# Patient Record
Sex: FEMALE | Race: WHITE | NOT HISPANIC OR LATINO | Employment: UNEMPLOYED | URBAN - METROPOLITAN AREA
[De-identification: names, ages, dates, MRNs, and addresses within clinical notes are randomized per-mention and may not be internally consistent; named-entity substitution may affect disease eponyms.]

---

## 2017-02-14 ENCOUNTER — ALLIED HEALTH/NURSE VISIT (OUTPATIENT)
Dept: INTERNAL MEDICINE | Facility: CLINIC | Age: 47
End: 2017-02-14

## 2017-02-14 VITALS — WEIGHT: 293 LBS | BODY MASS INDEX: 47.09 KG/M2 | HEIGHT: 66 IN

## 2017-02-14 DIAGNOSIS — F50.819 BINGE EATING DISORDER: ICD-10-CM

## 2017-02-14 DIAGNOSIS — Z71.3 DIETARY COUNSELING: ICD-10-CM

## 2017-02-14 DIAGNOSIS — E66.9 OBESITY, UNSPECIFIED: Primary | ICD-10-CM

## 2017-02-14 NOTE — PROGRESS NOTES
"Yuridia Barksdale  is a 46 year old female presents today for new nutrition consultation.    Vitals:  Ht 1.676 m (5' 6\")  Wt 134.5 kg (296 lb 9.6 oz)  BMI 47.87 kg/m2. She has long history of eating disorder.  Se has been working with therapist and also with RD for the last few years. In addition, she has been seen at the [facility].         Nutrition History  Patient is on a regular  diet at home.  We are trying API protocol for immune system suppression given her long history of Lupus.   It is week 3 on the API protocol.  Client has seen me at [facility] and private practice prior to transferring her care to [facility].     Recall:  B: Greek yogurt, and 1/2c bluberries  L: Meats with veggies  D: Beef with veggies and squash    Physical Activity  3-5 times per week     Time with Patient:  30 Minutes    Nutrition  DX:.   1. Obesity, unspecified    2. Dietary counseling    3. Binge eating disorder        Nutrition Goals:     Short term goals:  1:  Food journal  2:  API protocol, making sure she has at least 30g of carbs with each meal.  Suspect that she is under fueling in carbohydrate area.     Long term nutrition goals:  1: Elimination of eating disorder  2: Weight loss.  Initial goal 10% weight loss.  Clients has been losing weight in the last year.   3: Decreasing progression of her autoimmune disease and other chronic medical conditions  4: Eliminating eating disorder symptoms      Paola Waite RD  [facility]    "

## 2017-02-14 NOTE — MR AVS SNAPSHOT
"              After Visit Summary   2/14/2017    Yuridia Barksdale    MRN: [id number]           Patient Information     Date Of Birth          1970        Visit Information        Provider Department      2/14/2017 12:00 PM Paola Waite RD [facility]        Today's Diagnoses     Obesity, unspecified    -  1    Dietary counseling        Binge eating disorder           Follow-ups after your visit        Who to contact     Please call your clinic at [phone] to:    Ask questions about your health    Make or cancel appointments    Discuss your medicines    Learn about your test results    Speak to your doctor   If you have compliments or concerns about an experience at your clinic, or if you wish to file a complaint, please contact [facility] Patient Relations at [phone] or email us at [email or web address]         Additional Information About Your Visit        MyChart Information     [employer] gives you secure access to your electronic health record. If you see a primary care provider, you can also send messages to your care team and make appointments. If you have questions, please call your primary care clinic.  If you do not have a primary care provider, please call [phone] and they will assist you.      [employer] is an electronic gateway that provides easy, online access to your medical records. With [employer], you can request a clinic appointment, read your test results, renew a prescription or communicate with your care team.     To access your existing account, please contact your [facility] or call [phone] for assistance.        Care EveryWhere ID     This is your Care EveryWhere ID. This could be used by other organizations to access your [facility] medical records  [id number]        Your Vitals Were     Height BMI (Body Mass Index)                1.676 m (5' 6\") 47.87 kg/m2           Blood Pressure " from Last 3 Encounters:   02/04/13 (!) 161/96   04/26/12 177/108    Weight from Last 3 Encounters:   02/14/17 134.5 kg (296 lb 9.6 oz)   02/04/13 (!) 138.3 kg (305 lb)   04/26/12 136.1 kg (300 lb)              We Performed the Following     MNT INDIVIDUAL INITIAL EA 15 MIN        Primary Care Provider    Ochoa Mccall       XXX RETIRED XXX XXX  [place] [address]        Thank you!     Thank you for choosing  [facility]  for your care. Our goal is always to provide you with excellent care. Hearing back from our patients is one way we can continue to improve our services. Please take a few minutes to complete the written survey that you may receive in the mail after your visit with us. Thank you!             Your Updated Medication List - Protect others around you: Learn how to safely use, store and throw away your medicines at www.disposemymeds.org.          This list is accurate as of: 2/14/17  2:19 PM.  Always use your most recent med list.                   Brand Name Dispense Instructions for use    COUMADIN PO      Take  by mouth.       DULoxetine 30 MG EC capsule    CYMBALTA     Take 30 mg by mouth daily Two tabs in a.m. And one tab in p.m.       FISH OIL PO      Take 2 capsules by mouth daily       oxyCODONE 5 MG IR tablet    ROXICODONE    30 tablet    Take 1-2 tablets by mouth every 4 hours as needed for pain for 30 doses.       TART CHERRY ADVANCED PO      Take 1 Dose by mouth daily States liquid form but not regularly.       XARELTO PO      Take 1 tablet by mouth daily

## 2017-02-22 ENCOUNTER — ALLIED HEALTH/NURSE VISIT (OUTPATIENT)
Dept: INTERNAL MEDICINE | Facility: CLINIC | Age: 47
End: 2017-02-22

## 2017-02-22 VITALS — WEIGHT: 293 LBS | BODY MASS INDEX: 48.82 KG/M2 | HEIGHT: 65 IN

## 2017-02-22 DIAGNOSIS — E66.9 OBESITY: ICD-10-CM

## 2017-02-22 DIAGNOSIS — Z71.3 DIETARY COUNSELING: Primary | ICD-10-CM

## 2017-02-22 DIAGNOSIS — F50.9 EATING DISORDER, UNSPECIFIED: ICD-10-CM

## 2017-02-22 NOTE — PROGRESS NOTES
Yuridia Barksdale  is a 46 year old female presents today for follow-up nutrition consultation.  Dizzy spells 2 times.       Vitals:  Wt Readings from Last 4 Encounters:   02/22/17 134.6 kg (296 lb 12.8 oz)   02/14/17 134.5 kg (296 lb 9.6 oz)   02/04/13 (!) 138.3 kg (305 lb)   04/26/12 136.1 kg (300 lb)           Nutrition History  Patient is on a regular  diet at home.  We are trying API protocol for immune system suppression given her long history of Lupus.  Sunday meals that were most difficult.  Looked into her meals on the others days and despite asking her to have grain/carbs.   Warned that we need to stop API immuno suppressive protocol if unable to follow well.  Concerned about dizziness.   B: 9:00: 2 eggs, 5-6oz of meat, fruit, 8oz of juice  L: 12:30: Salad:  Vegetables, squash, 1 egg, balsamic vinergrette 2 Tbsp   D: 18:30: Potato, Azeri toast, eggs with pistachios, green tea unsweetened.     Physical Activity  Every day walking so far this week.     Time with Patient:  30 Minutes    Nutrition  DX:.   1. Dietary counseling    2. Obesity    3. Eating disorder, unspecified        Nutrition Goals:     Short term goals:  1:  Have 2 cups of fruit for breakfas  2:  API protocol, making sure she has at least 30g of carbs with each meal.  Suspect that she is under fueling in carbohydrate area.   3: Pack lunches for next week despite evelina being provided for the meeting.   4: Bring all supplement list and text if not following above.  We will need to stop API immuno suppressive protocol.     Long term nutrition goals:  1: Elimination of eating disorder  2: Weight loss.  Initial goal 10% weight loss.  Clients has been losing weight in the last year.   3: Decreasing progression of her autoimmune disease and other chronic medical conditions  4: Eliminating eating disorder symptoms      Paola Waite RD  [facility]

## 2017-02-22 NOTE — MR AVS SNAPSHOT
After Visit Summary   2/22/2017    Yuridia Barksdale    MRN: [id number]           Patient Information     Date Of Birth          1970        Visit Information        Provider Department      2/22/2017 12:30 PM Paola Waite RD [facility]        Today's Diagnoses     Dietary counseling    -  1    Obesity        Eating disorder, unspecified           Follow-ups after your visit        Your next 10 appointments already scheduled     Feb 27, 2017 12:00 PM CST   NUTR[facility] VISIT with [facility] ([facility])    [address] [place] [phone]            Mar 14, 2017 12:00 PM CDT   NUTR[facility]IT [facility] ([facility])    [address] [place] [id number] [phone]            Mar 21, 2017 12:00 PM CDT   NUTRITION VISIT with [facility] ([facility])    [address] [place] [phone]            Mar 28, 2017 12:00 PM CDT   NUTRITION VISIT with [facility] ([facility])    [address] [phone]            Apr 11, 2017 12:00 PM CDT   NUTRITION VISIT with [facility] ([facility])    [address] [phone]            Apr 18, 2017 12:00 PM CDT   NUTRITION VISIT with [facility] ([facility])    [address] [id number] [phone]            Apr 25, 2017 12:00 PM CDT   [facility]IT [facility]    [address] [id number] [phone]            May 02, 2017 12:00 PM CDT   NUTR[facility]IT [facility]    [address] [place] [id number] [phone]            May 09, 2017 12:00 PM CDT   NUTRITION VISIT with [facility]    [address] [place] [phone]            May 16, 2017 12:00 PM CDT   NUTRITION VISIT with [facility])    [address] [place] [address] [id number] [phone]              Who to contact     Please call your clinic at [phone] to:    Ask questions about your health    Make or cancel appointments    Discuss your medicines    Learn about your test results    Speak to your doctor   If you have compliments or concerns about an experience at your clinic, or if you wish to file a complaint, please contact [facility] Patient Relations at [phone] or email us at [email or web address]         Additional Information About Your Visit        [employer] Information     "[employer]t gives you secure access to your electronic health record. If you see a primary care provider, you can also send messages to your care team and make appointments. If you have questions, please call your primary care clinic.  If you do not have a primary care provider, please call [phone] and they will assist you.      [employer] is an electronic gateway that provides easy, online access to your medical records. With [employer], you can request a clinic appointment, read your test results, renew a prescription or communicate with your care team.     To access  "your existing account, please contact your [facility] or call [phone] for assistance.        Care EveryWhere ID     This is your Care EveryWhere ID. This could be used by other organizations to access your [facility] medical records  [id number]        Your Vitals Were     Height BMI (Body Mass Index)                1.651 m (5' 5\") 49.39 kg/m2           Blood Pressure from Last 3 Encounters:   02/04/13 (!) 161/96   04/26/12 177/108    Weight from Last 3 Encounters:   02/22/17 134.6 kg (296 lb 12.8 oz)   02/14/17 134.5 kg (296 lb 9.6 oz)   02/04/13 (!) 138.3 kg (305 lb)              We Performed the Following     MNT INDIVIDUAL F/U REASSESS, EA 15 MIN        Primary Care Provider    Ochoa Mccall       XXX RETIRED XXX XXX  XXX [place] [address]        Thank you!     Thank you for choosing  [facility]  for your care. Our goal is always to provide you with excellent care. Hearing back from our patients is one way we can continue to improve our services. Please take a few minutes to complete the written survey that you may receive in the mail after your visit with us. Thank you!             Your Updated Medication List - Protect others around you: Learn how to safely use, store and throw away your medicines at www.disposemymeds.org.          This list is accurate as of: 2/22/17 11:59 PM.  Always use your most recent med list.                   Brand Name Dispense Instructions for use    COUMADIN PO      Take  by mouth.       DULoxetine 30 MG EC capsule    CYMBALTA     Take 30 mg by mouth daily Two tabs in a.m. And one tab in p.m.       FISH OIL PO      Take 2 capsules by mouth daily       oxyCODONE 5 MG IR tablet    ROXICODONE    30 tablet    Take 1-2 tablets by mouth every 4 hours as needed for pain for 30 doses.       TART CHERRY ADVANCED PO      Take 1 Dose by mouth daily States liquid form but not regularly.       XARELTO PO      Take 1 tablet by mouth daily       "

## 2017-02-27 ENCOUNTER — ALLIED HEALTH/NURSE VISIT (OUTPATIENT)
Dept: INTERNAL MEDICINE | Facility: CLINIC | Age: 47
End: 2017-02-27

## 2017-02-27 VITALS — BODY MASS INDEX: 48.8 KG/M2 | WEIGHT: 292.9 LBS | HEIGHT: 65 IN

## 2017-02-27 DIAGNOSIS — E66.9 OBESITY, UNSPECIFIED: Primary | ICD-10-CM

## 2017-02-27 DIAGNOSIS — F50.9 EATING DISORDER, UNSPECIFIED: ICD-10-CM

## 2017-02-27 DIAGNOSIS — Z71.3 DIETARY COUNSELING: ICD-10-CM

## 2017-02-27 NOTE — PROGRESS NOTES
Yuridia Barksdale  is a 46 year old female presents today for follow-up nutrition consultation. NO dizzy spells     Vitals:  Wt Readings from Last 4 Encounters:   02/27/17 132.9 kg (292 lb 14.4 oz)   02/22/17 134.6 kg (296 lb 12.8 oz)   02/14/17 134.5 kg (296 lb 9.6 oz)   02/04/13 (!) 138.3 kg (305 lb)           Nutrition History  Patient is on a regular  diet at home.  We are trying API protocol for immune system suppression given her long history of Lupus.  No dizzy spells.   Sunday meals that were most difficult.  Warned that we need to stop API immuno suppressive protocol if unable to follow well.    B: 9:00: Greek yogurt, strawberries, 1 scoop  L: 12:30:Jumy Umaña unwitch,  Squash, snap peas  S:  15:43.  Gencan, and banana  D: 18:30: 2 eggs, and 2 sausages, fruit, orange juice    Reviewed all supplements:  She is not taking beet juice concentrate or tart cherries consistently.  So it is difficult to see effectiveness.      Physical Activity  4 workouts    Time with Patient:  30 Minutes    Nutrition  DX:.   1. Obesity, unspecified    2. Dietary counseling    3. Eating disorder, unspecified        Nutrition Goals:     Short term goals:  1:  Start taking beet juice concentrate consistently as well as tart cherry juice concentrate consistently to see what effect that has on her joint pain, workout quality and cardiovascular health.     Long term nutrition goals:  1: Elimination of eating disorder  2: Weight loss.  Initial goal 10% weight loss.  Clients has been losing weight in the last year.   3: Decreasing progression of her autoimmune disease and other chronic medical conditions  4: Eliminating eating disorder symptoms      Paola Waite, MS, RD, CSSD, LD  [facility]

## 2017-02-27 NOTE — MR AVS SNAPSHOT
After Visit Summary   2/27/2017    Yuridia Barksdale    MRN: [id number]           Patient Information     Date Of Birth          1970        Visit Information        Provider Department      2/27/2017 12:00 PM Paola Waite RD [facility]        Today's Diagnoses     Obesity, unspecified    -  1    Dietary counseling        Eating disorder, unspecified           Follow-ups after your visit        Your next 10 appointments already scheduled     Mar 21, 2017 12:00 PM CDT   NUTR[facility]ISIT [facility] ([facility])    [address] [place] [phone]            Mar 28, 2017 12:00 PM CDT   NUTRITION VISIT with [facility] ([facility])    [address] [place] [phone]            Apr 11, 2017 12:00 PM CDT   NUTRITION VISIT with [facility] ([facility])    [address] [place] [address] [phone]            Apr 18, 2017 12:00 PM CDT   NUTRITION VISIT with [facility] ([facility])    [address] [phone]            Apr 25, 2017 12:00 PM CDT   NUTRITION VISIT with [facility] ([facility])    [address] [place] [phone]            May 02, 2017 12:00 PM CDT   NUTRITION VISIT with [facility] ([facility])    [address] [phone]            May 09, 2017 12:00 PM CDT   NUTRITION VISIT with CLARENCE [facility]    [address] [phone]            May 16, 2017 12:00 PM CDT   NUTRITION VISIT with [facility]    [address] [place] [phone] [phone]            May 23, 2017 12:00 PM CDT   NUTRITION VISIT with [facility] ([facility])    [address] [place] [phone]            May 30, 2017 12:00 PM CDT   NUTRITION VISIT with [facility] ([facility])    [address] [place] [phone]              Who to contact     Please call your clinic at [phone] to:    Ask questions about your health    Make or cancel appointments    Discuss your medicines    Learn about your test results    Speak to your doctor   If you have compliments or concerns about an experience at your clinic, or if you wish to file a complaint, please contact [facility] Patient Relations at [phone] or email us at [email or web address]         Additional Information About Your Visit        MyChart Information     [employer] gives you secure access to your electronic health record. If you see a primary care provider, you can also send messages to your care team and make appointments. If you have questions, please call your primary care clinic.  If you do not have a primary care provider, please call [phone] and they will assist you.      [employer] is an electronic gateway that provides easy, online access to your medical records. With [employer], you can request a clinic appointment, read your test results, renew a prescription or communicate with your care  "team.     To access your existing account, please contact your [facility] or call [phone] for assistance.        Care EveryWhere ID     This is your Care EveryWhere ID. This could be used by other organizations to access your [facility] medical records  [id number]        Your Vitals Were     Height BMI (Body Mass Index)                1.651 m (5' 5\") 48.74 kg/m2           Blood Pressure from Last 3 Encounters:   02/04/13 (!) 161/96   04/26/12 177/108    Weight from Last 3 Encounters:   02/27/17 132.9 kg (292 lb 14.4 oz)   02/22/17 134.6 kg (296 lb 12.8 oz)   02/14/17 134.5 kg (296 lb 9.6 oz)              We Performed the Following     MNT INDIVIDUAL F/U REASSESS, EA 15 MIN        Primary Care Provider    Ochoa Mccall       XXX RETIRED XXX XXX  XXX [place] [address]        Thank you!     Thank you for choosing  [facility]  for your care. Our goal is always to provide you with excellent care. Hearing back from our patients is one way we can continue to improve our services. Please take a few minutes to complete the written survey that you may receive in the mail after your visit with us. Thank you!             Your Updated Medication List - Protect others around you: Learn how to safely use, store and throw away your medicines at www.disposemymeds.org.          This list is accurate as of: 2/27/17 11:59 PM.  Always use your most recent med list.                   Brand Name Dispense Instructions for use    COUMADIN PO      Take  by mouth.       DULoxetine 30 MG EC capsule    CYMBALTA     Take 30 mg by mouth daily Two tabs in a.m. And one tab in p.m.       FISH OIL PO      Take 2 capsules by mouth daily       oxyCODONE 5 MG IR tablet    ROXICODONE    30 tablet    Take 1-2 tablets by mouth every 4 hours as needed for pain for 30 doses.       TART CHERRY ADVANCED PO      Take 1 Dose by mouth daily States liquid form but not regularly.       XARELTO PO      Take 1 " tablet by mouth daily

## 2017-03-21 ENCOUNTER — ALLIED HEALTH/NURSE VISIT (OUTPATIENT)
Dept: INTERNAL MEDICINE | Facility: CLINIC | Age: 47
End: 2017-03-21

## 2017-03-21 VITALS — HEIGHT: 66 IN | WEIGHT: 293 LBS | BODY MASS INDEX: 47.09 KG/M2

## 2017-03-21 DIAGNOSIS — Z71.3 DIETARY COUNSELING: Primary | ICD-10-CM

## 2017-03-21 DIAGNOSIS — F50.9 EATING DISORDER, UNSPECIFIED: ICD-10-CM

## 2017-03-21 DIAGNOSIS — E66.9 OBESITY, UNSPECIFIED: ICD-10-CM

## 2017-03-21 NOTE — PROGRESS NOTES
Yuridia Barksdale  is a 46 year old female presents today for follow-up nutrition consultation. Not following immuno suppressive protocol.     Vitals:  Wt Readings from Last 4 Encounters:   03/21/17 134.7 kg (296 lb 14.4 oz)   02/27/17 132.9 kg (292 lb 14.4 oz)   02/22/17 134.6 kg (296 lb 12.8 oz)   02/14/17 134.5 kg (296 lb 9.6 oz)           Nutrition History  Patient is on a regular diet at home.  We are trying immuno suppresive protocol given her long history of Lupus.  No dizzy spells.   Did not follow the protocol since vacation.  It was hard to follow, but she had more join pain, and headaches since then.  She believes that she needs to get back on the protocol to figure out the meals and foods that could trigger her immune system.      B: 9:00: Greek yogurt, strawberries, 1 scoop protein powder  L: 12:30: Mixed greens with salmon and carrots  S:  15:43.  GenUCAN, beat juice before workout.  D: 18:30: Chicken breast, carrots, banana    Reviewed all supplements:  She is not taking beet juice concentrate or tart cherries consistently.      Physical Activity  4 workouts last week.     Time with Patient:  30 Minutes    Nutrition  DX:.   1. Dietary counseling    2. Obesity, unspecified    3. Eating disorder, unspecified        Nutrition Goals:     Short term goals:  1:  Start taking beet juice concentrate consistently as well as tart cherry juice concentrate consistently to see what effect that has on her joint pain, workout quality and cardiovascular health.   2:  Go back to immuno suppressive protocol.  Will be including eggs, yogurt.  Will be tightly observing her weight, mood and pain.   Protocol will be stopped if it is not making a difference to her health.     Long term nutrition goals:  1: Elimination of eating disorder  2: Weight loss.  Initial goal 10% weight loss.  Clients has been losing weight in the last year.   3: Decreasing progression of her autoimmune disease and other chronic medical conditions  4:  Eliminating eating disorder symptoms      Paola Waite, MS, RD, CSSD, LD  [facility]

## 2017-03-21 NOTE — MR AVS SNAPSHOT
After Visit Summary   3/21/2017    Yuridia Barksdale    MRN: [id number]           Patient Information     Date Of Birth          1970        Visit Information        Provider Department      3/21/2017 12:00 PM Paola Waite RD [facility]        Today's Diagnoses     Dietary counseling    -  1    Obesity, unspecified        Eating disorder, unspecified           Follow-ups after your visit        Your next 10 appointments already scheduled     Mar 28, 2017 12:00 PM CDT   NUTRITION VISIT with [facility] ([facility])    [address] [place] [id number] [phone]            Apr 11, 2017 12:00 PM CDT   NUTRITION VISIT with [facility] ([facility])    [address] [place] [address] [phone]            Apr 18, 2017 12:00 PM CDT   NUTRITION VISIT with [facility] ([facility])    [address] [place] [address] [phone]            Apr 25, 2017 12:00 PM CDT   NUTRITION VISIT with [facility] ([facility])    [address] [id number] [phone]            May 02, 2017 12:00 PM CDT   NUTRITION VISIT with [facility] ([facility])    [address] [place] [phone]            May 09, 2017 12:00 PM CDT   NUTRITION VISIT with [facility] ([facility])    [address] [phone] [phone]            May 16, 2017 12:00 PM CDT   NUTRITION VISIT with CLARENCE Butler  [facility] ([facility])    [address] [facility]  [address] [facility] [phone]            May 23, 2017 12:00 PM CDT   NUTRITION VISIT with Paola Waite RD   [facility] ([facility])    [address] [phone]            May 30, 2017 12:00 PM CDT   NUTRITION VISIT with Paola Waite RD   [facility] ([facility])    [address] [phone]              Who to contact     Please call your clinic at [phone] to:    Ask questions about your health    Make or cancel appointments    Discuss your medicines    Learn about your test results    Speak to your doctor   If you have compliments or concerns about an experience at your clinic, or if you wish to file a complaint, please contact [facility] Patient Relations at [phone] or email us at [email or web address]         Additional Information About Your Visit        [employer] Information     [employer] gives you secure access to your electronic health record. If you see a primary care provider, you can also send messages to your care team and make appointments. If you have questions, please call your primary care clinic.  If you do not have a primary care provider, please call [phone] and they will assist you.      [employer] is an electronic gateway that provides easy, online access to your medical records. With [employer], you can request a clinic appointment, read your test results, renew a prescription or communicate with your care team.     To access your existing account, please contact your [facility] or call [phone] for assistance.        Care EveryWhere ID     This is your Care EveryWhere ID. This could be used by other  "organizations to access your [place] medical records  [id number]        Your Vitals Were     Height BMI (Body Mass Index)                1.676 m (5' 6\") 47.92 kg/m2           Blood Pressure from Last 3 Encounters:   02/04/13 (!) 161/96   04/26/12 177/108    Weight from Last 3 Encounters:   03/21/17 134.7 kg (296 lb 14.4 oz)   02/27/17 132.9 kg (292 lb 14.4 oz)   02/22/17 134.6 kg (296 lb 12.8 oz)              We Performed the Following     MNT INDIVIDUAL F/U REASSESS, EA 15 MIN        Primary Care Provider    Ochoa Mccall       XXX RETIRED XXX XXX  [place] [address]        Thank you!     Thank you for choosing  [facility]  for your care. Our goal is always to provide you with excellent care. Hearing back from our patients is one way we can continue to improve our services. Please take a few minutes to complete the written survey that you may receive in the mail after your visit with us. Thank you!             Your Updated Medication List - Protect others around you: Learn how to safely use, store and throw away your medicines at www.disposemymeds.org.          This list is accurate as of: 3/21/17 11:59 PM.  Always use your most recent med list.                   Brand Name Dispense Instructions for use    COUMADIN PO      Take  by mouth.       DULoxetine 30 MG EC capsule    CYMBALTA     Take 30 mg by mouth daily Two tabs in a.m. And one tab in p.m.       FISH OIL PO      Take 2 capsules by mouth daily       oxyCODONE 5 MG IR tablet    ROXICODONE    30 tablet    Take 1-2 tablets by mouth every 4 hours as needed for pain for 30 doses.       TART CHERRY ADVANCED PO      Take 1 Dose by mouth daily States liquid form but not regularly.       XARELTO PO      Take 1 tablet by mouth daily         "

## 2017-03-28 ENCOUNTER — ALLIED HEALTH/NURSE VISIT (OUTPATIENT)
Dept: INTERNAL MEDICINE | Facility: CLINIC | Age: 47
End: 2017-03-28

## 2017-03-28 VITALS — BODY MASS INDEX: 46.85 KG/M2 | HEIGHT: 66 IN | WEIGHT: 291.5 LBS

## 2017-03-28 DIAGNOSIS — F50.9 EATING DISORDER, UNSPECIFIED: ICD-10-CM

## 2017-03-28 DIAGNOSIS — E46 UNSPECIFIED PROTEIN-CALORIE MALNUTRITION (H): ICD-10-CM

## 2017-03-28 DIAGNOSIS — E66.9 OBESITY, UNSPECIFIED: ICD-10-CM

## 2017-03-28 DIAGNOSIS — Z71.3 DIETARY COUNSELING: Primary | ICD-10-CM

## 2017-03-28 NOTE — MR AVS SNAPSHOT
After Visit Summary   3/28/2017    Yuridia Barksdale    MRN: [id number]           Patient Information     Date Of Birth          1970        Visit Information        Provider Department      3/28/2017 12:00 PM Paola Waite RD [facility]        Today's Diagnoses     Dietary counseling    -  1    Unspecified protein-calorie malnutrition (H)        Obesity, unspecified        Eating disorder, unspecified           Follow-ups after your visit        Your next 10 appointments already scheduled     Apr 11, 2017 12:00 PM CDT   NUTRITION VISIT with [facility] ([facility])    [address] [place] [address]   [phone]            Apr 18, 2017 12:00 PM CDT   NUTRITION VISIT with CL[facility]ENCE [facility] ([facility])    [address] [place] [address] [phone]            Apr 25, 2017 12:00 PM CDT   NUTRITION VISIT with CLARENCE [facility] ([facility])    [address] [place] [address] [phone]            May 02, 2017 12:00 PM CDT   NUTRITION VISIT with CLARENCE [facility] ([facility])    [address] [place] [address] [phone]            May 09, 2017 12:00 PM CDT   NUTRITION VISIT with CL[facility]ENCE [facility] ([facility])    [address] [place] [address] [phone]            May 16, 2017 12:00 PM CDT   NUTRITION VISIT with CLARENCE [facility] ([facility])    [address] [place] [phone]            May 23, 2017 12:00  "PM CDT   NUTRITION VISIT with CL[facility]ENCE [facility] ([facility])    [address] [facility]  [address] [facility] [address] [phone]            May 30, 2017 12:00 PM CDT   NUTRITION VISIT with [facility] ([facility])    [address] [facility]  [address] [facility] [address] [phone]              Who to contact     Please call your clinic at [phone] to:    Ask questions about your health    Make or cancel appointments    Discuss your medicines    Learn about your test results    Speak to your doctor   If you have compliments or concerns about an experience at your clinic, or if you wish to file a complaint, please contact [facility] Patient Relations at [phone] or email us at [email or web address]         Additional Information About Your Visit        [employer] Information     [employer] gives you secure access to your electronic health record. If you see a primary care provider, you can also send messages to your care team and make appointments. If you have questions, please call your primary care clinic.  If you do not have a primary care provider, please call [phone] and they will assist you.      [employer] is an electronic gateway that provides easy, online access to your medical records. With [employer], you can request a clinic appointment, read your test results, renew a prescription or communicate with your care team.     To access your existing account, please contact your [facility] or call [phone] for assistance.        Care EveryWhere ID     This is your Care EveryWhere ID. This could be used by other organizations to access your [place] medical records  [id number]        Your Vitals Were     Height BMI (Body Mass Index)                1.676 m (5' 6\") 47.05 kg/m2           Blood " Pressure from Last 3 Encounters:   02/04/13 (!) 161/96   04/26/12 177/108    Weight from Last 3 Encounters:   03/28/17 132.2 kg (291 lb 8 oz)   03/21/17 134.7 kg (296 lb 14.4 oz)   02/27/17 132.9 kg (292 lb 14.4 oz)              We Performed the Following     MNT INDIVIDUAL F/U REASSESS, EA 15 MIN        Primary Care Provider    Ochoa Mccall       XXX RETIRED XXX XXX  XXX [place] [address]        Thank you!     Thank you for choosing  [facility]  for your care. Our goal is always to provide you with excellent care. Hearing back from our patients is one way we can continue to improve our services. Please take a few minutes to complete the written survey that you may receive in the mail after your visit with us. Thank you!             Your Updated Medication List - Protect others around you: Learn how to safely use, store and throw away your medicines at www.disposemymeds.org.          This list is accurate as of: 3/28/17 11:59 PM.  Always use your most recent med list.                   Brand Name Dispense Instructions for use    COUMADIN PO      Take  by mouth.       DULoxetine 30 MG EC capsule    CYMBALTA     Take 30 mg by mouth daily Two tabs in a.m. And one tab in p.m.       FISH OIL PO      Take 2 capsules by mouth daily       oxyCODONE 5 MG IR tablet    ROXICODONE    30 tablet    Take 1-2 tablets by mouth every 4 hours as needed for pain for 30 doses.       TART CHERRY ADVANCED PO      Take 1 Dose by mouth daily States liquid form but not regularly.       XARELTO PO      Take 1 tablet by mouth daily

## 2017-03-28 NOTE — PROGRESS NOTES
Yuridia Barksdale  is a 46 year old female presents today for follow-up nutrition consultation. She has been following immuno suppressive protocol for her long history of lupus and reporting lesser join pain.  However, despite talking about the need to have adequate carbohydrate for her brain and energy levels, she is had days when she had only veggies and protein, but no carbs.     Vitals:  Wt Readings from Last 4 Encounters:   03/28/17 132.2 kg (291 lb 8 oz)   03/21/17 134.7 kg (296 lb 14.4 oz)   02/27/17 132.9 kg (292 lb 14.4 oz)   02/22/17 134.6 kg (296 lb 12.8 oz)           Nutrition History  Patient is on a regular diet at home.  We are trying immuno suppresive protocol given her long history of Lupus.  No dizzy spells, but lightheadedness and some spotty vision.      B: 8:00: Greek yogurt, strawberries, 1 scoop protein powder  L: 13:44: lunch meet, avocado, lettuce, carrots, padilla  S:  15:43.  GenUCAN, beat juice before workout.  D: 18:30: Chicken, green beans    Reviewed all supplements:  She is not taking beet juice concentrate or tart cherries consistently.      Physical Activity  4 workouts last week.     Time with Patient:  30 Minutes    Nutrition  DX:.   1. Dietary counseling    2. Unspecified protein-calorie malnutrition (H)    3. Obesity, unspecified    4. Eating disorder, unspecified        Nutrition Goals:     Short term goals:  1: She needs to include adequate amount of grain for energy and brain.  Suspect that dizzy and spotty vision is due to lack of carbohydrates.  In addition, she lost significant amount of weight in the last week and her past experiences with eating disorder does not help.  She is attached to losing significant weight.   Education on brain function, gut function, and all other functions in the body that require carbohydrates and explained that it she needs to have 2 c of fruit for breakfast and 1 c of grain for lunch and dinner.       Long term nutrition goals:  1: Elimination  of eating disorder  2: Weight loss.  Initial goal 10% weight loss.  Clients has been losing weight in the last year.   3: Decreasing progression of her autoimmune disease and other chronic medical conditions  4: Eliminating eating disorder symptoms      Paola Waite, MS, RD, CSSD, LD  [facility]

## 2017-05-02 ENCOUNTER — ALLIED HEALTH/NURSE VISIT (OUTPATIENT)
Dept: INTERNAL MEDICINE | Facility: CLINIC | Age: 47
End: 2017-05-02

## 2017-05-02 VITALS — BODY MASS INDEX: 47.09 KG/M2 | WEIGHT: 293 LBS | HEIGHT: 66 IN

## 2017-05-02 DIAGNOSIS — F50.9 EATING DISORDER, UNSPECIFIED: ICD-10-CM

## 2017-05-02 DIAGNOSIS — Z71.3 DIETARY COUNSELING: Primary | ICD-10-CM

## 2017-05-02 DIAGNOSIS — E66.9 OBESITY, UNSPECIFIED: ICD-10-CM

## 2017-05-02 NOTE — PROGRESS NOTES
Yuridia Barksdale  is a 46 year old female presents today for follow-up nutrition consultation.  Noticed that I am eating more pasta, lesser veggies and fruit.  Last week had more catered meals.  Had one dizzy spell, which seemed to be related to very low carb intake (it was about 60g in the first part of the day)    Vitals:  Wt Readings from Last 4 Encounters:   05/02/17 298 lb 11.2 oz (135.5 kg)   04/18/17 295 lb 8 oz (134 kg)   03/28/17 291 lb 8 oz (132.2 kg)   03/21/17 296 lb 14.4 oz (134.7 kg)           Nutrition History  Patient is on a regular diet at home.  When no appointment, typically noticed worse nutrition.  2 binges 2 weeks ago (state meeting nervousness, lack food)    B: 8:00: 3 eggs, 2 sausage links, 1c of berries  L: 13:44: salad that I bought (cheff salad), Portuguese dressing  S: GenUCAN  D: 18:30: pasta (3/4c) with red sauce, with ground beef.    Reviewed all supplements:  She is only taking omega 3 fatty acids regularly, tart cherries intermittedly    Physical Activity  5 workouts last week    Time with Patient:  30 Minutes    Nutrition  DX:.   1. Dietary counseling    2. Obesity, unspecified    3. Eating disorder, unspecified        Nutrition Goals:     Short term goals:  1: We will go back to regular nutrition:  1 grain, 4-5 oz of protein, 2 fruit or veggie, 1 fat and 1 calcium per meal due to eating disorder symptoms.   2:  Will schedule an appointment with reumatologist    Long term nutrition goals:  1: Elimination of eating disorder  2: Weight loss.  Initial goal 10% weight loss.  Clients has been losing weight in the last year.   3: Decreasing progression of her autoimmune disease and other chronic medical conditions  4: Eliminating eating disorder symptoms      Paola Waite, MS, RD, CSSD, LD  [facility]

## 2017-05-02 NOTE — MR AVS SNAPSHOT
After Visit Summary   5/2/2017    Yruidia Barksdale    MRN: [id number]           Patient Information     Date Of Birth          1970        Visit Information        Provider Department      5/2/2017 12:00 PM Paola Waite RD [facility]        Today's Diagnoses     Dietary counseling    -  1    Obesity, unspecified        Eating disorder, unspecified           Follow-ups after your visit        Your next 10 appointments already scheduled     May 09, 2017 12:00 PM CDT   NUTRITION VISIT with CL[facility]ENCE [facility] ([facility])    [address] [place] [id number] [phone]            May 16, 2017 12:00 PM CDT   NUTRITION VISIT with CLARENCE Butler [facility] ([facility])    [address] [place] [phone]            May 23, 2017 12:00 PM CDT   NUTRITION VISIT with CLARENCE Butler [facility] ([facility])    [address] [place] [phone]            May 30, 2017 12:00 PM CDT   NUTRITION VISIT with CLARENCE [facility] ([facility])    [address] [place] [address] [phone]              Who to contact     Please call your clinic at [phone] to:    Ask questions about your health    Make or cancel appointments    Discuss your medicines    Learn about your test results    Speak to your doctor   If you have compliments or concerns about an experience at your clinic, or if you wish to file a complaint, please contact [facility] Patient Relations at [phone] or email us at [email or web address]         Additional Information About Your Visit        MyChart Information   "   [employer] gives you secure access to your electronic health record. If you see a primary care provider, you can also send messages to your care team and make appointments. If you have questions, please call your primary care clinic.  If you do not have a primary care provider, please call [phone] and they will assist you.      [employer] is an electronic gateway that provides easy, online access to your medical records. With [employer], you can request a clinic appointment, read your test results, renew a prescription or communicate with your care team.     To access your existing account, please contact your [facility] or call [phone] for assistance.        Care EveryWhere ID     This is your Care EveryWhere ID. This could be used by other organizations to access your [place] medical records  [id number]        Your Vitals Were     Height BMI (Body Mass Index)                5' 6\" (1.676 m) 48.21 kg/m2           Blood Pressure from Last 3 Encounters:   02/04/13 (!) 161/96   04/26/12 177/108    Weight from Last 3 Encounters:   05/02/17 298 lb 11.2 oz (135.5 kg)   04/18/17 295 lb 8 oz (134 kg)   03/28/17 291 lb 8 oz (132.2 kg)              We Performed the Following     MNT INDIVIDUAL F/U REASSESS, EA 15 MIN        Primary Care Provider    Ochoa Mccall       XXX RETIRED XXX XXX  XXX [place] [address]        Thank you!     Thank you for choosing  [facility]  for your care. Our goal is always to provide you with excellent care. Hearing back from our patients is one way we can continue to improve our services. Please take a few minutes to complete the written survey that you may receive in the mail after your visit with us. Thank you!             Your Updated Medication List - Protect others around you: Learn how to safely use, store and throw away your medicines at www.disposemymeds.org.          This list is accurate as of: 5/2/17 12:32 PM.  Always use your most " recent med list.                   Brand Name Dispense Instructions for use    COUMADIN PO      Take  by mouth.       DULoxetine 30 MG EC capsule    CYMBALTA     Take 30 mg by mouth daily Two tabs in a.m. And one tab in p.m.       FISH OIL PO      Take 2 capsules by mouth daily       oxyCODONE 5 MG IR tablet    ROXICODONE    30 tablet    Take 1-2 tablets by mouth every 4 hours as needed for pain for 30 doses.       TART CHERRY ADVANCED PO      Take 1 Dose by mouth daily States liquid form but not regularly.       XARELTO PO      Take 1 tablet by mouth daily

## 2017-05-09 ENCOUNTER — ALLIED HEALTH/NURSE VISIT (OUTPATIENT)
Dept: INTERNAL MEDICINE | Facility: CLINIC | Age: 47
End: 2017-05-09

## 2017-05-09 VITALS — BODY MASS INDEX: 47.09 KG/M2 | HEIGHT: 66 IN | WEIGHT: 293 LBS

## 2017-05-09 DIAGNOSIS — F50.9 EATING DISORDER, UNSPECIFIED: ICD-10-CM

## 2017-05-09 DIAGNOSIS — Z71.3 DIETARY COUNSELING: Primary | ICD-10-CM

## 2017-05-09 DIAGNOSIS — E66.9 OBESITY, UNSPECIFIED: ICD-10-CM

## 2017-05-09 NOTE — PROGRESS NOTES
Yuridia Barksdale  is a 46 year old female presents today for follow-up nutrition consultation.  Dizzy spells (2 times last week).   Rheumatologist did see today.  Recommendation to see urologist, neurologist.     Vitals:  Wt Readings from Last 4 Encounters:   05/09/17 298 lb 14.4 oz (135.6 kg)   05/02/17 298 lb 11.2 oz (135.5 kg)   04/18/17 295 lb 8 oz (134 kg)   03/28/17 291 lb 8 oz (132.2 kg)           Nutrition History  Patient is on a regular diet at home.    9:00: 2 eggs, 2 sausage links, 1 apple  15:00:  Salad with   18:30:  Ogema, rice, veggies    Reviewed all supplements:  She is only taking omega 3 fatty acids regularly, tart cherries regularly    Physical Activity  3 times this week    Time with Patient:  30 Minutes    Nutrition  DX:.   No diagnosis found.    Nutrition Goals:     Short term goals:  1: Have 1 whole grain for each meal.  Discussed practical way to make it work  2:  Challenged to make sure she thinking about her health rather than chasing the scale. Suspect that her low carbohydrate intake as well as long times sometimes between meals contributing to dizzy spells.     Long term nutrition goals:  1: Elimination of eating disorder  2: Weight loss.  Initial goal 10% weight loss.  Clients has been losing weight in the last year.   3: Decreasing progression of her autoimmune disease and other chronic medical conditions  4: Eliminating eating disorder symptoms      Paola Waite, MS, RD, CSSD, LD  [facility]

## 2017-05-09 NOTE — MR AVS SNAPSHOT
After Visit Summary   5/9/2017    Yuridia Barksdale    MRN: [id number]           Patient Information     Date Of Birth          1970        Visit Information        Provider Department      5/9/2017 12:00 PM Paola Waite RD [facility]        Today's Diagnoses     Dietary counseling    -  1    Obesity, unspecified        Eating disorder, unspecified           Follow-ups after your visit        Your next 10 appointments already scheduled     May 16, 2017 12:00 PM CDT   NUTRITION VISIT with CLARENCE [facility] ([facility])    [address] [place] [id number] [phone]            May 23, 2017 12:00 PM CDT   NUTRITION VISIT with CLARENCE [facility] ([facility])    [address] [place] [id number] [phone]            May 30, 2017 12:00 PM CDT   NUTRITION VISIT with CLARENCE Butler [facility] ([facility])    [address] [place] [id number] [phone]              Who to contact     Please call your clinic at [phone] to:    Ask questions about your health    Make or cancel appointments    Discuss your medicines    Learn about your test results    Speak to your doctor   If you have compliments or concerns about an experience at your clinic, or if you wish to file a complaint, please contact [facility] Patient Relations at [phone] or email us at [email or web address]         Additional Information About Your Visit        MyChart Information     [employer] gives you secure access to your electronic health record. If you see a primary care provider, you can also send messages to your care team and make appointments. If you have questions, please call your primary care  "clinic.  If you do not have a primary care provider, please call [phone] and they will assist you.      [employer] is an electronic gateway that provides easy, online access to your medical records. With [employer], you can request a clinic appointment, read your test results, renew a prescription or communicate with your care team.     To access your existing account, please contact your [facility] or call [phone] for assistance.        Care EveryWhere ID     This is your Care EveryWhere ID. This could be used by other organizations to access your [place] medical records  [id number]        Your Vitals Were     Height BMI (Body Mass Index)                5' 6\" (1.676 m) 48.24 kg/m2           Blood Pressure from Last 3 Encounters:   02/04/13 (!) 161/96   04/26/12 177/108    Weight from Last 3 Encounters:   05/09/17 298 lb 14.4 oz (135.6 kg)   05/02/17 298 lb 11.2 oz (135.5 kg)   04/18/17 295 lb 8 oz (134 kg)              We Performed the Following     MNT INDIVIDUAL F/U REASSESS, EA 15 MIN        Primary Care Provider    Ochoa Mccall       XXX RETIRED XXX XXX  [place] [address]        Thank you!     Thank you for choosing  [facility]  for your care. Our goal is always to provide you with excellent care. Hearing back from our patients is one way we can continue to improve our services. Please take a few minutes to complete the written survey that you may receive in the mail after your visit with us. Thank you!             Your Updated Medication List - Protect others around you: Learn how to safely use, store and throw away your medicines at www.disposemymeds.org.          This list is accurate as of: 5/9/17  2:09 PM.  Always use your most recent med list.                   Brand Name Dispense Instructions for use    COUMADIN PO      Take  by mouth.       DULoxetine 30 MG EC capsule    CYMBALTA     Take 30 mg by mouth daily Two tabs in a.m. And one tab in " p.m.       FISH OIL PO      Take 2 capsules by mouth daily       oxyCODONE 5 MG IR tablet    ROXICODONE    30 tablet    Take 1-2 tablets by mouth every 4 hours as needed for pain for 30 doses.       TART CHERRY ADVANCED PO      Take 1 Dose by mouth daily States liquid form but not regularly.       XARELTO PO      Take 1 tablet by mouth daily

## 2017-06-16 ENCOUNTER — ALLIED HEALTH/NURSE VISIT (OUTPATIENT)
Dept: UROLOGY | Facility: CLINIC | Age: 47
End: 2017-06-16

## 2017-06-16 VITALS — HEIGHT: 66 IN | WEIGHT: 293 LBS | BODY MASS INDEX: 47.09 KG/M2

## 2017-06-16 DIAGNOSIS — E66.9 OBESITY, UNSPECIFIED: ICD-10-CM

## 2017-06-16 DIAGNOSIS — Z71.3 DIETARY COUNSELING: Primary | ICD-10-CM

## 2017-06-16 DIAGNOSIS — F50.9 EATING DISORDER, UNSPECIFIED: ICD-10-CM

## 2017-06-16 NOTE — MR AVS SNAPSHOT
"              After Visit Summary   6/16/2017    Yuridia Barksdale    MRN: [id number]           Patient Information     Date Of Birth          1970        Visit Information        Provider Department      6/16/2017 5:30 PM Paola Waite [facility]        Today's Diagnoses     Dietary counseling    -  1    Obesity, unspecified        Eating disorder, unspecified           Follow-ups after your visit        Who to contact     Please call your clinic at [phone] to:    Ask questions about your health    Make or cancel appointments    Discuss your medicines    Learn about your test results    Speak to your doctor   If you have compliments or concerns about an experience at your clinic, or if you wish to file a complaint, please contact [facility] Patient Relations at [phone] or email us at [email or web address]         Additional Information About Your Visit        MyChart Information     [employer] gives you secure access to your electronic health record. If you see a primary care provider, you can also send messages to your care team and make appointments. If you have questions, please call your primary care clinic.  If you do not have a primary care provider, please call [phone] and they will assist you.      [employer] is an electronic gateway that provides easy, online access to your medical records. With [employer], you can request a clinic appointment, read your test results, renew a prescription or communicate with your care team.     To access your existing account, please contact your [facility] or call [phone] for assistance.        Care EveryWhere ID     This is your Care EveryWhere ID. This could be used by other organizations to access your [place] medical records  [id number]        Your Vitals Were     Height BMI (Body Mass Index)                1.676 m (5' 6\") 48.95 " kg/m2           Blood Pressure from Last 3 Encounters:   02/04/13 (!) 161/96   04/26/12 177/108    Weight from Last 3 Encounters:   06/16/17 (!) 137.6 kg (303 lb 4.8 oz)   05/09/17 135.6 kg (298 lb 14.4 oz)   05/02/17 135.5 kg (298 lb 11.2 oz)              We Performed the Following     MNT INDIVIDUAL F/U REASSESS, EA 15 MIN        Primary Care Provider    Ochoa Mccall       XXX RETIRED XXX XXX  XXX [place] [address]        Thank you!     Thank you for choosing [facility] AND [facility]  for your care. Our goal is always to provide you with excellent care. Hearing back from our patients is one way we can continue to improve our services. Please take a few minutes to complete the written survey that you may receive in the mail after your visit with us. Thank you!             Your Updated Medication List - Protect others around you: Learn how to safely use, store and throw away your medicines at www.disposemymeds.org.          This list is accurate as of: 6/16/17 11:12 PM.  Always use your most recent med list.                   Brand Name Dispense Instructions for use    COUMADIN PO      Take  by mouth.       DULoxetine 30 MG EC capsule    CYMBALTA     Take 30 mg by mouth daily Two tabs in a.m. And one tab in p.m.       FISH OIL PO      Take 2 capsules by mouth daily       oxyCODONE 5 MG IR tablet    ROXICODONE    30 tablet    Take 1-2 tablets by mouth every 4 hours as needed for pain for 30 doses.       TART CHERRY ADVANCED PO      Take 1 Dose by mouth daily States liquid form but not regularly.       XARELTO PO      Take 1 tablet by mouth daily

## 2017-06-16 NOTE — PROGRESS NOTES
Yuridai Barksdale  is a 46 year old female presents today for follow-up nutrition consultation.  She has not been feeling well about her nutrition except last week when she was on vacation.     Wt Readings from Last 4 Encounters:   06/16/17 (!) 137.6 kg (303 lb 4.8 oz)   05/09/17 135.6 kg (298 lb 14.4 oz)   05/02/17 135.5 kg (298 lb 11.2 oz)   04/18/17 134 kg (295 lb 8 oz)           Nutrition History  Patient is on a regular diet at home.  No binges in the last week. Prior to that 2 binges per week with restriction.  NO food all day and at night .   9:00: 2 eggs, strawberries (1C), 1% milk (10oz)  11:30:  Salad with grilled chicken, and various veggies, beets, vinagrette  19:30:  Grilled chicken breast, mashed potatoes, blueberries    Reviewed all supplements:  She is only taking tart cherries but no omega 3 fatty acids and no GenUcan    Physical Activity  5 times this week.    Time with Patient:  30 Minutes    Nutrition  DX:.   1. Dietary counseling    2. Obesity, unspecified    3. Eating disorder, unspecified        Nutrition Goals:     Short term goals:  1: Start having balanced meals as we discussed with grain with each meal  2:  Restart omega-3 supplements.     Long term nutrition goals:  1: Elimination of eating disorder  2: Weight loss.  Initial goal 10% weight loss.  Clients has been losing weight in the last year.   3: Decreasing progression of her autoimmune disease and other chronic medical conditions  4: Eliminating eating disorder symptoms      Paola Waite, MS, RD, CSSD, LD  [facility]

## 2017-09-17 ENCOUNTER — HEALTH MAINTENANCE LETTER (OUTPATIENT)
Age: 47
End: 2017-09-17

## 2018-05-02 ENCOUNTER — HOSPITAL ENCOUNTER (EMERGENCY)
Facility: CLINIC | Age: 48
Discharge: HOME | End: 2018-05-02
Attending: EMERGENCY MEDICINE | Admitting: EMERGENCY MEDICINE
Payer: COMMERCIAL

## 2018-05-02 VITALS
DIASTOLIC BLOOD PRESSURE: 108 MMHG | BODY MASS INDEX: 48.82 KG/M2 | SYSTOLIC BLOOD PRESSURE: 161 MMHG | HEART RATE: 84 BPM | TEMPERATURE: 97.1 F | RESPIRATION RATE: 18 BRPM | HEIGHT: 65 IN | WEIGHT: 293 LBS | OXYGEN SATURATION: 94 %

## 2018-05-02 DIAGNOSIS — R04.0 EPISTAXIS: ICD-10-CM

## 2018-05-02 PROCEDURE — 99284 EMERGENCY DEPT VISIT MOD MDM: CPT | Mod: 25

## 2018-05-02 PROCEDURE — 27210182 ZZH KIT NASAL PACKING

## 2018-05-02 PROCEDURE — 30903 CONTROL OF NOSEBLEED: CPT | Mod: RT

## 2018-05-02 NOTE — ED AVS SNAPSHOT
Emergency Department    [address] [facility] [address]    Phone:  [phone]    Fax:  [phone]                                       Yuridia Barksdale   MRN: [id number]    Department:   Emergency Department   Date of Visit:  5/2/2018           Patient Information     Date Of Birth          1970        Your diagnoses for this visit were:     Epistaxis        You were seen by Hi Abel MD.      Follow-up Information     Follow up with Ochoa Mccall. Schedule an appointment as soon as possible for a visit in 2 days.    Specialty:  Psychology    Why:  As needed, For repeat evaluation and symptom check    Contact information:    XXX RETIRED XXX  XXX  [place] [address]          Follow up with  Emergency Department.    Specialty:  EMERGENCY MEDICINE    Why:  If symptoms worsen    Contact information:    [address] [facility] [id number] [phone]        Discharge Instructions         Nosebleed (Adult)    Bleeding from the nose most commonly occurs because of injury or drying and cracking of the inner lining of the nose. Most nosebleeds are because of dry air or nose-picking. They can occur during a common cold or an allergy attack. They can also occur on a very hot day, or from dry air in the winter.  If the bleeding site is found, it may be cauterized. This means it is treated to cause a blood clot to form. This may be done with a chemical, heat, or electricity. If the bleeding continues after the site is cauterized, or if the site cannot be found, packing may be put in your nose. This is to apply pressure and stop the bleeding. The packing may be made of gauze or sponge. A small balloon catheter is sometimes used. These must be removed by your healthcare provider. Some types of packing dissolve on their own. If you are taking blood thinning (anticoagulant) medicine, you may have a blood test.  Home care    If packing was put in your nose,  unless told otherwise, do not pull on it or try to remove it yourself. You will be given an appointment to have it removed. You may also have been given antibiotics to prevent a sinus infection. If so, finish all of the medicine.    Don't blow your nose for 12 hours after the bleeding stops. This will allow a strong blood clot to form. Don't pick your nose. This may restart bleeding.    Don't drink alcohol or hot liquids for the next 2 days. Alcohol or hot liquids in your mouth can dilate blood vessels in your nose. This can cause bleeding to start again.    Don't take ibuprofen, naproxen, or medicines that contain aspirin. These thin the blood and may cause your nose to bleed. You may take acetaminophen for pain, unless another pain medicine was prescribed.    If the bleeding starts again, sit up and lean forward to prevent swallowing blood. Pinch your nose tightly on both sides, as shown above, for 10 to 15 minutes. Time yourself. Don t release the pressure on your nose until 10 minutes is up. If bleeding does not stop, continue to pinch your nose and call your healthcare provider or return to this facility.    If you have a cold, allergies, or dry nasal membranes, lubricate the nasal passages. Apply a small amount of petroleum jelly inside the nose with a cotton swab twice a day (morning and night).    Don't overheat your home. This can dry the air and make your condition worse.    Put a humidifier in the room where you sleep. This will add moisture to the air. Clean the humidifier as advised by the .    Use a saline nasal spray to keep nasal passages moist.    Don't pick your nose. Keep fingernails trimmed to decrease risk of bleeds.    Don't smoke.  Follow-up care  Follow up with your healthcare provider, or as advised. Nasal packing should be rechecked or removed within 2 to 3 days.  When to seek medical advice  Call your healthcare provider right away if any of these occur.    You have another  nosebleed that you cannot control    Dizziness, weakness, or fainting    You become tired or confused    Fever of 100.4 F (38 C) or higher, or as directed by your healthcare provider    Headache    Sinus or facial pain    Shortness of breath or trouble breathing  Date Last Reviewed: 11/1/2017 2000-2017 [employer]. [address] [place] [address], [place] [address]. All rights reserved. This information is not intended as a substitute for professional medical care. Always follow your healthcare professional's instructions.          Nosebleed  The skin inside your nose is fragile and filled with blood vessels. That's why even a slight injury to your nose sometimes may cause bleeding. Hard nose blowing, dry winter air, colds, and nose-picking can also cause nosebleeds. Medicines such as warfarin, aspirin, and other blood thinners can make it more likely to have a nosebleed that is difficult to stop. Normally, nosebleeds aren't a cause for concern. But in some cases, they can mean that you have a more serious health problem. Know when to seek medical care for a nosebleed.  When to go to the emergency room (ER)  Most nosebleeds aren t a medical emergency. In fact, you often can treat them yourself. But see your healthcare provider if you have nosebleeds often. And seek care right away if you:    Have a head injury    Have bleeding that lasts more than 15 to 30 minutes or is severe    Feel weak or faint    Have trouble breathing  What to expect in the ER    You will be examined and may have blood tests.    You may be given medicated nose drops to stop the nosebleed.    The doctor may pack gauze into your nose to put pressure on the vessel and help stop bleeding.    The bleeding vessel may be cauterized. During this procedure, the vessel is burned with an electrical device or chemical. Your nose is first numbed so you won t feel any pain.    In rare cases, you may need surgery to control the bleeding.  Home care  "for a nosebleed    Don't blow your nose for 12 hours after the bleeding stops. This will allow a strong blood clot to form. Don't pick your nose. This may restart bleeding.    Don't drink alcohol or hot liquids for the next 2 days. Alcohol and hot liquids can dilate blood vessels in your nose. This can cause bleeding to start again.    Don't take ibuprofen, naproxen, or medicines that contain aspirin. These thin the blood and may cause your nose to bleed. You may take acetaminophen for pain, unless another pain medicine was prescribed.    If the bleeding starts again, sit up and lean forward to prevent swallowing blood. Pinch your nose tightly on both sides for 10 to 15 minutes. Time yourself. Don t release the pressure on your nose until 10 minutes is up. If bleeding doesn't stop, continue to pinch your nose. Call your healthcare provider.    If you have a cold, allergies, or dry nasal membranes, lubricate the nasal passages. Apply a small amount of petroleum jelly inside the nose with a cotton swab twice a day (morning and night).    Don't overheat your home. This can dry the air and make your condition worse.    Put a humidifier in the room where you sleep. This will add moisture to the air.    Use a saline nasal spray to keep nasal passages moist.    Don't pick your nose. Keep fingernails trimmed to decrease risk of bleeds.    Don't smoke.    Follow all other home care instructions from your healthcare provider.    Call your healthcare provider if you have any questions or concerns.  Date Last Reviewed: 10/1/2016    9409-3317 [employer]. [address] [place] [address], [place] [address]. All rights reserved. This information is not intended as a substitute for professional medical care. Always follow your healthcare professional's instructions.        Discharge Instructions  Epistaxis    Today you were seen for a nosebleed.   Nosebleeds (the medical term is \"epistaxis\") are very common. Almost every " person has had at least one in their lifetime.  Although the amount of blood loss can appear dramatic, nosebleeds rarely cause serious problems.  The most common causes are dry air or nose picking, but they also are common in people who have allergies, high blood pressure or are on blood thinners, such as Coumadin, Aspirin or Plavix. If you or your child gets a nosebleed, the important thing is to know how to take care of it. With the right self-care, most nosebleeds will stop on their own.    Return to the Emergency Department if:    Your nose is bleeding a very large amount of blood.    You get very pale, faint, or tired.    You cannot get the bleeding to stop after following these instructions.    A nosebleed happens after recent nasal surgery or if you have a known nasal tumor.    You have new, serious symptoms, such as chest pain.    You get a nosebleed after an injury, such as being hit in the face, and you are concerned that you could have other injuries (like a broken bone).    Treatment:    Your doctor may tell you to use a decongestant nose spray, like Afrin  (oxymetazoline), in both nostrils in the morning and at night for the next three days. Do not use this medicine for more than three days at a time.  If you do it will cause nasal congestion.     You should apply a small amount of Vaseline  to the inside of your nostrils for moisture before bed.  Using a humidifier in your bedroom will help as well.    For the next three days, do not blow your nose or put anything in your nose. You may sniffle, or dab the outside of your nose.    Do not bend with your head below your waist for the next three days. Do not lift anything so heavy that you have to strain.     If you received nasal packing, please do not remove the packing until seen by an Ear Nose and Throat specialist.  If antibiotics have been given with the packing please take as directed.    If your nose starts to bleed again:    Blow your nose to get  rid of some of the clots that have formed inside your nostrils. This may increase the bleeding temporarily, but that's OK.    Spray decongestant nose spray (like Afrin ) into both nostrils to constrict the blood vessels.    Sit or stand while bending forward slightly at the waist. Do not lie down or tilt your head back. This may cause you to swallow blood and can lead to vomiting.     the soft part of BOTH nostrils at the bottom of your nose and squeeze your nose closed for at least 5 minutes (for children) or 10 to 15 minutes (for adults). You can use your fingers, or the clip we gave you here. Use a clock to time yourself. Do not release the pressure every so often to check whether the bleeding has stopped. Many people hurt their chances of stopping the bleeding by releasing the pressure too soon or too often.    If you follow the steps outlined above, and your nose continues to bleed, repeat all the steps once more. Apply pressure for a total of at least 30 minutes. If you continue to bleed even then, return to the Emergency Department or go to an urgent care clinic.    If you keep having nose bleeds, schedule an appointment with your regular doctor, or with an Ear, Nose, and Throat Specialist.  If you were given a prescription for medicine here today, be sure to read all of the information (including the package insert) that comes with your prescription.  This will include important information about the medicine, its side effects, and any warnings that you need to know about.  The pharmacist who fills the prescription can provide more information and answer questions you may have about the medicine.  If you have questions or concerns that the pharmacist cannot address, please call or return to the Emergency Department.   Opioid Medication Information    Pain medications are among the most commonly prescribed medicines, so we are including this information for all our patients. If you did not receive pain  medication or get a prescription for pain medicine, you can ignore it.     You may have been given a prescription for an opioid (narcotic) pain medicine and/or have received a pain medicine while here in the Emergency Department. These medicines can make you drowsy or impaired. You must not drive, operate dangerous equipment, or engage in any other dangerous activities while taking these medications. If you drive while taking these medications, you could be arrested for DUI, or driving under the influence. Do not drink any alcohol while you are taking these medications.     Opioid pain medications can cause addiction. If you have a history of chemical dependency of any type, you are at a higher risk of becoming addicted to pain medications.  Only take these prescribed medications to treat your pain when all other options have been tried. Take it for as short a time and as few doses as possible. Store your pain pills in a secure place, as they are frequently stolen and provide a dangerous opportunity for children or visitors in your house to start abusing these powerful medications. We will not replace any lost or stolen medicine.  As soon as your pain is better, you should flush all your remaining medication.     Many prescription pain medications contain Tylenol  (acetaminophen), including Vicodin , Tylenol #3 , Norco , Lortab , and Percocet .  You should not take any extra pills of Tylenol  if you are using these prescription medications or you can get very sick.  Do not ever take more than 3000 mg of acetaminophen in any 24 hour period.    All opioids tend to cause constipation. Drink plenty of water and eat foods that have a lot of fiber, such as fruits, vegetables, prune juice, apple juice and high fiber cereal.  Take a laxative if you don t move your bowels at least every other day. Miralax , Milk of Magnesia, Colace , or Senna  can be used to keep you regular.      Remember that you can always come back to  the Emergency Department if you are not able to see your regular doctor in the amount of time listed above, if you get any new symptoms, or if there is anything that worries you.        24 Hour Appointment Hotline       To make an appointment at any [facility], call [phone] ([phone]). If you don't have a family doctor or clinic, we will help you find one. [facility] clinics are conveniently located to serve the needs of you and your family.             Review of your medicines      Our records show that you are taking the medicines listed below. If these are incorrect, please call your family doctor or clinic.        Dose / Directions Last dose taken    COUMADIN PO        Take  by mouth.   Refills:  0        DULoxetine 30 MG EC capsule   Commonly known as:  CYMBALTA   Dose:  30 mg        Take 30 mg by mouth daily Two tabs in a.m. And one tab in p.m.   Refills:  0        FISH OIL PO   Dose:  2 capsule        Take 2 capsules by mouth daily   Refills:  0        oxyCODONE IR 5 MG tablet   Commonly known as:  ROXICODONE   Dose:  5-10 mg   Quantity:  30 tablet        Take 1-2 tablets by mouth every 4 hours as needed for pain for 30 doses.   Refills:  0        TART CHERRY ADVANCED PO   Dose:  1 Dose        Take 1 Dose by mouth daily States liquid form but not regularly.   Refills:  0        XARELTO PO   Dose:  1 tablet        Take 1 tablet by mouth daily   Refills:  0                Orders Needing Specimen Collection     None      Pending Results     No orders found from 4/30/2018 to 5/3/2018.            Pending Culture Results     No orders found from 4/30/2018 to 5/3/2018.            Pending Results Instructions     If you had any lab results that were not finalized at the time of your Discharge, you can call the ED Lab Result RN at [phone]. You will be contacted by this team for any positive Lab results or changes in treatment. The nurses are available 7 days a week from 10A to 6:30P.  You can  leave a message 24 hours per day and they will return your call.        Test Results From Your Hospital Stay               Clinical Quality Measure: Blood Pressure Screening     Your blood pressure was checked while you were in the emergency department today. The last reading we obtained was  BP: (!) 161/108 . Please read the guidelines below about what these numbers mean and what you should do about them.  If your systolic blood pressure (the top number) is less than 120 and your diastolic blood pressure (the bottom number) is less than 80, then your blood pressure is normal. There is nothing more that you need to do about it.  If your systolic blood pressure (the top number) is 120-139 or your diastolic blood pressure (the bottom number) is 80-89, your blood pressure may be higher than it should be. You should have your blood pressure rechecked within a year by a primary care provider.  If your systolic blood pressure (the top number) is 140 or greater or your diastolic blood pressure (the bottom number) is 90 or greater, you may have high blood pressure. High blood pressure is treatable, but if left untreated over time it can put you at risk for heart attack, stroke, or kidney failure. You should have your blood pressure rechecked by a primary care provider within the next 4 weeks.  If your provider in the emergency department today gave you specific instructions to follow-up with your doctor or provider even sooner than that, you should follow that instruction and not wait for up to 4 weeks for your follow-up visit.        Thank you for choosing [facility]       Thank you for choosing [facility] for your care. Our goal is always to provide you with excellent care. Hearing back from our patients is one way we can continue to improve our services. Please take a few minutes to complete the written survey that you may receive in the mail after you visit with us. Thank you!        [employer] Information     [employer] gives  you secure access to your electronic health record. If you see a primary care provider, you can also send messages to your care team and make appointments. If you have questions, please call your primary care clinic.  If you do not have a primary care provider, please call [phone] and they will assist you.        Care EveryWhere ID     This is your Care EveryWhere ID. This could be used by other organizations to access your [facility] medical records  [id number]        Equal Access to Services     LATOYA INGRAM : Hadii blaine musao Sokiel, wanatalieda luhumphrey, qaybta kaalmaana leavitt, laura aden. [facility] [phone].    ATENCIÓN: Si habla brieañol, tiene a aviles disposición servicios gratuitos de asistencia lingüística. Llame al [phone].    We comply with applicable federal civil rights laws and [place] laws. We do not discriminate on the basis of race, color, national origin, age, disability, sex, sexual orientation, or gender identity.            After Visit Summary       This is your record. Keep this with you and show to your community pharmacist(s) and doctor(s) at your next visit.

## 2018-05-02 NOTE — DISCHARGE INSTRUCTIONS
Nosebleed (Adult)    Bleeding from the nose most commonly occurs because of injury or drying and cracking of the inner lining of the nose. Most nosebleeds are because of dry air or nose-picking. They can occur during a common cold or an allergy attack. They can also occur on a very hot day, or from dry air in the winter.  If the bleeding site is found, it may be cauterized. This means it is treated to cause a blood clot to form. This may be done with a chemical, heat, or electricity. If the bleeding continues after the site is cauterized, or if the site cannot be found, packing may be put in your nose. This is to apply pressure and stop the bleeding. The packing may be made of gauze or sponge. A small balloon catheter is sometimes used. These must be removed by your healthcare provider. Some types of packing dissolve on their own. If you are taking blood thinning (anticoagulant) medicine, you may have a blood test.  Home care    If packing was put in your nose, unless told otherwise, do not pull on it or try to remove it yourself. You will be given an appointment to have it removed. You may also have been given antibiotics to prevent a sinus infection. If so, finish all of the medicine.    Don't blow your nose for 12 hours after the bleeding stops. This will allow a strong blood clot to form. Don't pick your nose. This may restart bleeding.    Don't drink alcohol or hot liquids for the next 2 days. Alcohol or hot liquids in your mouth can dilate blood vessels in your nose. This can cause bleeding to start again.    Don't take ibuprofen, naproxen, or medicines that contain aspirin. These thin the blood and may cause your nose to bleed. You may take acetaminophen for pain, unless another pain medicine was prescribed.    If the bleeding starts again, sit up and lean forward to prevent swallowing blood. Pinch your nose tightly on both sides, as shown above, for 10 to 15 minutes. Time yourself. Don t release the  pressure on your nose until 10 minutes is up. If bleeding does not stop, continue to pinch your nose and call your healthcare provider or return to this facility.    If you have a cold, allergies, or dry nasal membranes, lubricate the nasal passages. Apply a small amount of petroleum jelly inside the nose with a cotton swab twice a day (morning and night).    Don't overheat your home. This can dry the air and make your condition worse.    Put a humidifier in the room where you sleep. This will add moisture to the air. Clean the humidifier as advised by the .    Use a saline nasal spray to keep nasal passages moist.    Don't pick your nose. Keep fingernails trimmed to decrease risk of bleeds.    Don't smoke.  Follow-up care  Follow up with your healthcare provider, or as advised. Nasal packing should be rechecked or removed within 2 to 3 days.  When to seek medical advice  Call your healthcare provider right away if any of these occur.    You have another nosebleed that you cannot control    Dizziness, weakness, or fainting    You become tired or confused    Fever of 100.4 F (38 C) or higher, or as directed by your healthcare provider    Headache    Sinus or facial pain    Shortness of breath or trouble breathing  Date Last Reviewed: 11/1/2017 2000-2017 [employer]. [address] [place] [address], [place] [address]. All rights reserved. This information is not intended as a substitute for professional medical care. Always follow your healthcare professional's instructions.          Nosebleed  The skin inside your nose is fragile and filled with blood vessels. That's why even a slight injury to your nose sometimes may cause bleeding. Hard nose blowing, dry winter air, colds, and nose-picking can also cause nosebleeds. Medicines such as warfarin, aspirin, and other blood thinners can make it more likely to have a nosebleed that is difficult to stop. Normally, nosebleeds aren't a cause for concern.  But in some cases, they can mean that you have a more serious health problem. Know when to seek medical care for a nosebleed.  When to go to the emergency room (ER)  Most nosebleeds aren t a medical emergency. In fact, you often can treat them yourself. But see your healthcare provider if you have nosebleeds often. And seek care right away if you:    Have a head injury    Have bleeding that lasts more than 15 to 30 minutes or is severe    Feel weak or faint    Have trouble breathing  What to expect in the ER    You will be examined and may have blood tests.    You may be given medicated nose drops to stop the nosebleed.    The doctor may pack gauze into your nose to put pressure on the vessel and help stop bleeding.    The bleeding vessel may be cauterized. During this procedure, the vessel is burned with an electrical device or chemical. Your nose is first numbed so you won t feel any pain.    In rare cases, you may need surgery to control the bleeding.  Home care for a nosebleed    Don't blow your nose for 12 hours after the bleeding stops. This will allow a strong blood clot to form. Don't pick your nose. This may restart bleeding.    Don't drink alcohol or hot liquids for the next 2 days. Alcohol and hot liquids can dilate blood vessels in your nose. This can cause bleeding to start again.    Don't take ibuprofen, naproxen, or medicines that contain aspirin. These thin the blood and may cause your nose to bleed. You may take acetaminophen for pain, unless another pain medicine was prescribed.    If the bleeding starts again, sit up and lean forward to prevent swallowing blood. Pinch your nose tightly on both sides for 10 to 15 minutes. Time yourself. Don t release the pressure on your nose until 10 minutes is up. If bleeding doesn't stop, continue to pinch your nose. Call your healthcare provider.    If you have a cold, allergies, or dry nasal membranes, lubricate the nasal passages. Apply a small amount of  "petroleum jelly inside the nose with a cotton swab twice a day (morning and night).    Don't overheat your home. This can dry the air and make your condition worse.    Put a humidifier in the room where you sleep. This will add moisture to the air.    Use a saline nasal spray to keep nasal passages moist.    Don't pick your nose. Keep fingernails trimmed to decrease risk of bleeds.    Don't smoke.    Follow all other home care instructions from your healthcare provider.    Call your healthcare provider if you have any questions or concerns.  Date Last Reviewed: 10/1/2016    6991-6515 [employer]. [address] [place], [place] [id number]. All rights reserved. This information is not intended as a substitute for professional medical care. Always follow your healthcare professional's instructions.        Discharge Instructions  Epistaxis    Today you were seen for a nosebleed.   Nosebleeds (the medical term is \"epistaxis\") are very common. Almost every person has had at least one in their lifetime.  Although the amount of blood loss can appear dramatic, nosebleeds rarely cause serious problems.  The most common causes are dry air or nose picking, but they also are common in people who have allergies, high blood pressure or are on blood thinners, such as Coumadin, Aspirin or Plavix. If you or your child gets a nosebleed, the important thing is to know how to take care of it. With the right self-care, most nosebleeds will stop on their own.    Return to the Emergency Department if:    Your nose is bleeding a very large amount of blood.    You get very pale, faint, or tired.    You cannot get the bleeding to stop after following these instructions.    A nosebleed happens after recent nasal surgery or if you have a known nasal tumor.    You have new, serious symptoms, such as chest pain.    You get a nosebleed after an injury, such as being hit in the face, and you are concerned that you could have other " injuries (like a broken bone).    Treatment:    Your doctor may tell you to use a decongestant nose spray, like Afrin  (oxymetazoline), in both nostrils in the morning and at night for the next three days. Do not use this medicine for more than three days at a time.  If you do it will cause nasal congestion.     You should apply a small amount of Vaseline  to the inside of your nostrils for moisture before bed.  Using a humidifier in your bedroom will help as well.    For the next three days, do not blow your nose or put anything in your nose. You may sniffle, or dab the outside of your nose.    Do not bend with your head below your waist for the next three days. Do not lift anything so heavy that you have to strain.     If you received nasal packing, please do not remove the packing until seen by an Ear Nose and Throat specialist.  If antibiotics have been given with the packing please take as directed.    If your nose starts to bleed again:    Blow your nose to get rid of some of the clots that have formed inside your nostrils. This may increase the bleeding temporarily, but that's OK.    Spray decongestant nose spray (like Afrin ) into both nostrils to constrict the blood vessels.    Sit or stand while bending forward slightly at the waist. Do not lie down or tilt your head back. This may cause you to swallow blood and can lead to vomiting.     the soft part of BOTH nostrils at the bottom of your nose and squeeze your nose closed for at least 5 minutes (for children) or 10 to 15 minutes (for adults). You can use your fingers, or the clip we gave you here. Use a clock to time yourself. Do not release the pressure every so often to check whether the bleeding has stopped. Many people hurt their chances of stopping the bleeding by releasing the pressure too soon or too often.    If you follow the steps outlined above, and your nose continues to bleed, repeat all the steps once more. Apply pressure for a total of  at least 30 minutes. If you continue to bleed even then, return to the Emergency Department or go to an urgent care clinic.    If you keep having nose bleeds, schedule an appointment with your regular doctor, or with an Ear, Nose, and Throat Specialist.  If you were given a prescription for medicine here today, be sure to read all of the information (including the package insert) that comes with your prescription.  This will include important information about the medicine, its side effects, and any warnings that you need to know about.  The pharmacist who fills the prescription can provide more information and answer questions you may have about the medicine.  If you have questions or concerns that the pharmacist cannot address, please call or return to the Emergency Department.   Opioid Medication Information    Pain medications are among the most commonly prescribed medicines, so we are including this information for all our patients. If you did not receive pain medication or get a prescription for pain medicine, you can ignore it.     You may have been given a prescription for an opioid (narcotic) pain medicine and/or have received a pain medicine while here in the Emergency Department. These medicines can make you drowsy or impaired. You must not drive, operate dangerous equipment, or engage in any other dangerous activities while taking these medications. If you drive while taking these medications, you could be arrested for DUI, or driving under the influence. Do not drink any alcohol while you are taking these medications.     Opioid pain medications can cause addiction. If you have a history of chemical dependency of any type, you are at a higher risk of becoming addicted to pain medications.  Only take these prescribed medications to treat your pain when all other options have been tried. Take it for as short a time and as few doses as possible. Store your pain pills in a secure place, as they are  frequently stolen and provide a dangerous opportunity for children or visitors in your house to start abusing these powerful medications. We will not replace any lost or stolen medicine.  As soon as your pain is better, you should flush all your remaining medication.     Many prescription pain medications contain Tylenol  (acetaminophen), including Vicodin , Tylenol #3 , Norco , Lortab , and Percocet .  You should not take any extra pills of Tylenol  if you are using these prescription medications or you can get very sick.  Do not ever take more than 3000 mg of acetaminophen in any 24 hour period.    All opioids tend to cause constipation. Drink plenty of water and eat foods that have a lot of fiber, such as fruits, vegetables, prune juice, apple juice and high fiber cereal.  Take a laxative if you don t move your bowels at least every other day. Miralax , Milk of Magnesia, Colace , or Senna  can be used to keep you regular.      Remember that you can always come back to the Emergency Department if you are not able to see your regular doctor in the amount of time listed above, if you get any new symptoms, or if there is anything that worries you.

## 2018-05-02 NOTE — ED PROVIDER NOTES
"  History     Chief Complaint:  Epistaxis     HPI   Yuridia Barksdale is a 47 year old female, anticoagulated on Xarelto for a history of deep vein thrombosis who presents to the emergency department for evaluation of epistaxis. She reports that this began at [id number]. She had a nose sore, and so she blew her nose. At that time, a clot came out and since then she has had a nose bleed. She presents with a nose clip on to stop bleeding--she has been taking this off intermittently and the longest that she has had this on for is twenty minutes. She endorses post-nasal drip in the emergency department.      Allergies:  Levaquin  Clindamycin  Vancomycin      Medications:    Cymbalta  Xarelto  Oxycodone     Past Medical History:    Deep vein thrombosis   Acute blood loss anemia  Lupus  Pneumonia     Past Surgical History:    Appendectomy  Hysterectomy    Family History:    History reviewed. No pertinent family history.     Social History:  The patient was alone.  Smoking Status: Never  Alcohol Use: Yes   Marital Status:  Single      Review of Systems   HENT: Positive for nosebleeds and postnasal drip.    All other systems reviewed and are negative.    Physical Exam     Patient Vitals for the past 24 hrs:   BP Temp Temp src Pulse Resp SpO2 Height Weight   05/02/18 1311 (!) 161/108 97.1  F (36.2  C) Temporal 84 18 94 % 1.651 m (5' 5\") 137 kg (302 lb)      Physical Exam  Vitals: reviewed by me  General: Pt seen on [facility], cooperative, and alert to conversation  Eyes: Tracking well, clear conjunctiva BL  ENT: MMM, midline trachea.  Right nare appears to have a posterior and inferior source of the bleeding, not amenable to cautery.  No arterial bleeding, does have some blood coming down the posterior oropharynx when her nose is clipped.  Lungs:   No tachypnea, no accessory muscle use. No respiratory distress.   CV: Rate as above, regular rhythm.    MSK: no peripheral edema or joint effusion.  No evidence of " trauma  Skin: No rash, normal turgor and temperature  Neuro: Clear speech and no facial droop.  Psych: Not RIS, no e/o AH/VH    Emergency Department Course     Procedures:  Physician: Dr. Hi Abel    Procedure:  Nosebleed management    The nose was packed with a Rapid Rhino, and the bleeding was controlled.  After a period of observation, the patient was reassessed and no further bleeding is noted.  There were no complications to the procedure.    Emergency Department Course:  Nursing notes and vitals reviewed.    1350: I performed an exam of the patient as documented above.   1354: I performed the above procedure.   1506: Patient rechecked and updated.   1528: Patient rechecked and updated.     Findings and plan explained to the Patient. Patient discharged home with instructions regarding supportive care, medications, and reasons to return. The importance of close follow-up was reviewed.     Impression & Plan      Medical Decision Making:  Yuridia Barksdale is a 47 year old female who presents for evaluation of nasal bleeding and epistaxis.  Inciting event appears to have been a strong blowing of the nose while on her Xarelto.  Here in the emergency room, while she is not amenable to cautery given the location of the bleeding I did elect to try an Afrin soaked Rhino Rocket, which was left in place for 20 minutes, and when I removed it, she had resolution of all bleeding.  She was then monitored for a period of 20 minutes, to ensure bleeding did not come back.  She had no bleeding in the posterior oropharynx this point either, and while she knows that she may have to come back to the ER and get a Rhino Rocket placed that would be in place for several days until she can see ENT, patient did ask that she be given a trial of management at home since the bleeding is stopped and without the Rhino Rocket.  We discussed this, and as she is on her blood thinner, asked her to call her primary care doctor tomorrow for  possible dosing management.  Very clear return to ED precautions were discussed, patient's give this plan.    Diagnosis:    ICD-10-CM    1. Epistaxis R04.0        Disposition:  Discharged to home.    Scribe Disclosure:  I, Myla aMrs, am serving as a scribe at 1:45 PM on 5/2/2018 to document services personally performed by Hi Abel MD based on my observations and the provider's statements to me.   5/2/2018    EMERGENCY DEPARTMENT       Hi Abel MD  05/02/18 3881

## 2018-05-02 NOTE — ED AVS SNAPSHOT
Emergency Department    [address] [place] [address]    Phone:  [phone]    Fax:  [phone]                                       Yuridia Barksdale   MRN: [id number]    Department:   Emergency Department   Date of Visit:  5/2/2018           After Visit Summary Signature Page     I have received my discharge instructions, and my questions have been answered. I have discussed any challenges I see with this plan with the nurse or doctor.    ..........................................................................................................................................  Patient/Patient Representative Signature      ..........................................................................................................................................  Patient Representative Print Name and Relationship to Patient    ..................................................               ................................................  Date                                            Time    ..........................................................................................................................................  Reviewed by Signature/Title    ...................................................              ..............................................  Date                                                            Time

## 2019-02-06 ENCOUNTER — MEDICAL CORRESPONDENCE (OUTPATIENT)
Dept: HEALTH INFORMATION MANAGEMENT | Facility: CLINIC | Age: 49
End: 2019-02-06

## 2019-02-06 ENCOUNTER — TRANSFERRED RECORDS (OUTPATIENT)
Dept: HEALTH INFORMATION MANAGEMENT | Facility: CLINIC | Age: 49
End: 2019-02-06

## 2019-02-13 ENCOUNTER — ALLIED HEALTH/NURSE VISIT (OUTPATIENT)
Dept: OPHTHALMOLOGY | Facility: CLINIC | Age: 49
End: 2019-02-13
Attending: OPHTHALMOLOGY
Payer: COMMERCIAL

## 2019-02-13 DIAGNOSIS — Z79.899 LONG-TERM USE OF PLAQUENIL: Primary | ICD-10-CM

## 2019-02-13 PROCEDURE — 40000269 ZZH STATISTIC NO CHARGE FACILITY FEE: Mod: ZF

## 2019-02-13 PROCEDURE — 92274 MULTIFOCAL ERG W/I&R: CPT | Mod: ZF

## 2019-02-13 ASSESSMENT — VISUAL ACUITY
OS_SC: 20/30
METHOD: SNELLEN - LINEAR
OD_SC: 20/40

## 2019-02-13 NOTE — LETTER
2019         RE:  :  MRN: Yuridia Barksdale  1970  [id number]     Dear Dr. Cespedes,    Your patient, Yuridia Barksdale, came for a multifocal electroretinogram only.      Assessment & Plan     Yuridia Barksdale is a 48 year old female with the following diagnoses:   1. Long-term use of Plaquenil       This is a well-performed and reliable multifocal electroretinogram both eyes.  The amplitudes and latencies are normal throughout all 103 hexagons both eyes.  There is no evidence of plaquenil toxicity.    Again, thank you for allowing me to participate in the care of your patient.      Sincerely,    Hi Scanlon MD  Professor  Ophthalmology Residency   Director of Neuro-Ophthalmology  [place] - [facility] Endow Chair  Departments of Ophthalmology, Neurology, and Neurosurgery  [address]  [address]  T - [phone]  F - [phone]  FRANDY [email or web address]    CC: ITZ JOHANSEN  [facility]   Benjy ALICEA  [facility] [address]  VIA Facsimile: [phone]     Yevgeniy Cespedes MD  [facility] [place]  [address] [facility] [address] [place] [address]  VIA Facsimile: [phone]

## 2019-02-13 NOTE — NURSING NOTE
"Chief Complaints and History of Present Illnesses   Patient presents with     Procedure     Chief Complaint(s) and History of Present Illness(es)     mfERG for plaquenil monitoring  Referred by Yevgeniy Cespedes/[facility]  Pt states plaquenil 100mg bid per day. Started 2003 for Lupus  Height 5\"6\", weight 295 lbs  Leny Mccarthy COA 2:20 PM February 13, 2019                 "

## 2019-02-14 NOTE — PROGRESS NOTES
Assessment & Plan     Yuridia Barksdale is a 48 year old female with the following diagnoses:   1. Long-term use of Plaquenil       This is a well-performed and reliable multifocal electroretinogram both eyes.  The amplitudes and latencies are normal throughout all 103 hexagons both eyes.  There is no evidence of plaquenil toxicity.           Attending Physician Attestation:  Complete documentation of historical and exam elements from today's encounter can be found in the full encounter summary report (not reduplicated in this progress note).  I personally obtained the chief complaint(s) and history of present illness.  I confirmed and edited as necessary the review of systems, past medical/surgical history, family history, social history, and examination findings as documented by others; and I examined the patient myself.  I personally reviewed the relevant tests, images, and reports as documented above.  I formulated and edited as necessary the assessment and plan and discussed the findings and management plan with the patient and family. - Hi Scanlon MD

## 2019-08-15 ENCOUNTER — THERAPY VISIT (OUTPATIENT)
Dept: PHYSICAL THERAPY | Facility: CLINIC | Age: 49
End: 2019-08-15
Payer: COMMERCIAL

## 2019-08-15 DIAGNOSIS — M62.89 PFD (PELVIC FLOOR DYSFUNCTION): ICD-10-CM

## 2019-08-15 DIAGNOSIS — N39.46 MIXED STRESS AND URGE URINARY INCONTINENCE: ICD-10-CM

## 2019-08-15 PROCEDURE — 97530 THERAPEUTIC ACTIVITIES: CPT | Mod: GP | Performed by: PHYSICAL THERAPIST

## 2019-08-15 PROCEDURE — 97161 PT EVAL LOW COMPLEX 20 MIN: CPT | Mod: GP | Performed by: PHYSICAL THERAPIST

## 2019-08-15 PROCEDURE — 97140 MANUAL THERAPY 1/> REGIONS: CPT | Mod: GP | Performed by: PHYSICAL THERAPIST

## 2019-08-15 PROCEDURE — 97112 NEUROMUSCULAR REEDUCATION: CPT | Mod: GP | Performed by: PHYSICAL THERAPIST

## 2019-08-15 NOTE — LETTER
[facility]THLET[facility] PHYSICAL THERAPY  [address]  [place] [facility] [address]  [phone]    2019    Re: Yuridia Barksdale   :   1970  MRN:  [id number]   REFERRING PHYSICIAN:   Mable Mendez    [facility] ATH[facility]IC [facility] [place] PHYSICAL [facility]  Date of Initial Evaluation:  08/15/2019  Visits:  Rxs Used: 1  Reason for Referral:   PFD (pelvic floor dysfunction), Mixed stress and urge urinary incontinence    EVALUATION SUMMARY  [facility] Initial Evaluation  Subjective:  The history is provided by the patient.   Patient is a 48 year old female who presents to [facility] with pelvic floor dysfunction of mixed urinary incontinence.  Patient states she believes her problems got worse one year ago when she was placed on a new medication called Endempsis.  Patient is diagnosed with Lupus and have blood clots in her lungs which causes for poor health condition.  Patient states her overall health is fair and she says she has 0/10 pain.  Patient also states she has bowel issues of loose stools that she feels she has to strain to void.  PMH includes:  Depression, HBP, bowel and bladder changes, overweight and sleep disorder/apnea.   Surgical history: Appendectomy, hysterectomy and heart catherization.  Patient is medicating with:  Anti-depressants, cardiac, HBP and sleep medications. Patient is currently unemployed.   History of current episode:    Onset date/MD order: Over a year ago without known cause    CC/Present symptoms: Bowel issues and mixed urinary incontinence  Pain rating (0-10): 0/10  Conditioning is improving/unchanging/worsening: unchanging/worsening    Pelvic/Abdominal Surgeries: appendectomy, hysterectomy  Current activity: inactive  Goals: to stop urinary accidents  Red flags: health and weight condition    Urination:  Do you leak on the way to the bathroom or with a strong urge to void? Yes   Do you leak with cough,sneeze,  jumping, running?Yes   Any other activities that cause leaking? No   Do you have triggers that make you feel you can't wait to go to the bathroom? No .  Type of pad and number used per day? 0  When you leak what is the amount? drops  How long can you delay the need to urinate? Not at all.   Do you feel excessive pressure in pelvic floor: no    Frequency of daytime urination: hourly or more  Frequency of nighttime urination:1-2  Can you stop the flow of urine when on the toilet? No  Re: Yuridia Barksdale   :   1970    Is the volume of urine passed usually: medium. (8sec rule= 250ml with average bladder storing 400-600ml)  Do you strain to pass urine? No  Do you have a slow or hesitant urinary stream? Yes  Do you have difficulty initiating the urine stream? No  Is urination painful? No  How many bladder infections have you had in last 12 months? no  Fluid intake(one glass is 8oz or one cup)  40 oz. glasses/day,  24 oz. caffinated glasses/day  - alcohol glasses/day.  Bowel habits:  Frequency of bowel movements? 1  times a day  Consistancy of stool? soft  Do you ignore the urge to defecate? No  Do you strain to pass stool? Yes  Pelvic Pain:  Do you have any pelvic pain with intercourse, exams, use of tampons? Yes  Are you sexually active?No  Have you ever been worried for your physical safety? No  Have you practiced the PF(kegel) exercises for 4 or more weeks? no  Marinoff Scale:Level NA Patient has never had sexual intercourse  (Level 3: Abstinence from intercourse because of severe pain. Level 2: Painful intercourse which limites frequency of activity. Level 1: Painful intercourse not severe enough to prevent activity.)    Treatment/Education provided this session (see flow sheet for additional information):    Self Care Management/Patient education (12 min): Today's session consisted of education regarding pelvic floor muscle anatomy, normal bladder function, urge suppression techniques and/or relaxation  techniques as indicated, and instruction in how to complete a bladder diary for assessment next visit. Depending on patient presentation, timed voids, double voids, and proper fluid/fiber intake discussed. Pt was instructed in the pathoanatomy of the pelvic floor utilizing pelvic model.  We discussed what pelvic floor physical therapy is, components of exam, and typical patient progression. Prior to internal PFM exam,  patient was told that they were in control of exam progression, and if at any time were uncomfortable and wished to discontinue we would.        NMR pelvic pain (12 min):  Pt education regarding contributing factors to pelvic pain and dysfunction related to overactivity in the pelvic floor.  Included resources for relaxed awareness and pelvic floor quieting techniqes.  Extra time spent describing pelvic floor muscle exam and treatment plan/goals, with attention to potential history that may contribute to current symptoms.  Included resources for home release techniques using dilators and/or thera wand as indicated.  Recommended partner involvement if available.  Discussed in detail potential physiological and behavioral components of pelvic pain.  Demonstrated/performed techniques for input to painful area while using visualization and relaxation.      Objective:  System    Re: Yuridia Barksdale   :   1970    Pelvic Dysfunction Evaluation:    Bladder/Pelvic Problems:  Patient presents with bowel issues and mixed urinary incontinence.  Patient unaware of how to contract or use her pelvic floor muscles with functional ADL's.  Storage Problem:  Urgency, frequency, urge incontinence, stress incontinence and mixed incontinence    Flexibility:    Tightness present at:Adductors; Iliopsoas; Hamstrings; Piriformis and Gluteals  obturator internus  Abdominal Wall:  Abdominal wall pelvic: Fascial restrictions noted about cecum, IC valve, sigmoid colon, pubovesical ligament and urachus.      Trigger  Points:  Iliopsoas  Scar Mobility:  WNL  Pelvic Clock Exam:    Ischiocavernosis pain:  -  Bulbocavernosis pain:  -  Transverse Perineal:  -  Levator ANI:  -  Perineal Body:  -  SI Provocation:  NA    Reflex Testing:  NA    External Assessment:    Skin Condition:  Normal  Scars:  Well healed  Bearing Down/Coughing:  Normal  Tissue Symmetry:  Normal  Introitus:  Normal  Muscle Contraction/Perineal Mobility:  No movement and substitution  Internal Assessment:  Internal assessment pelvic: Minimal TP's right and moderate TP's left levator ani muscle group.  Sensory Exam:  Normal  Contraction/Grade:  No contraction (0)  Accessory Muscle use-Abdominals:  Yes  Accessory Muscle use-Gluteals:  Yes  Accessory Muscle use-Adductors:  Yes    SEMG Biofeedback:  Semg biofeedback pelvic: Patient has poor recruitment and awareness of pelvic floor usage.  Patient utilizes alot of accessory muscles and she still is unsure if she is setting pelvic floor correctly.  Equipment:  Surface EMG    Suraface electrode placement--Perianal:  Winter anal  Baseline EMG PM:  .1uV    Peak pelvic muscle contraction:  Average quick flick 1.8uV  Sustained contraction:  Average 10 second 2.8uV  EMG interpretation to fatigue:  3-5 seconds  Re: Yuridia Barksdale   :   1970    Position:  SupineAdditional History:    Number of Pregnancies: 0  Number of Live Births: 0  Caffeine Consumption:  24 oz         Assessment/Plan:    Patient is a 48 year old female with pelvic complaints.    Patient has the following significant findings with corresponding treatment plan.                Diagnosis 1:  Pelvic floor dysfunction Decreased ROM/flexibility - manual therapy, therapeutic exercise, therapeutic activity and home program  Impaired muscle performance - biofeedback, neuro re-education and home program  Decreased function - therapeutic activities and home program  Diagnosis 2:  Mixed urinary incontinence  Decreased strength - therapeutic exercise, therapeutic  activities and home program  Impaired muscle performance - biofeedback, neuro re-education and home program  Decreased function - therapeutic activities and home program    Therapy Evaluation Codes:   1) History comprised of:   Personal factors that impact the plan of care:      None.    Comorbidity factors that impact the plan of care are:      Bowel/bladder changes, Depression, High blood pressure, Overweight and Sleep disorder/apnea.     Medications impacting care: Anti-depressant, Cardiac, High blood pressure and Sleep.  2) Examination of Body Systems comprised of:   Body structures and functions that impact the plan of care:      Lumbar spine and Pelvis.   Activity limitations that impact the plan of care are:      Jumping, Lifting, Running, Walking, Frequency, Pelvic Exam, Stress incontinence, Urgency, Urge incontinence and Urinary incontinence.  3) Clinical presentation characteristics are:   Stable/Uncomplicated.  4) Decision-Making    Low complexity using standardized patient assessment instrument and/or measureable assessment of functional outcome.  Cumulative Therapy Evaluation is: Low complexity.    Previous and current functional limitations:  (See Goal Flow Sheet for this information)    Short term and Long term goals: (See Goal Flow Sheet for this information)     Communication ability:  Patient appears to be able to clearly communicate and understand verbal and written communication and follow directions correctly.  Treatment Explanation - The following has been discussed with the patient:   RX ordered/plan of care  Anticipated outcomes  Possible risks and side effects  This patient would benefit from PT intervention to resume normal activities.   Rehab potential is good.  Re: Yuridia Barksdale   :   1970    Frequency:  1 X week, once daily  Duration:  for 12 weeks  Discharge Plan:  Achieve all LTG.  Independent in home treatment program.  Reach maximal therapeutic benefit.    Thank you for  your referral.    INQUIRIES  Therapist: Laura Florence,   [facility] [place]ICAL THERAP[facility]  [address] [place] [address] [id number]  [facility] [address]  Phone: [phone]  Fax: [phone]

## 2019-08-15 NOTE — PROGRESS NOTES
[facility] Initial Evaluation  Subjective:  The history is provided by the patient.   Patient is a 48 year old female who presents to [facility] with pelvic floor dysfunction of mixed urinary incontinence.  Patient states she believes her problems got worse one year ago when she was placed on a new medication called Endempsis.  Patient is diagnosed with Lupus and have blood clots in her lungs which causes for poor health condition.  Patient states her overall health is fair and she says she has 0/10 pain.  Patient also states she has bowel issues of loose stools that she feels she has to strain to void.  PMH includes:  Depression, HBP, bowel and bladder changes, overweight and sleep disorder/apnea.   Surgical history: Appendectomy, hysterectomy and heart catherization.  Patient is medicating with:  Anti-depressants, cardiac, HBP and sleep medications. Patient is currently unemployed.   History of current episode:    Onset date/MD order: Over a year ago without known cause    CC/Present symptoms: Bowel issues and mixed urinary incontinence  Pain rating (0-10): 0/10  Conditioning is improving/unchanging/worsening: unchanging/worsening    Pelvic/Abdominal Surgeries: appendectomy, hysterectomy  Current activity: inactive  Goals: to stop urinary accidents  Red flags: health and weight condition      Urination:  Do you leak on the way to the bathroom or with a strong urge to void? Yes   Do you leak with cough,sneeze, jumping, running?Yes   Any other activities that cause leaking? No   Do you have triggers that make you feel you can't wait to go to the bathroom? No .  Type of pad and number used per day? 0  When you leak what is the amount? drops  How long can you delay the need to urinate? Not at all.   Do you feel excessive pressure in pelvic floor: no    Frequency of daytime urination: hourly or more  Frequency of nighttime urination:1-2  Can you stop the flow of urine when on the toilet? No  Is the volume  of urine passed usually: medium. (8sec rule= 250ml with average bladder storing 400-600ml)  Do you strain to pass urine? No  Do you have a slow or hesitant urinary stream? Yes  Do you have difficulty initiating the urine stream? No  Is urination painful? No  How many bladder infections have you had in last 12 months? no  Fluid intake(one glass is 8oz or one cup)  40 oz. glasses/day,  24 oz. caffinated glasses/day  - alcohol glasses/day.  Bowel habits:  Frequency of bowel movements? 1  times a day  Consistancy of stool? soft  Do you ignore the urge to defecate? No  Do you strain to pass stool? Yes  Pelvic Pain:  Do you have any pelvic pain with intercourse, exams, use of tampons? Yes  Are you sexually active?No  Have you ever been worried for your physical safety? No  Have you practiced the PF(kegel) exercises for 4 or more weeks? no  Marinoff Scale:Level NA Patient has never had sexual intercourse  (Level 3: Abstinence from intercourse because of severe pain. Level 2: Painful intercourse which limites frequency of activity. Level 1: Painful intercourse not severe enough to prevent activity.)    Treatment/Education provided this session (see flow sheet for additional information):    Self Care Management/Patient education (12 min): Today's session consisted of education regarding pelvic floor muscle anatomy, normal bladder function, urge suppression techniques and/or relaxation techniques as indicated, and instruction in how to complete a bladder diary for assessment next visit. Depending on patient presentation, timed voids, double voids, and proper fluid/fiber intake discussed. Pt was instructed in the pathoanatomy of the pelvic floor utilizing pelvic model.  We discussed what pelvic floor physical therapy is, components of exam, and typical patient progression. Prior to internal PFM exam,  patient was told that they were in control of exam progression, and if at any time were uncomfortable and wished to  discontinue we would.            NMR pelvic pain (12 min):  Pt education regarding contributing factors to pelvic pain and dysfunction related to overactivity in the pelvic floor.  Included resources for relaxed awareness and pelvic floor quieting techniqes.  Extra time spent describing pelvic floor muscle exam and treatment plan/goals, with attention to potential history that may contribute to current symptoms.  Included resources for home release techniques using dilators and/or thera wand as indicated.  Recommended partner involvement if available.  Discussed in detail potential physiological and behavioral components of pelvic pain.  Demonstrated/performed techniques for input to painful area while using visualization and relaxation.                                    Objective:  System                                 Pelvic Dysfunction Evaluation:    Bladder/Pelvic Problems:  Patient presents with bowel issues and mixed urinary incontinence.  Patient unaware of how to contract or use her pelvic floor muscles with functional ADL's.  Storage Problem:  Urgency, frequency, urge incontinence, stress incontinence and mixed incontinence          Flexibility:    Tightness present at:Adductors; Iliopsoas; Hamstrings; Piriformis and Gluteals  obturator internus  Abdominal Wall:  Abdominal wall pelvic: Fascial restrictions noted about cecum, IC valve, sigmoid colon, pubovesical ligament and urachus.      Trigger Points:  Iliopsoas  Scar Mobility:  WNL  Pelvic Clock Exam:    Ischiocavernosis pain:  -  Bulbocavernosis pain:  -  Transverse Perineal:  -  Levator ANI:  -  Perineal Body:  -  SI Provocation:  NA        Reflex Testing:  NA    External Assessment:    Skin Condition:  Normal  Scars:  Well healed  Bearing Down/Coughing:  Normal  Tissue Symmetry:  Normal  Introitus:  Normal  Muscle Contraction/Perineal Mobility:  No movement and substitution  Internal Assessment:  Internal assessment pelvic: Minimal TP's right and  moderate TP's left levator ani muscle group.  Sensory Exam:  Normal  Contraction/Grade:  No contraction (0)  Accessory Muscle use-Abdominals:  Yes  Accessory Muscle use-Gluteals:  Yes  Accessory Muscle use-Adductors:  Yes    SEMG Biofeedback:  Semg biofeedback pelvic: Patient has poor recruitment and awareness of pelvic floor usage.  Patient utilizes alot of accessory muscles and she still is unsure if she is setting pelvic floor correctly.  Equipment:  Surface EMG    Suraface electrode placement--Perianal:  Winter anal  Baseline EMG PM:  .1uV    Peak pelvic muscle contraction:  Average quick flick 1.8uV  Sustained contraction:  Average 10 second 2.8uV  EMG interpretation to fatigue:  3-5 seconds  Position:  SupineAdditional History:    Number of Pregnancies: 0  Number of Live Births: 0  Caffeine Consumption:  24 oz                     General     ROS    Assessment/Plan:    Patient is a 48 year old female with pelvic complaints.    Patient has the following significant findings with corresponding treatment plan.                Diagnosis 1:  Pelvic floor dysfunction Decreased ROM/flexibility - manual therapy, therapeutic exercise, therapeutic activity and home program  Impaired muscle performance - biofeedback, neuro re-education and home program  Decreased function - therapeutic activities and home program  Diagnosis 2:  Mixed urinary incontinence  Decreased strength - therapeutic exercise, therapeutic activities and home program  Impaired muscle performance - biofeedback, neuro re-education and home program  Decreased function - therapeutic activities and home program    Therapy Evaluation Codes:   1) History comprised of:   Personal factors that impact the plan of care:      None.    Comorbidity factors that impact the plan of care are:      Bowel/bladder changes, Depression, High blood pressure, Overweight and Sleep disorder/apnea.     Medications impacting care: Anti-depressant, Cardiac, High blood pressure and  Sleep.  2) Examination of Body Systems comprised of:   Body structures and functions that impact the plan of care:      Lumbar spine and Pelvis.   Activity limitations that impact the plan of care are:      Jumping, Lifting, Running, Walking, Frequency, Pelvic Exam, Stress incontinence, Urgency, Urge incontinence and Urinary incontinence.  3) Clinical presentation characteristics are:   Stable/Uncomplicated.  4) Decision-Making    Low complexity using standardized patient assessment instrument and/or measureable assessment of functional outcome.  Cumulative Therapy Evaluation is: Low complexity.    Previous and current functional limitations:  (See Goal Flow Sheet for this information)    Short term and Long term goals: (See Goal Flow Sheet for this information)     Communication ability:  Patient appears to be able to clearly communicate and understand verbal and written communication and follow directions correctly.  Treatment Explanation - The following has been discussed with the patient:   RX ordered/plan of care  Anticipated outcomes  Possible risks and side effects  This patient would benefit from PT intervention to resume normal activities.   Rehab potential is good.    Frequency:  1 X week, once daily  Duration:  for 12 weeks  Discharge Plan:  Achieve all LTG.  Independent in home treatment program.  Reach maximal therapeutic benefit.    Please refer to the daily flowsheet for treatment today, total treatment time and time spent performing 1:1 timed codes.

## 2019-08-22 ENCOUNTER — THERAPY VISIT (OUTPATIENT)
Dept: PHYSICAL THERAPY | Facility: CLINIC | Age: 49
End: 2019-08-22
Payer: COMMERCIAL

## 2019-08-22 DIAGNOSIS — M62.89 PFD (PELVIC FLOOR DYSFUNCTION): ICD-10-CM

## 2019-08-22 DIAGNOSIS — N39.46 MIXED STRESS AND URGE URINARY INCONTINENCE: ICD-10-CM

## 2019-08-22 PROCEDURE — 97140 MANUAL THERAPY 1/> REGIONS: CPT | Mod: GP | Performed by: PHYSICAL THERAPIST

## 2019-08-22 PROCEDURE — 97530 THERAPEUTIC ACTIVITIES: CPT | Mod: GP | Performed by: PHYSICAL THERAPIST

## 2019-08-22 PROCEDURE — 97112 NEUROMUSCULAR REEDUCATION: CPT | Mod: GP | Performed by: PHYSICAL THERAPIST

## 2019-10-17 NOTE — PROGRESS NOTES
Discharge Note    Progress reporting period is from initial evaluation date (please see noted date below) to Aug 22, 2019.  No linked episodes      Yuridia failed to follow up and current status is unknown.  Please see information below for last relevant information on current status.  Patient seen for 2 visits.    SUBJECTIVE  Subjective changes noted by patient:  The quick flicks helped this past week.  Need to review hip flexor strethes.  BM twice daily Type 6.  Pt has not started psylium.  Patient noted a couple urge incontinent episodes.  Patient was able to sleep from 11:00pm to 6:00am without waking to urinate.    .  Current pain level is 0/10.     Previous pain level was  0/10.   Changes in function:  Yes (See Goal flowsheet attached for changes in current functional level)  Adverse reaction to treatment or activity: None    OBJECTIVE  Changes noted in objective findings: Left > right iliopsoas and iliacus MFR.  Internal pelvic floor left > right.MFR     ASSESSMENT/PLAN  Diagnosis: mixed incont/ bowel issues   Updated problem list and treatment plan:   Decreased function - HEP  Impaired muscle performance - HEP  STG/LTGs have been met or progress has been made towards goals:  Yes, please see goal flowsheet for most current information  Assessment of Progress: current status is unknown.    Last current status: Pt is progressing as expected   Self Management Plans:  HEP  I have re-evaluated this patient and find that the nature, scope, duration and intensity of the therapy is appropriate for the medical condition of the patient.  Yuridia continues to require the following intervention to meet STG and LTG's:  HEP.    Recommendations:  Discharge with current home program.  Patient to follow up with MD as needed.    Please refer to the daily flowsheet for treatment today, total treatment time and time spent performing 1:1 timed codes.

## 2019-10-21 ENCOUNTER — TELEPHONE (OUTPATIENT)
Dept: CARDIOLOGY | Facility: CLINIC | Age: 49
End: 2019-10-21

## 2019-10-21 DIAGNOSIS — D64.9 ANEMIA, UNSPECIFIED TYPE: ICD-10-CM

## 2019-10-21 DIAGNOSIS — I27.20 PULMONARY HTN (H): Primary | ICD-10-CM

## 2019-10-21 DIAGNOSIS — E78.5 DYSLIPIDEMIA: ICD-10-CM

## 2019-10-21 DIAGNOSIS — R06.02 SOB (SHORTNESS OF BREATH): ICD-10-CM

## 2019-10-21 DIAGNOSIS — Z11.59 NEED FOR HEPATITIS B SCREENING TEST: ICD-10-CM

## 2019-10-28 ENCOUNTER — DOCUMENTATION ONLY (OUTPATIENT)
Dept: CARE COORDINATION | Facility: CLINIC | Age: 49
End: 2019-10-28

## 2019-10-31 ENCOUNTER — THERAPY VISIT (OUTPATIENT)
Dept: PHYSICAL THERAPY | Facility: CLINIC | Age: 49
End: 2019-10-31
Payer: COMMERCIAL

## 2019-10-31 DIAGNOSIS — M62.89 PFD (PELVIC FLOOR DYSFUNCTION): ICD-10-CM

## 2019-10-31 DIAGNOSIS — N39.46 MIXED STRESS AND URGE URINARY INCONTINENCE: ICD-10-CM

## 2019-10-31 PROCEDURE — 97530 THERAPEUTIC ACTIVITIES: CPT | Mod: GP | Performed by: PHYSICAL THERAPIST

## 2019-10-31 PROCEDURE — 97112 NEUROMUSCULAR REEDUCATION: CPT | Mod: GP | Performed by: PHYSICAL THERAPIST

## 2019-10-31 PROCEDURE — 97140 MANUAL THERAPY 1/> REGIONS: CPT | Mod: GP | Performed by: PHYSICAL THERAPIST

## 2019-11-08 ENCOUNTER — HEALTH MAINTENANCE LETTER (OUTPATIENT)
Age: 49
End: 2019-11-08

## 2019-11-20 ENCOUNTER — THERAPY VISIT (OUTPATIENT)
Dept: PHYSICAL THERAPY | Facility: CLINIC | Age: 49
End: 2019-11-20
Payer: COMMERCIAL

## 2019-11-20 DIAGNOSIS — M62.89 PFD (PELVIC FLOOR DYSFUNCTION): ICD-10-CM

## 2019-11-20 DIAGNOSIS — N39.46 MIXED STRESS AND URGE URINARY INCONTINENCE: ICD-10-CM

## 2019-11-20 PROCEDURE — 97140 MANUAL THERAPY 1/> REGIONS: CPT | Mod: GP | Performed by: PHYSICAL THERAPIST

## 2019-11-20 PROCEDURE — 97530 THERAPEUTIC ACTIVITIES: CPT | Mod: GP | Performed by: PHYSICAL THERAPIST

## 2019-11-20 PROCEDURE — 97112 NEUROMUSCULAR REEDUCATION: CPT | Mod: GP | Performed by: PHYSICAL THERAPIST

## 2019-11-24 ASSESSMENT — ENCOUNTER SYMPTOMS
TROUBLE SWALLOWING: 0
TASTE DISTURBANCE: 0
SORE THROAT: 0
MYALGIAS: 1
SINUS PAIN: 0
DIZZINESS: 1
HEARTBURN: 0
PARALYSIS: 0
COUGH: 0
SPEECH CHANGE: 1
HEADACHES: 0
CONSTIPATION: 0
SMELL DISTURBANCE: 0
MUSCLE CRAMPS: 0
JOINT SWELLING: 1
MEMORY LOSS: 0
POSTURAL DYSPNEA: 0
HYPOTENSION: 0
DYSURIA: 0
HYPERTENSION: 0
SPUTUM PRODUCTION: 0
DIFFICULTY URINATING: 1
LOSS OF CONSCIOUSNESS: 0
BOWEL INCONTINENCE: 0
MUSCLE WEAKNESS: 0
BLOATING: 0
VOMITING: 0
ORTHOPNEA: 0
DIARRHEA: 1
RECTAL PAIN: 0
SYNCOPE: 0
WHEEZING: 0
TREMORS: 0
COUGH DISTURBING SLEEP: 0
SINUS CONGESTION: 0
DYSPNEA ON EXERTION: 1
NERVOUS/ANXIOUS: 1
SEIZURES: 0
HEMOPTYSIS: 0
NAUSEA: 0
TINGLING: 1
SLEEP DISTURBANCES DUE TO BREATHING: 0
DISTURBANCES IN COORDINATION: 1
PANIC: 0
ABDOMINAL PAIN: 1
FLANK PAIN: 0
SWOLLEN GLANDS: 0
HEMATURIA: 0
WEAKNESS: 0
LEG PAIN: 0
NECK PAIN: 0
PALPITATIONS: 1
HOARSE VOICE: 0
BLOOD IN STOOL: 0
STIFFNESS: 1
INSOMNIA: 0
NECK MASS: 0
BRUISES/BLEEDS EASILY: 0
ARTHRALGIAS: 1
BACK PAIN: 0
DEPRESSION: 1
EXERCISE INTOLERANCE: 1
NUMBNESS: 1
SHORTNESS OF BREATH: 1
DECREASED CONCENTRATION: 1
JAUNDICE: 0
SNORES LOUDLY: 0
LIGHT-HEADEDNESS: 1

## 2019-11-25 ENCOUNTER — OFFICE VISIT (OUTPATIENT)
Dept: CARDIOLOGY | Facility: CLINIC | Age: 49
End: 2019-11-25
Attending: INTERNAL MEDICINE
Payer: COMMERCIAL

## 2019-11-25 VITALS
BODY MASS INDEX: 45.64 KG/M2 | SYSTOLIC BLOOD PRESSURE: 111 MMHG | OXYGEN SATURATION: 91 % | WEIGHT: 284 LBS | HEART RATE: 78 BPM | DIASTOLIC BLOOD PRESSURE: 75 MMHG | HEIGHT: 66 IN

## 2019-11-25 DIAGNOSIS — Z11.59 NEED FOR HEPATITIS B SCREENING TEST: ICD-10-CM

## 2019-11-25 DIAGNOSIS — R06.02 SOB (SHORTNESS OF BREATH): ICD-10-CM

## 2019-11-25 DIAGNOSIS — E78.5 DYSLIPIDEMIA: ICD-10-CM

## 2019-11-25 DIAGNOSIS — I27.20 PULMONARY HTN (H): Primary | ICD-10-CM

## 2019-11-25 DIAGNOSIS — I27.20 PULMONARY HTN (H): ICD-10-CM

## 2019-11-25 DIAGNOSIS — D64.9 ANEMIA, UNSPECIFIED TYPE: ICD-10-CM

## 2019-11-25 LAB
6 MIN WALK (FT): 1400 FT
6 MIN WALK (M): 427 M
ALBUMIN SERPL-MCNC: 3.4 G/DL (ref 3.4–5)
ALP SERPL-CCNC: 114 U/L (ref 40–150)
ALT SERPL W P-5'-P-CCNC: 25 U/L (ref 0–50)
ANION GAP SERPL CALCULATED.3IONS-SCNC: 7 MMOL/L (ref 3–14)
AST SERPL W P-5'-P-CCNC: 20 U/L (ref 0–45)
BILIRUB DIRECT SERPL-MCNC: 0.1 MG/DL (ref 0–0.2)
BILIRUB SERPL-MCNC: 0.4 MG/DL (ref 0.2–1.3)
BUN SERPL-MCNC: 25 MG/DL (ref 7–30)
CALCIUM SERPL-MCNC: 9 MG/DL (ref 8.5–10.1)
CHLORIDE SERPL-SCNC: 105 MMOL/L (ref 94–109)
CHOLEST SERPL-MCNC: 139 MG/DL
CO2 SERPL-SCNC: 25 MMOL/L (ref 20–32)
CREAT SERPL-MCNC: 1.14 MG/DL (ref 0.52–1.04)
CRP SERPL-MCNC: 10.4 MG/L (ref 0–8)
ERYTHROCYTE [DISTWIDTH] IN BLOOD BY AUTOMATED COUNT: 12.8 % (ref 10–15)
GFR SERPL CREATININE-BSD FRML MDRD: 57 ML/MIN/{1.73_M2}
GLUCOSE SERPL-MCNC: 89 MG/DL (ref 70–99)
HBV CORE AB SERPL QL IA: NONREACTIVE
HBV SURFACE AB SERPL IA-ACNC: 0 M[IU]/ML
HBV SURFACE AG SERPL QL IA: NONREACTIVE
HCT VFR BLD AUTO: 40.7 % (ref 35–47)
HCV AB SERPL QL IA: NONREACTIVE
HDLC SERPL-MCNC: 69 MG/DL
HGB BLD-MCNC: 12.9 G/DL (ref 11.7–15.7)
HIV 1+2 AB+HIV1 P24 AG SERPL QL IA: NONREACTIVE
INR PPP: 2.54 (ref 0.86–1.14)
IRON SATN MFR SERPL: 17 % (ref 15–46)
IRON SERPL-MCNC: 44 UG/DL (ref 35–180)
LDLC SERPL CALC-MCNC: 54 MG/DL
MCH RBC QN AUTO: 30.4 PG (ref 26.5–33)
MCHC RBC AUTO-ENTMCNC: 31.7 G/DL (ref 31.5–36.5)
MCV RBC AUTO: 96 FL (ref 78–100)
NONHDLC SERPL-MCNC: 70 MG/DL
NT-PROBNP SERPL-MCNC: 483 PG/ML (ref 0–125)
PLATELET # BLD AUTO: 221 10E9/L (ref 150–450)
POTASSIUM SERPL-SCNC: 3.4 MMOL/L (ref 3.4–5.3)
PROT SERPL-MCNC: 7.6 G/DL (ref 6.8–8.8)
RBC # BLD AUTO: 4.25 10E12/L (ref 3.8–5.2)
RHEUMATOID FACT SER NEPH-ACNC: <20 IU/ML (ref 0–20)
SODIUM SERPL-SCNC: 137 MMOL/L (ref 133–144)
TIBC SERPL-MCNC: 260 UG/DL (ref 240–430)
TRIGL SERPL-MCNC: 81 MG/DL
TSH SERPL DL<=0.005 MIU/L-ACNC: 3.68 MU/L (ref 0.4–4)
WBC # BLD AUTO: 6.8 10E9/L (ref 4–11)

## 2019-11-25 PROCEDURE — 85610 PROTHROMBIN TIME: CPT | Performed by: INTERNAL MEDICINE

## 2019-11-25 PROCEDURE — 86038 ANTINUCLEAR ANTIBODIES: CPT | Performed by: INTERNAL MEDICINE

## 2019-11-25 PROCEDURE — 87389 HIV-1 AG W/HIV-1&-2 AB AG IA: CPT | Performed by: INTERNAL MEDICINE

## 2019-11-25 PROCEDURE — 80061 LIPID PANEL: CPT | Performed by: INTERNAL MEDICINE

## 2019-11-25 PROCEDURE — 80076 HEPATIC FUNCTION PANEL: CPT | Performed by: INTERNAL MEDICINE

## 2019-11-25 PROCEDURE — 85027 COMPLETE CBC AUTOMATED: CPT | Performed by: INTERNAL MEDICINE

## 2019-11-25 PROCEDURE — 83520 IMMUNOASSAY QUANT NOS NONAB: CPT | Performed by: INTERNAL MEDICINE

## 2019-11-25 PROCEDURE — 86140 C-REACTIVE PROTEIN: CPT | Performed by: INTERNAL MEDICINE

## 2019-11-25 PROCEDURE — 99204 OFFICE O/P NEW MOD 45 MIN: CPT | Mod: ZP | Performed by: INTERNAL MEDICINE

## 2019-11-25 PROCEDURE — 00000401 ZZHCL STATISTIC THROMBIN TIME NC: Performed by: INTERNAL MEDICINE

## 2019-11-25 PROCEDURE — 87340 HEPATITIS B SURFACE AG IA: CPT | Performed by: INTERNAL MEDICINE

## 2019-11-25 PROCEDURE — 80048 BASIC METABOLIC PNL TOTAL CA: CPT | Performed by: INTERNAL MEDICINE

## 2019-11-25 PROCEDURE — 85730 THROMBOPLASTIN TIME PARTIAL: CPT | Performed by: INTERNAL MEDICINE

## 2019-11-25 PROCEDURE — G0463 HOSPITAL OUTPT CLINIC VISIT: HCPCS

## 2019-11-25 PROCEDURE — 86803 HEPATITIS C AB TEST: CPT | Performed by: INTERNAL MEDICINE

## 2019-11-25 PROCEDURE — 84443 ASSAY THYROID STIM HORMONE: CPT | Performed by: INTERNAL MEDICINE

## 2019-11-25 PROCEDURE — 85613 RUSSELL VIPER VENOM DILUTED: CPT | Performed by: INTERNAL MEDICINE

## 2019-11-25 PROCEDURE — 86704 HEP B CORE ANTIBODY TOTAL: CPT | Performed by: INTERNAL MEDICINE

## 2019-11-25 PROCEDURE — 83540 ASSAY OF IRON: CPT | Performed by: INTERNAL MEDICINE

## 2019-11-25 PROCEDURE — 85597 PHOSPHOLIPID PLTLT NEUTRALIZ: CPT | Performed by: INTERNAL MEDICINE

## 2019-11-25 PROCEDURE — 86039 ANTINUCLEAR ANTIBODIES (ANA): CPT | Performed by: INTERNAL MEDICINE

## 2019-11-25 PROCEDURE — 84238 ASSAY NONENDOCRINE RECEPTOR: CPT | Performed by: INTERNAL MEDICINE

## 2019-11-25 PROCEDURE — 83880 ASSAY OF NATRIURETIC PEPTIDE: CPT | Performed by: INTERNAL MEDICINE

## 2019-11-25 PROCEDURE — 36415 COLL VENOUS BLD VENIPUNCTURE: CPT | Performed by: INTERNAL MEDICINE

## 2019-11-25 PROCEDURE — 86431 RHEUMATOID FACTOR QUANT: CPT | Performed by: INTERNAL MEDICINE

## 2019-11-25 PROCEDURE — 86706 HEP B SURFACE ANTIBODY: CPT | Performed by: INTERNAL MEDICINE

## 2019-11-25 PROCEDURE — 83550 IRON BINDING TEST: CPT | Performed by: INTERNAL MEDICINE

## 2019-11-25 RX ORDER — LOSARTAN POTASSIUM AND HYDROCHLOROTHIAZIDE 12.5; 1 MG/1; MG/1
1 TABLET ORAL
COMMUNITY
Start: 2018-03-12 | End: 2020-08-03

## 2019-11-25 RX ORDER — ATORVASTATIN CALCIUM 10 MG/1
10 TABLET, FILM COATED ORAL
Status: ON HOLD | COMMUNITY
Start: 2018-02-02 | End: 2021-01-21

## 2019-11-25 RX ORDER — LANOLIN ALCOHOL/MO/W.PET/CERES
CREAM (GRAM) TOPICAL
COMMUNITY
End: 2020-08-03

## 2019-11-25 RX ORDER — HYDROXYCHLOROQUINE SULFATE 200 MG/1
200 TABLET, FILM COATED ORAL 2 TIMES DAILY
COMMUNITY
Start: 2009-03-30 | End: 2021-05-03

## 2019-11-25 RX ORDER — DIGOXIN 125 MCG
TABLET ORAL
Refills: 3 | COMMUNITY
Start: 2019-10-29 | End: 2020-08-03

## 2019-11-25 RX ORDER — POTASSIUM CHLORIDE 1500 MG/1
40 TABLET, EXTENDED RELEASE ORAL
Status: CANCELLED | OUTPATIENT
Start: 2019-11-25

## 2019-11-25 RX ORDER — ARIPIPRAZOLE 5 MG/1
5 TABLET ORAL
Status: ON HOLD | COMMUNITY
Start: 2018-09-12 | End: 2021-01-18

## 2019-11-25 RX ORDER — TRAZODONE HYDROCHLORIDE 100 MG/1
100 TABLET ORAL
COMMUNITY
End: 2020-10-28

## 2019-11-25 RX ORDER — CLONAZEPAM 1 MG/1
1 TABLET ORAL
Status: ON HOLD | COMMUNITY
Start: 2018-02-28 | End: 2021-01-21

## 2019-11-25 RX ORDER — SODIUM CHLORIDE 9 MG/ML
INJECTION, SOLUTION INTRAVENOUS CONTINUOUS
Status: CANCELLED | OUTPATIENT
Start: 2019-11-25

## 2019-11-25 RX ORDER — LIDOCAINE 40 MG/G
CREAM TOPICAL
Status: CANCELLED | OUTPATIENT
Start: 2019-11-25

## 2019-11-25 RX ORDER — SACCHAROMYCES BOULARDII 250 MG
CAPSULE ORAL
COMMUNITY
End: 2020-08-03

## 2019-11-25 RX ORDER — POTASSIUM CHLORIDE 1500 MG/1
20 TABLET, EXTENDED RELEASE ORAL
Status: CANCELLED | OUTPATIENT
Start: 2019-11-25

## 2019-11-25 RX ORDER — FUROSEMIDE 20 MG
40 TABLET ORAL 2 TIMES DAILY
COMMUNITY
Start: 2019-09-30 | End: 2020-03-09

## 2019-11-25 RX ORDER — SERTRALINE HYDROCHLORIDE 100 MG/1
200 TABLET, FILM COATED ORAL
Status: ON HOLD | COMMUNITY
Start: 2013-10-06 | End: 2021-01-21

## 2019-11-25 ASSESSMENT — MIFFLIN-ST. JEOR: SCORE: 1939.97

## 2019-11-25 ASSESSMENT — PAIN SCALES - GENERAL: PAINLEVEL: NO PAIN (0)

## 2019-11-25 NOTE — NURSING NOTE
Chief Complaint   Patient presents with     New Patient     New PH referral from Dr. Olsen      Vitals were taken and medications were reconciled.     Vivian Ghosh RMA  9:17 AM

## 2019-11-25 NOTE — PATIENT INSTRUCTIONS
Medication Changes:  No medication changes at this time. Please continue current medication regiment.    Patient Instructions:  1. Continue staying active and eat a heart healthy diet.    2. Please keep current list of medications with you at all times.    3. Remember to weigh yourself daily after voiding and before you consume any food or beverages and log the numbers.  If you have gained 2 pounds overnight or 5 pounds in a week contact us immediately for medication adjustments or further instructions.    4. **Please call us immediately if you have any syncope (fainting or passing out), chest pain, edema (swelling or weight gain), or decline in your functional status (general decline in how you are feeling).    Check-In  Time Check-In Location Estimated Length Procedure   Name     12/2/19  9:30 AM   [facility] waiting room 60 minutes Echocardiogram (Echo)**   Procedure Preparations & Instructions     This is a non-invasive procedure and does NOT require any preparation        Check-In  Time Check-In Location Estimated Length Procedure   Name     12/2/19  11:00 AM   [facility]  waiting room  minutes Right heart catheterization and Pulmonary Angiogram**     Procedure Preparations & Instructions     This is an invasive procedure that DOES require preparation:    - Nothing to eat for 6 hours prior. You may drink clear liquids up until 2 hours prior to exam (no pop, or carbonated drinks).   - DO NOT use alcohol, tobacco or food/beverages containing caffeine for at least 12 hours prior to your test (ie. Coffee, tea, soda, chocolate, de-caffeinated beverages, certain medications including Excedrin, Anacin, NoDoz)  - Please arrange a ride to bring your home, however, you should be able to function as you normally would after the procedure.  - Following the exam you will be on complete bedrest for 2-4 hours.  - When you are discharged you may resume normal activities but no strenuous exercise or lifting for 48 hours.     *For  Patients on anticoagulants: ? Your Coumadin Clinic will give you instructions on medication adjustments or bridging prior to the procedure (INR must be <2.0)   1) Check PT/INR 1 week prior to the procedure. Notify provider if INR is greater than 5.0 or less than 2.0.    2) 5 days prior to the procedure: Take last dose of Coumadin    3) 3 days prior to the procedure: Start lovenox injections (1mg/kg) subcutaneously every 12 hours.    4) Day before procedure: Only take your AM Lovenox injection (Then Hold till 1 day after procedure)    5) Day of the procedure: Restart Coumadin at usual dosing in the evening. No lovenox this day.    6) 1 day post procedure: Continue Coumadin. Restart Lovenox injections every 12 hours.  7) Continue Lovenox for 4 days after procedure. Return to Coumadin clinic for an INR check and further instruction. You will continue Lovenox and Coumadin until your INR is in your therapeutic range (Typically 2-3) as directed by your INR Clinic.     7 Day Before 5 Day Before 3 Day Before 1 Day Before Procedure  Day 1 Day After 4 Day After   DATE          INR Check Check INR      Check INR   Lovenox AM   START Last Dose  Restart    Lovenox PM   START Do not take PM Dose  Restart    Coumadin  Last Dose   Re-Start in PM       Follow up Appointment Information:  Follow up after testing      We are located on the third floor of [facility] on the [facility].  Our address is     [address] [place] [address] [place] [address] [place] [address]    Thank you for allowing us to be a part of your care here at the [facility]    If you have questions or concerns please contact us at:    Yoly Villegas, RN, BSN, PHN  Deneen Mccann (Schedule,Prior Auth)  Nurse Coordinator     Clinic   Pulmonary Hypertension   Pulmonary Hypertension  [facility]  (Phone)[phone]   (Phone) [phone]        (Fax) [phone]    ** Please note that you will NOT receive a reminder call regarding your scheduled testing, reminder calls are for provider appointments only.  If you are scheduled for testing within the [facility] system you may receive a call regarding pre-registration for billing purposes only.**     Remember to weigh yourself daily after voiding and before you consume any food or beverages and log the numbers.  If you have gained/lost 2 pounds overnight or 5 pounds in a week contact us immediately for medication adjustments or further instructions.   **Please call us immediately if you have any syncope, chest pain, edema, or decline in your functional status.    Support Group:  Pulmonary Hypertension Association  Https://www.phassociation.org/  **Look at the Events Tab** They even have Support Groups that you can call into    [facility] PH Support Group  Second Saturday of the Month from 1-3 PM   Location: [address] [place] [address]  Leader: Marika Quiroz and Amisha Vila  Phone: [phone] or [phone]  Email: [email or web address]

## 2019-11-25 NOTE — NURSING NOTE
Procedures and/or Testing: Patient given instructions regarding  Pulmonary Angiogram,. Discussed purpose, preparation, procedure and when to expect results reported back to the patient. Patient demonstrated understanding of this information and agreed to call with further questions or concerns.  Right Heart Catheterization: Patient was instructed regarding right heart catheterization, including discussion of the procedure, preparation, intra-procedural steps, and recovery at home. Patient demonstrated understanding of this information and agreed to call with further questions or concerns.  Med Reconcile: Reviewed and verified all current medications with the patient. The updated medication list was printed and given to the patient.  Diet: Patient instructed regarding a heart healthy diet, including discussion of reduced fat and sodium intake. Patient demonstrated understanding of this information and agreed to call with further questions or concerns.  Return Appointment: Patient given instructions regarding scheduling next clinic visit. Patient demonstrated understanding of this information and agreed to call with further questions or concerns.  Patient stated she understood all health information given and agreed to call with further questions or concerns.     Medication Changes:  No medication changes at this time. Please continue current medication regiment.    Patient Instructions:  1. Continue staying active and eat a heart healthy diet.    2. Please keep current list of medications with you at all times.    3. Remember to weigh yourself daily after voiding and before you consume any food or beverages and log the numbers.  If you have gained 2 pounds overnight or 5 pounds in a week contact us immediately for medication adjustments or further instructions.    4. **Please call us immediately if you have any syncope (fainting or passing out), chest pain, edema (swelling or weight gain), or decline in your  functional status (general decline in how you are feeling).    Check-In  Time Check-In Location Estimated Length Procedure   Name     12/2/19  9:30 AM   [facility] waiting room 60 minutes Echocardiogram (Echo)**   Procedure Preparations & Instructions     This is a non-invasive procedure and does NOT require any preparation        Check-In  Time Check-In Location Estimated Length Procedure   Name     12/2/19  11:00 AM   [facility]  waiting room  minutes Right heart catheterization and Pulmonary Angiogram**     Procedure Preparations & Instructions     This is an invasive procedure that DOES require preparation:    - Nothing to eat for 6 hours prior. You may drink clear liquids up until 2 hours prior to exam (no pop, or carbonated drinks).   - DO NOT use alcohol, tobacco or food/beverages containing caffeine for at least 12 hours prior to your test (ie. Coffee, tea, soda, chocolate, de-caffeinated beverages, certain medications including Excedrin, Anacin, NoDoz)  - Please arrange a ride to bring your home, however, you should be able to function as you normally would after the procedure.  - Following the exam you will be on complete bedrest for 2-4 hours.  - When you are discharged you may resume normal activities but no strenuous exercise or lifting for 48 hours.     *For Patients on anticoagulants: ? Your Coumadin Clinic will give you instructions on medication adjustments or bridging prior to the procedure (INR must be <2.0)   1) Check PT/INR 1 week prior to the procedure. Notify provider if INR is greater than 5.0 or less than 2.0.    2) 5 days prior to the procedure: Take last dose of Coumadin    3) 3 days prior to the procedure: Start lovenox injections (1mg/kg) subcutaneously every 12 hours.    4) Day before procedure: Only take your AM Lovenox injection (Then Hold till 1 day after procedure)    5) Day of the procedure: Restart Coumadin at usual dosing in the evening. No lovenox this day.    6) 1 day post procedure:  Continue Coumadin. Restart Lovenox injections every 12 hours.  7) Continue Lovenox for 4 days after procedure. Return to Coumadin clinic for an INR check and further instruction. You will continue Lovenox and Coumadin until your INR is in your therapeutic range (Typically 2-3) as directed by your INR Clinic.     7 Day Before 5 Day Before 3 Day Before 1 Day Before Procedure  Day 1 Day After 4 Day After   DATE          INR Check Check INR      Check INR   Lovenox AM   START Last Dose  Restart    Lovenox PM   START Do not take PM Dose  Restart    Coumadin  Last Dose   Re-Start in PM       Follow up Appointment Information:  Follow up after testing

## 2019-11-25 NOTE — LETTER
2019      RE: Yuridia Barksdale  [address] [address]     Dear Colleague,    Thank you for the opportunity to participate in the care of your patient, Yuridia Barksdale, at the [facility]. Please see a copy of my visit note below.    Service Date: 2019      Clint Olsen MD   [facility]   [address] [place] [address]      RE: Yuridia Barksdale   MRN: [id number]   : 1970      Dear Dr. Olsen:      We had the pleasure of seeing Yuridia Barksdale in our Pulmonary Hypertension Clinic at the [facility].  Although you are familiar with her history, please allow me to summarize it for the purpose of our records.      Ms. Barksdale is a very pleasant 49-year-old female who was diagnosed with systemic lupus erythematosus in .  Her main symptoms were joint pain, fatigue and skin sensitivity.  She has been on chronic suppressive therapy with hydroxychloroquine.  She seldom takes prednisone burst.  Her lupus has been under control.      Subsequently in , she was diagnosed with a deep venous thrombosis secondary to antiphospholipid antibody syndrome associated with SLE.  She was initially on Coumadin, and subsequently, she has been on Xarelto since .  This was recently switched back to Coumadin as it was thought to be ineffective.      She was doing reasonably well except for 1 episode of pneumonia in  up until 2018, when she noticed exertional shortness of breath.  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  She had a CT scan of the chest, PE protocol.  She did not have a pulmonary angiogram.  She was seen by Dr. Clint Olsen at the [facility].  She was started on riociguat therapy.  Her Xarelto was thought to be ineffective and she was switched to Coumadin.  She also did  pulmonary rehabilitation.  With these treatments, she started to feel better.  Her records were reviewed by [facility], and she was recommended to come down for further evaluation for possible thromboendarterectomy versus balloon angioplasty.  However, her insurance denied coverage to the [facility].  In light of this, she was referred to our program for further evaluation.      She states that she is feeling better after starting riociguat, pulmonary rehab and Coumadin therapy.  She is able to do her activities of daily living without any limitation.  She has no shortness of breath for 1 flight of stairs.  However, if she overexerts, she notices shortness of breath.  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  She uses 3 liters of oxygen when she uses the bike or 2 liters of oxygen when she is doing strengthening exercise.  She can walk on a flat surface flat surface less than a block.  She has been having some lower extremity swelling on and off.  More swelling on the left side than the right side.  She has not had any exertional presyncope or syncope.  No exertional chest pain or chest pressure.  No PND or orthopnea.  No palpitation.  No recent hospitalizations or ER visits.  I would currently characterize her as NYHA functional class III.      PAST MEDICAL HISTORY:   1.  Systemic lupus erythematosus.   2.  Deep venous thrombosis.   3.  DAVIS with obstructive sleep apnea.   4.  Obesity.   5.  Chronic thromboembolic pulmonary hypertension.      PAST SURGICAL HISTORY:   1.  Appendectomy.   2.  Lumpectomy.   3.  Hysterectomy.      MEDICATIONS:    Current Outpatient Medications   Medication Sig     ARIPiprazole (ABILIFY) 5 MG tablet Take 5 mg by mouth     atorvastatin (LIPITOR) 10 MG tablet Take 10 mg by mouth     clonazePAM (KLONOPIN) 1 MG tablet Take 1 mg by mouth     digoxin (LANOXIN) 125 MCG tablet TK 1 T PO QD     enoxaparin ANTICOAGULANT (LOVENOX ANTICOAGULANT)  120 MG/0.8ML syringe Inject 0.8 mLs (120 mg) Subcutaneous daily     furosemide (LASIX) 20 MG tablet Take 40 mg by mouth 2 times daily      hydroxychloroquine (PLAQUENIL) 200 MG tablet Take 200 mg by mouth 2 times daily      losartan-hydrochlorothiazide (HYZAAR) 100-12.5 MG tablet Take 1 tablet by mouth     melatonin 3 MG tablet      riociguat (ADEMPAS) 2.5 MG tablet      saccharomyces boulardii (FLORASTOR) 250 MG capsule      sertraline (ZOLOFT) 100 MG tablet Take 200 mg by mouth     traZODone (DESYREL) 100 MG tablet Take 100 mg by mouth     Warfarin Sodium (COUMADIN PO) Take  by mouth.     Misc Natural Products (TART CHERRY ADVANCED PO) Take 1 Dose by mouth daily States liquid form but not regularly.     No current facility-administered medications for this visit.      REVIEW OF SYSTEMS:  A detailed 10-point review of systems was obtained as described in the History of Present Illness.  All other systems reviewed and are negative.      PERSONAL AND SOCIAL HISTORY:  She is not working. She works as a part-time  for [employer].  She works 4 hours 4 days a week.  She does not smoke.  She drinks alcohol socially.  She does not use any illicit drugs.  She is not .  She is single.  She has no kids.  She denies using diet pills in the past.      FAMILY HISTORY:  Her dad and mom are healthy.  She has 1 living healthy sister.  She has no significant family history of hypercoagulable state, lupus, pulmonary hypertension, premature coronary artery disease, cardiomyopathy or sudden cardiac death.      PHYSICAL EXAMINATION:  She was comfortable.  She was in no apparent distress.  Her blood pressure was 111/75.  Pulse rate was 78.  Her respiratory rate was 16.  She was saturating 91% on room air.  She was 5 feet 6 inches tall.  Her weight was 284 pounds.  Her BMI was 45.8.  She had no pallor, cyanosis or jaundice.  Her neck exam revealed no jugular venous distention.  Her carotids  were 1+ bilaterally.  Cardiac auscultation revealed normal S1, S2 with no murmur, rub or gallop.  Auscultation of the lungs revealed equal air entry on both sides with no added sounds.  Her abdomen was soft with normal bowel sounds, no tenderness, no rigidity, no guarding.  She had no focal neurological deficit.  Her extremities showed no edema.      I reviewed all her records from [facility].  Her last electrocardiogram dated 09/25/2018 showed normal sinus rhythm, normal EKG.      Her most recent echocardiogram from 04/08/2019 showed mildly enlarged right ventricle with mildly reduced right ventricular function.  Her right atrium was normal.  She had mild to moderate tricuspid regurgitation.  Her estimated right ventricular systolic pressure was significantly elevated at 85 mmHg plus right atrial pressure.  Left ventricle was normal in size and function.  Her estimated ejection fraction was 64%.  She had mildly enlarged left atrium.  She had no other significant valvular abnormalities.  She had no pericardial effusion.  Her V/Q scan dated 10/25/2018 showed high probability for pulmonary embolism.  She had multiple bilateral medium and large size subsegmental and segmental perfusion defects.  She had a CT chest, PE protocol, which showed no evidence for acute pulmonary embolism.  No acute infiltrates, pneumothorax or effusions.      She has not had any recent complete pulmonary function tests.  Her most recent 6-minute walk test was from 04/2019.  She walked for 402 meters.  On 2 liters of oxygen, she desaturated 81%.      She has not had any serological workup for pulmonary hypertension.      ASSESSMENT AND PLAN:      Ms. Yuridia Barksdale is a very pleasant 49-year-old female with past medical history significant for lupus, antiphospholipid antibody syndrome, DVT, obesity, obstructive sleep apnea and chronic thromboembolic hypertension, who is currently on riociguat therapy.  She was referred to us for  further evaluation and management of her CTEPH.      Ms. Barksdale was scheduled to go to the [facility], for possible pulmonary balloon angioplasty versus pulmonary thromboendarterectomy.  Unfortunately, her insurance has denied her coverage for [facility].  Thus, she was referred to our program for further evaluation and treatment.      She is currently not in right heart failure.  She is functional class III.  Feeling better on riociguat therapy.  She is on Coumadin. As a first step, I have recommended her to have a repeat right heart catheterization as well as pulmonary angiogram.  We will bridge her with Lovenox given her lupus, antiphospholipid syndrome.  We will decide on further steps based on her hemodynamic assessment as well as pulmonary angiogram.  She appears euvolemic.      In the interim, I did not change any of her therapy.  She will continue on Adempas 2.5 mg 3 times a day.  She appears euvolemic on the current dose of Lasix 40 mg twice a day, which I recommended to continue.  She is also on digoxin 125 mcg daily, which she will continue.  She is on Coumadin.  She is currently being monitored with a chromogenic factor given her lupus anticoagulant syndrome.  Her target chromogenic factor is 20-30.  She has a hematologist who she follows for lupus anticoagulant at [facility].      I also recommended her to have a repeat 6-minute walk test, echocardiogram to assess her most recent right ventricular size and function.  We will get basic labs, including CBC, BMP and NT-proBNP.      It was a pleasure meeting Ms. Yuridia Barksdale in our Pulmonary Hypertension Clinic at the [facility].  We thank you for involving us in her care.  Please do not hesitate to call us in the interim if you have any further questions.      Sincerely,   Arcelia Paniagua MD   [facility] Pulmonary Hypertension  Heart Failure, Transplant, and Mechanical  Circulatory Support Cardiology   Cardiovascular Division  [facility]   [phone]    D: 2019   T: 2019   MT: al      Name:     JEREMY BOATENG   MRN:      [id number]        Account:      [id number]   :      1970           Service Date: 2019   Document: [id number]

## 2019-11-25 NOTE — LETTER
2019      RE: Yuridia Barksdale  [address] [facility] [address]       Service Date: 2019      Clint Olsen MD   [facility]   [address] [place] [id number]      RE: Yuridia Barksdale   MRN: [id number]   : 1970      Dear Dr. Olsen:      We had the pleasure of seeing Yuridia Barksdale in our Pulmonary Hypertension Clinic at the [facility].  Although you are familiar with her history, please allow me to summarize it for the purpose of our records.      Ms. Barksdale is a very pleasant 49-year-old female who was diagnosed with systemic lupus erythematosus in .  Her main symptoms were joint pain, fatigue and skin sensitivity.  She has been on chronic suppressive therapy with hydroxychloroquine.  She seldom takes prednisone burst.  Her lupus has been under control.      Subsequently in , she was diagnosed with a deep venous thrombosis secondary to antiphospholipid antibody syndrome associated with SLE.  She was initially on Coumadin, and subsequently, she has been on Xarelto since .  This was recently switched back to Coumadin as it was thought to be ineffective.      She was doing reasonably well except for 1 episode of pneumonia in  up until 2018, when she noticed exertional shortness of breath.  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  She had a CT scan of the chest, PE protocol.  She did not have a pulmonary angiogram.  She was seen by Dr. Clint Olsen at the [facility].  She was started on riociguat therapy.  Her Xarelto was thought to be ineffective and she was switched to Coumadin.  She also did pulmonary rehabilitation.  With these treatments, she started to feel better.  Her records were reviewed by [facility] [place], and she was recommended to come down for further evaluation for possible  thromboendarterectomy versus balloon angioplasty.  However, her insurance denied coverage to the [facility].  In light of this, she was referred to our program for further evaluation.      She states that she is feeling better after starting riociguat, pulmonary rehab and Coumadin therapy.  She is able to do her activities of daily living without any limitation.  She has no shortness of breath for 1 flight of stairs.  However, if she overexerts, she notices shortness of breath.  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  She uses 3 liters of oxygen when she uses the bike or 2 liters of oxygen when she is doing strengthening exercise.  She can walk on a flat surface flat surface less than a block.  She has been having some lower extremity swelling on and off.  More swelling on the left side than the right side.  She has not had any exertional presyncope or syncope.  No exertional chest pain or chest pressure.  No PND or orthopnea.  No palpitation.  No recent hospitalizations or ER visits.  I would currently characterize her as NYHA functional class III.      PAST MEDICAL HISTORY:   1.  Systemic lupus erythematosus.   2.  Deep venous thrombosis.   3.  DAVIS with obstructive sleep apnea.   4.  Obesity.   5.  Chronic thromboembolic pulmonary hypertension.      PAST SURGICAL HISTORY:   1.  Appendectomy.   2.  Lumpectomy.   3.  Hysterectomy.      MEDICATIONS:    Current Outpatient Medications   Medication Sig     ARIPiprazole (ABILIFY) 5 MG tablet Take 5 mg by mouth     atorvastatin (LIPITOR) 10 MG tablet Take 10 mg by mouth     clonazePAM (KLONOPIN) 1 MG tablet Take 1 mg by mouth     digoxin (LANOXIN) 125 MCG tablet TK 1 T PO QD     enoxaparin ANTICOAGULANT (LOVENOX ANTICOAGULANT) 120 MG/0.8ML syringe Inject 0.8 mLs (120 mg) Subcutaneous daily     furosemide (LASIX) 20 MG tablet Take 40 mg by mouth 2 times daily      hydroxychloroquine (PLAQUENIL) 200 MG tablet Take 200 mg by mouth 2 times daily       losartan-hydrochlorothiazide (HYZAAR) 100-12.5 MG tablet Take 1 tablet by mouth     melatonin 3 MG tablet      riociguat (ADEMPAS) 2.5 MG tablet      saccharomyces boulardii (FLORASTOR) 250 MG capsule      sertraline (ZOLOFT) 100 MG tablet Take 200 mg by mouth     traZODone (DESYREL) 100 MG tablet Take 100 mg by mouth     Warfarin Sodium (COUMADIN PO) Take  by mouth.     Misc Natural Products (TART CHERRY ADVANCED PO) Take 1 Dose by mouth daily States liquid form but not regularly.     No current facility-administered medications for this visit.      REVIEW OF SYSTEMS:  A detailed 10-point review of systems was obtained as described in the History of Present Illness.  All other systems reviewed and are negative.      PERSONAL AND SOCIAL HISTORY:  She is not working. She works as a part-time  for [employer].  She works 4 hours 4 days a week.  She does not smoke.  She drinks alcohol socially.  She does not use any illicit drugs.  She is not .  She is single.  She has no kids.  She denies using diet pills in the past.      FAMILY HISTORY:  Her dad and mom are healthy.  She has 1 living healthy sister.  She has no significant family history of hypercoagulable state, lupus, pulmonary hypertension, premature coronary artery disease, cardiomyopathy or sudden cardiac death.      PHYSICAL EXAMINATION:  She was comfortable.  She was in no apparent distress.  Her blood pressure was 111/75.  Pulse rate was 78.  Her respiratory rate was 16.  She was saturating 91% on room air.  She was 5 feet 6 inches tall.  Her weight was 284 pounds.  Her BMI was 45.8.  She had no pallor, cyanosis or jaundice.  Her neck exam revealed no jugular venous distention.  Her carotids were 1+ bilaterally.  Cardiac auscultation revealed normal S1, S2 with no murmur, rub or gallop.  Auscultation of the lungs revealed equal air entry on both sides with no added sounds.  Her abdomen was soft with normal  bowel sounds, no tenderness, no rigidity, no guarding.  She had no focal neurological deficit.  Her extremities showed no edema.      I reviewed all her records from [facility].  Her last electrocardiogram dated 09/25/2018 showed normal sinus rhythm, normal EKG.      Her most recent echocardiogram from 04/08/2019 showed mildly enlarged right ventricle with mildly reduced right ventricular function.  Her right atrium was normal.  She had mild to moderate tricuspid regurgitation.  Her estimated right ventricular systolic pressure was significantly elevated at 85 mmHg plus right atrial pressure.  Left ventricle was normal in size and function.  Her estimated ejection fraction was 64%.  She had mildly enlarged left atrium.  She had no other significant valvular abnormalities.  She had no pericardial effusion.  Her V/Q scan dated 10/25/2018 showed high probability for pulmonary embolism.  She had multiple bilateral medium and large size subsegmental and segmental perfusion defects.  She had a CT chest, PE protocol, which showed no evidence for acute pulmonary embolism.  No acute infiltrates, pneumothorax or effusions.      She has not had any recent complete pulmonary function tests.  Her most recent 6-minute walk test was from 04/2019.  She walked for 402 meters.  On 2 liters of oxygen, she desaturated 81%.      She has not had any serological workup for pulmonary hypertension.      ASSESSMENT AND PLAN:      Ms. Yuridia Barksdale is a very pleasant 49-year-old female with past medical history significant for lupus, antiphospholipid antibody syndrome, DVT, obesity, obstructive sleep apnea and chronic thromboembolic hypertension, who is currently on riociguat therapy.  She was referred to us for further evaluation and management of her CTEPH.      Ms. Barksdale was scheduled to go to the [facility] [place], for possible pulmonary balloon angioplasty versus pulmonary thromboendarterectomy.   Unfortunately, her insurance has denied her coverage for [facility].  Thus, she was referred to our program for further evaluation and treatment.      She is currently not in right heart failure.  She is functional class III.  Feeling better on riociguat therapy.  She is on Coumadin. As a first step, I have recommended her to have a repeat right heart catheterization as well as pulmonary angiogram.  We will bridge her with Lovenox given her lupus, antiphospholipid syndrome.  We will decide on further steps based on her hemodynamic assessment as well as pulmonary angiogram.  She appears euvolemic.      In the interim, I did not change any of her therapy.  She will continue on Adempas 2.5 mg 3 times a day.  She appears euvolemic on the current dose of Lasix 40 mg twice a day, which I recommended to continue.  She is also on digoxin 125 mcg daily, which she will continue.  She is on Coumadin.  She is currently being monitored with a chromogenic factor given her lupus anticoagulant syndrome.  Her target chromogenic factor is 20-30.  She has a hematologist who she follows for lupus anticoagulant at [facility].      I also recommended her to have a repeat 6-minute walk test, echocardiogram to assess her most recent right ventricular size and function.  We will get basic labs, including CBC, BMP and NT-proBNP.      It was a pleasure meeting Ms. Jeremy Boateng in our Pulmonary Hypertension Clinic at the [facility].  We thank you for involving us in her care.  Please do not hesitate to call us in the interim if you have any further questions.      Sincerely,   Arcelia Paniagua MD   Center for Pulmonary Hypertension  Heart Failure, Transplant, and Mechanical Circulatory Support Cardiology   Cardiovascular Division  [facility]   [phone]               D: 11/27/2019   T: 11/27/2019   MT: al      Name:     JEREMY BOATENG   MRN:       [id number]        Account:      [id number]   :      1970           Service Date: 2019      Document: [id number]        Arcelia Paniagua MD

## 2019-11-26 LAB
ANA PAT SER IF-IMP: ABNORMAL
ANA SER QL IF: POSITIVE
ANA TITR SER IF: ABNORMAL {TITER}
IL6 SERPL-MCNC: 11.02 PG/ML
LA PPP-IMP: POSITIVE
STFR SERPL-SCNC: 3.3 MG/L (ref 1.9–4.4)

## 2019-11-27 NOTE — PROGRESS NOTES
Service Date: 2019      Clint Olsen MD   [facility]   [address] [place] [address]      RE: Yuridia Barksdale   MRN: [id number]   : 1970      Dear Dr. Olsen:      We had the pleasure of seeing Yuridia Barksdale in our Pulmonary Hypertension Clinic at the [facility].  Although you are familiar with her history, please allow me to summarize it for the purpose of our records.      Ms. Barksdale is a very pleasant 49-year-old female who was diagnosed with systemic lupus erythematosus in .  Her main symptoms were joint pain, fatigue and skin sensitivity.  She has been on chronic suppressive therapy with hydroxychloroquine.  She seldom takes prednisone burst.  Her lupus has been under control.      Subsequently in , she was diagnosed with a deep venous thrombosis secondary to antiphospholipid antibody syndrome associated with SLE.  She was initially on Coumadin, and subsequently, she has been on Xarelto since .  This was recently switched back to Coumadin as it was thought to be ineffective.      She was doing reasonably well except for 1 episode of pneumonia in  up until 2018, when she noticed exertional shortness of breath.  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  She had a CT scan of the chest, PE protocol.  She did not have a pulmonary angiogram.  She was seen by Dr. Clint Olsen at the [facility].  She was started on riociguat therapy.  Her Xarelto was thought to be ineffective and she was switched to Coumadin.  She also did pulmonary rehabilitation.  With these treatments, she started to feel better.  Her records were reviewed by [facility], [place], and she was recommended to come down for further evaluation for possible thromboendarterectomy versus balloon angioplasty.  However, her insurance denied coverage to the  [facility].  In light of this, she was referred to our program for further evaluation.      She states that she is feeling better after starting riociguat, pulmonary rehab and Coumadin therapy.  She is able to do her activities of daily living without any limitation.  She has no shortness of breath for 1 flight of stairs.  However, if she overexerts, she notices shortness of breath.  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  She uses 3 liters of oxygen when she uses the bike or 2 liters of oxygen when she is doing strengthening exercise.  She can walk on a flat surface flat surface less than a block.  She has been having some lower extremity swelling on and off.  More swelling on the left side than the right side.  She has not had any exertional presyncope or syncope.  No exertional chest pain or chest pressure.  No PND or orthopnea.  No palpitation.  No recent hospitalizations or ER visits.  I would currently characterize her as NYHA functional class III.      PAST MEDICAL HISTORY:   1.  Systemic lupus erythematosus.   2.  Deep venous thrombosis.   3.  DAVIS with obstructive sleep apnea.   4.  Obesity.   5.  Chronic thromboembolic pulmonary hypertension.      PAST SURGICAL HISTORY:   1.  Appendectomy.   2.  Lumpectomy.   3.  Hysterectomy.      MEDICATIONS:    Current Outpatient Medications   Medication Sig     ARIPiprazole (ABILIFY) 5 MG tablet Take 5 mg by mouth     atorvastatin (LIPITOR) 10 MG tablet Take 10 mg by mouth     clonazePAM (KLONOPIN) 1 MG tablet Take 1 mg by mouth     digoxin (LANOXIN) 125 MCG tablet TK 1 T PO QD     enoxaparin ANTICOAGULANT (LOVENOX ANTICOAGULANT) 120 MG/0.8ML syringe Inject 0.8 mLs (120 mg) Subcutaneous daily     furosemide (LASIX) 20 MG tablet Take 40 mg by mouth 2 times daily      hydroxychloroquine (PLAQUENIL) 200 MG tablet Take 200 mg by mouth 2 times daily      losartan-hydrochlorothiazide (HYZAAR) 100-12.5 MG tablet Take 1 tablet by mouth     melatonin  3 MG tablet      riociguat (ADEMPAS) 2.5 MG tablet      saccharomyces boulardii (FLORASTOR) 250 MG capsule      sertraline (ZOLOFT) 100 MG tablet Take 200 mg by mouth     traZODone (DESYREL) 100 MG tablet Take 100 mg by mouth     Warfarin Sodium (COUMADIN PO) Take  by mouth.     Misc Natural Products (TART CHERRY ADVANCED PO) Take 1 Dose by mouth daily States liquid form but not regularly.     No current facility-administered medications for this visit.      REVIEW OF SYSTEMS:  A detailed 10-point review of systems was obtained as described in the History of Present Illness.  All other systems reviewed and are negative.      PERSONAL AND SOCIAL HISTORY:  She is not working. She works as a part-time  for [employer].  She works 4 hours 4 days a week.  She does not smoke.  She drinks alcohol socially.  She does not use any illicit drugs.  She is not .  She is single.  She has no kids.  She denies using diet pills in the past.      FAMILY HISTORY:  Her dad and mom are healthy.  She has 1 living healthy sister.  She has no significant family history of hypercoagulable state, lupus, pulmonary hypertension, premature coronary artery disease, cardiomyopathy or sudden cardiac death.      PHYSICAL EXAMINATION:  She was comfortable.  She was in no apparent distress.  Her blood pressure was 111/75.  Pulse rate was 78.  Her respiratory rate was 16.  She was saturating 91% on room air.  She was 5 feet 6 inches tall.  Her weight was 284 pounds.  Her BMI was 45.8.  She had no pallor, cyanosis or jaundice.  Her neck exam revealed no jugular venous distention.  Her carotids were 1+ bilaterally.  Cardiac auscultation revealed normal S1, S2 with no murmur, rub or gallop.  Auscultation of the lungs revealed equal air entry on both sides with no added sounds.  Her abdomen was soft with normal bowel sounds, no tenderness, no rigidity, no guarding.  She had no focal neurological deficit.  Her  extremities showed no edema.      I reviewed all her records from [facility].  Her last electrocardiogram dated 09/25/2018 showed normal sinus rhythm, normal EKG.      Her most recent echocardiogram from 04/08/2019 showed mildly enlarged right ventricle with mildly reduced right ventricular function.  Her right atrium was normal.  She had mild to moderate tricuspid regurgitation.  Her estimated right ventricular systolic pressure was significantly elevated at 85 mmHg plus right atrial pressure.  Left ventricle was normal in size and function.  Her estimated ejection fraction was 64%.  She had mildly enlarged left atrium.  She had no other significant valvular abnormalities.  She had no pericardial effusion.  Her V/Q scan dated 10/25/2018 showed high probability for pulmonary embolism.  She had multiple bilateral medium and large size subsegmental and segmental perfusion defects.  She had a CT chest, PE protocol, which showed no evidence for acute pulmonary embolism.  No acute infiltrates, pneumothorax or effusions.      She has not had any recent complete pulmonary function tests.  Her most recent 6-minute walk test was from 04/2019.  She walked for 402 meters.  On 2 liters of oxygen, she desaturated 81%.      She has not had any serological workup for pulmonary hypertension.      ASSESSMENT AND PLAN:      Ms. Yuridia Barksdale is a very pleasant 49-year-old female with past medical history significant for lupus, antiphospholipid antibody syndrome, DVT, obesity, obstructive sleep apnea and chronic thromboembolic hypertension, who is currently on riociguat therapy.  She was referred to us for further evaluation and management of her CTEPH.      Ms. Barksdale was scheduled to go to the [facility], for possible pulmonary balloon angioplasty versus pulmonary thromboendarterectomy.  Unfortunately, her insurance has denied her coverage for [facility].  Thus, she was  referred to our program for further evaluation and treatment.      She is currently not in right heart failure.  She is functional class III.  Feeling better on riociguat therapy.  She is on Coumadin. As a first step, I have recommended her to have a repeat right heart catheterization as well as pulmonary angiogram.  We will bridge her with Lovenox given her lupus, antiphospholipid syndrome.  We will decide on further steps based on her hemodynamic assessment as well as pulmonary angiogram.  She appears euvolemic.      In the interim, I did not change any of her therapy.  She will continue on Adempas 2.5 mg 3 times a day.  She appears euvolemic on the current dose of Lasix 40 mg twice a day, which I recommended to continue.  She is also on digoxin 125 mcg daily, which she will continue.  She is on Coumadin.  She is currently being monitored with a chromogenic factor given her lupus anticoagulant syndrome.  Her target chromogenic factor is 20-30.  She has a hematologist who she follows for lupus anticoagulant at [facility].      I also recommended her to have a repeat 6-minute walk test, echocardiogram to assess her most recent right ventricular size and function.  We will get basic labs, including CBC, BMP and NT-proBNP.      It was a pleasure meeting Ms. Jeremy Boateng in our Pulmonary Hypertension Clinic at the [facility].  We thank you for involving us in her care.  Please do not hesitate to call us in the interim if you have any further questions.      Sincerely,   Arcelia Paniagua MD   Center for Pulmonary Hypertension  Heart Failure, Transplant, and Mechanical Circulatory Support Cardiology   Cardiovascular Division  [facility]   [phone]               D: 2019   T: 2019   MT: al      Name:     JEREMY BOATENG   MRN:      [id number]        Account:      [id number]   :      1970           Service Date: 2019      Document:  [id number]

## 2019-12-02 ENCOUNTER — APPOINTMENT (OUTPATIENT)
Dept: LAB | Facility: CLINIC | Age: 49
End: 2019-12-02
Attending: INTERNAL MEDICINE
Payer: COMMERCIAL

## 2019-12-02 ENCOUNTER — HOSPITAL ENCOUNTER (OUTPATIENT)
Facility: CLINIC | Age: 49
Discharge: HOME | End: 2019-12-02
Attending: INTERNAL MEDICINE | Admitting: INTERNAL MEDICINE
Payer: COMMERCIAL

## 2019-12-02 ENCOUNTER — APPOINTMENT (OUTPATIENT)
Dept: MEDSURG UNIT | Facility: CLINIC | Age: 49
End: 2019-12-02
Attending: INTERNAL MEDICINE
Payer: COMMERCIAL

## 2019-12-02 ENCOUNTER — HOSPITAL ENCOUNTER (OUTPATIENT)
Dept: CARDIOLOGY | Facility: CLINIC | Age: 49
End: 2019-12-02
Attending: INTERNAL MEDICINE
Payer: COMMERCIAL

## 2019-12-02 VITALS
DIASTOLIC BLOOD PRESSURE: 83 MMHG | TEMPERATURE: 98 F | HEIGHT: 66 IN | SYSTOLIC BLOOD PRESSURE: 142 MMHG | WEIGHT: 284 LBS | RESPIRATION RATE: 18 BRPM | OXYGEN SATURATION: 92 % | BODY MASS INDEX: 45.64 KG/M2

## 2019-12-02 DIAGNOSIS — I27.20 PULMONARY HTN (H): ICD-10-CM

## 2019-12-02 DIAGNOSIS — R06.02 SOB (SHORTNESS OF BREATH): ICD-10-CM

## 2019-12-02 LAB
ANION GAP SERPL CALCULATED.3IONS-SCNC: 4 MMOL/L (ref 3–14)
APTT PPP: 50 SEC (ref 22–37)
B-HCG SERPL-ACNC: <1 IU/L (ref 0–5)
BUN SERPL-MCNC: 11 MG/DL (ref 7–30)
CALCIUM SERPL-MCNC: 9 MG/DL (ref 8.5–10.1)
CHLORIDE SERPL-SCNC: 108 MMOL/L (ref 94–109)
CO2 SERPL-SCNC: 26 MMOL/L (ref 20–32)
CREAT SERPL-MCNC: 0.96 MG/DL (ref 0.52–1.04)
ERYTHROCYTE [DISTWIDTH] IN BLOOD BY AUTOMATED COUNT: 12.9 % (ref 10–15)
FACT X ACT/NOR PPP CHRO: 60 % (ref 70–130)
FIO2-PRE: 28 %
GFR SERPL CREATININE-BSD FRML MDRD: 71 ML/MIN/{1.73_M2}
GLUCOSE SERPL-MCNC: 90 MG/DL (ref 70–99)
HCT VFR BLD AUTO: 40.5 % (ref 35–47)
HGB BLD-MCNC: 12.9 G/DL (ref 11.7–15.7)
INR PPP: 1.25 (ref 0.86–1.14)
MCH RBC QN AUTO: 30.6 PG (ref 26.5–33)
MCHC RBC AUTO-ENTMCNC: 31.9 G/DL (ref 31.5–36.5)
MCV RBC AUTO: 96 FL (ref 78–100)
PLATELET # BLD AUTO: 260 10E9/L (ref 150–450)
POTASSIUM SERPL-SCNC: 4.5 MMOL/L (ref 3.4–5.3)
RBC # BLD AUTO: 4.21 10E12/L (ref 3.8–5.2)
SODIUM SERPL-SCNC: 139 MMOL/L (ref 133–144)
WBC # BLD AUTO: 5.3 10E9/L (ref 4–11)

## 2019-12-02 PROCEDURE — 85610 PROTHROMBIN TIME: CPT | Performed by: INTERNAL MEDICINE

## 2019-12-02 PROCEDURE — 93568 NJX CAR CTH NSLC P-ART ANGRP: CPT | Performed by: INTERNAL MEDICINE

## 2019-12-02 PROCEDURE — 40000167 ZZH STATISTIC PP CARE STAGE 2

## 2019-12-02 PROCEDURE — 27210794 ZZH OR GENERAL SUPPLY STERILE: Performed by: INTERNAL MEDICINE

## 2019-12-02 PROCEDURE — 93451 RIGHT HEART CATH: CPT | Performed by: INTERNAL MEDICINE

## 2019-12-02 PROCEDURE — C1894 INTRO/SHEATH, NON-LASER: HCPCS | Performed by: INTERNAL MEDICINE

## 2019-12-02 PROCEDURE — 85260 CLOT FACTOR X STUART-POWER: CPT | Performed by: INTERNAL MEDICINE

## 2019-12-02 PROCEDURE — 25000125 ZZHC RX 250: Performed by: INTERNAL MEDICINE

## 2019-12-02 PROCEDURE — 25000128 H RX IP 250 OP 636: Performed by: INTERNAL MEDICINE

## 2019-12-02 PROCEDURE — 84702 CHORIONIC GONADOTROPIN TEST: CPT | Performed by: INTERNAL MEDICINE

## 2019-12-02 PROCEDURE — 93306 TTE W/DOPPLER COMPLETE: CPT | Mod: 26 | Performed by: INTERNAL MEDICINE

## 2019-12-02 PROCEDURE — 93306 TTE W/DOPPLER COMPLETE: CPT

## 2019-12-02 PROCEDURE — 80048 BASIC METABOLIC PNL TOTAL CA: CPT | Performed by: INTERNAL MEDICINE

## 2019-12-02 PROCEDURE — 36415 COLL VENOUS BLD VENIPUNCTURE: CPT | Performed by: INTERNAL MEDICINE

## 2019-12-02 PROCEDURE — 93451 RIGHT HEART CATH: CPT | Mod: 26 | Performed by: INTERNAL MEDICINE

## 2019-12-02 PROCEDURE — 85730 THROMBOPLASTIN TIME PARTIAL: CPT | Performed by: INTERNAL MEDICINE

## 2019-12-02 PROCEDURE — 85027 COMPLETE CBC AUTOMATED: CPT | Performed by: INTERNAL MEDICINE

## 2019-12-02 RX ORDER — LIDOCAINE 40 MG/G
CREAM TOPICAL
Status: DISCONTINUED | OUTPATIENT
Start: 2019-12-02 | End: 2019-12-02 | Stop reason: HOSPADM

## 2019-12-02 RX ORDER — FLUMAZENIL 0.1 MG/ML
0.2 INJECTION, SOLUTION INTRAVENOUS
Status: CANCELLED | OUTPATIENT
Start: 2019-12-02 | End: 2019-12-03

## 2019-12-02 RX ORDER — SODIUM CHLORIDE 9 MG/ML
INJECTION, SOLUTION INTRAVENOUS CONTINUOUS
Status: DISCONTINUED | OUTPATIENT
Start: 2019-12-02 | End: 2019-12-02 | Stop reason: HOSPADM

## 2019-12-02 RX ORDER — POTASSIUM CHLORIDE 750 MG/1
20 TABLET, EXTENDED RELEASE ORAL
Status: DISCONTINUED | OUTPATIENT
Start: 2019-12-02 | End: 2019-12-02 | Stop reason: HOSPADM

## 2019-12-02 RX ORDER — ATROPINE SULFATE 0.1 MG/ML
0.5 INJECTION INTRAVENOUS EVERY 5 MIN PRN
Status: CANCELLED | OUTPATIENT
Start: 2019-12-02 | End: 2019-12-03

## 2019-12-02 RX ORDER — NALOXONE HYDROCHLORIDE 0.4 MG/ML
.2-.4 INJECTION, SOLUTION INTRAMUSCULAR; INTRAVENOUS; SUBCUTANEOUS
Status: CANCELLED | OUTPATIENT
Start: 2019-12-02 | End: 2019-12-03

## 2019-12-02 RX ORDER — IOPAMIDOL 755 MG/ML
INJECTION, SOLUTION INTRAVASCULAR
Status: DISCONTINUED | OUTPATIENT
Start: 2019-12-02 | End: 2019-12-02 | Stop reason: HOSPADM

## 2019-12-02 RX ORDER — FENTANYL CITRATE 50 UG/ML
25-50 INJECTION, SOLUTION INTRAMUSCULAR; INTRAVENOUS
Status: CANCELLED | OUTPATIENT
Start: 2019-12-02 | End: 2019-12-03

## 2019-12-02 RX ORDER — POTASSIUM CHLORIDE 750 MG/1
40 TABLET, EXTENDED RELEASE ORAL
Status: DISCONTINUED | OUTPATIENT
Start: 2019-12-02 | End: 2019-12-02 | Stop reason: HOSPADM

## 2019-12-02 ASSESSMENT — MIFFLIN-ST. JEOR: SCORE: 1939.97

## 2019-12-02 NOTE — PROGRESS NOTES
Pt arrived on 2a post RHC and pulmonary angiogram. VSS Ra. Dressing c/d/i. No pain. Family at bedside. Pt sipping on juice, declines food at this time.

## 2019-12-02 NOTE — IP AVS SNAPSHOT
MRN:[id number]                      After Visit Summary   12/2/2019    Yuridia Barksdale    MRN: [id number]           Visit Information        Department      12/2/2019 10:25 AM [address] [facility]          Review of your medicines      UNREVIEWED medicines. Ask your doctor about these medicines       Dose / Directions   ARIPiprazole 5 MG tablet  Commonly known as:  ABILIFY      Dose:  5 mg  Take 5 mg by mouth  Refills:  0     atorvastatin 10 MG tablet  Commonly known as:  LIPITOR      Dose:  10 mg  Take 10 mg by mouth  Refills:  0     clonazePAM 1 MG tablet  Commonly known as:  klonoPIN      Dose:  1 mg  Take 1 mg by mouth  Refills:  0     COUMADIN PO      Take  by mouth.  Refills:  0     digoxin 125 MCG tablet  Commonly known as:  LANOXIN      TK 1 T PO QD  Refills:  3     enoxaparin ANTICOAGULANT 120 MG/0.8ML syringe  Commonly known as:  LOVENOX ANTICOAGULANT  Used for:  Pulmonary HTN (H)      Dose:  120 mg  Inject 0.8 mLs (120 mg) Subcutaneous daily  Quantity:  14 Syringe  Refills:  1     furosemide 20 MG tablet  Commonly known as:  LASIX      Dose:  40 mg  Take 40 mg by mouth 2 times daily  Refills:  0     hydroxychloroquine 200 MG tablet  Commonly known as:  PLAQUENIL      Dose:  200 mg  Take 200 mg by mouth 2 times daily  Refills:  0     losartan-hydrochlorothiazide 100-12.5 MG tablet  Commonly known as:  HYZAAR      Dose:  1 tablet  Take 1 tablet by mouth  Refills:  0     melatonin 3 MG tablet      Refills:  0     riociguat 2.5 MG tablet  Commonly known as:  ADEMPAS      Refills:  0     saccharomyces boulardii 250 MG capsule  Commonly known as:  FLORASTOR      Refills:  0     sertraline 100 MG tablet  Commonly known as:  ZOLOFT      Dose:  200 mg  Take 200 mg by mouth  Refills:  0     TART CHERRY ADVANCED PO      Dose:  1 Dose  Take 1 Dose by mouth daily States liquid form but not regularly.  Refills:  0     traZODone 100 MG tablet  Commonly known as:  DESYREL      Dose:  100 mg  Take 100  mg by mouth  Refills:  0              Protect others around you: Learn how to safely use, store and throw away your medicines at www.disposemymeds.org.       Follow-ups after your visit       Your next 10 appointments already scheduled    Dec 19, 2019 11:25 AM [facility] Only with Laura Florence, SUE  [place] [facility] ([place]  ) [address]  [facility] [address] [phone]         Care Instructions       Further instructions from your care team       Going Home after a Pulmonary Angiogram and Right Heart Cath      After you go home:    Drink plenty of fluids.    You may eat your normal diet, unless your doctor tells you otherwise.    Remove the Band-Aid after 24 hours. If there is minor oozing, apply another Band-aid and remove it after 12 hours.    For 2 days, do NOT have sex or do any heavy exercise.    Do NOT take a bath, or use a hot tub or pool for at least 3 days. You may shower.  Care of groin site  It is normal to have a small bruise or lump at the site.    Do NOT scrub the site.    For the first 2 days, when you cough, sneeze or move your bowels, hold your hand over the puncture site and press gently.    Do NOT lift more than 10 pounds for at least 3 to 5 days.    Do not use lotion or powder near the puncture site for 3 days.  If you start bleeding from the site in your groin, lie down flat and press firmly on the site. Call  your doctor as soon as you can.  Call your doctor if:    You have a large or growing hard lump around the site.      The site is red, swollen, hot or tender.    Blood or fluid is draining from the site.    You have chills or a fever greater than 101 F (38 C).    Your leg or arm turns bluish, feels numb or cool.    You have hives, a rash or unusual itching.  Call 911 right away if you have:    Bleeding that does not stop.    Heavy bleeding.  [facility] [place]:  [phone]  "(7 days a week)                        Additional Information About Your Visit       [facility] Information    [facility] gives you secure access to your electronic health record. If you see a primary care provider, you can also send messages to your care team and make appointments. If you have questions, please call your primary care clinic.  If you do not have a primary care provider, please call [phone] and they will assist you.       Care EveryWhere ID    This is your Care EveryWhere ID. This could be used by other organizations to access your [facility] [employer] medical records  [id number]       Your Vitals Were  Most recent update: 12/2/2019  3:47 PM    Blood Pressure   143/83            Temperature   98  F (36.7  C) (Oral)          Respirations   18          Height   1.676 m (5' 6\")          Weight   128.8 kg (284 lb)             Pulse Oximetry   92%    BMI (Body Mass Index)   45.84 kg/m           Primary Care Provider Office Phone # Fax #    Ochoa Sheth [phone]      Equal Access to Services    LATOYA INGRAM AH: Hadii aad ku hadasho Soomaali, waaxda luqadaha, qaybta kaalmada adeegyada, waxay idiin hayderejen lynne kharakeyshawn layoli . So [facility] [phone].    ATENCIÓN: Si habla español, tiene a aviles disposición servicios gratuitos de asistencia lingüística. Zehraame al [phone].    We comply with applicable federal and state civil rights laws, including the [place] Human Rights Act. We do not discriminate on the basis of race, color, creed, Yarsani, national origin, marital status, age, disability, sex, sexual orientation, or gender identity.       Thank you!    Thank you for choosing [facility] for your care. Our goal is always to provide you with excellent care. Hearing back from our patients is one way we can continue to improve our services. Please take a few minutes to complete the written survey that you may receive in the mail after you visit with us. Thank you!            Medication List      ASK " your doctor about these medications          Morning Afternoon Evening Bedtime As Needed    ARIPiprazole 5 MG tablet  Also known as:  ABILIFY  INSTRUCTIONS:  Take 5 mg by mouth                     atorvastatin 10 MG tablet  Also known as:  LIPITOR  INSTRUCTIONS:  Take 10 mg by mouth                     clonazePAM 1 MG tablet  Also known as:  klonoPIN  INSTRUCTIONS:  Take 1 mg by mouth                     COUMADIN PO  INSTRUCTIONS:  Take  by mouth.                     digoxin 125 MCG tablet  Also known as:  LANOXIN  INSTRUCTIONS:  TK 1 T PO QD                     enoxaparin ANTICOAGULANT 120 MG/0.8ML syringe  Also known as:  LOVENOX ANTICOAGULANT  INSTRUCTIONS:  Inject 0.8 mLs (120 mg) Subcutaneous daily                     furosemide 20 MG tablet  Also known as:  LASIX  INSTRUCTIONS:  Take 40 mg by mouth 2 times daily                     hydroxychloroquine 200 MG tablet  Also known as:  PLAQUENIL  INSTRUCTIONS:  Take 200 mg by mouth 2 times daily                     losartan-hydrochlorothiazide 100-12.5 MG tablet  Also known as:  HYZAAR  INSTRUCTIONS:  Take 1 tablet by mouth                     melatonin 3 MG tablet                     riociguat 2.5 MG tablet  Also known as:  ADEMPAS                     saccharomyces boulardii 250 MG capsule  Also known as:  FLORASTOR                     sertraline 100 MG tablet  Also known as:  ZOLOFT  INSTRUCTIONS:  Take 200 mg by mouth                     TART CHERRY ADVANCED PO  INSTRUCTIONS:  Take 1 Dose by mouth daily States liquid form but not regularly.                     traZODone 100 MG tablet  Also known as:  DESYREL  INSTRUCTIONS:  Take 100 mg by mouth

## 2019-12-02 NOTE — IP AVS SNAPSHOT
[address] [address]                                    After Visit Summary   12/2/2019    Yuridia Barksdale    MRN: [id number]           After Visit Summary Signature Page    I have received my discharge instructions, and my questions have been answered. I have discussed any challenges I see with this plan with the nurse or doctor.    ..........................................................................................................................................  Patient/Patient Representative Signature      ..........................................................................................................................................  Patient Representative Print Name and Relationship to Patient    ..................................................               ................................................  Date                                   Time    ..........................................................................................................................................  Reviewed by Signature/Title    ...................................................              ..............................................  Date                                               Time          22EPIC Rev 08/18

## 2019-12-02 NOTE — DISCHARGE INSTRUCTIONS
Going Home after a Pulmonary Angiogram and Right Heart Cath      After you go home:    Drink plenty of fluids.    You may eat your normal diet, unless your doctor tells you otherwise.    Remove the Band-Aid after 24 hours. If there is minor oozing, apply another Band-aid and remove it after 12 hours.    For 2 days, do NOT have sex or do any heavy exercise.    Do NOT take a bath, or use a hot tub or pool for at least 3 days. You may shower.  Care of groin site  It is normal to have a small bruise or lump at the site.    Do NOT scrub the site.    For the first 2 days, when you cough, sneeze or move your bowels, hold your hand over the puncture site and press gently.    Do NOT lift more than 10 pounds for at least 3 to 5 days.    Do not use lotion or powder near the puncture site for 3 days.  If you start bleeding from the site in your groin, lie down flat and press firmly on the site. Call  your doctor as soon as you can.  Call your doctor if:    You have a large or growing hard lump around the site.      The site is red, swollen, hot or tender.    Blood or fluid is draining from the site.    You have chills or a fever greater than 101 F (38 C).    Your leg or arm turns bluish, feels numb or cool.    You have hives, a rash or unusual itching.  Call 911 right away if you have:    Bleeding that does not stop.    Heavy bleeding.  [facility] [place]:  [phone] (7 days a week)

## 2019-12-06 ENCOUNTER — TELEPHONE (OUTPATIENT)
Dept: CARDIOLOGY | Facility: CLINIC | Age: 49
End: 2019-12-06

## 2019-12-11 NOTE — TELEPHONE ENCOUNTER
Patient had a RHC on 12/2/19.  Dr. Paniagua states he called and reviewed testing with patient, but she needs a follow up appt to discuss her options for treatment.  Patient had CTEPH, and although she is already on Adempas, her pressures are still high/    Per Dr. Paniagua, Dr. Jean is nervous to perform a BPA on her with such high pressure readings.  Surgery may be an option in [facility], but they need to talk more about it,, as he insurance denied going their in the past.  Agreed with plan and sent staff msg to Deneen to schedule follow up.  Yoly Villegas RN on 12/11/2019 at 2:57 PM

## 2019-12-13 ENCOUNTER — MYC MEDICAL ADVICE (OUTPATIENT)
Dept: CARDIOLOGY | Facility: CLINIC | Age: 49
End: 2019-12-13

## 2019-12-16 NOTE — TELEPHONE ENCOUNTER
This encounter was printed and given to Dr. Paniagua.  He stated he will call patient directly to discus. Yoly Villegas RN on 12/16/2019 at 3:03 PM

## 2019-12-18 ENCOUNTER — PATIENT OUTREACH (OUTPATIENT)
Dept: CARDIOLOGY | Facility: CLINIC | Age: 49
End: 2019-12-18

## 2020-01-15 DIAGNOSIS — R06.02 SOB (SHORTNESS OF BREATH): ICD-10-CM

## 2020-01-15 DIAGNOSIS — I27.20 PULMONARY HTN (H): Primary | ICD-10-CM

## 2020-01-20 ENCOUNTER — OFFICE VISIT (OUTPATIENT)
Dept: CARDIOLOGY | Facility: CLINIC | Age: 50
End: 2020-01-20
Attending: INTERNAL MEDICINE
Payer: COMMERCIAL

## 2020-01-20 VITALS
HEIGHT: 66 IN | BODY MASS INDEX: 46.21 KG/M2 | SYSTOLIC BLOOD PRESSURE: 115 MMHG | HEART RATE: 70 BPM | WEIGHT: 287.5 LBS | OXYGEN SATURATION: 95 % | DIASTOLIC BLOOD PRESSURE: 75 MMHG

## 2020-01-20 DIAGNOSIS — R06.02 SOB (SHORTNESS OF BREATH): ICD-10-CM

## 2020-01-20 DIAGNOSIS — I27.20 PULMONARY HTN (H): ICD-10-CM

## 2020-01-20 DIAGNOSIS — I27.20 PULMONARY HTN (H): Primary | ICD-10-CM

## 2020-01-20 LAB
ALBUMIN SERPL-MCNC: 3.3 G/DL (ref 3.4–5)
ALP SERPL-CCNC: 106 U/L (ref 40–150)
ALT SERPL W P-5'-P-CCNC: 24 U/L (ref 0–50)
ANION GAP SERPL CALCULATED.3IONS-SCNC: 5 MMOL/L (ref 3–14)
AST SERPL W P-5'-P-CCNC: 18 U/L (ref 0–45)
BILIRUB SERPL-MCNC: 0.4 MG/DL (ref 0.2–1.3)
BUN SERPL-MCNC: 31 MG/DL (ref 7–30)
CALCIUM SERPL-MCNC: 9.1 MG/DL (ref 8.5–10.1)
CHLORIDE SERPL-SCNC: 110 MMOL/L (ref 94–109)
CO2 SERPL-SCNC: 25 MMOL/L (ref 20–32)
CREAT SERPL-MCNC: 1.02 MG/DL (ref 0.52–1.04)
ERYTHROCYTE [DISTWIDTH] IN BLOOD BY AUTOMATED COUNT: 13 % (ref 10–15)
GFR SERPL CREATININE-BSD FRML MDRD: 65 ML/MIN/{1.73_M2}
GLUCOSE SERPL-MCNC: 95 MG/DL (ref 70–99)
HCT VFR BLD AUTO: 39.6 % (ref 35–47)
HGB BLD-MCNC: 12.9 G/DL (ref 11.7–15.7)
MCH RBC QN AUTO: 30.6 PG (ref 26.5–33)
MCHC RBC AUTO-ENTMCNC: 32.6 G/DL (ref 31.5–36.5)
MCV RBC AUTO: 94 FL (ref 78–100)
NT-PROBNP SERPL-MCNC: 485 PG/ML (ref 0–125)
PLATELET # BLD AUTO: 253 10E9/L (ref 150–450)
POTASSIUM SERPL-SCNC: 3.9 MMOL/L (ref 3.4–5.3)
PROT SERPL-MCNC: 7.6 G/DL (ref 6.8–8.8)
RBC # BLD AUTO: 4.21 10E12/L (ref 3.8–5.2)
SODIUM SERPL-SCNC: 139 MMOL/L (ref 133–144)
WBC # BLD AUTO: 6.5 10E9/L (ref 4–11)

## 2020-01-20 PROCEDURE — 36415 COLL VENOUS BLD VENIPUNCTURE: CPT | Performed by: INTERNAL MEDICINE

## 2020-01-20 PROCEDURE — G0463 HOSPITAL OUTPT CLINIC VISIT: HCPCS | Mod: ZF

## 2020-01-20 PROCEDURE — 83880 ASSAY OF NATRIURETIC PEPTIDE: CPT | Performed by: INTERNAL MEDICINE

## 2020-01-20 PROCEDURE — 99215 OFFICE O/P EST HI 40 MIN: CPT | Mod: ZP | Performed by: INTERNAL MEDICINE

## 2020-01-20 PROCEDURE — 80053 COMPREHEN METABOLIC PANEL: CPT | Performed by: INTERNAL MEDICINE

## 2020-01-20 PROCEDURE — 85027 COMPLETE CBC AUTOMATED: CPT | Performed by: INTERNAL MEDICINE

## 2020-01-20 ASSESSMENT — MIFFLIN-ST. JEOR: SCORE: 1955.84

## 2020-01-20 ASSESSMENT — PAIN SCALES - GENERAL: PAINLEVEL: NO PAIN (0)

## 2020-01-20 NOTE — PATIENT INSTRUCTIONS
Medication Changes:  - No medication changes at this time. Continue current medication regiment.     Patient Instructions:  1. Continue staying active and eat a heart healthy diet.    2. Please keep current list of medications with you at all times.    3. Remember to weigh yourself daily after voiding and before you consume any food or beverages and log the numbers.  If you have gained 2 pounds overnight or 5 pounds in a week contact us immediately for medication adjustments or further instructions.    4. **Please call us immediately if you have any syncope (fainting or passing out), chest pain, edema (swelling or weight gain), or decline in your functional status (general decline in how you are feeling).    Follow up Appointment Information:  - We will send a packet to [facility] for BPA (balloon pulmonary angioplasty) re-evaluation, per Dr. Paniagua's recommendation  - Nuclear medicine stress test for atypical chest pain   - Follow up with Dr. Paniagua in 8-12 weeks with labs prior    Check-In  Time Check-In Location Estimated Length Procedure   Name        GOLD  waiting room 3-4 hours Nuclear Medicine Stress Test**     Procedure Preparations & Instructions     This is a non-invasive procedure that DOES require preparation:    - Nothing to eat for 3 hours prior to your test  - You may have clear liquids up to the time of your test  - DO NOT use alcohol, tobacco or food/beverages containing caffeine for at least 12 hours prior to your test (ie. Coffee, tea, soda, chocolate, de-caffeinated beverages, certain medications including Excedrin, Anacin, NoDoz)  - Please wear loose, two-piece clothing and comfortable, rubber soled shoes for walking  - Do NOT take nitrates the day of your procedure (including nitro-patches, please bring them with you instead     Results:  Component      Latest Ref Rng & Units 1/20/2020   Sodium      133 - 144 mmol/L 139   Potassium      3.4 - 5.3 mmol/L 3.9   Chloride      94 - 109 mmol/L  110 (H)   Carbon Dioxide      20 - 32 mmol/L 25   Anion Gap      3 - 14 mmol/L 5   Glucose      70 - 99 mg/dL 95   Urea Nitrogen      7 - 30 mg/dL 31 (H)   Creatinine      0.52 - 1.04 mg/dL 1.02   GFR Estimate      >60 mL/min/1.73:m2 65   GFR Estimate If Black      >60 mL/min/1.73:m2 76   Calcium      8.5 - 10.1 mg/dL 9.1   Bilirubin Total      0.2 - 1.3 mg/dL 0.4   Albumin      3.4 - 5.0 g/dL 3.3 (L)   Protein Total      6.8 - 8.8 g/dL 7.6   Alkaline Phosphatase      40 - 150 U/L 106   ALT      0 - 50 U/L 24   AST      0 - 45 U/L 18   WBC      4.0 - 11.0 10e9/L 6.5   RBC Count      3.8 - 5.2 10e12/L 4.21   Hemoglobin      11.7 - 15.7 g/dL 12.9   Hematocrit      35.0 - 47.0 % 39.6   MCV      78 - 100 fl 94   MCH      26.5 - 33.0 pg 30.6   MCHC      31.5 - 36.5 g/dL 32.6   RDW      10.0 - 15.0 % 13.0   Platelet Count      150 - 450 10e9/L 253   N-Terminal Pro Bnp      0 - 125 pg/mL 485 (H)     We are located on the third floor of the [facility] ([facility]) on the [facility].  Our address is     [address] [place] [address] [place] [address] [place] [address]    Thank you for allowing us to be a part of your care here at the [facility]    If you have questions or concerns please contact us at:    Yoly Villegas, RN, BSN, PHN  Deneen Mccann (Schedule,Prior Auth)  Nurse Coordinator     Clinic   Pulmonary Hypertension   Pulmonary Hypertension  [facility]  (Phone)[phone]   (Phone) [phone]        (Fax) [phone]    ** Please note that you will NOT receive a reminder call regarding your scheduled testing, reminder calls are for provider appointments only.  If you are scheduled for testing within the [facility] system you may receive a call regarding pre-registration for billing purposes only.**     Remember to weigh yourself daily after voiding and before you consume any  food or beverages and log the numbers.  If you have gained/lost 2 pounds overnight or 5 pounds in a week contact us immediately for medication adjustments or further instructions.   **Please call us immediately if you have any syncope, chest pain, edema, or decline in your functional status.    Support Group:  Pulmonary Hypertension Association  Https://www.phassociation.org/  **Look at the Events Tab** They even have Support Groups that you can call into    [facility] Support Group  Second Saturday of the Month from 1-3 PM   Location: [address] [place] [address]  Leader: Marika Quiroz and Amisha Vila  Phone: [phone] or [phone]  Email: [email or web address]

## 2020-01-20 NOTE — NURSING NOTE
Procedures and/or Testing: Patient given instructions regarding  NM Lexiscan Stress Test,. Discussed purpose, preparation, procedure and when to expect results reported back to the patient. Patient demonstrated understanding of this information and agreed to call with further questions or concerns.    Labs: Patient was given results of the laboratory testing obtained today. Patient demonstrated understanding of this information and agreed to call with further questions or concerns.     Diet: Patient instructed regarding a heart healthy diet, including discussion of reduced fat and sodium intake. Patient demonstrated understanding of this information and agreed to call with further questions or concerns.    Return Appointment: Patient given instructions regarding scheduling next clinic visit. Patient demonstrated understanding of this information and agreed to call with further questions or concerns.  Patient stated she understood all health information given and agreed to call with further questions or concerns.    Medication Changes:  - No medication changes at this time. Continue current medication regiment.     Patient Instructions:  1. Continue staying active and eat a heart healthy diet.    2. Please keep current list of medications with you at all times.    3. Remember to weigh yourself daily after voiding and before you consume any food or beverages and log the numbers.  If you have gained 2 pounds overnight or 5 pounds in a week contact us immediately for medication adjustments or further instructions.    4. **Please call us immediately if you have any syncope (fainting or passing out), chest pain, edema (swelling or weight gain), or decline in your functional status (general decline in how you are feeling).    Follow up Appointment Information:  - We will send a packet to [facility] for BPA (balloon pulmonary angioplasty) re-evaluation, per Dr. Paniagua's recommendation  - Nuclear medicine stress test for  atypical chest pain   - Follow up with Dr. Paniagua in 8-12 weeks with labs prior    Check-In  Time Check-In Location Estimated Length Procedure   Name        GOLD  waiting room 3-4 hours Nuclear Medicine Stress Test**     Procedure Preparations & Instructions     This is a non-invasive procedure that DOES require preparation:    - Nothing to eat for 3 hours prior to your test  - You may have clear liquids up to the time of your test  - DO NOT use alcohol, tobacco or food/beverages containing caffeine for at least 12 hours prior to your test (ie. Coffee, tea, soda, chocolate, de-caffeinated beverages, certain medications including Excedrin, Anacin, NoDoz)  - Please wear loose, two-piece clothing and comfortable, rubber soled shoes for walking  - Do NOT take nitrates the day of your procedure (including nitro-patches, please bring them with you instead     Results:  Component      Latest Ref Rng & Units 1/20/2020   Sodium      133 - 144 mmol/L 139   Potassium      3.4 - 5.3 mmol/L 3.9   Chloride      94 - 109 mmol/L 110 (H)   Carbon Dioxide      20 - 32 mmol/L 25   Anion Gap      3 - 14 mmol/L 5   Glucose      70 - 99 mg/dL 95   Urea Nitrogen      7 - 30 mg/dL 31 (H)   Creatinine      0.52 - 1.04 mg/dL 1.02   GFR Estimate      >60 mL/min/1.73:m2 65   GFR Estimate If Black      >60 mL/min/1.73:m2 76   Calcium      8.5 - 10.1 mg/dL 9.1   Bilirubin Total      0.2 - 1.3 mg/dL 0.4   Albumin      3.4 - 5.0 g/dL 3.3 (L)   Protein Total      6.8 - 8.8 g/dL 7.6   Alkaline Phosphatase      40 - 150 U/L 106   ALT      0 - 50 U/L 24   AST      0 - 45 U/L 18   WBC      4.0 - 11.0 10e9/L 6.5   RBC Count      3.8 - 5.2 10e12/L 4.21   Hemoglobin      11.7 - 15.7 g/dL 12.9   Hematocrit      35.0 - 47.0 % 39.6   MCV      78 - 100 fl 94   MCH      26.5 - 33.0 pg 30.6   MCHC      31.5 - 36.5 g/dL 32.6   RDW      10.0 - 15.0 % 13.0   Platelet Count      150 - 450 10e9/L 253   N-Terminal Pro Bnp      0 - 125 pg/mL 485 (H)

## 2020-01-20 NOTE — PROGRESS NOTES
"2020    Clint Olsen MD   [facility]   [address] [place] [address]     RE:  Yuridia Barksdale   MRN:  [id number]  :  1970    Dear Dr. Olsen,    We had the pleasure of seeing your patient, Yuridia Barksdale, at the [facility]. As you know, Ms. Barksdale is a very pleasant 49 old female with a history of lupus, antiphospholipid antibody syndrome, DVT, obesity, obstructive sleep apnea, and chronic thromboembolic pulmonary hypertension on riociguat therapy who presents for follow-up.    She established care in our clinic on 19 for further evaluation and management of her chronic thromboembolic pulmonary hypertension.  Since her last visit, she had a right heart catheterization which showed RA 8, /27/51, PCW 14, thermodilution cardiac output 10.1 (cardiac index 4.34), Terry cardiac output 5.17 (cardiac index 2.23), and PVR 7.15.  Pulmonary angiogram showed proximal stenosis of A2, A1, and A9 segments on the right side and mid segment of A8 was noted on the left side; all lesions were amenable to BPA.    Since her last visit, she notes that her dyspnea has been stable and has not worsened.  She has new left-sided chest heaviness over the last couple weeks.  The chest discomfort occurs most days and worsens after exertion and lasts for approximately 20 minutes.  Chest discomfort improves with rest.  Denies orthopnea, PND, weight gain, and any recent hospitalizations or emergency department visits.  Has chronic swelling of left lower extremity due to history of DVT.  Is occasionally having lightheadedness that occurs once every 2 weeks.  Notes that she follows with a neurologist at [facility].  Notes a history of \"mini-strokes\".        PAST MEDICAL HISTORY:  Past Medical History:   Diagnosis Date     Acute blood loss anemia 2014    needed blood transfusion     DVT (deep venous thrombosis) (H)      Lupus (H)      Pneumonia " 12/2013    hospitalized x8days       FAMILY HISTORY:  No significant family history of hypercoagulable state, lupus, pulmonary hypertension, premature coronary artery disease, cardiomyopathy or sudden cardiac death    SOCIAL HISTORY:  Social History     Socioeconomic History     Marital status: Single     Spouse name: Not on file     Number of children: Not on file     Years of education: Not on file     Highest education level: Not on file   Occupational History     Not on file   Social Needs     Financial resource strain: Not on file     Food insecurity:     Worry: Not on file     Inability: Not on file     Transportation needs:     Medical: Not on file     Non-medical: Not on file   Tobacco Use     Smoking status: Never Smoker     Smokeless tobacco: Never Used   Substance and Sexual Activity     Alcohol use: Yes     Comment: occasionally     Drug use: No     Sexual activity: Never   Lifestyle     Physical activity:     Days per week: Not on file     Minutes per session: Not on file     Stress: Not on file   Relationships     Social connections:     Talks on phone: Not on file     Gets together: Not on file     Attends [employer] service: Not on file     Active member of club or organization: Not on file     Attends meetings of clubs or organizations: Not on file     Relationship status: Not on file     Intimate partner violence:     Fear of current or ex partner: Not on file     Emotionally abused: Not on file     Physically abused: Not on file     Forced sexual activity: Not on file   Other Topics Concern     Not on file   Social History Narrative     Not on file       CURRENT MEDICATIONS:  Current Outpatient Medications   Medication Sig     ARIPiprazole (ABILIFY) 5 MG tablet Take 5 mg by mouth     atorvastatin (LIPITOR) 10 MG tablet Take 10 mg by mouth     clonazePAM (KLONOPIN) 1 MG tablet Take 1 mg by mouth     digoxin (LANOXIN) 125 MCG tablet TK 1 T PO QD     furosemide (LASIX) 20 MG tablet Take 40 mg by mouth  "2 times daily      hydroxychloroquine (PLAQUENIL) 200 MG tablet Take 200 mg by mouth 2 times daily      losartan-hydrochlorothiazide (HYZAAR) 100-12.5 MG tablet Take 1 tablet by mouth     melatonin 3 MG tablet      riociguat (ADEMPAS) 2.5 MG tablet      saccharomyces boulardii (FLORASTOR) 250 MG capsule      sertraline (ZOLOFT) 100 MG tablet Take 200 mg by mouth     traZODone (DESYREL) 100 MG tablet Take 100 mg by mouth     Warfarin Sodium (COUMADIN PO) Take  by mouth.     Misc Natural Products (TART CHERRY ADVANCED PO) Take 1 Dose by mouth daily States liquid form but not regularly.     No current facility-administered medications for this visit.      ROS:   10 point ROS negative except as discussed in above HPI.    EXAM:  /75 (BP Location: Right arm, Patient Position: Chair, Cuff Size: Adult Large)   Pulse 70   Ht 1.676 m (5' 6\")   Wt 130.4 kg (287 lb 8 oz)   SpO2 95%   BMI 46.40 kg/m    General: appears comfortable, alert and articulate  Head: normocephalic, atraumatic  Eyes: anicteric sclera, EOMI  Orophyarynx: moist mucosa, no lesions, dentition intact  Heart: regular, normal S1/S2, no murmur, estimated JVP 6  Lungs: clear to auscultation bilaterally, no rales or wheezing  Abdomen: soft, non-tender, bowel sounds present, no hepatosplenomegaly  Extremities: nonpitting left lower extremity edema, no right lower extremity edema  Neurological: normal speech and affect, no gross motor deficits    Labs:  Recent Results (from the past 24 hour(s))   CBC with platelets    Collection Time: 01/20/20 11:18 AM   Result Value Ref Range    WBC 6.5 4.0 - 11.0 10e9/L    RBC Count 4.21 3.8 - 5.2 10e12/L    Hemoglobin 12.9 11.7 - 15.7 g/dL    Hematocrit 39.6 35.0 - 47.0 %    MCV 94 78 - 100 fl    MCH 30.6 26.5 - 33.0 pg    MCHC 32.6 31.5 - 36.5 g/dL    RDW 13.0 10.0 - 15.0 %    Platelet Count 253 150 - 450 10e9/L   N terminal pro BNP outpatient    Collection Time: 01/20/20 11:18 AM   Result Value Ref Range    " N-Terminal Pro Bnp 485 (H) 0 - 125 pg/mL   Comprehensive metabolic panel    Collection Time: 01/20/20 11:18 AM   Result Value Ref Range    Sodium 139 133 - 144 mmol/L    Potassium 3.9 3.4 - 5.3 mmol/L    Chloride 110 (H) 94 - 109 mmol/L    Carbon Dioxide 25 20 - 32 mmol/L    Anion Gap 5 3 - 14 mmol/L    Glucose 95 70 - 99 mg/dL    Urea Nitrogen 31 (H) 7 - 30 mg/dL    Creatinine 1.02 0.52 - 1.04 mg/dL    GFR Estimate 65 >60 mL/min/[1.73_m2]    GFR Estimate If Black 76 >60 mL/min/[1.73_m2]    Calcium 9.1 8.5 - 10.1 mg/dL    Bilirubin Total 0.4 0.2 - 1.3 mg/dL    Albumin 3.3 (L) 3.4 - 5.0 g/dL    Protein Total 7.6 6.8 - 8.8 g/dL    Alkaline Phosphatase 106 40 - 150 U/L    ALT 24 0 - 50 U/L    AST 18 0 - 45 U/L         Echocardiogram 10/2/19:  Left ventricular function, chamber size, wall motion, and wall thickness are  normal.The EF is 55-60%. Grade II or moderate diastolic dysfunction. Flattened  septum is consistent with right ventricular pressure and volume overload.  Global right ventricular function is mildly to moderately reduced. Severe  right ventricular dilation is present with normal thickness.  Estimated PASP is at least 34 mmHg, likely is underestimated by this TTE. IVC  diameter <2.1 cm collapsing >50% with sniff suggests a normal RA pressure of 3  mmHg. No pericardial effusion is present.     No prior study to compare.      RHC 12/2/19:  RA: 8  RV: 100/16  PA: 100/27/51  PCWP: 14  TD CO/CI: 10.1/4.34  Terry CO/CI: 5.17/2.23  PVR: 7.15    Pulmonary angiogram 12/2/19:  SUMMARY:       RIGHT PULMONARY ARTERY:    Right Main PA: Appears Normal     Interlobar Artery: Normal    Basal trunk: Normal          Upper lobe segments    A1: Proximal Stenosis with good blush  A2: Proximal Stenosis  A3: Normal    Middle lobe segments    A4: Normal  A5: Normal    Lower lobe segments    A6: Normal  A7: Normal  A8: Normal  A9: Stenosis in the mid segment   A10: Normal        LEFT PULMONARY ARTERY:    Left Main PA:  Noral    Basal trunk:    Upper lobe segments    A1:Normal  A2:Normal  A3:Normal    Middle lobe segments  A4:Normal  A5:Noraml    Lower lobe segments    A6: Normal  A7+8: Stenosis in the mid segment   A9:Normal    A10:Normal      Assessment and Plan:     Yuridia Barksdale is a very pleasant 49 old female with a history of lupus, antiphospholipid antibody syndrome, DVT, obesity, obstructive sleep apnea, and chronic thromboembolic pulmonary hypertension on riociguat therapy who presents for follow-up.    She is functional class III.  Her cardiac output is preserved.  She is currently on riociguat, digoxin, and warfarin.  She is currently being monitored with chromogenic factor given her lupus anticoagulant syndrome.  Her goal chromogenic factor is 20-30.  She is follows with a hematologist at [facility].She appears euvolemic on exam.  We will continue lasix 40 mg BID.  Although she can be considered for balloon pulmonary angioplasty at the [facility], she is a high risk patient at our institution due to her elevated pulmonary artery systolic pressure at 100.  At this time, we will refer her to the [facility] for consideration of balloon angioplasty.    Since she has chest discomfort we will obtain Lexiscan for further evaluation.    We will have her return to clinic in 8 to 12 weeks.    It was a pleasure seeing your patient, Yuridia Barksdale, at the [facility] Pulmonary [facility]pertension [facility].  Please contact us with any questions or concerns that you may have.      Patient was seen and discussed with staff attending, Dr. Paniagua.      Sincerely,      Cat Randolph MD, PhD  Cardiology Fellow

## 2020-01-20 NOTE — PROGRESS NOTES
Service Date: 2020      Clint Olsen MD   [facility]   [address] [place] [address]      RE: Yuridia Barksdale   MRN: [id number]   : 1970      Dear Dr. Olsen:      We had the pleasure of seeing Yuridia Barksdale in our Pulmonary Hypertension Clinic at the [facility].  Ms. Yuridia Barksdale is a very pleasant 49-year-old female with past medical history significant for lupus, antiphospholipid antibody syndrome, DVT, obesity, obstructive sleep apnea and chronic thromboembolic hypertension, who is currently on riociguat therapy.  She was referred to us for further evaluation and management of her CTEPH.     We saw her the end of 2019.  We recommended her to have a repeat right heart catheterization and pulmonary angiogram here to determine her candidacy for pulmonary balloon angioplasty in our program.  She is completed this testing and she returns today for follow-up.    Her right heart catheterization showed an RA pressure of 8 mmHg, RV of 100/60 mmHg, PA of 100/27 with a mean of 51 mmHg, pulmonary capillary wedge pressure of 14 mmHg, PA saturation of 66%, thermodilution cardiac output of 10.1 L/min with an index of 4.34 L/min/m , estimated Terry cardiac output of 5.2 L/min with an index of 2.23 L/min/m .  Her PVR was 7.15 Wood units based on her estimated Terry and 4 Wood units based on her thermodilution cardiac output.      She had selective pulmonary angiogram which revealed proximal stenosis of the A2, A1 and A9 segments were noted on the right side.  Mid segment of the A 8 was noted in the left side all these lesions are amenable to BPA.    In the interim, she has been doing about the same.  She is functional class II.  She has no exertional presyncope or syncope.  No worsening lower extremity swelling.  She has been complaining of left-sided chest pain on and off with exertion.  No PND or orthopnea.  No bleeding complications.  She is  "compliant with her RIOCIGUAT and anticoagulation therapy.     PAST MEDICAL HISTORY:   1.  Systemic lupus erythematosus.   2.  Deep venous thrombosis.   3.  DAVIS with obstructive sleep apnea.   4.  Obesity.   5.  Chronic thromboembolic pulmonary hypertension.         MEDICATIONS:    Current Outpatient Medications   Medication Sig     ARIPiprazole (ABILIFY) 5 MG tablet Take 5 mg by mouth     atorvastatin (LIPITOR) 10 MG tablet Take 10 mg by mouth     clonazePAM (KLONOPIN) 1 MG tablet Take 1 mg by mouth     digoxin (LANOXIN) 125 MCG tablet TK 1 T PO QD     furosemide (LASIX) 20 MG tablet Take 40 mg by mouth 2 times daily      hydroxychloroquine (PLAQUENIL) 200 MG tablet Take 200 mg by mouth 2 times daily      losartan-hydrochlorothiazide (HYZAAR) 100-12.5 MG tablet Take 1 tablet by mouth     melatonin 3 MG tablet      riociguat (ADEMPAS) 2.5 MG tablet      saccharomyces boulardii (FLORASTOR) 250 MG capsule      sertraline (ZOLOFT) 100 MG tablet Take 200 mg by mouth     traZODone (DESYREL) 100 MG tablet Take 100 mg by mouth     Warfarin Sodium (COUMADIN PO) Take  by mouth.     Misc Natural Products (TART CHERRY ADVANCED PO) Take 1 Dose by mouth daily States liquid form but not regularly.     No current facility-administered medications for this visit.      REVIEW OF SYSTEMS:  A detailed 10-point review of systems was obtained as described in the History of Present Illness.  All other systems reviewed and are negative.       PHYSICAL EXAMINATION:      She was comfortable.  She was in no apparent distress.  /75 (BP Location: Right arm, Patient Position: Chair, Cuff Size: Adult Large)   Pulse 70   Ht 1.676 m (5' 6\")   Wt 130.4 kg (287 lb 8 oz)   SpO2 95%   BMI 46.40 kg/m  . She had no pallor, cyanosis or jaundice.  Her neck exam revealed no jugular venous distention.  Her carotids were 1+ bilaterally.  Cardiac auscultation revealed normal S1, S2 with no murmur, rub or gallop.  Auscultation of the lungs revealed " equal air entry on both sides with no added sounds.  Her abdomen was soft with normal bowel sounds, no tenderness, no rigidity, no guarding.  She had no focal neurological deficit.  Her extremities showed no edema.      CBC RESULTS:   Recent Labs   Lab Test 01/20/20  1118   WBC 6.5   RBC 4.21   HGB 12.9   HCT 39.6   MCV 94   MCH 30.6   MCHC 32.6   RDW 13.0        Recent Labs   Lab Test 01/20/20  1118 12/02/19  1045    139   POTASSIUM 3.9 4.5   CHLORIDE 110* 108   CO2 25 26   ANIONGAP 5 4   GLC 95 90   BUN 31* 11   CR 1.02 0.96   KATARINA 9.1 9.0     Liver Function Studies -   Recent Labs   Lab Test 01/20/20  1118   PROTTOTAL 7.6   ALBUMIN 3.3*   BILITOTAL 0.4   ALKPHOS 106   AST 18   ALT 24     No results found for: NTBNPI  Lab Results   Component Value Date    NTBNP 485 (H) 01/20/2020        ASSESSMENT AND PLAN:      Ms. Barksdale was scheduled to go to the [facility], for possible pulmonary balloon angioplasty versus pulmonary thromboendarterectomy.  Unfortunately, her insurance has denied her coverage for [facility].  Thus, she was referred to our program for further evaluation and treatment.      We reviewed her case in our multidisciplinary CTEPH meeting.  She has proximal lesions are on her A1 A2 and a 9 segments on the right side and a 8 segment on the left side which are amenable for pulmonary balloon angioplasty.  However she would be considered a high risk candidate for our program given her high PA pressure.  The risk of reperfusion injury and pulmonary hemorrhage directly correlates with the severity of the PA pressure.  Our program is in its early stages.  We have been doing this only for the last 6 months.  Thus given her complexity and high risk nature she would be better off getting this done in a more expedient center which would be [facility].  I have sent a referral to [facility] for  reconsideration.  Hopefully her insurance will approve.    In the interim, I have recommended her to continue RIOCIGUAT therapy and Coumadin.  She is being monitored by chromogenic factor with a goal of 20-30.    She does complain of this atypical chest pain which could be related to her pulmonary hypertension.  However given her lupus, to be sure, I have recommended her to have a nuclear stress test to make sure that we rule out coronary disease.  She will have this scheduled in the next couple of days.    It was a pleasure meeting Ms. Shi people in our pulmonary hypertension program at [facility].  We thank you for involving us in her care.  Please do not hesitate to call us in the interim of any further questions.         Sincerely,   Arcelia Paniagua MD   Center for Pulmonary Hypertension  Heart Failure, Transplant, and Mechanical Circulatory Support Cardiology   Cardiovascular Division  [facility]   [phone]               D: 2019   T: 2019   MT: al      Name:     JEREMY BOATENG   MRN:      [id number]        Account:      [id number]   :      1970           Service Date: 2019      Document: [id number]    Dr. Conte Skim    Jacque Dawson

## 2020-01-20 NOTE — LETTER
2020      RE: Yuridia Barksdale  [address] [address]       Dear Colleague,    Thank you for the opportunity to participate in the care of your patient, Yuridia Barksdale, at the [facility]. Please see a copy of my visit note below.    Service Date: 2020      Clint Olsen MD   [facility]   [address] [place] [address]      RE: Yuridia Barksdale   MRN: [id number]   : 1970      Dear Dr. Olsen:      We had the pleasure of seeing Yuridia Barksdale in our Pulmonary Hypertension Clinic at the [facility].  Ms. Yuridia Barksdale is a very pleasant 49-year-old female with past medical history significant for lupus, antiphospholipid antibody syndrome, DVT, obesity, obstructive sleep apnea and chronic thromboembolic hypertension, who is currently on riociguat therapy.  She was referred to us for further evaluation and management of her CTEPH.     We saw her the end of 2019.  We recommended her to have a repeat right heart catheterization and pulmonary angiogram here to determine her candidacy for pulmonary balloon angioplasty in our program.  She is completed this testing and she returns today for follow-up.    Her right heart catheterization showed an RA pressure of 8 mmHg, RV of 100/60 mmHg, PA of 100/27 with a mean of 51 mmHg, pulmonary capillary wedge pressure of 14 mmHg, PA saturation of 66%, thermodilution cardiac output of 10.1 L/min with an index of 4.34 L/min/m , estimated Terry cardiac output of 5.2 L/min with an index of 2.23 L/min/m .  Her PVR was 7.15 Wood units based on her estimated Terry and 4 Wood units based on her thermodilution cardiac output.      She had selective pulmonary angiogram which revealed proximal stenosis of the A2, A1 and A9 segments were noted on the right side.  Mid segment of the A 8 was noted in the left side all these lesions are amenable to  BPA.    In the interim, she has been doing about the same.  She is functional class II.  She has no exertional presyncope or syncope.  No worsening lower extremity swelling.  She has been complaining of left-sided chest pain on and off with exertion.  No PND or orthopnea.  No bleeding complications.  She is compliant with her RIOCIGUAT and anticoagulation therapy.     PAST MEDICAL HISTORY:   1.  Systemic lupus erythematosus.   2.  Deep venous thrombosis.   3.  DAVIS with obstructive sleep apnea.   4.  Obesity.   5.  Chronic thromboembolic pulmonary hypertension.         MEDICATIONS:    Current Outpatient Medications   Medication Sig     ARIPiprazole (ABILIFY) 5 MG tablet Take 5 mg by mouth     atorvastatin (LIPITOR) 10 MG tablet Take 10 mg by mouth     clonazePAM (KLONOPIN) 1 MG tablet Take 1 mg by mouth     digoxin (LANOXIN) 125 MCG tablet TK 1 T PO QD     furosemide (LASIX) 20 MG tablet Take 40 mg by mouth 2 times daily      hydroxychloroquine (PLAQUENIL) 200 MG tablet Take 200 mg by mouth 2 times daily      losartan-hydrochlorothiazide (HYZAAR) 100-12.5 MG tablet Take 1 tablet by mouth     melatonin 3 MG tablet      riociguat (ADEMPAS) 2.5 MG tablet      saccharomyces boulardii (FLORASTOR) 250 MG capsule      sertraline (ZOLOFT) 100 MG tablet Take 200 mg by mouth     traZODone (DESYREL) 100 MG tablet Take 100 mg by mouth     Warfarin Sodium (COUMADIN PO) Take  by mouth.     Misc Natural Products (TART CHERRY ADVANCED PO) Take 1 Dose by mouth daily States liquid form but not regularly.     No current facility-administered medications for this visit.      REVIEW OF SYSTEMS:  A detailed 10-point review of systems was obtained as described in the History of Present Illness.  All other systems reviewed and are negative.       PHYSICAL EXAMINATION:      She was comfortable.  She was in no apparent distress.  /75 (BP Location: Right arm, Patient Position: Chair, Cuff Size: Adult Large)   Pulse 70   Ht 1.676 m (5'  "6\")   Wt 130.4 kg (287 lb 8 oz)   SpO2 95%   BMI 46.40 kg/m   . She had no pallor, cyanosis or jaundice.  Her neck exam revealed no jugular venous distention.  Her carotids were 1+ bilaterally.  Cardiac auscultation revealed normal S1, S2 with no murmur, rub or gallop.  Auscultation of the lungs revealed equal air entry on both sides with no added sounds.  Her abdomen was soft with normal bowel sounds, no tenderness, no rigidity, no guarding.  She had no focal neurological deficit.  Her extremities showed no edema.      CBC RESULTS:   Recent Labs   Lab Test 01/20/20  1118   WBC 6.5   RBC 4.21   HGB 12.9   HCT 39.6   MCV 94   MCH 30.6   MCHC 32.6   RDW 13.0        Recent Labs   Lab Test 01/20/20  1118 12/02/19  1045    139   POTASSIUM 3.9 4.5   CHLORIDE 110* 108   CO2 25 26   ANIONGAP 5 4   GLC 95 90   BUN 31* 11   CR 1.02 0.96   KATARINA 9.1 9.0     Liver Function Studies -   Recent Labs   Lab Test 01/20/20  1118   PROTTOTAL 7.6   ALBUMIN 3.3*   BILITOTAL 0.4   ALKPHOS 106   AST 18   ALT 24     No results found for: NTBNPI  Lab Results   Component Value Date    NTBNP 485 (H) 01/20/2020        ASSESSMENT AND PLAN:      Ms. Barksdale was scheduled to go to the [facility], for possible pulmonary balloon angioplasty versus pulmonary thromboendarterectomy.  Unfortunately, her insurance has denied her coverage for [facility].  Thus, she was referred to our program for further evaluation and treatment.      We reviewed her case in our multidisciplinary CTEPH meeting.  She has proximal lesions are on her A1 A2 and a 9 segments on the right side and a 8 segment on the left side which are amenable for pulmonary balloon angioplasty.  However she would be considered a high risk candidate for our program given her high PA pressure.  The risk of reperfusion injury and pulmonary hemorrhage directly correlates with the severity of the PA pressure.  Our program is in its " early stages.  We have been doing this only for the last 6 months.  Thus given her complexity and high risk nature she would be better off getting this done in a more expedient center which would be [facility].  I have sent a referral to [facility] for reconsideration.  Hopefully her insurance will approve.    In the interim, I have recommended her to continue RIOCIGUAT therapy and Coumadin.  She is being monitored by chromogenic factor with a goal of 20-30.    She does complain of this atypical chest pain which could be related to her pulmonary hypertension.  However given her lupus, to be sure, I have recommended her to have a nuclear stress test to make sure that we rule out coronary disease.  She will have this scheduled in the next couple of days.    It was a pleasure meeting Ms. Shi people in our pulmonary hypertension program at [facility].  We thank you for involving us in her care.  Please do not hesitate to call us in the interim of any further questions.         Sincerely,   Arcelia Paniagua MD   Center for Pulmonary Hypertension  Heart Failure, Transplant, and Mechanical Circulatory Support Cardiology   Cardiovascular Division  [facility]   [phone]               D: 2019   T: 2019   MT: al      Name:     JEREMY BOATENG   MRN:      [id number]        Account:      [id number]   :      1970           Service Date: 2019      Document: [id number]    Dr. Conte Skim    Werner Saeed, Jacque Carmona

## 2020-01-20 NOTE — NURSING NOTE
Chief Complaint   Patient presents with     Follow Up      PH follow up appt to discuss High risk BPA procedure.      Vitals were taken and medication were reviewed.   Kamilla Davila, Student MA  11:50 AM

## 2020-01-24 ENCOUNTER — HOSPITAL ENCOUNTER (OUTPATIENT)
Dept: NUCLEAR MEDICINE | Facility: CLINIC | Age: 50
Setting detail: NUCLEAR MEDICINE
End: 2020-01-24
Attending: INTERNAL MEDICINE
Payer: COMMERCIAL

## 2020-01-24 ENCOUNTER — HOSPITAL ENCOUNTER (OUTPATIENT)
Dept: NUCLEAR MEDICINE | Facility: CLINIC | Age: 50
Setting detail: NUCLEAR MEDICINE
Discharge: HOME | End: 2020-01-24
Attending: INTERNAL MEDICINE | Admitting: INTERNAL MEDICINE
Payer: COMMERCIAL

## 2020-01-24 ENCOUNTER — HOSPITAL ENCOUNTER (OUTPATIENT)
Dept: CARDIOLOGY | Facility: CLINIC | Age: 50
Discharge: HOME | End: 2020-01-24
Attending: INTERNAL MEDICINE | Admitting: INTERNAL MEDICINE
Payer: COMMERCIAL

## 2020-01-24 DIAGNOSIS — R06.02 SOB (SHORTNESS OF BREATH): ICD-10-CM

## 2020-01-24 DIAGNOSIS — I27.20 PULMONARY HTN (H): ICD-10-CM

## 2020-01-24 PROCEDURE — 93017 CV STRESS TEST TRACING ONLY: CPT

## 2020-01-24 PROCEDURE — 78452 HT MUSCLE IMAGE SPECT MULT: CPT

## 2020-01-24 PROCEDURE — 25000128 H RX IP 250 OP 636: Performed by: INTERNAL MEDICINE

## 2020-01-24 PROCEDURE — 34300033 ZZH RX 343: Performed by: INTERNAL MEDICINE

## 2020-01-24 PROCEDURE — A9502 TC99M TETROFOSMIN: HCPCS | Performed by: INTERNAL MEDICINE

## 2020-01-24 PROCEDURE — 93016 CV STRESS TEST SUPVJ ONLY: CPT | Performed by: INTERNAL MEDICINE

## 2020-01-24 RX ORDER — ALBUTEROL SULFATE 90 UG/1
2 AEROSOL, METERED RESPIRATORY (INHALATION) EVERY 5 MIN PRN
Status: DISCONTINUED | OUTPATIENT
Start: 2020-01-24 | End: 2020-01-25 | Stop reason: HOSPADM

## 2020-01-24 RX ORDER — REGADENOSON 0.08 MG/ML
0.4 INJECTION, SOLUTION INTRAVENOUS ONCE
Status: COMPLETED | OUTPATIENT
Start: 2020-01-24 | End: 2020-01-24

## 2020-01-24 RX ORDER — ACYCLOVIR 200 MG/1
0-1 CAPSULE ORAL
Status: DISCONTINUED | OUTPATIENT
Start: 2020-01-24 | End: 2020-01-25 | Stop reason: HOSPADM

## 2020-01-24 RX ORDER — AMINOPHYLLINE 25 MG/ML
50-100 INJECTION, SOLUTION INTRAVENOUS
Status: DISCONTINUED | OUTPATIENT
Start: 2020-01-24 | End: 2020-01-25 | Stop reason: HOSPADM

## 2020-01-24 RX ADMIN — REGADENOSON 0.4 MG: 0.08 INJECTION, SOLUTION INTRAVENOUS at 14:56

## 2020-01-24 RX ADMIN — TETROFOSMIN 9.6 MCI.: 1.38 INJECTION, POWDER, LYOPHILIZED, FOR SOLUTION INTRAVENOUS at 13:31

## 2020-01-24 NOTE — PROGRESS NOTES
Pt here for Lexiscan nuclear stress test.  Medication and side effects reviewed with patient. Lung sounds clear to auscultation bilaterally.  Denied caffeine use.  Patient tolerated Lexiscan dose without any adverse reactions.  VSS.  Monitored post injection and then taken to the [facility] waiting room and instructed to wait there for nuclear medicine tech for follow up imaging.

## 2020-01-27 LAB
CV STRESS MAX HR HE: 92
RATE PRESSURE PRODUCT: NORMAL
STRESS ECHO BASELINE DIASTOLIC HE: 71
STRESS ECHO BASELINE HR: 73
STRESS ECHO BASELINE SYSTOLIC BP: 115
STRESS ECHO CALCULATED PERCENT HR: 53 %
STRESS ECHO LAST STRESS DIASTOLIC BP: 72
STRESS ECHO LAST STRESS SYSTOLIC BP: 136
STRESS ECHO TARGET HR: 173

## 2020-01-28 ENCOUNTER — NURSE TRIAGE (OUTPATIENT)
Dept: NURSING | Facility: CLINIC | Age: 50
End: 2020-01-28

## 2020-01-28 NOTE — TELEPHONE ENCOUNTER
Patient calling. States she had a stress test done on Friday and she is looking for the results.    Results have been reviewed by the physician. Test is negative.    Protocol and care advice reviewed  Caller states understanding of the recommended disposition    Advised to call back if further questions or concerns      Reason for Disposition    [1] Other NON-URGENT information for PCP AND [2] does not require PCP response    Additional Information    Negative: ED call to PCP    Negative: Physician call to PCP    Negative: Call about patient who is currently hospitalized    Negative: Lab or radiology calling with CRITICAL test results    Negative: [1] Prescription not at pharmacy AND [2] was prescribed today by PCP    Negative: [1] Follow-up call from patient regarding patient's clinical status AND [2] information urgent    Negative: [1] Caller requests to speak ONLY to PCP AND [2] URGENT question    Negative: [1] Caller requests to speak to PCP now AND [2] won't tell us reason for call  (Exception: if 10 pm to 6 am, caller must first discuss reason for the call)    Negative: Notification of hospital admission    Negative: Notification of death    Negative: Caller requesting lab results    Negative: Lab or radiology calling with test results    Negative: [1] Follow-up call from patient regarding patient's clinical status AND [2] information NON-URGENT    Negative: [1] Caller requests to speak ONLY to PCP AND [2] NON-URGENT question    Negative: Caller requesting an appointment, triage offered and declined    Protocols used: [facility]CP CALL - [facility]

## 2020-01-31 ENCOUNTER — TELEPHONE (OUTPATIENT)
Dept: CARDIOLOGY | Facility: CLINIC | Age: 50
End: 2020-01-31

## 2020-01-31 NOTE — TELEPHONE ENCOUNTER
Completed referral form and faxed to [phone] along with requested documents, including: patient demographic, history and physical, echocardiogram, right heart catheterization report, VQ scan, pulmonary function test, labs, and pulmonary angiogram results.    Also sent documents and CD with right heart cath, V/Q and echo. by [employer]: tracking number: [id number]    Deneen Mccann  [facility]   Pulmonary Hypertension  [facility]  (P) [phone]

## 2020-02-03 ENCOUNTER — TELEPHONE (OUTPATIENT)
Dept: CARDIOLOGY | Facility: CLINIC | Age: 50
End: 2020-02-03

## 2020-02-03 NOTE — TELEPHONE ENCOUNTER
Patient called and asked for update on her case.  Discussed Dr. Paniagua's discussion with [facility] and the verbal agreement for them to accept the referral.  All documents and imaging were sent last week. We did elijah the case as Urgent, so we will reach out to them for an update in 2 weeks if we haven't heard from them yet.  Advised patient that if she is approved prior to that time frame, they should be reaching out to her directly.  Patient verbalized understanding, agreed with plan and denied any further questions. Yoly Villegas RN on 2/3/2020 at 9:10 AM      Sent myself a reminder to follow up in 2 weeks.  ------------------------  LM for patient asking if [facility] has reached out to her at all. Left my direct dial number for call back.  Yoly Villegas RN on 2/17/2020 at 2:50 PM  -----------------------  Patient LM for me advising me she has not heard from [facility].  ----------------------  Advised Deneen of this and she stated she will call them for an update.  ----------------------  Called patient and advised her of Deneen's plan to call [facility] for an update, then she or I will call her to update her. Patient verbalized understanding, agreed with plan and denied any further questions. Yoly Villegas RN on 2/19/2020 at 5:44 PM

## 2020-02-04 NOTE — TELEPHONE ENCOUNTER
Received a call from [facility] who stated that she received the fax, but was wondering if the CD have been sent. Stated to Floor the CD has been FedEx'd on Friday. Floor stated she will watch for the package to arrive, but stated that the address listed on the form is only for [employer]. Provided Floor with the tracking number. Floor stated she will call the office when the package is received.    Deneen Mccann  Clinic   Pulmonary Hypertension  [facility]  (P) [phone]

## 2020-02-06 NOTE — TELEPHONE ENCOUNTER
Received a message from [facility] w/ [employer] who stated that she has received the package in the mail and it will be reviewed by Dr. Gerber. [facility] states they will be in touch with the sachin Mccann  Clinic   Pulmonary Hypertension  [facility]  (p) [phone]

## 2020-02-20 NOTE — TELEPHONE ENCOUNTER
Contacted [facility] w/ [facility] with regards to the pt's referral at [phone]. [facility] stated that Dr. Gerber has reviewed the case and is requesting the pt return to [facility] for another evaluation. Based on the information provided, Dr. Gerber states that the pt is 50 PTE / 50 BPA. [facility] stated that Elizabet is in charge of the pt's schedule and should be contacting the pt by the end of next week. [facility] stated that if the pt does not hear from Elizabet to contact [phone], Opt 2.  -------------------------  Contacted pt to provide pt an update with regards to the referral. Stated to pt that [facility] would like to do another evaluation and Elizabet should be contacting her soon to schedule the appointment. Requested that if pt does not hear from Elizabet by Tuesday or Wednesday next week to contact [facility] at [phone], Opt 2. Stated that if pt has any issues to contact the office again. Pt vebalized understanding of the plan and had no further questions.    Deneen Mccann  Clinic   Pulmonary Hypertension  [facility]  (P) [phone]

## 2020-02-23 ENCOUNTER — HEALTH MAINTENANCE LETTER (OUTPATIENT)
Age: 50
End: 2020-02-23

## 2020-03-06 NOTE — TELEPHONE ENCOUNTER
[facility] stated that [facility] is not accepting the authorization from [facility] and stated to Elizabet that it needs to come from the referring physician, Dr. Paniagua. Elizabet stated that she will fax over the completed prior authorization request, Dr. Paniagua's office just needs to add a note and a letter to the request and submit it to [facility] for review. Provided Elizabet the fax number and she stated she will either fax it today or Monday. Confirmed plan and awaiting fax from [facility].    Deneen Mccann  Clinic   Pulmonary Hypertension  [facility]  (P) [phone]

## 2020-03-09 ENCOUNTER — TELEPHONE (OUTPATIENT)
Dept: CARDIOLOGY | Facility: CLINIC | Age: 50
End: 2020-03-09

## 2020-03-09 ENCOUNTER — OFFICE VISIT (OUTPATIENT)
Dept: CARDIOLOGY | Facility: CLINIC | Age: 50
End: 2020-03-09
Attending: INTERNAL MEDICINE
Payer: COMMERCIAL

## 2020-03-09 VITALS
BODY MASS INDEX: 46.28 KG/M2 | WEIGHT: 288 LBS | HEART RATE: 63 BPM | SYSTOLIC BLOOD PRESSURE: 144 MMHG | DIASTOLIC BLOOD PRESSURE: 76 MMHG | HEIGHT: 66 IN | OXYGEN SATURATION: 94 %

## 2020-03-09 DIAGNOSIS — R06.02 SOB (SHORTNESS OF BREATH): ICD-10-CM

## 2020-03-09 DIAGNOSIS — I27.20 PULMONARY HTN (H): ICD-10-CM

## 2020-03-09 LAB
ALBUMIN SERPL-MCNC: 3.5 G/DL (ref 3.4–5)
ALP SERPL-CCNC: 111 U/L (ref 40–150)
ALT SERPL W P-5'-P-CCNC: 22 U/L (ref 0–50)
ANION GAP SERPL CALCULATED.3IONS-SCNC: 6 MMOL/L (ref 3–14)
AST SERPL W P-5'-P-CCNC: 21 U/L (ref 0–45)
BILIRUB SERPL-MCNC: 0.6 MG/DL (ref 0.2–1.3)
BUN SERPL-MCNC: 16 MG/DL (ref 7–30)
CALCIUM SERPL-MCNC: 9.3 MG/DL (ref 8.5–10.1)
CHLORIDE SERPL-SCNC: 106 MMOL/L (ref 94–109)
CO2 SERPL-SCNC: 27 MMOL/L (ref 20–32)
CREAT SERPL-MCNC: 1.01 MG/DL (ref 0.52–1.04)
ERYTHROCYTE [DISTWIDTH] IN BLOOD BY AUTOMATED COUNT: 13 % (ref 10–15)
GFR SERPL CREATININE-BSD FRML MDRD: 66 ML/MIN/{1.73_M2}
GLUCOSE SERPL-MCNC: 89 MG/DL (ref 70–99)
HCG SERPL QL: NEGATIVE
HCT VFR BLD AUTO: 42.1 % (ref 35–47)
HGB BLD-MCNC: 13.4 G/DL (ref 11.7–15.7)
MCH RBC QN AUTO: 30.3 PG (ref 26.5–33)
MCHC RBC AUTO-ENTMCNC: 31.8 G/DL (ref 31.5–36.5)
MCV RBC AUTO: 95 FL (ref 78–100)
NT-PROBNP SERPL-MCNC: 431 PG/ML (ref 0–125)
PLATELET # BLD AUTO: 243 10E9/L (ref 150–450)
POTASSIUM SERPL-SCNC: 3.8 MMOL/L (ref 3.4–5.3)
PROT SERPL-MCNC: 7.6 G/DL (ref 6.8–8.8)
RBC # BLD AUTO: 4.42 10E12/L (ref 3.8–5.2)
SODIUM SERPL-SCNC: 139 MMOL/L (ref 133–144)
WBC # BLD AUTO: 4.2 10E9/L (ref 4–11)

## 2020-03-09 PROCEDURE — 84703 CHORIONIC GONADOTROPIN ASSAY: CPT | Performed by: INTERNAL MEDICINE

## 2020-03-09 PROCEDURE — 80053 COMPREHEN METABOLIC PANEL: CPT | Performed by: INTERNAL MEDICINE

## 2020-03-09 PROCEDURE — 36415 COLL VENOUS BLD VENIPUNCTURE: CPT | Performed by: INTERNAL MEDICINE

## 2020-03-09 PROCEDURE — 83880 ASSAY OF NATRIURETIC PEPTIDE: CPT | Performed by: INTERNAL MEDICINE

## 2020-03-09 PROCEDURE — 85027 COMPLETE CBC AUTOMATED: CPT | Performed by: INTERNAL MEDICINE

## 2020-03-09 PROCEDURE — G0463 HOSPITAL OUTPT CLINIC VISIT: HCPCS | Mod: ZF

## 2020-03-09 PROCEDURE — 99215 OFFICE O/P EST HI 40 MIN: CPT | Mod: GC | Performed by: INTERNAL MEDICINE

## 2020-03-09 RX ORDER — FUROSEMIDE 20 MG
60 TABLET ORAL 2 TIMES DAILY
Qty: 540 TABLET | Refills: 3 | Status: SHIPPED | OUTPATIENT
Start: 2020-03-09 | End: 2020-07-31

## 2020-03-09 ASSESSMENT — MIFFLIN-ST. JEOR: SCORE: 1958.11

## 2020-03-09 ASSESSMENT — PAIN SCALES - GENERAL: PAINLEVEL: NO PAIN (0)

## 2020-03-09 NOTE — PROGRESS NOTES
Service Date: 2020    Clint Olsen MD   [facility]   [address] [place] [address]     RE:  Yuridia Barksdale   MRN:  [id number]  :  1970      Dear Dr. Olsen:    We had the pleasure of seeing Yuridia Barksdale at the [facility].  As you know, Ms. Yuridia Barksdale is a very pleasant 49-year-old female with past medical history significant for lupus, antiphospholipid antibody syndrome, DVT, obesity, obstructive sleep apnea and chronic thromboembolic hypertension, who is currently on riociguat therapy.  She was referred to us for further evaluation and management of her CTEPH. She presents for follow-up.    She was last seen in clinic on 2020. Her right heart catheterization in  showed an RA pressure of 8 mmHg, RV of 100/60 mmHg, PA of 100/27 with a mean of 51 mmHg, pulmonary capillary wedge pressure of 14 mmHg, PA saturation of 66%, thermodilution cardiac output of 10.1 L/min with an index of 4.34 L/min/m , estimated Terry cardiac output of 5.2 L/min with an index of 2.23 L/min/m .  Her PVR was 7.15 Wood units based on her estimated Terry and 4 Wood units based on her thermodilution cardiac output.  She had selective pulmonary angiogram which revealed proximal stenosis of the A2, A1 and A9 segments were noted on the right side.  Mid segment of the A 8 was noted in the left side all these lesions are amenable to BPA.  Since she is considered a high risk patient for our newer BPA program due to her elevated PA pressures, she was referred to [facility], who is planning a pulmonary endarterectomy on 2020.    Additionally, during her last clinic visit, she noted having some atypical chest discomfort.  Lexiscan on 2020 showed no ischemia.    She notes over the last couple weeks, she has been having worsening dyspnea on exertion.  She notes that her oxygen saturation drops to 83 to 84% while she is exercising with 3  L/min of oxygen, which is what she uses at baseline.  She denies orthopnea and PND.  She notes more leg swelling.  Denies lightheadedness and syncope.  Denies chest pain. Has not had any hospitalizations or ED visits since her last visit.      PAST MEDICAL HISTORY:  1.  Systemic lupus erythematosus.   2.  Deep venous thrombosis.   3.  DAVIS with obstructive sleep apnea.   4.  Obesity.   5.  Chronic thromboembolic pulmonary hypertension.       PAST SURGICAL HISTORY:  Past Surgical History:   Procedure Laterality Date     APPENDECTOMY  9/1994     CV PULMONARY ANGIOGRAM N/A 12/2/2019     CV RIGHT HEART CATH N/A 12/2/2019     HYSTERECTOMY  4/21/2016       FAMILY HISTORY:  No family history on file.    SOCIAL HISTORY:  Social History     Socioeconomic History     Marital status: Single     Spouse name: Not on file     Number of children: Not on file     Years of education: Not on file     Highest education level: Not on file   Occupational History     Not on file   Social Needs     Financial resource strain: Not on file     Food insecurity     Worry: Not on file     Inability: Not on file     Transportation needs     Medical: Not on file     Non-medical: Not on file   Tobacco Use     Smoking status: Never Smoker     Smokeless tobacco: Never Used   Substance and Sexual Activity     Alcohol use: Yes     Comment: occasionally     Drug use: No     Sexual activity: Never   Lifestyle     Physical activity     Days per week: Not on file     Minutes per session: Not on file     Stress: Not on file   Relationships     Social connections     Talks on phone: Not on file     Gets together: Not on file     Attends Mosque service: Not on file     Active member of club or organization: Not on file     Attends meetings of clubs or organizations: Not on file     Relationship status: Not on file     Intimate partner violence     Fear of current or ex partner: Not on file     Emotionally abused: Not on file     Physically abused: Not on  "file     Forced sexual activity: Not on file   Other Topics Concern     Not on file   Social History Narrative     Not on file       CURRENT MEDICATIONS:  Current Outpatient Medications   Medication Sig     ARIPiprazole (ABILIFY) 5 MG tablet Take 5 mg by mouth     atorvastatin (LIPITOR) 10 MG tablet Take 10 mg by mouth     clonazePAM (KLONOPIN) 1 MG tablet Take 1 mg by mouth     digoxin (LANOXIN) 125 MCG tablet TK 1 T PO QD     furosemide (LASIX) 20 MG tablet Take 40 mg by mouth 2 times daily      hydroxychloroquine (PLAQUENIL) 200 MG tablet Take 200 mg by mouth 2 times daily      losartan-hydrochlorothiazide (HYZAAR) 100-12.5 MG tablet Take 1 tablet by mouth     melatonin 3 MG tablet      riociguat (ADEMPAS) 2.5 MG tablet      saccharomyces boulardii (FLORASTOR) 250 MG capsule      sertraline (ZOLOFT) 100 MG tablet Take 200 mg by mouth     traZODone (DESYREL) 100 MG tablet Take 100 mg by mouth     Warfarin Sodium (COUMADIN PO) Take  by mouth.     Misc Natural Products (TART CHERRY ADVANCED PO) Take 1 Dose by mouth daily States liquid form but not regularly.     No current facility-administered medications for this visit.        ROS:   10 point ROS negative except as discussed in above HPI.    EXAM:  BP (!) 144/76 (BP Location: Right arm, Patient Position: Chair, Cuff Size: Adult Regular)   Pulse 63   Ht 1.676 m (5' 6\")   Wt 130.6 kg (288 lb)   SpO2 94%   BMI 46.48 kg/m    General: appears comfortable, alert and articulate  Head: normocephalic, atraumatic  Eyes: anicteric sclera, EOMI  Orophyarynx: moist mucosa, no lesions, dentition intact  Heart: regular, normal S1/S2, no murmur, gallop, rub, estimated JVP 9 cm  Lungs: clear to auscultation bilaterally, no rales or wheezing  Abdomen: soft, non-tender, bowel sounds present, no hepatosplenomegaly  Extremities: 1+ pitting edema in RLE and nonpitting edema in LLE. no clubbing, cyanosis or edema  Neurological: normal speech and affect, no gross motor " deficits    Labs:  Recent Results (from the past 168 hour(s))   HCG qualitative    Collection Time: 03/09/20  8:45 AM   Result Value Ref Range    HCG Qualitative Serum Negative NEG^Negative   CBC with platelets    Collection Time: 03/09/20  8:45 AM   Result Value Ref Range    WBC 4.2 4.0 - 11.0 10e9/L    RBC Count 4.42 3.8 - 5.2 10e12/L    Hemoglobin 13.4 11.7 - 15.7 g/dL    Hematocrit 42.1 35.0 - 47.0 %    MCV 95 78 - 100 fl    MCH 30.3 26.5 - 33.0 pg    MCHC 31.8 31.5 - 36.5 g/dL    RDW 13.0 10.0 - 15.0 %    Platelet Count 243 150 - 450 10e9/L   N terminal pro BNP outpatient    Collection Time: 03/09/20  8:45 AM   Result Value Ref Range    N-Terminal Pro Bnp 431 (H) 0 - 125 pg/mL   Comprehensive metabolic panel    Collection Time: 03/09/20  8:45 AM   Result Value Ref Range    Sodium 139 133 - 144 mmol/L    Potassium 3.8 3.4 - 5.3 mmol/L    Chloride 106 94 - 109 mmol/L    Carbon Dioxide 27 20 - 32 mmol/L    Anion Gap 6 3 - 14 mmol/L    Glucose 89 70 - 99 mg/dL    Urea Nitrogen 16 7 - 30 mg/dL    Creatinine 1.01 0.52 - 1.04 mg/dL    GFR Estimate 66 >60 mL/min/[1.73_m2]    GFR Estimate If Black 77 >60 mL/min/[1.73_m2]    Calcium 9.3 8.5 - 10.1 mg/dL    Bilirubin Total 0.6 0.2 - 1.3 mg/dL    Albumin 3.5 3.4 - 5.0 g/dL    Protein Total 7.6 6.8 - 8.8 g/dL    Alkaline Phosphatase 111 40 - 150 U/L    ALT 22 0 - 50 U/L    AST 21 0 - 45 U/L         Echocardiogram 12/2/2019:  Interpretation Summary  Left ventricular function, chamber size, wall motion, and wall thickness are  normal.The EF is 55-60%. Grade II or moderate diastolic dysfunction. Flattened  septum is consistent with right ventricular pressure and volume overload.  Global right ventricular function is mildly to moderately reduced. Severe  right ventricular dilation is present with normal thickness.  Estimated PASP is at least 34 mmHg, likely is underestimated by this TTE. IVC  diameter <2.1 cm collapsing >50% with sniff suggests a normal RA pressure of 3  mmHg.  No pericardial effusion is present.     No prior study to compare.      RHC and BPA 12/2/2019:  RA: 8  RV: 100/16  PA: 100/27/51  PCWP: 14  TD CO/CI: 10.1/4.34  Terry CO/CI: 5.17/2.23  PVR: 4 by TD    Proximal stenosis of the A2, A1 and A9 segments were noted on the right side.  Stenosis of mid segment of the A8 was noted in the left side.  All these lesions are amenable to BPA.      Assessment and Plan:     Yuridia Barksdale is very pleasant 49-year-old female with past medical history significant for lupus, antiphospholipid antibody syndrome, DVT, obesity, obstructive sleep apnea and chronic thromboembolic hypertension on riociguat therapy who presents for follow-up.    She is scheduled to undergo a pulmonary endarterectomy on 4/22/2020 at [facility]. In the interim, we advised her to continue riociguat, digoxin, and coumadin (until 5 days prior to the schedule and then start bridging with lovenox 3 days prior to her surgery).  Goal chromogenic factor 20 to 30.  Since she has some lower extremity edema and appears mildly hypervolemic on exam, advised her to increase lasix from 40 to 60 mg BID.  She is functional class II.  Her labs today are stable and her NTpro-BNP is mildly lower today compared to her levels during prior visits.  We also advised her to increase her supplemental oxygen to 4 L/min with activity since she was having episodes of oxygen desaturations.    We advised her to follow-up with either us or at [facility].  If she is interested in following up with us, we advised her to contact our office to schedule an appointment.    It was a pleasure seeing Yuridia Barksdale at the [facility]monary [facility]pert[facility].  Please contact us with any questions or concerns that you may have.      Patient was seen and discussed with staff attending, Dr. Paniagua.      Sincerely,      Cat Randolph MD, PhD  Cardiology Fellow    I have personally seen and examined the patient, and then discussed with  Dr. Randolph, and agree with the findings and plan in this note. I have reviewed today's vital signs, medications, labs, and imaging.     She has been approved by [facility] to get evaluation and treatment for his CTEPH. Will continue her on current therapy. Will coordinate with [facility] regarding bridging. She has APL. She is stable for on riociguat.     Sincerely,    Arcelia Paniagua MD   Center for Pulmonary Hypertension  Section of Advanced Heart Failure   Cardiovascular Division  [facility]   [phone]

## 2020-03-09 NOTE — LETTER
3/9/2020      RE: Yuridia Barksdale  [address] [facility] [address]       Service Date: 2020    Clint Olsen MD   [facility]   [address] [place] [address]     RE:  Yuridia Barksdale   MRN:  [id number]  :  1970      Dear Dr. Olsen:    We had the pleasure of seeing Yuridia Barksdale at the [facility].  As you know, Ms. Yuridia Barksdale is a very pleasant 49-year-old female with past medical history significant for lupus, antiphospholipid antibody syndrome, DVT, obesity, obstructive sleep apnea and chronic thromboembolic hypertension, who is currently on riociguat therapy.  She was referred to us for further evaluation and management of her CTEPH. She presents for follow-up.    She was last seen in clinic on 2020. Her right heart catheterization in  showed an RA pressure of 8 mmHg, RV of 100/60 mmHg, PA of 100/27 with a mean of 51 mmHg, pulmonary capillary wedge pressure of 14 mmHg, PA saturation of 66%, thermodilution cardiac output of 10.1 L/min with an index of 4.34 L/min/m , estimated Terry cardiac output of 5.2 L/min with an index of 2.23 L/min/m .  Her PVR was 7.15 Wood units based on her estimated Terry and 4 Wood units based on her thermodilution cardiac output.  She had selective pulmonary angiogram which revealed proximal stenosis of the A2, A1 and A9 segments were noted on the right side.  Mid segment of the A 8 was noted in the left side all these lesions are amenable to BPA.  Since she is considered a high risk patient for our newer BPA program due to her elevated PA pressures, she was referred to [facility], who is planning a pulmonary endarterectomy on 2020.    Additionally, during her last clinic visit, she noted having some atypical chest discomfort.  Lexiscan on 2020 showed no ischemia.    She notes over the last couple weeks, she has been having worsening dyspnea on exertion.  She  notes that her oxygen saturation drops to 83 to 84% while she is exercising with 3 L/min of oxygen, which is what she uses at baseline.  She denies orthopnea and PND.  She notes more leg swelling.  Denies lightheadedness and syncope.  Denies chest pain. Has not had any hospitalizations or ED visits since her last visit.      PAST MEDICAL HISTORY:  1.  Systemic lupus erythematosus.   2.  Deep venous thrombosis.   3.  DAVIS with obstructive sleep apnea.   4.  Obesity.   5.  Chronic thromboembolic pulmonary hypertension.       PAST SURGICAL HISTORY:  Past Surgical History:   Procedure Laterality Date     APPENDECTOMY  9/1994     CV PULMONARY ANGIOGRAM N/A 12/2/2019     CV RIGHT HEART CATH N/A 12/2/2019     HYSTERECTOMY  4/21/2016       FAMILY HISTORY:  No family history on file.    SOCIAL HISTORY:  Social History     Socioeconomic History     Marital status: Single     Spouse name: Not on file     Number of children: Not on file     Years of education: Not on file     Highest education level: Not on file   Occupational History     Not on file   Social Needs     Financial resource strain: Not on file     Food insecurity     Worry: Not on file     Inability: Not on file     Transportation needs     Medical: Not on file     Non-medical: Not on file   Tobacco Use     Smoking status: Never Smoker     Smokeless tobacco: Never Used   Substance and Sexual Activity     Alcohol use: Yes     Comment: occasionally     Drug use: No     Sexual activity: Never   Lifestyle     Physical activity     Days per week: Not on file     Minutes per session: Not on file     Stress: Not on file   Relationships     Social connections     Talks on phone: Not on file     Gets together: Not on file     Attends Sabianism service: Not on file     Active member of club or organization: Not on file     Attends meetings of clubs or organizations: Not on file     Relationship status: Not on file     Intimate partner violence     Fear of current or ex  "partner: Not on file     Emotionally abused: Not on file     Physically abused: Not on file     Forced sexual activity: Not on file   Other Topics Concern     Not on file   Social History Narrative     Not on file       CURRENT MEDICATIONS:  Current Outpatient Medications   Medication Sig     ARIPiprazole (ABILIFY) 5 MG tablet Take 5 mg by mouth     atorvastatin (LIPITOR) 10 MG tablet Take 10 mg by mouth     clonazePAM (KLONOPIN) 1 MG tablet Take 1 mg by mouth     digoxin (LANOXIN) 125 MCG tablet TK 1 T PO QD     furosemide (LASIX) 20 MG tablet Take 40 mg by mouth 2 times daily      hydroxychloroquine (PLAQUENIL) 200 MG tablet Take 200 mg by mouth 2 times daily      losartan-hydrochlorothiazide (HYZAAR) 100-12.5 MG tablet Take 1 tablet by mouth     melatonin 3 MG tablet      riociguat (ADEMPAS) 2.5 MG tablet      saccharomyces boulardii (FLORASTOR) 250 MG capsule      sertraline (ZOLOFT) 100 MG tablet Take 200 mg by mouth     traZODone (DESYREL) 100 MG tablet Take 100 mg by mouth     Warfarin Sodium (COUMADIN PO) Take  by mouth.     Misc Natural Products (TART CHERRY ADVANCED PO) Take 1 Dose by mouth daily States liquid form but not regularly.     No current facility-administered medications for this visit.        ROS:   10 point ROS negative except as discussed in above HPI.    EXAM:  BP (!) 144/76 (BP Location: Right arm, Patient Position: Chair, Cuff Size: Adult Regular)   Pulse 63   Ht 1.676 m (5' 6\")   Wt 130.6 kg (288 lb)   SpO2 94%   BMI 46.48 kg/m    General: appears comfortable, alert and articulate  Head: normocephalic, atraumatic  Eyes: anicteric sclera, EOMI  Orophyarynx: moist mucosa, no lesions, dentition intact  Heart: regular, normal S1/S2, no murmur, gallop, rub, estimated JVP 9 cm  Lungs: clear to auscultation bilaterally, no rales or wheezing  Abdomen: soft, non-tender, bowel sounds present, no hepatosplenomegaly  Extremities: 1+ pitting edema in RLE and nonpitting edema in LLE. no clubbing, " cyanosis or edema  Neurological: normal speech and affect, no gross motor deficits    Labs:  Recent Results (from the past 168 hour(s))   HCG qualitative    Collection Time: 03/09/20  8:45 AM   Result Value Ref Range    HCG Qualitative Serum Negative NEG^Negative   CBC with platelets    Collection Time: 03/09/20  8:45 AM   Result Value Ref Range    WBC 4.2 4.0 - 11.0 10e9/L    RBC Count 4.42 3.8 - 5.2 10e12/L    Hemoglobin 13.4 11.7 - 15.7 g/dL    Hematocrit 42.1 35.0 - 47.0 %    MCV 95 78 - 100 fl    MCH 30.3 26.5 - 33.0 pg    MCHC 31.8 31.5 - 36.5 g/dL    RDW 13.0 10.0 - 15.0 %    Platelet Count 243 150 - 450 10e9/L   N terminal pro BNP outpatient    Collection Time: 03/09/20  8:45 AM   Result Value Ref Range    N-Terminal Pro Bnp 431 (H) 0 - 125 pg/mL   Comprehensive metabolic panel    Collection Time: 03/09/20  8:45 AM   Result Value Ref Range    Sodium 139 133 - 144 mmol/L    Potassium 3.8 3.4 - 5.3 mmol/L    Chloride 106 94 - 109 mmol/L    Carbon Dioxide 27 20 - 32 mmol/L    Anion Gap 6 3 - 14 mmol/L    Glucose 89 70 - 99 mg/dL    Urea Nitrogen 16 7 - 30 mg/dL    Creatinine 1.01 0.52 - 1.04 mg/dL    GFR Estimate 66 >60 mL/min/[1.73_m2]    GFR Estimate If Black 77 >60 mL/min/[1.73_m2]    Calcium 9.3 8.5 - 10.1 mg/dL    Bilirubin Total 0.6 0.2 - 1.3 mg/dL    Albumin 3.5 3.4 - 5.0 g/dL    Protein Total 7.6 6.8 - 8.8 g/dL    Alkaline Phosphatase 111 40 - 150 U/L    ALT 22 0 - 50 U/L    AST 21 0 - 45 U/L         Echocardiogram 12/2/2019:  Interpretation Summary  Left ventricular function, chamber size, wall motion, and wall thickness are  normal.The EF is 55-60%. Grade II or moderate diastolic dysfunction. Flattened  septum is consistent with right ventricular pressure and volume overload.  Global right ventricular function is mildly to moderately reduced. Severe  right ventricular dilation is present with normal thickness.  Estimated PASP is at least 34 mmHg, likely is underestimated by this TTE. IVC  diameter  <2.1 cm collapsing >50% with sniff suggests a normal RA pressure of 3  mmHg. No pericardial effusion is present.     No prior study to compare.      RHC and BPA 12/2/2019:  RA: 8  RV: 100/16  PA: 100/27/51  PCWP: 14  TD CO/CI: 10.1/4.34  Terry CO/CI: 5.17/2.23  PVR: 4 by TD    Proximal stenosis of the A2, A1 and A9 segments were noted on the right side.  Stenosis of mid segment of the A8 was noted in the left side.  All these lesions are amenable to BPA.      Assessment and Plan:     Yuridia Barksdale is very pleasant 49-year-old female with past medical history significant for lupus, antiphospholipid antibody syndrome, DVT, obesity, obstructive sleep apnea and chronic thromboembolic hypertension on riociguat therapy who presents for follow-up.    She is scheduled to undergo a pulmonary endarterectomy on 4/22/2020 at [facility]. In the interim, we advised her to continue riociguat, digoxin, and coumadin (until 5 days prior to the schedule and then start bridging with lovenox 3 days prior to her surgery).  Goal chromogenic factor 20 to 30.  Since she has some lower extremity edema and appears mildly hypervolemic on exam, advised her to increase lasix from 40 to 60 mg BID.  She is functional class II.  Her labs today are stable and her NTpro-BNP is mildly lower today compared to her levels during prior visits.  We also advised her to increase her supplemental oxygen to 4 L/min with activity since she was having episodes of oxygen desaturations.    We advised her to follow-up with either us or at [facility].  If she is interested in following up with us, we advised her to contact our office to schedule an appointment.    It was a pleasure seeing Yuridia Barksdale at the [facility].  Please contact us with any questions or concerns that you may have.      Patient was seen and discussed with staff attending, Dr. Paniagua.      Sincerely,      Cat Randolph MD, PhD  Cardiology  Fellow    I have personally seen and examined the patient, and then discussed with Dr. Randolph, and agree with the findings and plan in this note. I have reviewed today's vital signs, medications, labs, and imaging.     She has been approved by [employer] to get evaluation and treatment for his CTEPH. Will continue her on current therapy. Will coordinate with [facility] regarding bridging. She has APL. She is stable for on riociguat.     Sincerely,    Arcelia Paniagua MD   [facility] for Pulmonary Hypertension  Section of Advanced Heart Failure   Cardiovascular Division  [facility]   [phone]                  Arcelia Paniagua MD

## 2020-03-09 NOTE — NURSING NOTE
Alber Barragan   Preferred Name:   None  Male, 32 year old, 1985  Phone:   *[phone] (Mobile) [phone] (Home Phone)  Last Weight:   84.4 kg  PCP:   None  Need Interp:   No  Language:   English  Allergies  Amoxicillin  Penicillins  Primary Ins:   WC-IC[employer]  MRN:   [id number]  my[place]:   Active  Next Appt With Me:   None  Last Appt With Me:       FW: Briox/ Dr. Pena   Received: Yesterday   Message Contents   MD Dariana Lamas RN; Dee Westbrook, I think I sent a message to you regarding this. I'm not sure if you had a chance to call him and to document and see how his Botox has worked. Please ask him about symptoms and how it affected him daily and then document this in a telephone encounter. I then can go off a your notes and states that he has been communicated with by a nurse and I we documented how his symptoms have been relieved. This can angled was insurance coming. Thanks   Previous Messages        ----- Message -----   From: Dee Luo   Sent: 8/22/2018   4:14 PM   To: Jennifer Pena MD, *   Subject: FW: Botox/ Dr. Jean ARENAS!     Are you able to dictate something for this patient?     Please see message below.     Thanks Much!     Dee   ----- Message -----   From: Alondra Curtis   Sent: 8/22/2018   3:52 PM   To: Plastics Surg Schedule Pool Em, *   Subject: Botox/ Dr. Jean Moura.   Mia is calling back.   Thanks   Alondra, covering for Ernestine   ----- Message -----   From: Alondra Curtis   Sent: 8/20/2018  10:59 AM   To: Plastics Surg Schedule Pool Em, *   Subject: Botox                                             Good morning.   Mia from [employer] called requesting the effectiveness of Botox injection from 1-29-18. Per Mia, without this information she is not able to approve injection.     Thanks much   Alondra, covering for Ernestine           Chief Complaint   Patient presents with     Follow Up      6 week PH follow up appt w/labs prior.      Vitals were taken and medications were reconciled.     Vivian Ghosh RMA  9:00 AM

## 2020-03-09 NOTE — LETTER
3/9/2020      RE: Yuridia Barksdale  [address] [address] [address]       Service Date: 2020    Clint Olsen MD   [facility]   [address] [place] [address]     RE:  Yuridia Barksdale   MRN:  [id number]  :  1970      Dear Dr. Olsen:    We had the pleasure of seeing Yuridia Barksdale at the [facility]pert[facility].  As you know, Ms. Yuridia Barksdale is a very pleasant 49-year-old female with past medical history significant for lupus, antiphospholipid antibody syndrome, DVT, obesity, obstructive sleep apnea and chronic thromboembolic hypertension, who is currently on riociguat therapy.  She was referred to us for further evaluation and management of her CTEPH. She presents for follow-up.    She was last seen in clinic on 2020. Her right heart catheterization in  showed an RA pressure of 8 mmHg, RV of 100/60 mmHg, PA of 100/27 with a mean of 51 mmHg, pulmonary capillary wedge pressure of 14 mmHg, PA saturation of 66%, thermodilution cardiac output of 10.1 L/min with an index of 4.34 L/min/m , estimated Terry cardiac output of 5.2 L/min with an index of 2.23 L/min/m .  Her PVR was 7.15 Wood units based on her estimated Terry and 4 Wood units based on her thermodilution cardiac output.  She had selective pulmonary angiogram which revealed proximal stenosis of the A2, A1 and A9 segments were noted on the right side.  Mid segment of the A 8 was noted in the left side all these lesions are amenable to BPA.  Since she is considered a high risk patient for our newer BPA program due to her elevated PA pressures, she was referred to [facility], who is planning a pulmonary endarterectomy on 2020.    Additionally, during her last clinic visit, she noted having some atypical chest discomfort.  Lexiscan on 2020 showed no ischemia.    She notes over the last couple weeks, she has been having worsening dyspnea on exertion.  She  notes that her oxygen saturation drops to 83 to 84% while she is exercising with 3 L/min of oxygen, which is what she uses at baseline.  She denies orthopnea and PND.  She notes more leg swelling.  Denies lightheadedness and syncope.  Denies chest pain. Has not had any hospitalizations or ED visits since her last visit.      PAST MEDICAL HISTORY:  1.  Systemic lupus erythematosus.   2.  Deep venous thrombosis.   3.  DAVIS with obstructive sleep apnea.   4.  Obesity.   5.  Chronic thromboembolic pulmonary hypertension.       PAST SURGICAL HISTORY:  Past Surgical History:   Procedure Laterality Date     APPENDECTOMY  9/1994     CV PULMONARY ANGIOGRAM N/A 12/2/2019     CV RIGHT HEART CATH N/A 12/2/2019     HYSTERECTOMY  4/21/2016       FAMILY HISTORY:  No family history on file.    SOCIAL HISTORY:  Social History     Socioeconomic History     Marital status: Single     Spouse name: Not on file     Number of children: Not on file     Years of education: Not on file     Highest education level: Not on file   Occupational History     Not on file   Social Needs     Financial resource strain: Not on file     Food insecurity     Worry: Not on file     Inability: Not on file     Transportation needs     Medical: Not on file     Non-medical: Not on file   Tobacco Use     Smoking status: Never Smoker     Smokeless tobacco: Never Used   Substance and Sexual Activity     Alcohol use: Yes     Comment: occasionally     Drug use: No     Sexual activity: Never   Lifestyle     Physical activity     Days per week: Not on file     Minutes per session: Not on file     Stress: Not on file   Relationships     Social connections     Talks on phone: Not on file     Gets together: Not on file     Attends Taoist service: Not on file     Active member of club or organization: Not on file     Attends meetings of clubs or organizations: Not on file     Relationship status: Not on file     Intimate partner violence     Fear of current or ex  "partner: Not on file     Emotionally abused: Not on file     Physically abused: Not on file     Forced sexual activity: Not on file   Other Topics Concern     Not on file   Social History Narrative     Not on file       CURRENT MEDICATIONS:  Current Outpatient Medications   Medication Sig     ARIPiprazole (ABILIFY) 5 MG tablet Take 5 mg by mouth     atorvastatin (LIPITOR) 10 MG tablet Take 10 mg by mouth     clonazePAM (KLONOPIN) 1 MG tablet Take 1 mg by mouth     digoxin (LANOXIN) 125 MCG tablet TK 1 T PO QD     furosemide (LASIX) 20 MG tablet Take 40 mg by mouth 2 times daily      hydroxychloroquine (PLAQUENIL) 200 MG tablet Take 200 mg by mouth 2 times daily      losartan-hydrochlorothiazide (HYZAAR) 100-12.5 MG tablet Take 1 tablet by mouth     melatonin 3 MG tablet      riociguat (ADEMPAS) 2.5 MG tablet      saccharomyces boulardii (FLORASTOR) 250 MG capsule      sertraline (ZOLOFT) 100 MG tablet Take 200 mg by mouth     traZODone (DESYREL) 100 MG tablet Take 100 mg by mouth     Warfarin Sodium (COUMADIN PO) Take  by mouth.     Misc Natural Products (TART CHERRY ADVANCED PO) Take 1 Dose by mouth daily States liquid form but not regularly.     No current facility-administered medications for this visit.        ROS:   10 point ROS negative except as discussed in above HPI.    EXAM:  BP (!) 144/76 (BP Location: Right arm, Patient Position: Chair, Cuff Size: Adult Regular)   Pulse 63   Ht 1.676 m (5' 6\")   Wt 130.6 kg (288 lb)   SpO2 94%   BMI 46.48 kg/m    General: appears comfortable, alert and articulate  Head: normocephalic, atraumatic  Eyes: anicteric sclera, EOMI  Orophyarynx: moist mucosa, no lesions, dentition intact  Heart: regular, normal S1/S2, no murmur, gallop, rub, estimated JVP 9 cm  Lungs: clear to auscultation bilaterally, no rales or wheezing  Abdomen: soft, non-tender, bowel sounds present, no hepatosplenomegaly  Extremities: 1+ pitting edema in RLE and nonpitting edema in LLE. no clubbing, " cyanosis or edema  Neurological: normal speech and affect, no gross motor deficits    Labs:  Recent Results (from the past 168 hour(s))   HCG qualitative    Collection Time: 03/09/20  8:45 AM   Result Value Ref Range    HCG Qualitative Serum Negative NEG^Negative   CBC with platelets    Collection Time: 03/09/20  8:45 AM   Result Value Ref Range    WBC 4.2 4.0 - 11.0 10e9/L    RBC Count 4.42 3.8 - 5.2 10e12/L    Hemoglobin 13.4 11.7 - 15.7 g/dL    Hematocrit 42.1 35.0 - 47.0 %    MCV 95 78 - 100 fl    MCH 30.3 26.5 - 33.0 pg    MCHC 31.8 31.5 - 36.5 g/dL    RDW 13.0 10.0 - 15.0 %    Platelet Count 243 150 - 450 10e9/L   N terminal pro BNP outpatient    Collection Time: 03/09/20  8:45 AM   Result Value Ref Range    N-Terminal Pro Bnp 431 (H) 0 - 125 pg/mL   Comprehensive metabolic panel    Collection Time: 03/09/20  8:45 AM   Result Value Ref Range    Sodium 139 133 - 144 mmol/L    Potassium 3.8 3.4 - 5.3 mmol/L    Chloride 106 94 - 109 mmol/L    Carbon Dioxide 27 20 - 32 mmol/L    Anion Gap 6 3 - 14 mmol/L    Glucose 89 70 - 99 mg/dL    Urea Nitrogen 16 7 - 30 mg/dL    Creatinine 1.01 0.52 - 1.04 mg/dL    GFR Estimate 66 >60 mL/min/[1.73_m2]    GFR Estimate If Black 77 >60 mL/min/[1.73_m2]    Calcium 9.3 8.5 - 10.1 mg/dL    Bilirubin Total 0.6 0.2 - 1.3 mg/dL    Albumin 3.5 3.4 - 5.0 g/dL    Protein Total 7.6 6.8 - 8.8 g/dL    Alkaline Phosphatase 111 40 - 150 U/L    ALT 22 0 - 50 U/L    AST 21 0 - 45 U/L         Echocardiogram 12/2/2019:  Interpretation Summary  Left ventricular function, chamber size, wall motion, and wall thickness are  normal.The EF is 55-60%. Grade II or moderate diastolic dysfunction. Flattened  septum is consistent with right ventricular pressure and volume overload.  Global right ventricular function is mildly to moderately reduced. Severe  right ventricular dilation is present with normal thickness.  Estimated PASP is at least 34 mmHg, likely is underestimated by this TTE. IVC  diameter  <2.1 cm collapsing >50% with sniff suggests a normal RA pressure of 3  mmHg. No pericardial effusion is present.     No prior study to compare.      RHC and BPA 12/2/2019:  RA: 8  RV: 100/16  PA: 100/27/51  PCWP: 14  TD CO/CI: 10.1/4.34  Terry CO/CI: 5.17/2.23  PVR: 4 by TD    Proximal stenosis of the A2, A1 and A9 segments were noted on the right side.  Stenosis of mid segment of the A8 was noted in the left side.  All these lesions are amenable to BPA.      Assessment and Plan:     Yuridia Barksdale is very pleasant 49-year-old female with past medical history significant for lupus, antiphospholipid antibody syndrome, DVT, obesity, obstructive sleep apnea and chronic thromboembolic hypertension on riociguat therapy who presents for follow-up.    She is scheduled to undergo a pulmonary endarterectomy on 4/22/2020 at [facility]. In the interim, we advised her to continue riociguat, digoxin, and coumadin (until 5 days prior to the schedule and then start bridging with lovenox 3 days prior to her surgery).  Goal chromogenic factor 20 to 30.  Since she has some lower extremity edema and appears mildly hypervolemic on exam, advised her to increase lasix from 40 to 60 mg BID.  She is functional class II.  Her labs today are stable and her NTpro-BNP is mildly lower today compared to her levels during prior visits.  We also advised her to increase her supplemental oxygen to 4 L/min with activity since she was having episodes of oxygen desaturations.    We advised her to follow-up with either us or at [facility].  If she is interested in following up with us, we advised her to contact our office to schedule an appointment.    It was a pleasure seeing Yuridia Barksdale at the [facility].  Please contact us with any questions or concerns that you may have.      Patient was seen and discussed with staff attending, Dr. Paniagau.      Sincerely,      Cat Randolph MD, PhD  Cardiology  Fellow    I have personally seen and examined the patient, and then discussed with Dr. Randolph, and agree with the findings and plan in this note. I have reviewed today's vital signs, medications, labs, and imaging.     She has been approved by [employer] to get evaluation and treatment for his CTEPH. Will continue her on current therapy. Will coordinate with [facility] regarding bridging. She has APL. She is stable for on riociguat.     Sincerely,    Arcelia Paniagua MD   Center for Pulmonary Hypertension  Section of Advanced Heart Failure   Cardiovascular Division  [facility]   [phone]                  Arcelia Paniagua MD

## 2020-03-09 NOTE — LETTER
3/9/2020      RE: Yuridia Barksdale  [address] [facility] [address]       Dear Colleague,    Thank you for the opportunity to participate in the care of your patient, Yuridia Barksdale, at the [facility]. Please see a copy of my visit note below.    Service Date: 2020    Clint Olsen MD   [facility]   [address] [place] [address]     RE:  Yuridia Barksdale   MRN:  [id number]  :  1970      Dear Dr. Olsen:    We had the pleasure of seeing Yuridia Barksdale at the [facility].  As you know, Ms. Yuridia Barksdale is a very pleasant 49-year-old female with past medical history significant for lupus, antiphospholipid antibody syndrome, DVT, obesity, obstructive sleep apnea and chronic thromboembolic hypertension, who is currently on riociguat therapy.  She was referred to us for further evaluation and management of her CTEPH. She presents for follow-up.    She was last seen in clinic on 2020. Her right heart catheterization in  showed an RA pressure of 8 mmHg, RV of 100/60 mmHg, PA of 100/27 with a mean of 51 mmHg, pulmonary capillary wedge pressure of 14 mmHg, PA saturation of 66%, thermodilution cardiac output of 10.1 L/min with an index of 4.34 L/min/m , estimated Terry cardiac output of 5.2 L/min with an index of 2.23 L/min/m .  Her PVR was 7.15 Wood units based on her estimated Terry and 4 Wood units based on her thermodilution cardiac output.  She had selective pulmonary angiogram which revealed proximal stenosis of the A2, A1 and A9 segments were noted on the right side.  Mid segment of the A 8 was noted in the left side all these lesions are amenable to BPA.  Since she is considered a high risk patient for our newer BPA program due to her elevated PA pressures, she was referred to [facility], who is planning a pulmonary endarterectomy on  4/22/2020.    Additionally, during her last clinic visit, she noted having some atypical chest discomfort.  Lexiscan on 1/24/2020 showed no ischemia.    She notes over the last couple weeks, she has been having worsening dyspnea on exertion.  She notes that her oxygen saturation drops to 83 to 84% while she is exercising with 3 L/min of oxygen, which is what she uses at baseline.  She denies orthopnea and PND.  She notes more leg swelling.  Denies lightheadedness and syncope.  Denies chest pain. Has not had any hospitalizations or ED visits since her last visit.      PAST MEDICAL HISTORY:  1.  Systemic lupus erythematosus.   2.  Deep venous thrombosis.   3.  DAVIS with obstructive sleep apnea.   4.  Obesity.   5.  Chronic thromboembolic pulmonary hypertension.       PAST SURGICAL HISTORY:  Past Surgical History:   Procedure Laterality Date     APPENDECTOMY  9/1994     CV PULMONARY ANGIOGRAM N/A 12/2/2019     CV RIGHT HEART CATH N/A 12/2/2019     HYSTERECTOMY  4/21/2016       FAMILY HISTORY:  No family history on file.    SOCIAL HISTORY:  Social History     Socioeconomic History     Marital status: Single     Spouse name: Not on file     Number of children: Not on file     Years of education: Not on file     Highest education level: Not on file   Occupational History     Not on file   Social Needs     Financial resource strain: Not on file     Food insecurity     Worry: Not on file     Inability: Not on file     Transportation needs     Medical: Not on file     Non-medical: Not on file   Tobacco Use     Smoking status: Never Smoker     Smokeless tobacco: Never Used   Substance and Sexual Activity     Alcohol use: Yes     Comment: occasionally     Drug use: No     Sexual activity: Never   Lifestyle     Physical activity     Days per week: Not on file     Minutes per session: Not on file     Stress: Not on file   Relationships     Social connections     Talks on phone: Not on file     Gets together: Not on file      "Attends Dru[employer] service: Not on file     Active member of club or organization: Not on file     Attends meetings of clubs or organizations: Not on file     Relationship status: Not on file     Intimate partner violence     Fear of current or ex partner: Not on file     Emotionally abused: Not on file     Physically abused: Not on file     Forced sexual activity: Not on file   Other Topics Concern     Not on file   Social History Narrative     Not on file       CURRENT MEDICATIONS:  Current Outpatient Medications   Medication Sig     ARIPiprazole (ABILIFY) 5 MG tablet Take 5 mg by mouth     atorvastatin (LIPITOR) 10 MG tablet Take 10 mg by mouth     clonazePAM (KLONOPIN) 1 MG tablet Take 1 mg by mouth     digoxin (LANOXIN) 125 MCG tablet TK 1 T PO QD     furosemide (LASIX) 20 MG tablet Take 40 mg by mouth 2 times daily      hydroxychloroquine (PLAQUENIL) 200 MG tablet Take 200 mg by mouth 2 times daily      losartan-hydrochlorothiazide (HYZAAR) 100-12.5 MG tablet Take 1 tablet by mouth     melatonin 3 MG tablet      riociguat (ADEMPAS) 2.5 MG tablet      saccharomyces boulardii (FLORASTOR) 250 MG capsule      sertraline (ZOLOFT) 100 MG tablet Take 200 mg by mouth     traZODone (DESYREL) 100 MG tablet Take 100 mg by mouth     Warfarin Sodium (COUMADIN PO) Take  by mouth.     Misc Natural Products (TART CHERRY ADVANCED PO) Take 1 Dose by mouth daily States liquid form but not regularly.     No current facility-administered medications for this visit.        ROS:   10 point ROS negative except as discussed in above HPI.    EXAM:  BP (!) 144/76 (BP Location: Right arm, Patient Position: Chair, Cuff Size: Adult Regular)   Pulse 63   Ht 1.676 m (5' 6\")   Wt 130.6 kg (288 lb)   SpO2 94%   BMI 46.48 kg/m    General: appears comfortable, alert and articulate  Head: normocephalic, atraumatic  Eyes: anicteric sclera, EOMI  Orophyarynx: moist mucosa, no lesions, dentition intact  Heart: regular, normal S1/S2, no murmur, " gallop, rub, estimated JVP 9 cm  Lungs: clear to auscultation bilaterally, no rales or wheezing  Abdomen: soft, non-tender, bowel sounds present, no hepatosplenomegaly  Extremities: 1+ pitting edema in RLE and nonpitting edema in LLE. no clubbing, cyanosis or edema  Neurological: normal speech and affect, no gross motor deficits    Labs:  Recent Results (from the past 168 hour(s))   HCG qualitative    Collection Time: 03/09/20  8:45 AM   Result Value Ref Range    HCG Qualitative Serum Negative NEG^Negative   CBC with platelets    Collection Time: 03/09/20  8:45 AM   Result Value Ref Range    WBC 4.2 4.0 - 11.0 10e9/L    RBC Count 4.42 3.8 - 5.2 10e12/L    Hemoglobin 13.4 11.7 - 15.7 g/dL    Hematocrit 42.1 35.0 - 47.0 %    MCV 95 78 - 100 fl    MCH 30.3 26.5 - 33.0 pg    MCHC 31.8 31.5 - 36.5 g/dL    RDW 13.0 10.0 - 15.0 %    Platelet Count 243 150 - 450 10e9/L   N terminal pro BNP outpatient    Collection Time: 03/09/20  8:45 AM   Result Value Ref Range    N-Terminal Pro Bnp 431 (H) 0 - 125 pg/mL   Comprehensive metabolic panel    Collection Time: 03/09/20  8:45 AM   Result Value Ref Range    Sodium 139 133 - 144 mmol/L    Potassium 3.8 3.4 - 5.3 mmol/L    Chloride 106 94 - 109 mmol/L    Carbon Dioxide 27 20 - 32 mmol/L    Anion Gap 6 3 - 14 mmol/L    Glucose 89 70 - 99 mg/dL    Urea Nitrogen 16 7 - 30 mg/dL    Creatinine 1.01 0.52 - 1.04 mg/dL    GFR Estimate 66 >60 mL/min/[1.73_m2]    GFR Estimate If Black 77 >60 mL/min/[1.73_m2]    Calcium 9.3 8.5 - 10.1 mg/dL    Bilirubin Total 0.6 0.2 - 1.3 mg/dL    Albumin 3.5 3.4 - 5.0 g/dL    Protein Total 7.6 6.8 - 8.8 g/dL    Alkaline Phosphatase 111 40 - 150 U/L    ALT 22 0 - 50 U/L    AST 21 0 - 45 U/L         Echocardiogram 12/2/2019:  Interpretation Summary  Left ventricular function, chamber size, wall motion, and wall thickness are  normal.The EF is 55-60%. Grade II or moderate diastolic dysfunction. Flattened  septum is consistent with right ventricular pressure  and volume overload.  Global right ventricular function is mildly to moderately reduced. Severe  right ventricular dilation is present with normal thickness.  Estimated PASP is at least 34 mmHg, likely is underestimated by this TTE. IVC  diameter <2.1 cm collapsing >50% with sniff suggests a normal RA pressure of 3  mmHg. No pericardial effusion is present.     No prior study to compare.      RHC and BPA 12/2/2019:  RA: 8  RV: 100/16  PA: 100/27/51  PCWP: 14  TD CO/CI: 10.1/4.34  Terry CO/CI: 5.17/2.23  PVR: 4 by TD    Proximal stenosis of the A2, A1 and A9 segments were noted on the right side.  Stenosis of mid segment of the A8 was noted in the left side.  All these lesions are amenable to BPA.      Assessment and Plan:     Yuridia Barksdale is very pleasant 49-year-old female with past medical history significant for lupus, antiphospholipid antibody syndrome, DVT, obesity, obstructive sleep apnea and chronic thromboembolic hypertension on riociguat therapy who presents for follow-up.    She is scheduled to undergo a pulmonary endarterectomy on 4/22/2020 at [facility]. In the interim, we advised her to continue riociguat, digoxin, and coumadin (until 5 days prior to the schedule and then start bridging with lovenox 3 days prior to her surgery).  Goal chromogenic factor 20 to 30.  Since she has some lower extremity edema and appears mildly hypervolemic on exam, advised her to increase lasix from 40 to 60 mg BID.  She is functional class II.  Her labs today are stable and her NTpro-BNP is mildly lower today compared to her levels during prior visits.  We also advised her to increase her supplemental oxygen to 4 L/min with activity since she was having episodes of oxygen desaturations.    We advised her to follow-up with either us or at [facility].  If she is interested in following up with us, we advised her to contact our office to schedule an appointment.    It was a pleasure seeing Yuridia Barksdale at the [facility]  [place] [facility].  Please contact us with any questions or concerns that you may have.      Patient was seen and discussed with staff attending, Dr. Paniagua.      Sincerely,      Cat Randolph MD, PhD  Cardiology Fellow    I have personally seen and examined the patient, and then discussed with Dr. Randolph, and agree with the findings and plan in this note. I have reviewed today's vital signs, medications, labs, and imaging.     She has been approved by [employer] to get evaluation and treatment for his CTEPH. Will continue her on current therapy. Will coordinate with [facility] regarding bridging. She has APL. She is stable for on riociguat.     Sincerely,    Arcelia Paniagua MD   Center for Pulmonary Hypertension  Section of Advanced Heart Failure   Cardiovascular Division  [facility]   [phone]

## 2020-03-09 NOTE — PATIENT INSTRUCTIONS
Medication Changes:  - INCREASE Lasix to 60 mg BID. No follow up labs needed    Patient Instructions:  1. Continue staying active and eat a heart healthy diet.    2. Please keep current list of medications with you at all times.    3. Remember to weigh yourself daily after voiding and before you consume any food or beverages and log the numbers.  If you have gained 2 pounds overnight or 5 pounds in a week contact us immediately for medication adjustments or further instructions.    4. **Please call us immediately if you have any syncope (fainting or passing out), chest pain, edema (swelling or weight gain), or decline in your functional status (general decline in how you are feeling).    Follow up Appointment Information:  - Please wear your oxygen on your flight to [place]. We will write you a doctor's note allowing you to bring your POC through the airport.  - We will contact [place] to determine if they want any Lovenox bridging prior to the trip. Yoly will call you and help coordinate the start date.  - After surgery is complete, please follow up with either Dr. Paniagua or Kristine. If you wish to follow up with us, please give Deneen a call to help schedule that appointment    1) Check PT/INR 1 week prior to the procedure. Notify provider if INR is greater than 5.0 or less than 2.0.    2) 5 days prior to the procedure: Take last dose of Coumadin    3) 3 days prior to the procedure: Start lovenox injections (1mg/kg) subcutaneously every 12 hours.    4) Day before procedure: Only take your AM Lovenox injection (Then Hold till 1 day after procedure)    5) Day of the procedure: Restart Coumadin at usual dosing in the evening. No lovenox this day.    6) 1 day post procedure: Continue Coumadin. Restart Lovenox injections every 12 hours.  7) Continue Lovenox for 4 days after procedure. Return to Coumadin clinic for an INR check and further instruction. You will continue Lovenox and Coumadin until your INR is in  your therapeutic range (Typically 2-3) as directed by your INR Clinic.     7 Day Before 5 Day Before 3 Day Before 1 Day Before Procedure  Day 1 Day After 4 Day After   DATE          INR Check Check INR      Check INR   Lovenox AM   START Last Dose  Restart    Lovenox PM   START Do not take PM Dose  Restart    Coumadin  Last Dose   Re-Start in PM         Results:  Component      Latest Ref Rng & Units 3/9/2020   Sodium      133 - 144 mmol/L 139   Potassium      3.4 - 5.3 mmol/L 3.8   Chloride      94 - 109 mmol/L 106   Carbon Dioxide      20 - 32 mmol/L 27   Anion Gap      3 - 14 mmol/L 6   Glucose      70 - 99 mg/dL 89   Urea Nitrogen      7 - 30 mg/dL 16   Creatinine      0.52 - 1.04 mg/dL 1.01   GFR Estimate      >60 mL/min/1.73:m2 66   GFR Estimate If Black      >60 mL/min/1.73:m2 77   Calcium      8.5 - 10.1 mg/dL 9.3   Bilirubin Total      0.2 - 1.3 mg/dL 0.6   Albumin      3.4 - 5.0 g/dL 3.5   Protein Total      6.8 - 8.8 g/dL 7.6   Alkaline Phosphatase      40 - 150 U/L 111   ALT      0 - 50 U/L 22   AST      0 - 45 U/L 21   WBC      4.0 - 11.0 10e9/L 4.2   RBC Count      3.8 - 5.2 10e12/L 4.42   Hemoglobin      11.7 - 15.7 g/dL 13.4   Hematocrit      35.0 - 47.0 % 42.1   MCV      78 - 100 fl 95   MCH      26.5 - 33.0 pg 30.3   MCHC      31.5 - 36.5 g/dL 31.8   RDW      10.0 - 15.0 % 13.0   Platelet Count      150 - 450 10e9/L 243   N-Terminal Pro Bnp      0 - 125 pg/mL 431 (H)     We are located on the third floor of the Clinic and [facility] Center ([facility]) on the [facility].  Our address is     [address] [place] [address] on [address] [place] [address]    Thank you for allowing us to be a part of your care here at the [facility]    If you have questions or concerns please contact us at:    Yoly Villegas, RN, BSN, PHN Deneen Mccann (Schedule,Prior Auth)  Nurse Coordinator     Clinic   Pulmonary Hypertension   Pulmonary  Hypertension  [facility]  (Phone)[phone]   (Phone) [phone]        (Fax) [phone]    ** Please note that you will NOT receive a reminder call regarding your scheduled testing, reminder calls are for provider appointments only.  If you are scheduled for testing within the [facility] system you may receive a call regarding pre-registration for billing purposes only.**     Remember to weigh yourself daily after voiding and before you consume any food or beverages and log the numbers.  If you have gained/lost 2 pounds overnight or 5 pounds in a week contact us immediately for medication adjustments or further instructions.   **Please call us immediately if you have any syncope, chest pain, edema, or decline in your functional status.    Support Group:  Pulmonary Hypertension Association  Https://www.phassociation.org/  **Look at the Events Tab** They even have Support Groups that you can call into    [facility] Support Group  Second Saturday of the Month from 1-3 PM   Location: [address] [place] [address]  Leader: Marika Quiroz and Amisha Vila  Phone: [phone] or [phone]  Email: [email or web address]

## 2020-03-09 NOTE — NURSING NOTE
Medication Change: Patient was educated regarding prescribed medication change, including discussion of the indication, administration, side effects, and when to report to MD or RN. Patient demonstrated understanding of this information and agreed to call with further questions or concerns.    Labs: Patient was given results of the laboratory testing obtained today. Patient demonstrated understanding of this information and agreed to call with further questions or concerns.     Diet: Patient instructed regarding a heart healthy diet, including discussion of reduced fat and sodium intake. Patient demonstrated understanding of this information and agreed to call with further questions or concerns.    Return Appointment: Patient given instructions regarding scheduling next clinic visit. Patient demonstrated understanding of this information and agreed to call with further questions or concerns.    Patient stated she understood all health information given and agreed to call with further questions or concerns.    Medication Changes:  - INCREASE Lasix to 60 mg BID. No follow up labs needed    Patient Instructions:  1. Continue staying active and eat a heart healthy diet.    2. Please keep current list of medications with you at all times.    3. Remember to weigh yourself daily after voiding and before you consume any food or beverages and log the numbers.  If you have gained 2 pounds overnight or 5 pounds in a week contact us immediately for medication adjustments or further instructions.    4. **Please call us immediately if you have any syncope (fainting or passing out), chest pain, edema (swelling or weight gain), or decline in your functional status (general decline in how you are feeling).    Follow up Appointment Information:  - Please wear your oxygen on your flight to [place]. We will write you a doctor's note allowing you to bring your POC through the airport.  - We will contact [place] to determine if they  want any Lovenox bridging prior to the trip. Yoly will call you and help coordinate the start date.  - After surgery is complete, please follow up with either Dr. Paniagua or Kristine. If you wish to follow up with us, please give Deneen a call to help schedule that appointment    1) Check PT/INR 1 week prior to the procedure. Notify provider if INR is greater than 5.0 or less than 2.0.    2) 5 days prior to the procedure: Take last dose of Coumadin    3) 3 days prior to the procedure: Start lovenox injections (1mg/kg) subcutaneously every 12 hours.    4) Day before procedure: Only take your AM Lovenox injection (Then Hold till 1 day after procedure)    5) Day of the procedure: Restart Coumadin at usual dosing in the evening. No lovenox this day.    6) 1 day post procedure: Continue Coumadin. Restart Lovenox injections every 12 hours.  7) Continue Lovenox for 4 days after procedure. Return to Coumadin clinic for an INR check and further instruction. You will continue Lovenox and Coumadin until your INR is in your therapeutic range (Typically 2-3) as directed by your INR Clinic.     7 Day Before 5 Day Before 3 Day Before 1 Day Before Procedure  Day 1 Day After 4 Day After   DATE          INR Check Check INR      Check INR   Lovenox AM   START Last Dose  Restart    Lovenox PM   START Do not take PM Dose  Restart    Coumadin  Last Dose   Re-Start in PM         Results:  Component      Latest Ref Rng & Units 3/9/2020   Sodium      133 - 144 mmol/L 139   Potassium      3.4 - 5.3 mmol/L 3.8   Chloride      94 - 109 mmol/L 106   Carbon Dioxide      20 - 32 mmol/L 27   Anion Gap      3 - 14 mmol/L 6   Glucose      70 - 99 mg/dL 89   Urea Nitrogen      7 - 30 mg/dL 16   Creatinine      0.52 - 1.04 mg/dL 1.01   GFR Estimate      >60 mL/min/1.73:m2 66   GFR Estimate If Black      >60 mL/min/1.73:m2 77   Calcium      8.5 - 10.1 mg/dL 9.3   Bilirubin Total      0.2 - 1.3 mg/dL 0.6   Albumin      3.4 - 5.0 g/dL 3.5   Protein  Total      6.8 - 8.8 g/dL 7.6   Alkaline Phosphatase      40 - 150 U/L 111   ALT      0 - 50 U/L 22   AST      0 - 45 U/L 21   WBC      4.0 - 11.0 10e9/L 4.2   RBC Count      3.8 - 5.2 10e12/L 4.42   Hemoglobin      11.7 - 15.7 g/dL 13.4   Hematocrit      35.0 - 47.0 % 42.1   MCV      78 - 100 fl 95   MCH      26.5 - 33.0 pg 30.3   MCHC      31.5 - 36.5 g/dL 31.8   RDW      10.0 - 15.0 % 13.0   Platelet Count      150 - 450 10e9/L 243   N-Terminal Pro Bnp      0 - 125 pg/mL 431 (H)     **Staff message sent to Yoly to follow up with [place] regarding Lovenox bridging. Patient will be expecting a call. Micheline Chapa RN on 3/9/2020 at 10:11 AM    **Patient confirmed that she wants the O2 letter mailed to her residence. Michleine Chapa RN on 3/9/2020 at 10:11 AM

## 2020-03-09 NOTE — TELEPHONE ENCOUNTER
PA Initiation for In-Network Benefit Request & Procedure/Surgery Prior Authorizaiton    Request: In-Benefit Network Request & Procedure/Surgery Prior Authorization  Insurance Company:  [employer] Phone [phone] Fax [phone]  Referral To: [facility] ([facility])  Type (Procedure/Clinic Visit/etc.): PTE (Surgery)  Start Date:  03/09/2020    *In-Network Benefit Request and Procedure/Surgery Prior Authorization forms completed and faxed to [employer] for expedited review along with clinic note date 1/20/2020, CPT codes from [facility], Authorization Request from [facility], Description of PTE from [facility], and PTE literary documentation from [facility].

## 2020-03-17 NOTE — TELEPHONE ENCOUNTER
In Network Benefit Request & Procedure/Surgery Prior Authorization Approval    Authorization Effective Date:  03/09/2020  Authorization Expiration Date:  09/06/2020  Medication: In-Benefit Network Request & Procedure/Surgery Prior Authorization  Quantity Approved: 20  Reference #:  [id number]  Insurance Company:  [employer] Phone [phone] Fax [phone]  Approval for: Procedure and consultation to be seen by [facility] for PTE.    *Contacted Elizabet @ [facility] to let her know that the pt's insurance has approved the in-network benefit request for the patient to be seen by [facility]. Faxed over a copy of the approval to [facility] for their records.    *Contacted pt to provide her an update with regards to the approval. Stated to pt her insurance has approved her to be seen by [facility]. Pt requested if a copy could be sent to her home. Stated one will be mailed to her and one will also be faxed to [facility] for their records as well.     Pt stated during the call that she would like to discontinue her care with Dr. Olsen and see Dr. Paniagua moving forward. Stated to pt that she will need to let Dr. Olsen's team know if that is what she would like to do. Pt stated it is and she will call him today. Pt asked if she needed to follow-up with [facility] after PTE because she was informed by [facility] that she needed to follow-up in 3 days. Stated to pt that the decision would be hers. Pt verbalized understanding and had no further questions.

## 2020-03-18 ENCOUNTER — TELEPHONE (OUTPATIENT)
Dept: CARDIOLOGY | Facility: CLINIC | Age: 50
End: 2020-03-18

## 2020-03-18 NOTE — TELEPHONE ENCOUNTER
----- Message from Micheline Chapa RN sent at 3/9/2020  9:58 AM CDT -----  Regarding: POC  Here is her plan:    - Increase Lasix to 60mg BID. No follow up labs needed, per Dr. Painagua  - Patient will need an letter to the airline company allowing her to have O2 while flying  - Patient is scheduled for pre-PTE testing in [place] in mid-April and for the surgery on April 22nd. Please call [phone] (and if that doesn't work, [phone]) to determine when the patient needs to start lovenox bridging. I let the patient know you will contact her with the bridging schedule.   - Patient will decide if she wants to follow up with us or [facility] after the surgery. She will call us to schedule a follow up appointment in early June, if she chooses us.     Thanks!Eliceo  -----------------------------------------  LM for patient asking if her surgery at [facility] has been postponed, and if yes how long.  If no, when is her flight because I need to get her the flight letter. Left my direct dial number for call back.  Yoly Villegas RN on 3/18/2020 at 11:16 AM  --------------------------------------  Patient LM that she has not heard from [facility] about any cancellation or re-scheduling of her surgery. Yoly Villegas RN on 3/18/2020 at 1:34 PM  --------------------------------------  Deneen called and confirmed patient's surgery is still on schedule.      I called [facility] to obtain bridging information for patient's pre-procedure.  I spoke with someone who said they would send a msg for one of 's GLORY's to call me back with instructions.  Agreed with plan. Left my direct dial number for call back.  Yoly Villegas RN on 3/20/2020 at 2:29 PM  -----------------------------------------  Received Vm back from ROBBIE Weston @ [facility] advising me to call [phone] for med instructions prior to surgery.  -----------------------------------------  Patient LM stating she hasn't heard anything from [facility], so she assumes everything is  still on.  She would like to get the airplane letter and talk with me about her POC in case she needs another battery for the flight. Yoly Villegas RN on 3/26/2020 at 1:23 PM  ------------------------------------------  Called [facility] for medication instructions prior to upcoming surgery.  I was advised of the following by  Christofer Bahena NP;    They will be mailing her schedule to her next week with the schedule and instructions.  Her last dose of Coumadin should be sat 11th, with Lovenox beginning Monday 13th.  Surgery is April 22nd.  I was asked to provide patient with 12 days of Lovenox.  Verbalized understanding and agreed with plan. Yoly Villegas RN on 3/26/2020 at 3:56 PM  -----------------------------------------  LM for patient that I had spoken with [facility] and advised her of their instructions as above.  Advised her I would send a [employer] message, but need her to call me and let me know which pharmacy she wants me to send the Lovenox too?  Yoly Villegas RN on 3/26/2020 at 4:00 PM  ---------------------------------------  mychart message sent. Yoly Villegas RN on 3/26/2020 at 4:13 PM  ---------------------------------------  Patient LM acknowledging the information and wasn't sure which pharmacy to use yet.  Her biggest question was her risk of travel and surgery with the COVID-19 currently going on.  She would like our thoughts on that. Yoly Villegas RN on 3/26/2020 at 4:17 PM  -------------------------------------  Sent staff msg to Dr. Paniagua asking him to call & discuss with patient. Yoly Villegas RN on 3/26/2020 at 4:22 PM

## 2020-03-19 NOTE — TELEPHONE ENCOUNTER
[facility] with [facility] to the [facility]'s scheduled [facility]. Elizabet stated that as of right now, the surgeries are going to proceed as scheduled and the pt should've been contacted by an RN to discuss. If the surgery is to get cancelled, it would be by the patient. Elizabet stated that if anything changes, they will contact the pt directly. Elizabet stated that she will still have an RN reach out to the patient today to discuss the procedure.  ------------------------  Attempted to contact pt. Unable to reach. Left a voicemail stating that [facility] is going to proceed as scheduled. Requested pt call back with any questions.    Deneen Mccann  Clinic   Pulmonary Hypertension  [facility]  (P) [phone]

## 2020-03-23 ENCOUNTER — MEDICAL CORRESPONDENCE (OUTPATIENT)
Dept: HEALTH INFORMATION MANAGEMENT | Facility: CLINIC | Age: 50
End: 2020-03-23

## 2020-03-26 ENCOUNTER — MYC MEDICAL ADVICE (OUTPATIENT)
Dept: CARDIOLOGY | Facility: CLINIC | Age: 50
End: 2020-03-26

## 2020-03-27 NOTE — TELEPHONE ENCOUNTER
Dr. Paniagua called patient and surgery postponed for now. Staff message sent to [facility] to follow up. Micheline Chapa RN on 3/27/2020 at 12:40 PM

## 2020-03-30 ENCOUNTER — TELEPHONE (OUTPATIENT)
Dept: CARDIOLOGY | Facility: CLINIC | Age: 50
End: 2020-03-30

## 2020-03-30 NOTE — TELEPHONE ENCOUNTER
LATE ENTRY:    Dr. Paniagua called me Friday 3/27/20 and advised me that he received the message I sent him expressing the patient's concerns for travel during the COVID-19 pandemic.  He called and spoke with the surgeon @ [facility] and they agreed patient was currently stable and they could and should wait and re-schedule surgery.  If patient becomes unstable then we will let them know and they will re-schedule ASAP.  Dr. Paniagua had LM for patient as she didn't answer.  He asked that I reach out to patient and verify she received information and answered any questions.  Agreed with plan.Yoly Villegas RN on 3/30/2020 at 8:53 AM  ---------------------  LM for patient advising her I was calling to make sure she received Dr. Paniagua's message Friday 3/27/20.  He had called the surgeon at [facility] and they agreed she could and should wait to travel to have surgery, as she is stable.  I wanted to see if she had any questions.  I also stated I would send her a [employer] message, but I would like either a [employer] msg or  back letting me know she received my message and if she has any questions. Yoly Villegas RN on 3/30/2020 at 9:00 AM

## 2020-06-01 ENCOUNTER — VIRTUAL VISIT (OUTPATIENT)
Dept: CARDIOLOGY | Facility: CLINIC | Age: 50
End: 2020-06-01
Attending: INTERNAL MEDICINE
Payer: COMMERCIAL

## 2020-06-01 DIAGNOSIS — I27.20 PULMONARY HTN (H): Primary | ICD-10-CM

## 2020-06-01 PROCEDURE — 99214 OFFICE O/P EST MOD 30 MIN: CPT | Mod: GT | Performed by: INTERNAL MEDICINE

## 2020-06-01 ASSESSMENT — PAIN SCALES - GENERAL: PAINLEVEL: NO PAIN (0)

## 2020-06-01 NOTE — LETTER
2020      RE: Yuridia Barksdale  [address] [address]       Dear Colleague,    Thank you for the opportunity to participate in the care of your patient, Yuridia Barksdale, at the [facility] HEART [facility]. Please see a copy of my visit note below.    Yuridia Barksdale is a 49 year old female who is being evaluated via a billable video visit.        Service Date: 2020      Ohcoa Sheth MD   [facility] [address] [address]      Mario Gerber MD    [facility]      Clint Olsen MD   [facility]   [address] [id number]       RE: Yuridia Barksdale   MRN: [id number]   : 1970      Dear Meryl Rondon and Zabrina:      We had the pleasure of seeing Ms. Yuridia Barksdale for followup in our Pulmonary Hypertension Clinic at the [facility].  As you know, she is a very pleasant 49-year-old female with chronic thromboembolic pulmonary hypertension who is currently on Adempas at 2.5 mg 3 times a day.  She is returning today for followup.      We did a video virtual visit due to the ongoing COVID-19 pandemic.  The patient verbalized consent for this.      As you know, Ms. Barksdale was scheduled to undergo thromboendarterectomy in mid April at the [facility].  Unfortunately, due to the COVID-19 pandemic this has been rescheduled now to mid July.  In the interim, she is doing well.  She has not noticed any worsening exertional shortness of breath.  She is living with her parents and has not been working with a  but has been exercising on the treadmill by herself.  She has not noticed any increasing exertional shortness of breath.  She uses oxygen during her exercise.  She has not had any worsening lower extremity swelling or abdominal distention.  Her weight has been  stable.  She denies having any exertional chest pain or chest pressure.  No lightheadedness, dizziness or syncope.  No recent hospitalizations or ER visits.  No bleeding or other complication.      PAST MEDICAL HISTORY:   1.  Systemic lupus erythematosus.   2.  Lupus antiphospholipid antibody syndrome.   3.  History of deep venous thrombosis.   4.  Chronic thromboembolic pulmonary hypertension.   5.  Obesity.   6.  Obstructive sleep apnea.      MEDICATIONS:   Current Outpatient Medications   Medication Sig     ARIPiprazole (ABILIFY) 5 MG tablet Take 5 mg by mouth     atorvastatin (LIPITOR) 10 MG tablet Take 10 mg by mouth     clonazePAM (KLONOPIN) 1 MG tablet Take 1 mg by mouth     digoxin (LANOXIN) 125 MCG tablet TK 1 T PO QD     furosemide (LASIX) 20 MG tablet Take 3 tablets (60 mg) by mouth 2 times daily     hydroxychloroquine (PLAQUENIL) 200 MG tablet Take 200 mg by mouth 2 times daily      losartan-hydrochlorothiazide (HYZAAR) 100-12.5 MG tablet Take 1 tablet by mouth     riociguat (ADEMPAS) 2.5 MG tablet      saccharomyces boulardii (FLORASTOR) 250 MG capsule      sertraline (ZOLOFT) 100 MG tablet Take 200 mg by mouth     traZODone (DESYREL) 100 MG tablet Take 100 mg by mouth     Warfarin Sodium (COUMADIN PO) Take  by mouth.     melatonin 3 MG tablet      Misc Natural Products (TART CHERRY ADVANCED PO) Take 1 Dose by mouth daily States liquid form but not regularly.     No current facility-administered medications for this visit.      REVIEW OF SYSTEMS:  A detailed 10-point review of systems was obtained as described in the History of Present Illness.  All other systems are reviewed and are negative.      PHYSICAL EXAMINATION:  Exam could not be done due to the virtual visit.      Her most recent labs from March were unremarkable except for a mild elevation in BNP.      ASSESSMENT AND PLAN:      Ms. Yuridia Barksdale is a 49-year-old female with chronic thromboembolic pulmonary hypertension due to hypercoagulable  state from her antiphospholipid antibody syndrome.  She is currently on Adempas 2.5 mg 3 times a day.      She is doing reasonably okay.  She does not have any evidence of decompensated right heart failure.  She is currently functional class II.  She is tolerating Adempas 2.5 mg 3 times a day with no significant side effects.  She appears to be euvolemic on the current diuretic dose of Lasix 40 mg twice a day.  She is also on digoxin for right ventricular support.      She is scheduled to undergo a thromboendarterectomy in mid July.  I have encouraged her to call the [facility] office to have a plan for bridging with Lovenox.  This is important given her antiphospholipid antibody syndrome and risk of hypercoagulable state.  I am unable to plan on the bridging as I do not know what procedures and when she is scheduled for these.  We will also try to reach out to the [facility] to find the plan and help with this bridging.  In the interim, I have recommended her to continue the same medical regimen of Adempas 2.5 mg 3 times a day, Lasix 40 mg twice a day and digoxin.  She will continue to use oxygen at 3 liters with exercise.      She wants us to write a letter for caring oxygen for her flight trip, which we will help her with.      I have recommended Ms. Barksdale to return to see us.  We have tentatively scheduled her to see us back in mid August, but I have recommended her to call us as soon as she comes back.  We will try to see her in 1-2 weeks after she returns from [place].      It was a pleasure meeting Ms. Yuridia Barksdale in our Pulmonary Hypertension Clinic at the [facility].  We thank you for involving us in her care.  Please do not hesitate to call us in the interim if you have any further questions.      Sincerely,   Arcelia Paniagua MD   Center for Pulmonary Hypertension  Heart Failure, Transplant, and Mechanical Circulatory Support Cardiology   Cardiovascular  Division  [facility]   [phone]       D: 2020   T: 2020   MT: AMI      Name:     JEREMY BOATENG   MRN:      [id number]        Account:      [id number]   :      1970           Service Date: 2020      Document: [id number]        Please do not hesitate to contact me if you have any questions/concerns.     Sincerely,     Arcelia Paniagua MD

## 2020-06-01 NOTE — PROGRESS NOTES
"Yuridia Barksdale is a 49 year old female who is being evaluated via a billable video visit.      The patient has been notified of following:     \"This video visit will be conducted via a call between you and your physician/provider. We have found that certain health care needs can be provided without the need for an in-person physical exam.  This service lets us provide the care you need with a video conversation.  If a prescription is necessary we can send it directly to your pharmacy.  If lab work is needed we can place an order for that and you can then stop by our lab to have the test done at a later time.    Video visits are billed at different rates depending on your insurance coverage.  Please reach out to your insurance provider with any questions.    If during the course of the call the physician/provider feels a video visit is not appropriate, you will not be charged for this service.\"    Patient has given verbal consent for Video visit? Yes    How would you like to obtain your AVS? Ty     Patient would like the video invitation sent by: [email or web address]    Will anyone else be joining your video visit? No        "

## 2020-06-03 ENCOUNTER — TELEPHONE (OUTPATIENT)
Dept: CARDIOLOGY | Facility: CLINIC | Age: 50
End: 2020-06-03

## 2020-06-03 ENCOUNTER — MYC MEDICAL ADVICE (OUTPATIENT)
Dept: CARDIOLOGY | Facility: CLINIC | Age: 50
End: 2020-06-03

## 2020-06-03 DIAGNOSIS — R06.02 SOB (SHORTNESS OF BREATH): ICD-10-CM

## 2020-06-03 DIAGNOSIS — I27.24 CTEPH (CHRONIC THROMBOEMBOLIC PULMONARY HYPERTENSION) (H): Primary | ICD-10-CM

## 2020-06-03 NOTE — TELEPHONE ENCOUNTER
[facility] Fax #[phone]  PH: [phone]  ------------------------  Sent e-mail to Deneen asking her to fax the lab orders to the above clinic. Yoly Villegas RN on 6/3/2020 at 2:36 PM

## 2020-06-03 NOTE — PROGRESS NOTES
Service Date: 2020      Ochoa Sheth MD   [facility] [address] [place] [address]      Mario Gerber MD    [facility]      Clint Olsen MD   [facility]   [address] [place]  [address]       RE: Yuridia Barksdale   MRN: [id number]   : 1970      Dear Meryl Rondon and Zabrina:      We had the pleasure of seeing Ms. Yuridia Barksdale for followup in our Pulmonary Hypertension Clinic at the [facility].  As you know, she is a very pleasant 49-year-old female with chronic thromboembolic pulmonary hypertension who is currently on Adempas at 2.5 mg 3 times a day.  She is returning today for followup.      We did a video virtual visit due to the ongoing COVID-19 pandemic.  The patient verbalized consent for this.      As you know, Ms. Barksdale was scheduled to undergo thromboendarterectomy in mid April at the [facility].  Unfortunately, due to the COVID-19 pandemic this has been rescheduled now to mid July.  In the interim, she is doing well.  She has not noticed any worsening exertional shortness of breath.  She is living with her parents and has not been working with a  but has been exercising on the treadmill by herself.  She has not noticed any increasing exertional shortness of breath.  She uses oxygen during her exercise.  She has not had any worsening lower extremity swelling or abdominal distention.  Her weight has been stable.  She denies having any exertional chest pain or chest pressure.  No lightheadedness, dizziness or syncope.  No recent hospitalizations or ER visits.  No bleeding or other complication.      PAST MEDICAL HISTORY:   1.  Systemic lupus erythematosus.   2.  Lupus antiphospholipid antibody syndrome.   3.  History of deep venous thrombosis.   4.  Chronic thromboembolic pulmonary hypertension.   5.  Obesity.   6.   Obstructive sleep apnea.      MEDICATIONS:   Current Outpatient Medications   Medication Sig     ARIPiprazole (ABILIFY) 5 MG tablet Take 5 mg by mouth     atorvastatin (LIPITOR) 10 MG tablet Take 10 mg by mouth     clonazePAM (KLONOPIN) 1 MG tablet Take 1 mg by mouth     digoxin (LANOXIN) 125 MCG tablet TK 1 T PO QD     furosemide (LASIX) 20 MG tablet Take 3 tablets (60 mg) by mouth 2 times daily     hydroxychloroquine (PLAQUENIL) 200 MG tablet Take 200 mg by mouth 2 times daily      losartan-hydrochlorothiazide (HYZAAR) 100-12.5 MG tablet Take 1 tablet by mouth     riociguat (ADEMPAS) 2.5 MG tablet      saccharomyces boulardii (FLORASTOR) 250 MG capsule      sertraline (ZOLOFT) 100 MG tablet Take 200 mg by mouth     traZODone (DESYREL) 100 MG tablet Take 100 mg by mouth     Warfarin Sodium (COUMADIN PO) Take  by mouth.     melatonin 3 MG tablet      Misc Natural Products (TART CHERRY ADVANCED PO) Take 1 Dose by mouth daily States liquid form but not regularly.     No current facility-administered medications for this visit.      REVIEW OF SYSTEMS:  A detailed 10-point review of systems was obtained as described in the History of Present Illness.  All other systems are reviewed and are negative.      PHYSICAL EXAMINATION:  Exam could not be done due to the virtual visit.      Her most recent labs from March were unremarkable except for a mild elevation in BNP.      ASSESSMENT AND PLAN:      Ms. Yuridia Barksdale is a 49-year-old female with chronic thromboembolic pulmonary hypertension due to hypercoagulable state from her antiphospholipid antibody syndrome.  She is currently on Adempas 2.5 mg 3 times a day.      She is doing reasonably okay.  She does not have any evidence of decompensated right heart failure.  She is currently functional class II.  She is tolerating Adempas 2.5 mg 3 times a day with no significant side effects.  She appears to be euvolemic on the current diuretic dose of Lasix 40 mg twice a day.  She  is also on digoxin for right ventricular support.      She is scheduled to undergo a thromboendarterectomy in mid July.  I have encouraged her to call the [facility] office to have a plan for bridging with Lovenox.  This is important given her antiphospholipid antibody syndrome and risk of hypercoagulable state.  I am unable to plan on the bridging as I do not know what procedures and when she is scheduled for these.  We will also try to reach out to the [facility] to find the plan and help with this bridging.  In the interim, I have recommended her to continue the same medical regimen of Adempas 2.5 mg 3 times a day, Lasix 40 mg twice a day and digoxin.  She will continue to use oxygen at 3 liters with exercise.      She wants us to write a letter for caring oxygen for her flight trip, which we will help her with.      I have recommended Ms. Boateng to return to see us.  We have tentatively scheduled her to see us back in mid August, but I have recommended her to call us as soon as she comes back.  We will try to see her in 1-2 weeks after she returns from [place].      It was a pleasure meeting Ms. Jeremy Boateng in our Pulmonary Hypertension Clinic at the [facility].  We thank you for involving us in her care.  Please do not hesitate to call us in the interim if you have any further questions.     Video-Visit Details     Type of service:  Video Visit     Video Start Time: 12 PM  Video End Time: 12.25 PM    Originating Location (pt. Location): Home     Distant Location (provider location):  [facility]      Platform used for Video Visit: [employer] video call        Sincerely,   Arcelia Paniagua MD   Center for Pulmonary Hypertension  Heart Failure, Transplant, and Mechanical Circulatory Support Cardiology   Cardiovascular Division  [facility]   [phone]               D: 06/03/2020   T: 06/03/2020   MT: AMI      Name:     JEREMY BOATENG    MRN:      [id number]        Account:      [id number]   :      1970           Service Date: 2020      Document: [id number]

## 2020-06-03 NOTE — TELEPHONE ENCOUNTER
Patient LM stating she was supposed to have labs drawn, but her clinic doesn't have any orders.    --------------------  patient had a virtual visit yesterday with provider, but not is incomplete, so I wasn't aware of this need.    LM for patient advising her I wasn't aware of the need for labs, but I will be happy to fax the orders if she could just tell me where.  I couldn't understand the name of the clinic on the VM she left.  Advised her she could send that info via [employer].  Left my direct dial number for call back.  Yoly Villegas RN on 6/3/2020 at 12:00 PM  ---------------------  Patient sent mychart msg with the info.  Orders were faxed.

## 2020-06-04 LAB
ALBUMIN SERPL-MCNC: 3.8 G/DL
ALP SERPL-CCNC: 100 U/L
ALT SERPL-CCNC: 19 U/L
ANION GAP SERPL CALCULATED.3IONS-SCNC: 12 MMOL/L
AST SERPL-CCNC: 28 U/L
BILIRUB SERPL-MCNC: <0.8 MG/DL
BUN SERPL-MCNC: 24 MG/DL
BUN/CREAT RATIO - HISTORICAL: 21.8
CALCIUM SERPL-MCNC: 9.3 MG/DL
CALCIUM SERPL-MCNC: 9.5 MG/DL
CHLORIDE SERPLBLD-SCNC: 105 MMOL/L
CO2 SERPL-SCNC: 24 MMOL/L
CREAT SERPL-MCNC: 21.8 MG/DL
GFR SERPL CREATININE-BSD FRML MDRD: 53 ML/MIN/1.73M2
GLUCOSE SERPL-MCNC: 99 MG/DL (ref 70–99)
POTASSIUM SERPL-SCNC: 4 MMOL/L
PROT SERPL-MCNC: 7.5 G/DL
SODIUM SERPL-SCNC: 137 MMOL/L

## 2020-06-05 LAB
ERYTHROCYTE [DISTWIDTH] IN BLOOD BY AUTOMATED COUNT: 12.3 %
HCT VFR BLD AUTO: 40.4 %
HEMOGLOBIN: 13.6 G/DL (ref 11.7–15.7)
MCH RBC QN AUTO: 31.8 PG
MCHC RBC AUTO-ENTMCNC: 33.7 G/DL
MCV RBC AUTO: 94.3 FL
PLATELET # BLD AUTO: 254 10^9/L
RBC # BLD AUTO: 4.29 10^12/L
WBC # BLD AUTO: 5.4 10^9/L

## 2020-06-10 ENCOUNTER — TELEPHONE (OUTPATIENT)
Dept: CARDIOLOGY | Facility: CLINIC | Age: 50
End: 2020-06-10

## 2020-06-10 NOTE — TELEPHONE ENCOUNTER
Component      Latest Ref Rng & Units 6/4/2020 6/4/2020 6/4/2020          12:00 AM 12:00 AM 12:00 AM   Sodium      135 - 145 mmol/L  137    Potassium      3.5 - 5.1 mmol/L  4.0    Chloride      98 - 107 mmol/L  105    Carbon Dioxide      22 - 30 mmol/L  24    Anion Gap      6 - 20 mmol/L  12    Glucose      70 - 99 mg/dL  99    BUN/Creatinine Ratio - Historical      15.0  21.8 (H)    Creatinine      0.50 - 1.00 mg/dL  21.8 (H)    Calcium      8.4 - 10.2 mg/dL  9.3 9.5   Protein Total      6.3 - 8.2 g/dL  7.5    Albumin      3.5 - 5.0 g/dL  3.8    Bilirubin Total      0.2 - 1.3 mg/dL  <0.8    Alkaline Phosphatase      38 - 126 U/L  100    AST      15 - 46 U/L  28    ALT      0 - 35 U/L  19    GFR Estimate      ml/min/1.73m2  53    GFR Estimate If Black      ml/min/1.73m2  65    Urea Nitrogen      7 - 17 mg/dL  24 (H)    WBC      10:9/L 5.4     RBC Count      10:12/L 4.29     Hemoglobin      11.7 - 15.7 g/dL 13.6     Hematocrit      % 40.4     MCV      fl 94.3     MCH      pg 31.8     MCHC      g/dL 33.7     RDW      % 12.3     Platelet Count      10:9/L 254     Called  for patient advising her I received the labs and most look great, but a couple are really confusing, so we should re-draw the BMP.  I advised her if she had her flight information for [place] could she give it to me and asked if [facility] was part pf , as the labs came through as though they were.  [employer].  Left my direct dial number for call back.  Yoly Villegas RN on 6/10/2020 at 5:08 PM    Unable to view these through [employer] and I don't find any scanned copy under the Media tab.  ----------------------------  patient called and I reviewed the results we received and why I wanted us to re-check them.  patient agreed and can have them drawn today.  I will fax CMP order t [place] lab again.  Patient verbalized understanding, agreed with plan and denied any further questions. Yoly Villegas RN on 6/11/2020 at 9:05  AM    Lab order was faxed. Yoly Villegas RN on 6/11/2020 at 9:07 AM    NOTE: patient is taking [employer] on July 6th to [facility] for surgery.  She states she does not need a DrAudreys note and has already gotten approval for her Oxygen on the flight.

## 2020-06-12 LAB
ALBUMIN SERPL-MCNC: 4 G/DL
ALP SERPL-CCNC: 108 U/L
ALT SERPL-CCNC: 19 U/L
ANION GAP SERPL CALCULATED.3IONS-SCNC: 12 MMOL/L
AST SERPL-CCNC: 30 U/L
BILIRUB SERPL-MCNC: 0.8 MG/DL
BUN SERPL-MCNC: 23 MG/DL
CALCIUM SERPL-MCNC: 9.5 MG/DL
CHLORIDE SERPLBLD-SCNC: 103 MMOL/L
CO2 SERPL-SCNC: 26 MMOL/L
CREAT SERPL-MCNC: 1.1 MG/DL
GFR SERPL CREATININE-BSD FRML MDRD: 53 ML/MIN/1.73M2
GLUCOSE SERPL-MCNC: 94 MG/DL (ref 70–99)
POTASSIUM SERPL-SCNC: 4.3 MMOL/L
PROT SERPL-MCNC: 7.7 G/DL
SODIUM SERPL-SCNC: 137 MMOL/L

## 2020-07-09 ENCOUNTER — TRANSFERRED RECORDS (OUTPATIENT)
Dept: HEALTH INFORMATION MANAGEMENT | Facility: CLINIC | Age: 50
End: 2020-07-09

## 2020-07-13 ENCOUNTER — TRANSFERRED RECORDS (OUTPATIENT)
Dept: HEALTH INFORMATION MANAGEMENT | Facility: CLINIC | Age: 50
End: 2020-07-13

## 2020-07-14 ENCOUNTER — TRANSFERRED RECORDS (OUTPATIENT)
Dept: HEALTH INFORMATION MANAGEMENT | Facility: CLINIC | Age: 50
End: 2020-07-14

## 2020-07-17 ENCOUNTER — TRANSFERRED RECORDS (OUTPATIENT)
Dept: HEALTH INFORMATION MANAGEMENT | Facility: CLINIC | Age: 50
End: 2020-07-17

## 2020-07-21 ENCOUNTER — TRANSFERRED RECORDS (OUTPATIENT)
Dept: HEALTH INFORMATION MANAGEMENT | Facility: CLINIC | Age: 50
End: 2020-07-21

## 2020-07-22 ENCOUNTER — TRANSFERRED RECORDS (OUTPATIENT)
Dept: HEALTH INFORMATION MANAGEMENT | Facility: CLINIC | Age: 50
End: 2020-07-22

## 2020-07-29 ENCOUNTER — TELEPHONE (OUTPATIENT)
Dept: CARDIOLOGY | Facility: CLINIC | Age: 50
End: 2020-07-29

## 2020-07-29 DIAGNOSIS — R06.02 SOB (SHORTNESS OF BREATH): ICD-10-CM

## 2020-07-29 DIAGNOSIS — I27.24 CTEPH (CHRONIC THROMBOEMBOLIC PULMONARY HYPERTENSION) (H): Primary | ICD-10-CM

## 2020-07-29 NOTE — TELEPHONE ENCOUNTER
"Returned pt's call to schedule an appointment. Appointment was not scheduled due to pt's swelling and cough. Stated to pt that RN should review pt's case and decision can be made on when pt should be seen.    Pt stated that she has been noticing significant swelling in her legs and hands. Pt stated that the swelling began during her stay at [facility], but her legs were hard. Pt stated that the hardness in her legs started about 24 hours ago. Pt also stated that during her stay at [facility], they changed her lasix to 60mg.     Pt also stated that she has been experiencing coughing spasms which started a little before her discharge from [facility]. She says they have been ongoing since. Stated to pt that this information will get relayed to RN to follow-up and advise on follow-up. Pt agreed to plan and would wait to hear from RN.    Message routed to RN.    Deneen Mccann  Clinic   Pulmonary Hypertension  [facility]  (P) [phone]  -----------------------------------------  patient also LM for me with this information as well as her currently taking Lasix 60mg BID since discharging from the hospital.  ----------------------------------------  Called patient and she confiirmed all the above information.  Although she has a cough, she denies it being productive. She is able to lay flat to sleep, but upon returning home has been on Oxygen 2-4L.  Her concentrator only goes to 3 L, so they provided her with an Oximyzer to wear when she needs the higher liter flow.  She left the hospital 10 pounds heavier than when she went in and has not started weighing herself since she returning home.  We both agree she is probably more than 10 pounds up since leaving the hospital now, as she feels worse now than she did when she left.  She noticed the increase in swelling and new cough \"in the last day or so\".  She got back Saturday from [facility] and received an injection of Lasix the day she was discharged.     Date " of Surgery was 7/14/20.  She is currently in [facility]. ~3 hours from [facility].  Advised her I think she should come into the Hospital for diuresing, but I want to talk with Dr. Paniagua and see what he recommends first.  Patient verbalized understanding, agreed with plan and denied any further questions. Yoly Villegas RN on 7/29/2020 at 2:03 PM  -------------------------------  Discussed with Dr. Paniagua who advised patient increase Lasix to 80mg BID, and come into clinic Friday 7/31/20 to see Dr. Paniagua.  Verbalized understanding.  ------------------------------  Called patient and discussed recommendations.  We agreed patient would arrive at 10:30am for labs immediately prior to appt at [facility]. We reviewed all meds and I asked her to start weighing herself daily. Patient verbalized understanding, agreed with plan and denied any further questions. Yoly Villegas RN on 7/29/2020 at 2:26 PM

## 2020-07-30 ENCOUNTER — TELEPHONE (OUTPATIENT)
Dept: CARDIOLOGY | Facility: CLINIC | Age: 50
End: 2020-07-30

## 2020-07-30 DIAGNOSIS — I27.24 CTEPH (CHRONIC THROMBOEMBOLIC PULMONARY HYPERTENSION) (H): Primary | ICD-10-CM

## 2020-07-30 NOTE — TELEPHONE ENCOUNTER
Received a VM from RN at patient's PMD office asking if we could draw a factor X along with the other labs she's having.  ----------------------------  Ordered Factor X.    Called RN back and advised her the lab was ordered her fax number is #[phone]. Yoly Villegas RN on 7/30/2020 at 3:06 PM

## 2020-07-31 ENCOUNTER — OFFICE VISIT (OUTPATIENT)
Dept: CARDIOLOGY | Facility: CLINIC | Age: 50
End: 2020-07-31
Attending: INTERNAL MEDICINE
Payer: COMMERCIAL

## 2020-07-31 VITALS
DIASTOLIC BLOOD PRESSURE: 72 MMHG | HEIGHT: 66 IN | TEMPERATURE: 97.6 F | OXYGEN SATURATION: 98 % | BODY MASS INDEX: 44.68 KG/M2 | HEART RATE: 69 BPM | WEIGHT: 278 LBS | SYSTOLIC BLOOD PRESSURE: 116 MMHG

## 2020-07-31 DIAGNOSIS — R06.02 SOB (SHORTNESS OF BREATH): ICD-10-CM

## 2020-07-31 DIAGNOSIS — I27.24 CTEPH (CHRONIC THROMBOEMBOLIC PULMONARY HYPERTENSION) (H): ICD-10-CM

## 2020-07-31 DIAGNOSIS — I27.24 CTEPH (CHRONIC THROMBOEMBOLIC PULMONARY HYPERTENSION) (H): Primary | ICD-10-CM

## 2020-07-31 DIAGNOSIS — I27.20 PULMONARY HTN (H): ICD-10-CM

## 2020-07-31 LAB
ALBUMIN SERPL-MCNC: 2.9 G/DL (ref 3.4–5)
ALP SERPL-CCNC: 119 U/L (ref 40–150)
ALT SERPL W P-5'-P-CCNC: 22 U/L (ref 0–50)
ANION GAP SERPL CALCULATED.3IONS-SCNC: 6 MMOL/L (ref 3–14)
AST SERPL W P-5'-P-CCNC: 18 U/L (ref 0–45)
BILIRUB DIRECT SERPL-MCNC: 0.1 MG/DL (ref 0–0.2)
BILIRUB SERPL-MCNC: 0.4 MG/DL (ref 0.2–1.3)
BUN SERPL-MCNC: 18 MG/DL (ref 7–30)
CALCIUM SERPL-MCNC: 8.7 MG/DL (ref 8.5–10.1)
CHLORIDE SERPL-SCNC: 104 MMOL/L (ref 94–109)
CO2 SERPL-SCNC: 28 MMOL/L (ref 20–32)
CREAT SERPL-MCNC: 0.98 MG/DL (ref 0.52–1.04)
ERYTHROCYTE [DISTWIDTH] IN BLOOD BY AUTOMATED COUNT: 14.3 % (ref 10–15)
FACT X ACT/NOR PPP CHRO: 21 % (ref 70–130)
GFR SERPL CREATININE-BSD FRML MDRD: 69 ML/MIN/{1.73_M2}
GLUCOSE SERPL-MCNC: 98 MG/DL (ref 70–99)
HCT VFR BLD AUTO: 33.6 % (ref 35–47)
HGB BLD-MCNC: 10.5 G/DL (ref 11.7–15.7)
INR PPP: 3.83 (ref 0.86–1.14)
MCH RBC QN AUTO: 30.3 PG (ref 26.5–33)
MCHC RBC AUTO-ENTMCNC: 31.3 G/DL (ref 31.5–36.5)
MCV RBC AUTO: 97 FL (ref 78–100)
PLATELET # BLD AUTO: 247 10E9/L (ref 150–450)
POTASSIUM SERPL-SCNC: 3.8 MMOL/L (ref 3.4–5.3)
PROT SERPL-MCNC: 7.1 G/DL (ref 6.8–8.8)
RBC # BLD AUTO: 3.46 10E12/L (ref 3.8–5.2)
SODIUM SERPL-SCNC: 137 MMOL/L (ref 133–144)
WBC # BLD AUTO: 7.5 10E9/L (ref 4–11)

## 2020-07-31 PROCEDURE — 80076 HEPATIC FUNCTION PANEL: CPT | Performed by: INTERNAL MEDICINE

## 2020-07-31 PROCEDURE — G0463 HOSPITAL OUTPT CLINIC VISIT: HCPCS | Mod: ZF

## 2020-07-31 PROCEDURE — 85027 COMPLETE CBC AUTOMATED: CPT | Performed by: INTERNAL MEDICINE

## 2020-07-31 PROCEDURE — 99214 OFFICE O/P EST MOD 30 MIN: CPT | Mod: ZP | Performed by: INTERNAL MEDICINE

## 2020-07-31 PROCEDURE — 85610 PROTHROMBIN TIME: CPT | Performed by: INTERNAL MEDICINE

## 2020-07-31 PROCEDURE — 36415 COLL VENOUS BLD VENIPUNCTURE: CPT | Performed by: INTERNAL MEDICINE

## 2020-07-31 PROCEDURE — 80048 BASIC METABOLIC PNL TOTAL CA: CPT | Performed by: INTERNAL MEDICINE

## 2020-07-31 PROCEDURE — 85260 CLOT FACTOR X STUART-POWER: CPT | Performed by: INTERNAL MEDICINE

## 2020-07-31 RX ORDER — TRAZODONE HYDROCHLORIDE 50 MG/1
TABLET, FILM COATED ORAL
Status: ON HOLD | COMMUNITY
Start: 2020-07-03 | End: 2021-01-21

## 2020-07-31 RX ORDER — FUROSEMIDE 20 MG
80 TABLET ORAL 2 TIMES DAILY
Qty: 720 TABLET | Refills: 1 | Status: SHIPPED | OUTPATIENT
Start: 2020-07-31 | End: 2020-09-22

## 2020-07-31 ASSESSMENT — PAIN SCALES - GENERAL: PAINLEVEL: MILD PAIN (3)

## 2020-07-31 ASSESSMENT — MIFFLIN-ST. JEOR: SCORE: 1912.75

## 2020-07-31 NOTE — PATIENT INSTRUCTIONS
"You were seen today in the Cardiovascular Clinic at the [facility].      Cardiology Providers you saw during your visit: Dr Paniagua      Follow up:     Lasix 80 mg twice daily sent to your pharmacy.   We will call you to schedule a virtual visit with Bello next week  Increase your intake of foods high in iron. This includes things like:    Red meat, poultry, fish, eggs    Dried fruits (especially raisins, prunes, figs)    Legumes such as dried beans and lentils    Breads and cereals with iron added    Blackstrap molasses    Spinach    Foods cooked in cast-iron pans. This is especially true of acidic foods, such as tomatoes and jennyfer.    If you have questions or concerns please contact us at:     Meredith Vega RN CHFN   Cardiology Care Coordinator - C.O.[facility]  Questions and [phone]  First press #1 for the [employer] and then press #3 for \"Medical Advise\" to reach a Cardiology Nurse.                                                                           "

## 2020-07-31 NOTE — LETTER
RE: Yuridia Barksdale  [address]  [place] [address]     Service Date:2020     Ochoa Sheth MD   [facility]   [address] [place]  [address]      Mario Gerber MD    [facility]  [address] [place] [address]     Clint Olsen MD   [facility]   [address] [place]  [address]       RE: Yuridia Barksdale   MRN: [id number]   : 1970      Dear Meryl Rondon and Zabrina:      We had the pleasure of seeing Ms. Yuridia Barskdale for followup in our Pulmonary Hypertension Clinic at the [facility].  As you know, she is a very pleasant 49-year-old female with chronic thromboembolic pulmonary hypertension who underwent pulmonary thromboendarterectomy at [facility] 2 weeks ago.  She had type IV disease.  She is returning today for follow-up.    She underwent pulmonary thromboendarterectomy on .  Her postoperative course was uncomplicated.  She was discharged on day 11.  She was discharged on supplemental oxygen 2 L at rest and 4 L with exertion.  Her Adempas and digoxin were discontinued after surgery.  She was discharged home on Coumadin with an INR goal of 3-4 and Lasix 60 mg twice a day.    She had bilateral type 3 disease. Her PTE was difficult due to the distal nature of her disease. She had total circulatory arrest time of 38 minutes. Her post-operative     Her postoperative hemodynamics showed an PA pressure of 37/12 with a mean of 21 mmHg.  Her cardiac output was 6.16 L.  Her CVP was 5 mmHg.  Her calculated PVR based on CVP was 143 dynes/sec/cm5.  Her postoperative echocardiogram showed mild right ventricular enlargement and mildly reduced right ventricular systolic pressure.  Her estimated PA pressure based on echocardiogram was 38 mmHg.  Her postoperative VQ scan score showed improved perfusion of the right  lower lobe and left upper lobe.    She is more short of breath after surgery than before.  She is also having worsening bilateral lower extremity swelling.  Her sternal pain is manageable.  She has not had any lightheadedness dizziness or syncope.  No PND or orthopnea.  No symptoms at rest.  No bleeding problems.     PAST MEDICAL HISTORY:   1.  Systemic lupus erythematosus.   2.  Lupus antiphospholipid antibody syndrome.   3.  History of deep venous thrombosis.   4.  Chronic thromboembolic pulmonary hypertension.   5.  Obesity.   6.  Obstructive sleep apnea.      MEDICATIONS:   Current Outpatient Medications   Medication Sig     ARIPiprazole (ABILIFY) 5 MG tablet Take 5 mg by mouth     atorvastatin (LIPITOR) 10 MG tablet Take 10 mg by mouth     clonazePAM (KLONOPIN) 1 MG tablet Take 1 mg by mouth     furosemide (LASIX) 20 MG tablet Take 4 tablets (80 mg) by mouth 2 times daily     hydroxychloroquine (PLAQUENIL) 200 MG tablet Take 200 mg by mouth 2 times daily      sertraline (ZOLOFT) 100 MG tablet Take 200 mg by mouth     traZODone (DESYREL) 100 MG tablet Take 100 mg by mouth     Warfarin Sodium (COUMADIN PO) Take  by mouth.     digoxin (LANOXIN) 125 MCG tablet TK 1 T PO QD     losartan-hydrochlorothiazide (HYZAAR) 100-12.5 MG tablet Take 1 tablet by mouth     melatonin 3 MG tablet      Misc Natural Products (TART CHERRY ADVANCED PO) Take 1 Dose by mouth daily States liquid form but not regularly.     riociguat (ADEMPAS) 2.5 MG tablet      saccharomyces boulardii (FLORASTOR) 250 MG capsule      traZODone (DESYREL) 50 MG tablet      No current facility-administered medications for this visit.      REVIEW OF SYSTEMS:  A detailed 10-point review of systems was obtained as described in the History of Present Illness.  All other systems are reviewed and are negative.      PHYSICAL EXAMINATION:   She was awake, alert, oriented x3.  She was in no apparent distress.  /72 (BP Location: Right arm, Patient Position:  "Chair, Cuff Size: Adult Large)   Pulse 69   Temp 97.6  F (36.4  C)   Ht 1.676 m (5' 6\")   Wt 126.1 kg (278 lb)   SpO2 98%   BMI 44.87 kg/m    She had no cyanosis or jaundice.  She was slightly anemic with mild pallor.  She had no jugular venous distention.  Her JVD was visible at 2 cm above manubrium sternal angle.  She had 2+ bilateral pitting pedal edema.  Cardiac auscultation revealed normal S1-S2 with no murmur rub or gallop.  Her sternal wound was healing well.  Auscultation of the lungs revealed equal air entry on both sides with no added sounds.  She had no wheezing or crepitations.  Her abdomen was soft with no worsening, no tenderness, rigidity or guarding.  She had no focal neurological deficit.     CBC RESULTS:   Recent Labs   Lab Test 07/31/20  1023   WBC 7.5   RBC 3.46*   HGB 10.5*   HCT 33.6*   MCV 97   MCH 30.3   MCHC 31.3*   RDW 14.3        Recent Labs   Lab Test 07/31/20  1023 06/11/20    137   POTASSIUM 3.8 4.3   CHLORIDE 104 103   CO2 28 26   ANIONGAP 6 12   GLC 98 94   BUN 18 23   CR 0.98 1.10   KATARINA 8.7 9.5     Liver Function Studies -   Recent Labs   Lab Test 07/31/20  1023   PROTTOTAL 7.1   ALBUMIN 2.9*   BILITOTAL 0.4   ALKPHOS 119   AST 18   ALT 22     INR   Date Value Ref Range Status   07/31/2020 3.83 (H) 0.86 - 1.14 Final       ASSESSMENT AND PLAN:    Ms. Yuridia Barksdale is a 49-year-old female with chronic thromboembolic pulmonary hypertension due to hypercoagulable state from her antiphospholipid antibody syndrome.     Chronic thromboembolic pulmonary hypertension  -status post pulmonary thromboendarterectomy on 7/14/2020     She is volume overloaded.  This looks more dependent edema.  She does not have JVD.  We increase her Lasix to 80 mg twice daily to which she is responding.  I have recommended her to continue the Lasix 80 twice a day for now.  Her serum potassium level is low normal.  I have increased her to increase her p.o. potassium intake.  I suspect this is " more volume overload after surgery than RV dysfunction.      She is currently on Coumadin for her anticoagulation.  She normally follows chromogenic factor goal of 20-30 which I recommended her to continue.  She has antiphospholipid antibody syndrome.    We will continue her on supplemental oxygen 2 L at rest and 4 L with exertion.  We will attempt to wean her off the oxygen once she is clinically better.    We will plan on repeating an echocardiogram, right heart catheterization, VQ scan and a CT scan of the chest PE protocol in 3 months as per our protocol to assess for residual chronic thromboembolic pulmonary hypertension.  Will consider repeating these test sooner if she were to clinically struggle with heart failure symptoms.  I suspect her current edema is more volume overload following surgery and this will improve with diuretics hopefully.      I have recommended her to do a virtual visit with her nurse practitioner next week and repeat labs locally.  She will also return to see me in 2 weeks via virtual visit.  She will call us in the interim if you have any further worsening symptoms.    It was a pleasure meeting Ms. Yuridia Barksdale in our Pulmonary Hypertension Clinic at the [facility].  We thank you for involving us in her care.  Please do not hesitate to call us in the interim if you have any further questions.     Sincerely,   Arcelia Paniagua MD   Center for Pulmonary Hypertension  Heart Failure, Transplant, and Mechanical Circulatory Support Cardiology   Cardiovascular Division  [facility]   [phone]

## 2020-07-31 NOTE — LETTER
2020      RE: Yuridia Barksdale  [address] [address]       Service Date:2020     Ochoa Sheth MD   [facility] [address]  [address]      Mario Gerber MD    [facility]      Clint Olsen MD   [facility]   [address] [place]  [address]       RE: Yuridia Barksdale   MRN: [id number]   : 1970      Dear Meryl Rondon and Zabrina:      We had the pleasure of seeing Ms. Yuridia Barksdale for followup in our Pulmonary Hypertension Clinic at the [facility].  As you know, she is a very pleasant 49-year-old female with chronic thromboembolic pulmonary hypertension who underwent pulmonary thromboendarterectomy at [facility] 2 weeks ago.  She had type IV disease.  She is returning today for follow-up.    She underwent pulmonary thromboendarterectomy on .  Her postoperative course was uncomplicated.  She was discharged on day 11.  She was discharged on supplemental oxygen 2 L at rest and 4 L with exertion.  Her Adempas and digoxin were discontinued after surgery.  She was discharged home on Coumadin with an INR goal of 3-4 and Lasix 60 mg twice a day.    We do not have her discharge summary yet.  We do not have her pulmonary hemodynamics after thromboendarterectomy.  We also do not have a copy of her specimen pictures yet.    She is more short of breath after surgery than before.  She is also having worsening bilateral lower extremity swelling.  Her sternal pain is manageable.  She has not had any lightheadedness dizziness or syncope.  No PND or orthopnea.  No symptoms at rest.  No bleeding problems.     PAST MEDICAL HISTORY:   1.  Systemic lupus erythematosus.   2.  Lupus antiphospholipid antibody syndrome.   3.  History of deep venous thrombosis.   4.  Chronic thromboembolic pulmonary hypertension.   5.  Obesity.   6.   "Obstructive sleep apnea.      MEDICATIONS:   Current Outpatient Medications   Medication Sig     ARIPiprazole (ABILIFY) 5 MG tablet Take 5 mg by mouth     atorvastatin (LIPITOR) 10 MG tablet Take 10 mg by mouth     clonazePAM (KLONOPIN) 1 MG tablet Take 1 mg by mouth     furosemide (LASIX) 20 MG tablet Take 4 tablets (80 mg) by mouth 2 times daily     hydroxychloroquine (PLAQUENIL) 200 MG tablet Take 200 mg by mouth 2 times daily      sertraline (ZOLOFT) 100 MG tablet Take 200 mg by mouth     traZODone (DESYREL) 100 MG tablet Take 100 mg by mouth     Warfarin Sodium (COUMADIN PO) Take  by mouth.     digoxin (LANOXIN) 125 MCG tablet TK 1 T PO QD     losartan-hydrochlorothiazide (HYZAAR) 100-12.5 MG tablet Take 1 tablet by mouth     melatonin 3 MG tablet      Misc Natural Products (TART CHERRY ADVANCED PO) Take 1 Dose by mouth daily States liquid form but not regularly.     riociguat (ADEMPAS) 2.5 MG tablet      saccharomyces boulardii (FLORASTOR) 250 MG capsule      traZODone (DESYREL) 50 MG tablet      No current facility-administered medications for this visit.      REVIEW OF SYSTEMS:  A detailed 10-point review of systems was obtained as described in the History of Present Illness.  All other systems are reviewed and are negative.      PHYSICAL EXAMINATION:   She was awake, alert, oriented x3.  She was in no apparent distress.  /72 (BP Location: Right arm, Patient Position: Chair, Cuff Size: Adult Large)   Pulse 69   Temp 97.6  F (36.4  C)   Ht 1.676 m (5' 6\")   Wt 126.1 kg (278 lb)   SpO2 98%   BMI 44.87 kg/m    She had no cyanosis or jaundice.  She was slightly anemic with mild pallor.  She had no jugular venous distention.  Her JVD was visible at 2 cm above manubrium sternal angle.  She had 2+ bilateral pitting pedal edema.  Cardiac auscultation revealed normal S1-S2 with no murmur rub or gallop.  Her sternal wound was healing well.  Auscultation of the lungs revealed equal air entry on both sides " with no added sounds.  She had no wheezing or crepitations.  Her abdomen was soft with no worsening, no tenderness, rigidity or guarding.  She had no focal neurological deficit.     CBC RESULTS:   Recent Labs   Lab Test 07/31/20  1023   WBC 7.5   RBC 3.46*   HGB 10.5*   HCT 33.6*   MCV 97   MCH 30.3   MCHC 31.3*   RDW 14.3        Recent Labs   Lab Test 07/31/20  1023 06/11/20    137   POTASSIUM 3.8 4.3   CHLORIDE 104 103   CO2 28 26   ANIONGAP 6 12   GLC 98 94   BUN 18 23   CR 0.98 1.10   KATARINA 8.7 9.5     Liver Function Studies -   Recent Labs   Lab Test 07/31/20  1023   PROTTOTAL 7.1   ALBUMIN 2.9*   BILITOTAL 0.4   ALKPHOS 119   AST 18   ALT 22     INR   Date Value Ref Range Status   07/31/2020 3.83 (H) 0.86 - 1.14 Final           ASSESSMENT AND PLAN:      Ms. Yuridia Barksdale is a 49-year-old female with chronic thromboembolic pulmonary hypertension due to hypercoagulable state from her antiphospholipid antibody syndrome.     Chronic thromboembolic pulmonary hypertension  -status post pulmonary thromboendarterectomy on 7/14/2020     She is volume overloaded.  This looks more dependent edema.  She does not have JVD.  We increase her Lasix to 80 mg twice daily to which she is responding.  I have recommended her to continue the Lasix 80 twice a day for now.  Her serum potassium level is low normal.  I have increased her to increase her p.o. potassium intake.  I suspect this is more volume overload after surgery than RV dysfunction.      She is currently on Coumadin for her anticoagulation.  She normally follows chromogenic factor goal of 20-30 which I recommended her to continue.  She has antiphospholipid antibody syndrome.    We will continue her on supplemental oxygen 2 L at rest and 4 L with exertion.  We will attempt to wean her off the oxygen once she is clinically better.    We will plan on repeating an echocardiogram, right heart catheterization, VQ scan and a CT scan of the chest PE protocol in 3  months as per our protocol to assess for residual chronic thromboembolic pulmonary hypertension.  Will consider repeating these test sooner if she were to clinically struggle with heart failure symptoms.  I suspect her current edema is more volume overload following surgery and this will improve with diuretics hopefully.      I have recommended her to do a virtual visit with her nurse practitioner next week and repeat labs locally.  She will also return to see me in 2 weeks via virtual visit.  She will call us in the interim if you have any further worsening symptoms.    It was a pleasure meeting Ms. Yuridia Barksdale in our Pulmonary Hypertension Clinic at the [facility].  We thank you for involving us in her care.  Please do not hesitate to call us in the interim if you have any further questions.       Sincerely,   Arcelia Paniagua MD   Center for Pulmonary Hypertension  Heart Failure, Transplant, and Mechanical Circulatory Support Cardiology   Cardiovascular Division  [facility]   [phone]        Arcelia Paniagua MD

## 2020-07-31 NOTE — NURSING NOTE
Chief Complaint   Patient presents with     Follow Up      PH follow up appt w/labs prior     Vitals were taken and medication reconciled.    RONI Shafer  11:04 AM

## 2020-07-31 NOTE — PROGRESS NOTES
Service Date:2020     Ochoa Sheth MD   [facility] [address]  [address]      Mario Gerber MD    [facility]      Clint Olsen MD   [facility]   [address]  [address]       RE: Yuridia Barksdale   MRN: [id number]   : 1970      Dear Meryl Rondon and Zabrina:      We had the pleasure of seeing Ms. Yuridia Barksdale for followup in our Pulmonary Hypertension Clinic at the [facility].  As you know, she is a very pleasant 49-year-old female with chronic thromboembolic pulmonary hypertension who underwent pulmonary thromboendarterectomy at [facility] 2 weeks ago.  She had type IV disease.  She is returning today for follow-up.    She underwent pulmonary thromboendarterectomy on .  Her postoperative course was uncomplicated.  She was discharged on day 11.  She was discharged on supplemental oxygen 2 L at rest and 4 L with exertion.  Her Adempas and digoxin were discontinued after surgery.  She was discharged home on Coumadin with an INR goal of 3-4 and Lasix 60 mg twice a day.    She had bilateral type 3 disease. Her PTE was difficult due to the distal nature of her disease. She had total circulatory arrest time of 38 minutes. Her post-operative     Her postoperative hemodynamics showed an PA pressure of 37/12 with a mean of 21 mmHg.  Her cardiac output was 6.16 L.  Her CVP was 5 mmHg.  Her calculated PVR based on CVP was 143 dynes/sec/cm5.  Her postoperative echocardiogram showed mild right ventricular enlargement and mildly reduced right ventricular systolic pressure.  Her estimated PA pressure based on echocardiogram was 38 mmHg.  Her postoperative VQ scan score showed improved perfusion of the right lower lobe and left upper lobe.    She is more short of breath after surgery than before.  She is also having worsening bilateral lower  "extremity swelling.  Her sternal pain is manageable.  She has not had any lightheadedness dizziness or syncope.  No PND or orthopnea.  No symptoms at rest.  No bleeding problems.     PAST MEDICAL HISTORY:   1.  Systemic lupus erythematosus.   2.  Lupus antiphospholipid antibody syndrome.   3.  History of deep venous thrombosis.   4.  Chronic thromboembolic pulmonary hypertension.   5.  Obesity.   6.  Obstructive sleep apnea.      MEDICATIONS:   Current Outpatient Medications   Medication Sig     ARIPiprazole (ABILIFY) 5 MG tablet Take 5 mg by mouth     atorvastatin (LIPITOR) 10 MG tablet Take 10 mg by mouth     clonazePAM (KLONOPIN) 1 MG tablet Take 1 mg by mouth     furosemide (LASIX) 20 MG tablet Take 4 tablets (80 mg) by mouth 2 times daily     hydroxychloroquine (PLAQUENIL) 200 MG tablet Take 200 mg by mouth 2 times daily      sertraline (ZOLOFT) 100 MG tablet Take 200 mg by mouth     traZODone (DESYREL) 100 MG tablet Take 100 mg by mouth     Warfarin Sodium (COUMADIN PO) Take  by mouth.     digoxin (LANOXIN) 125 MCG tablet TK 1 T PO QD     losartan-hydrochlorothiazide (HYZAAR) 100-12.5 MG tablet Take 1 tablet by mouth     melatonin 3 MG tablet      Misc Natural Products (TART CHERRY ADVANCED PO) Take 1 Dose by mouth daily States liquid form but not regularly.     riociguat (ADEMPAS) 2.5 MG tablet      saccharomyces boulardii (FLORASTOR) 250 MG capsule      traZODone (DESYREL) 50 MG tablet      No current facility-administered medications for this visit.      REVIEW OF SYSTEMS:  A detailed 10-point review of systems was obtained as described in the History of Present Illness.  All other systems are reviewed and are negative.      PHYSICAL EXAMINATION:   She was awake, alert, oriented x3.  She was in no apparent distress.  /72 (BP Location: Right arm, Patient Position: Chair, Cuff Size: Adult Large)   Pulse 69   Temp 97.6  F (36.4  C)   Ht 1.676 m (5' 6\")   Wt 126.1 kg (278 lb)   SpO2 98%   BMI 44.87 " kg/m    She had no cyanosis or jaundice.  She was slightly anemic with mild pallor.  She had no jugular venous distention.  Her JVD was visible at 2 cm above manubrium sternal angle.  She had 2+ bilateral pitting pedal edema.  Cardiac auscultation revealed normal S1-S2 with no murmur rub or gallop.  Her sternal wound was healing well.  Auscultation of the lungs revealed equal air entry on both sides with no added sounds.  She had no wheezing or crepitations.  Her abdomen was soft with no worsening, no tenderness, rigidity or guarding.  She had no focal neurological deficit.     CBC RESULTS:   Recent Labs   Lab Test 07/31/20  1023   WBC 7.5   RBC 3.46*   HGB 10.5*   HCT 33.6*   MCV 97   MCH 30.3   MCHC 31.3*   RDW 14.3        Recent Labs   Lab Test 07/31/20  1023 06/11/20    137   POTASSIUM 3.8 4.3   CHLORIDE 104 103   CO2 28 26   ANIONGAP 6 12   GLC 98 94   BUN 18 23   CR 0.98 1.10   KATARINA 8.7 9.5     Liver Function Studies -   Recent Labs   Lab Test 07/31/20  1023   PROTTOTAL 7.1   ALBUMIN 2.9*   BILITOTAL 0.4   ALKPHOS 119   AST 18   ALT 22     INR   Date Value Ref Range Status   07/31/2020 3.83 (H) 0.86 - 1.14 Final           ASSESSMENT AND PLAN:      Ms. Yuridia Barksdale is a 49-year-old female with chronic thromboembolic pulmonary hypertension due to hypercoagulable state from her antiphospholipid antibody syndrome.     Chronic thromboembolic pulmonary hypertension  -status post pulmonary thromboendarterectomy on 7/14/2020     She is volume overloaded.  This looks more dependent edema.  She does not have JVD.  We increase her Lasix to 80 mg twice daily to which she is responding.  I have recommended her to continue the Lasix 80 twice a day for now.  Her serum potassium level is low normal.  I have increased her to increase her p.o. potassium intake.  I suspect this is more volume overload after surgery than RV dysfunction.      She is currently on Coumadin for her anticoagulation.  She normally follows  chromogenic factor goal of 20-30 which I recommended her to continue.  She has antiphospholipid antibody syndrome.    We will continue her on supplemental oxygen 2 L at rest and 4 L with exertion.  We will attempt to wean her off the oxygen once she is clinically better.    We will plan on repeating an echocardiogram, right heart catheterization, VQ scan and a CT scan of the chest PE protocol in 3 months as per our protocol to assess for residual chronic thromboembolic pulmonary hypertension.  Will consider repeating these test sooner if she were to clinically struggle with heart failure symptoms.  I suspect her current edema is more volume overload following surgery and this will improve with diuretics hopefully.      I have recommended her to do a virtual visit with her nurse practitioner next week and repeat labs locally.  She will also return to see me in 2 weeks via virtual visit.  She will call us in the interim if you have any further worsening symptoms.    It was a pleasure meeting Ms. Yuridia Barksdale in our Pulmonary Hypertension Clinic at the [facility].  We thank you for involving us in her care.  Please do not hesitate to call us in the interim if you have any further questions.       Sincerely,   Arcelia Paniagua MD   [facility] for Pulmonary Hypertension  Heart Failure, Transplant, and Mechanical Circulatory Support Cardiology   Cardiovascular Division  [facility]   [phone]

## 2020-08-03 PROBLEM — I27.24 CTEPH (CHRONIC THROMBOEMBOLIC PULMONARY HYPERTENSION) (H): Status: ACTIVE | Noted: 2020-08-03

## 2020-08-03 RX ORDER — LIDOCAINE 40 MG/G
CREAM TOPICAL
Status: CANCELLED | OUTPATIENT
Start: 2020-08-03

## 2020-08-03 NOTE — NURSING NOTE
All orders placed and staff msg sent to [facility] for future scheduling of testing in October. Yoly Villegas RN on 8/3/2020 at 12:56 PM

## 2020-08-05 NOTE — PROGRESS NOTES
CARDIOLOGY PULMONARY HYPERTENSION CLINIC VIDEO VISIT    Yuridia Barksdale is a 49 year old female who is being evaluated via a billable video visit.      The patient has been notified of following:     This video visit will be conducted via a call between you and your physician/provider. We have found that certain health care needs can be provided without the need for an in-person physical exam.  This service lets us provide the care you need with a video conversation.  If a prescription is necessary we can send it directly to your pharmacy.  If lab work is needed we can place an order for that and you can then stop by our lab to have the test done at a later time.    Virtual visits are billed at different rates depending on your insurance coverage. During this emergency period, for some insurers they may be billed the same as an in-person visit.  Please reach out to your insurance provider with any questions.    If during the course of the call the physician/provider feels a video visit is not appropriate, you will not be charged for this service.      Physician has received verbal consent for a Video Visit from the patient? Yes    Patient would like the video invitation sent by: Other e-mail: Patient will be My Chart      I have reviewed and updated the patient's Past Medical History, Social History, Family History and Medication List.    MEDICATIONS:  Current Outpatient Medications   Medication Sig Dispense Refill     ARIPiprazole (ABILIFY) 5 MG tablet Take 5 mg by mouth       atorvastatin (LIPITOR) 10 MG tablet Take 10 mg by mouth       clonazePAM (KLONOPIN) 1 MG tablet Take 1 mg by mouth       furosemide (LASIX) 20 MG tablet Take 4 tablets (80 mg) by mouth 2 times daily 720 tablet 1     hydroxychloroquine (PLAQUENIL) 200 MG tablet Take 200 mg by mouth 2 times daily        sertraline (ZOLOFT) 100 MG tablet Take 200 mg by mouth       traZODone (DESYREL) 100 MG tablet Take 100 mg by mouth       traZODone (DESYREL)  "50 MG tablet        Warfarin Sodium (COUMADIN PO) Take  by mouth.         ALLERGIES  Heparin; Levaquin; Clindamycin; and Vancomycin    Review Of Systems:   Respiratory: Still having SAHU, frequent cough.  Cardiovascular: Pos chest discomfort with frequent coughing. Neg dizziness/presyncope/syncope.   Gastrointestinal: Neg new N/V/D.      Vitals - Patient Reported  Weight (Patient Reported): 119.1 kg (262 lb 9.6 oz)  Height (Patient Reported): 167.6 cm (5' 6\")  BMI (Based on Pt Reported Ht/Wt): 42.38  SpO2 (Patient Reported): 95(Pt on 2 liters of oxygen)      Brief physical exam:  General: In no acute distress, upright and calm.  Eyes: No apparent redness or discharge.   Chest: No labored breathing, no cough during exam or audible wheezing.   Neuro: No obvious focal defects or tremors.   Psych: Alert and oriented. Does not appear anxious.     The rest of a comprehensive physical examination is deferred due to public health emergency video visit restrictions.     Most recent labs: No new labs since last visit.       Primary PH cardiologist: Dr. Pan      HPI:  Ms. Barksdale is a very pleasant 49 year old female with a PMhx including SLE with lupus antiphospholipid antibody syndrome and hx DVT on anticoagulation, and DAVIS on CPAP. She also has chronic thromboembolic pulmonary arterial hypertension. She was placed on riociguat, but was then referred to [facility] for surgical evaluation. Ultimately, she underwent pulmonary thromboendarterectomy at [facility] on July 14, 2020.  She has type IV disease and her PTE was somewhat difficult to the distal nature of her disease. Her post operative course was reportedly uncomplicated, and postoperative hemodynamics showed an PA pressure of 37/12 with a mean of 21 mmHg.  Her cardiac output was 6.16 L. Echo showed mild right ventricular enlargement and mildly reduced right ventricular systolic pressure.  Her estimated PA pressure based on echocardiogram was 38 " mmHg.  Her VQ scan score showed improved perfusion of the right lower lobe and left upper lobe. Her riociguat and her digoxin were discontinued at that time.     Yuridia returned to see Dr. Villafana about a week ago. At that time she reported feeling more short of breath than prior to surgery. Her weight was 274# at home and she noted lower extremity edema. She was wearing her oxygen and remained on coumadin and Lasix 60mg BID as directed. She was instructed to increase her Lasix to 80mg daily, and today we are doing a virtual visit to follow up on her progress.    She tells me today that her swelling is much improved. Weight is down to 262# on her home scale today. However, she is still having SAHU and reports that her sats still drop into the mid 80s with ambulation despite wearing 3L with an oximyzer. She is trying to go for walks four times daily to remain active. She tells me her home concentrator only goes to 3L and she does not have a large stationary one. Her sats are rest are around 95%. Her main complaint revolves around an ongoing cough. This is worth when she reclines or tries to lie flat. It sounds like she is still having a fair amount of orthopnea. She denies any dizziness or presyncope. Unfortunately, she has not had any repeat labs performed since her last visit.        Assessment/Plan:    1. Chronic thromboembolic pulmonary hypertension.   --Ms. 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 She is now s/p pulmonary  thromboendarterectomy on 7/14/2020. This was somewhat difficult due to the distal nature of her disease. She is no longer on riociguat or digoxin.    --She is still likely volume overloaded. Lower extremity edema is improved and weight is down 12# but she is still having a frequent cough, orthopnea, and desaturation with walking. For now, as her weight is still trending down, will continue Lasix at 80mg BID. Check a BMP/NT-proBNP in the  next day or two near her home.    --I've asked her not to push herself with fast walking until her volume status is under better control in efforts to avoid significant hypoxemia. However, I asked her to still move around her home frequently and do stationary exercises due to her hypercoagulable state.   I am hopeful as we remove more fluid her sats will improve; but if not, we will need to arrange for a different POC to allow more oxygen flow.   --She is compliant with her CPAP but has inquired whether she needs a bipap as she felt this was helpful while in the hospital. I told her that I believe with more volume removal her orthopnea will improve as well. If this is not the case, we can refer her back to review with her prior sleep provider.    --Continue Coumadin for anticoagulation, in the setting of CTEPH and antiphospholipid antibody syndrome. She normally follows chromogenic factor goal of 20-30.    --As per previous, we will plan on repeating an echocardiogram, right heart catheterization, VQ scan and a CT scan of the chest PE protocol in 3 months as per our protocol to assess for residual chronic thromboembolic pulmonary hypertension.  Will consider repeating these test sooner if she were to clinically struggle with heart failure symptoms.      Follow up plan:  Return to see Dr. Villafana in ~2 weeks as scheduled, then tentatively with repeat PH testing in October as above.       Video-Visit Details    Type of service:  Video Visit    Video Start Time: 0914  Video End Time: 0940       Originating Location (pt. Location): Home    Distant Location (provider location):  [facility]    Platform used for Video Visit: Kaitlyn Silva PA-C  [facility]  Pager [phone]

## 2020-08-06 ENCOUNTER — VIRTUAL VISIT (OUTPATIENT)
Dept: CARDIOLOGY | Facility: CLINIC | Age: 50
End: 2020-08-06
Attending: PHYSICIAN ASSISTANT
Payer: COMMERCIAL

## 2020-08-06 DIAGNOSIS — I27.24 CTEPH (CHRONIC THROMBOEMBOLIC PULMONARY HYPERTENSION) (H): Primary | ICD-10-CM

## 2020-08-06 PROCEDURE — 99214 OFFICE O/P EST MOD 30 MIN: CPT | Mod: GT | Performed by: PHYSICIAN ASSISTANT

## 2020-08-06 ASSESSMENT — PAIN SCALES - GENERAL: PAINLEVEL: NO PAIN (0)

## 2020-08-06 NOTE — LETTER
"8/6/2020      RE: Yuridia Barksdale  [address] [address]       Dear Colleague,    Thank you for the opportunity to participate in the care of your patient, Yuridia Barksdale, at the [facility]. Please see a copy of my visit note below.    CARDIOLOGY PULMONARY HYPERTENSION CLINIC VIDEO VISIT    Yuridia Barksdale is a 49 year old female who is being evaluated via a billable video visit.      I have reviewed and updated the patient's Past Medical History, Social History, Family History and Medication List.    MEDICATIONS:  Current Outpatient Medications   Medication Sig Dispense Refill     ARIPiprazole (ABILIFY) 5 MG tablet Take 5 mg by mouth       atorvastatin (LIPITOR) 10 MG tablet Take 10 mg by mouth       clonazePAM (KLONOPIN) 1 MG tablet Take 1 mg by mouth       furosemide (LASIX) 20 MG tablet Take 4 tablets (80 mg) by mouth 2 times daily 720 tablet 1     hydroxychloroquine (PLAQUENIL) 200 MG tablet Take 200 mg by mouth 2 times daily        sertraline (ZOLOFT) 100 MG tablet Take 200 mg by mouth       traZODone (DESYREL) 100 MG tablet Take 100 mg by mouth       traZODone (DESYREL) 50 MG tablet        Warfarin Sodium (COUMADIN PO) Take  by mouth.         ALLERGIES  Heparin; Levaquin; Clindamycin; and Vancomycin    Review Of Systems:   Respiratory: Still having SAHU, frequent cough.  Cardiovascular: Pos chest discomfort with frequent coughing. Neg dizziness/presyncope/syncope.   Gastrointestinal: Neg new N/V/D.      Vitals - Patient Reported  Weight (Patient Reported): 119.1 kg (262 lb 9.6 oz)  Height (Patient Reported): 167.6 cm (5' 6\")  BMI (Based on Pt Reported Ht/Wt): 42.38  SpO2 (Patient Reported): 95(Pt on 2 liters of oxygen)      Brief physical exam:  General: In no acute distress, upright and calm.  Eyes: No apparent redness or discharge.   Chest: No labored breathing, no cough during exam or audible wheezing.   Neuro: No obvious focal " defects or tremors.   Psych: Alert and oriented. Does not appear anxious.     The rest of a comprehensive physical examination is deferred due to public health emergency video visit restrictions.     Most recent labs: No new labs since last visit.       Primary PH cardiologist: Dr. Pan      HPI:  Ms. Barksdale is a very pleasant 49 year old female with a PMhx including SLE with lupus antiphospholipid antibody syndrome and hx DVT on anticoagulation, and DAVIS on CPAP. She also has chronic thromboembolic pulmonary arterial hypertension. She was placed on riociguat, but was then referred to [facility] for surgical evaluation. Ultimately, she underwent pulmonary thromboendarterectomy at [facility] on July 14, 2020.  She has type IV disease and her PTE was somewhat difficult to the distal nature of her disease. Her post operative course was reportedly uncomplicated, and postoperative hemodynamics showed an PA pressure of 37/12 with a mean of 21 mmHg.  Her cardiac output was 6.16 L. Echo showed mild right ventricular enlargement and mildly reduced right ventricular systolic pressure.  Her estimated PA pressure based on echocardiogram was 38 mmHg.  Her VQ scan score showed improved perfusion of the right lower lobe and left upper lobe. Her riociguat and her digoxin were discontinued at that time.     Yuridia returned to see Dr. Villafana about a week ago. At that time she reported feeling more short of breath than prior to surgery. Her weight was 274# at home and she noted lower extremity edema. She was wearing her oxygen and remained on coumadin and Lasix 60mg BID as directed. She was instructed to increase her Lasix to 80mg daily, and today we are doing a virtual visit to follow up on her progress.    She tells me today that her swelling is much improved. Weight is down to 262# on her home scale today. However, she is still having SAHU and reports that her sats still drop into the mid 80s with ambulation  despite wearing 3L with an oximyzer. She is trying to go for walks four times daily to remain active. She tells me her home concentrator only goes to 3L and she does not have a large stationary one. Her sats are rest are around 95%. Her main complaint revolves around an ongoing cough. This is worth when she reclines or tries to lie flat. It sounds like she is still having a fair amount of orthopnea. She denies any dizziness or presyncope. Unfortunately, she has not had any repeat labs performed since her last visit.        Assessment/Plan:    1. Chronic thromboembolic pulmonary hypertension.   --Ms. 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 She is now s/p pulmonary  thromboendarterectomy on 7/14/2020. This was somewhat difficult due to the distal nature of her disease. She is no longer on riociguat or digoxin.    --She is still likely volume overloaded. Lower extremity edema is improved and weight is down 12# but she is still having a frequent cough, orthopnea, and desaturation with walking. For now, as her weight is still trending down, will continue Lasix at 80mg BID. Check a BMP/NT-proBNP in the next day or two near her home.    --I've asked her not to push herself with fast walking until her volume status is under better control in efforts to avoid significant hypoxemia. However, I asked her to still move around her home frequently and do stationary exercises due to her hypercoagulable state.   I am hopeful as we remove more fluid her sats will improve; but if not, we will need to arrange for a different POC to allow more oxygen flow.   --She is compliant with her CPAP but has inquired whether she needs a bipap as she felt this was helpful while in the hospital. I told her that I believe with more volume removal her orthopnea will improve as well. If this is not the case, we can refer her back to review with her prior sleep provider.    --Continue Coumadin for  anticoagulation, in the setting of CTEPH and antiphospholipid antibody syndrome. She normally follows chromogenic factor goal of 20-30.    --As per previous, we will plan on repeating an echocardiogram, right heart catheterization, VQ scan and a CT scan of the chest PE protocol in 3 months as per our protocol to assess for residual chronic thromboembolic pulmonary hypertension.  Will consider repeating these test sooner if she were to clinically struggle with heart failure symptoms.      Follow up plan:  Return to see Dr. Villafana in ~2 weeks as scheduled, then tentatively with repeat PH testing in October as above.       Azucena Silva PA-C  [facility]  Pager [phone]      Please do not hesitate to contact me if you have any questions/concerns.     Sincerely,     CEDRICK Zamarripa

## 2020-08-06 NOTE — PATIENT INSTRUCTIONS
Medication Changes:  No changes today. Continue Lasix 80mg twice daily for now. Continue to weigh yourself daily and to check your oxygen saturations frequently as we discussed. We will work on arranging labs near your home in the next few days.     Patient Instructions:  1. Continue staying active and eat a heart healthy diet.    2. Please keep current list of medications with you at all times.    3. Remember to weigh yourself daily after voiding and before you consume any food or beverages and log the numbers.  If you have gained 2 pounds overnight or 5 pounds in a week contact us immediately for medication adjustments or further instructions.    4. **Please call us immediately if you have any syncope (fainting or passing out), chest pain, edema (swelling or weight gain), or decline in your functional status (general decline in how you are feeling).    Follow up Appointment Information:  Already arranged with Dr. Villafana in a few weeks.   Tentatively will arrange for repeat heart ultrasound and heart catheterization in October.     We are located on the third floor of the [facility] ([facility]) on the [facility].  Our address is     [address] [place] [address] [place] [address] [place] [address]    Thank you for allowing us to be a part of your care here at the [facility]    If you have questions or concerns please contact us at:    Yoly Villegas, RN, BSN, PHN  Deneen Mccann (Schedule,Prior Auth)  Nurse Coordinator     Clinic   Pulmonary Hypertension   Pulmonary Hypertension  [facility]  (Phone)[phone]   (Phone) [phone]        (Fax) [phone]    ** Please note that you will NOT receive a reminder call regarding your scheduled testing, reminder calls are for provider appointments only.  If you are scheduled for testing within the [facility] system you may  receive a call regarding pre-registration for billing purposes only.**     Remember to weigh yourself daily after voiding and before you consume any food or beverages and log the numbers.  If you have gained/lost 2 pounds overnight or 5 pounds in a week contact us immediately for medication adjustments or further instructions.   **Please call us immediately if you have any syncope, chest pain, edema, or decline in your functional status.    Support Group:  Pulmonary Hypertension Association  Https://www.phassociation.org/  **Look at the Events Tab** They even have Support Groups that you can call into    [facility] Support Group  Second Saturday of the Month from 1-3 PM   Location: [address] [place] [address]  Leader: Marika Quiroz and Amisha Vila  Phone: [phone] or [phone]  Email: [email or web address]

## 2020-08-10 ENCOUNTER — TELEPHONE (OUTPATIENT)
Dept: CARDIOLOGY | Facility: CLINIC | Age: 50
End: 2020-08-10

## 2020-08-10 NOTE — TELEPHONE ENCOUNTER
Called patient and advised her I had faxed lab orders (BMP) to [facility]. Yoly Villegas RN on 8/10/2020 at 11:33 AM

## 2020-08-14 ENCOUNTER — TELEPHONE (OUTPATIENT)
Dept: CARDIOLOGY | Facility: CLINIC | Age: 50
End: 2020-08-14

## 2020-08-14 NOTE — TELEPHONE ENCOUNTER
Called patient to discuss when she had her labs drawn.  She had them drawn on 8/10/20 @ [facility].  Verbalized understanding. Yoly Villegas RN on 8/14/2020 at 10:13 AM        Routed to Azucena Silva PA-C to review. Yoly Villegas RN on 8/14/2020 at 10:13 AM  --------------------------  Is her weight still coming down?   Renal function is ok, so can stay on higher dose of lasix until she sees [facility] on Monday.   Thanks!   Azucena    Message text     -------------------------  Called patient and advised her the labs looked good, and asked if her weight continued to come down?  She stated it has started to plateau.  Advised her to stay on the same Lasix done until her appt Monday.  Patient verbalized understanding, agreed with plan and denied any further questions. Yoly Villegas RN on 8/14/2020 at 4:38 PM

## 2020-08-17 ENCOUNTER — TRANSFERRED RECORDS (OUTPATIENT)
Dept: HEALTH INFORMATION MANAGEMENT | Facility: CLINIC | Age: 50
End: 2020-08-17

## 2020-08-17 ENCOUNTER — VIRTUAL VISIT (OUTPATIENT)
Dept: CARDIOLOGY | Facility: CLINIC | Age: 50
End: 2020-08-17
Attending: INTERNAL MEDICINE
Payer: COMMERCIAL

## 2020-08-17 DIAGNOSIS — R06.09 DOE (DYSPNEA ON EXERTION): ICD-10-CM

## 2020-08-17 DIAGNOSIS — I27.20 PULMONARY HTN (H): Primary | ICD-10-CM

## 2020-08-17 PROCEDURE — 99215 OFFICE O/P EST HI 40 MIN: CPT | Performed by: INTERNAL MEDICINE

## 2020-08-17 NOTE — LETTER
2020    RE: Yuridia Barksdale  [address] [facility] [address]     Dear Colleague,    Thank you for the opportunity to participate in the care of your patient, Yuridia Barksdale, at the [facility]. Please see a copy of my visit note below.    Service Date: 2020     Ochoa Sheth MD   [facility] [address] [place] [address]      Mario Gerber MD    [facility]      Clint Olsen MD   [facility]   [address] [place] [address]  [address]       RE: Yuridia Barksdale   MRN: [id number]   : 1970      Dear Meryl Rondon and Zabrina:      We had the pleasure of seeing Ms. Yuridia Barksdale for followup in our Pulmonary Hypertension Clinic at the [facility].  As you know, she is a very pleasant 49-year-old female with chronic thromboembolic pulmonary hypertension who underwent pulmonary thromboendarterectomy at [facility] 2 weeks ago.  She had type IV disease.  She is returning today for follow-up.    She underwent pulmonary thromboendarterectomy on .  Her postoperative course was uncomplicated.  She was discharged on day 11.  She was discharged on supplemental oxygen 2 L at rest and 4 L with exertion.  Her Adempas and digoxin were discontinued after surgery.  She was discharged home on Coumadin with an INR goal of 3-4 and Lasix 60 mg twice a day.    She had bilateral type 3 disease. Her PTE was difficult due to the distal nature of her disease. She had total circulatory arrest time of 38 minutes. Her post-operative     Her postoperative hemodynamics showed an PA pressure of 37/12 with a mean of 21 mmHg.  Her cardiac output was 6.16 L.  Her CVP was 5 mmHg.  Her calculated PVR based on CVP was 143 dynes/sec/cm5.    Her postoperative echocardiogram showed mild right ventricular enlargement and mildly  reduced right ventricular systolic pressure.  Her estimated PA pressure based on echocardiogram was 38 mmHg.  Her postoperative VQ scan score showed improved perfusion of the right lower lobe and left upper lobe.    She was having worsenign lower extremity swelling immediately after surgery/. However, this has improved signficantly on higher doses of lasix. Weight down to 254 lbs. Breathing is ok. She uses her oxygen 24/7 - 3 liters with high flow. Her sternal pain is manageable.  She has not had any lightheadedness dizziness or syncope.  No PND or orthopnea.  No symptoms at rest.  No bleeding problems.     PAST MEDICAL HISTORY:   1.  Systemic lupus erythematosus.   2.  Lupus antiphospholipid antibody syndrome.   3.  History of deep venous thrombosis.   4.  Chronic thromboembolic pulmonary hypertension.   5.  Obesity.   6.  Obstructive sleep apnea.      MEDICATIONS:   Current Outpatient Medications   Medication Sig     ARIPiprazole (ABILIFY) 5 MG tablet Take 5 mg by mouth     atorvastatin (LIPITOR) 10 MG tablet Take 10 mg by mouth     clonazePAM (KLONOPIN) 1 MG tablet Take 1 mg by mouth     furosemide (LASIX) 20 MG tablet Take 4 tablets (80 mg) by mouth 2 times daily     hydroxychloroquine (PLAQUENIL) 200 MG tablet Take 200 mg by mouth 2 times daily      sertraline (ZOLOFT) 100 MG tablet Take 200 mg by mouth     traZODone (DESYREL) 100 MG tablet Take 100 mg by mouth     traZODone (DESYREL) 50 MG tablet      Warfarin Sodium (COUMADIN PO) Take  by mouth.     No current facility-administered medications for this visit.      REVIEW OF SYSTEMS:  A detailed 10-point review of systems was obtained as described in the History of Present Illness.  All other systems are reviewed and are negative.      PHYSICAL EXAMINATION:   She was awake, alert, oriented x3.  She was in no apparent distress.  Breathing comfortably  Good skin color  Limited exam due to video nature of the visit.      CBC RESULTS:   Recent Labs   Lab Test  07/31/20  1023   WBC 7.5   RBC 3.46*   HGB 10.5*   HCT 33.6*   MCV 97   MCH 30.3   MCHC 31.3*   RDW 14.3        Recent Labs   Lab Test 07/31/20  1023 06/11/20    137   POTASSIUM 3.8 4.3   CHLORIDE 104 103   CO2 28 26   ANIONGAP 6 12   GLC 98 94   BUN 18 23   CR 0.98 1.10   KATARINA 8.7 9.5     Liver Function Studies -   Recent Labs   Lab Test 07/31/20  1023   PROTTOTAL 7.1   ALBUMIN 2.9*   BILITOTAL 0.4   ALKPHOS 119   AST 18   ALT 22     INR   Date Value Ref Range Status   07/31/2020 3.83 (H) 0.86 - 1.14 Final         ASSESSMENT AND PLAN:    Ms. Yuridia Barksdale is a 49-year-old female with chronic thromboembolic pulmonary hypertension due to hypercoagulable state from her antiphospholipid antibody syndrome.     Chronic thromboembolic pulmonary hypertension  -status post pulmonary thromboendarterectomy on 7/14/2020     Feeling better on the higher dose of lasix - lower extremity edema resolved. Weight down to 254 lbs.     Will check Chem 7 to make sure she is not too dry and adjust lasix dose gradually.     I have recommended her to wean off oxygen gradually as tolerated to maintain oxygen saturation > 90%.     She is currently on Coumadin for her anticoagulation.  She normally follows chromogenic factor goal of 20-30 which I recommended her to continue.  She has antiphospholipid antibody syndrome.    We will plan on repeating an echocardiogram, right heart catheterization, VQ scan and a CT scan of the chest PE protocol in mid October - 3 months from PTE as per our protocol to assess for residual chronic thromboembolic pulmonary hypertension.  Will consider repeating these test sooner if she were to clinically struggle with heart failure symptoms.     RTC in 2 months after completing the repeat testing.    It was a pleasure meeting Ms. Yuridia Barksdale in our Pulmonary Hypertension Clinic at the [facility].  We thank you for involving us in her care.  Please do not hesitate to call us  in the interim if you have any further questions.     Sincerely,   Arcelia Paniagua MD   Center for Pulmonary Hypertension  Heart Failure, Transplant, and Mechanical Circulatory Support Cardiology   Cardiovascular Division  [facility]   [phone]

## 2020-08-17 NOTE — PROGRESS NOTES
Service Date: 2020     Ochoa Sheth MD   [facility] [address] [place]  [address]      Mario Gerber MD    [facility]      Clint Olsen MD   [facility]   [address] [place] [address]  [address]       RE: Yuridia Barksdale   MRN: [id number]   : 1970      Dear Meryl Rondon and Zabrina:      We had the pleasure of seeing Ms. Yuridia Barksdale for followup in our Pulmonary Hypertension Clinic at the [facility].  As you know, she is a very pleasant 49-year-old female with chronic thromboembolic pulmonary hypertension who underwent pulmonary thromboendarterectomy at [facility] 2 weeks ago.  She had type IV disease.  She is returning today for follow-up.    She underwent pulmonary thromboendarterectomy on .  Her postoperative course was uncomplicated.  She was discharged on day 11.  She was discharged on supplemental oxygen 2 L at rest and 4 L with exertion.  Her Adempas and digoxin were discontinued after surgery.  She was discharged home on Coumadin with an INR goal of 3-4 and Lasix 60 mg twice a day.    She had bilateral type 3 disease. Her PTE was difficult due to the distal nature of her disease. She had total circulatory arrest time of 38 minutes. Her post-operative     Her postoperative hemodynamics showed an PA pressure of 37/12 with a mean of 21 mmHg.  Her cardiac output was 6.16 L.  Her CVP was 5 mmHg.  Her calculated PVR based on CVP was 143 dynes/sec/cm5.    Her postoperative echocardiogram showed mild right ventricular enlargement and mildly reduced right ventricular systolic pressure.  Her estimated PA pressure based on echocardiogram was 38 mmHg.  Her postoperative VQ scan score showed improved perfusion of the right lower lobe and left upper lobe.    She was having worsenign lower extremity swelling immediately after surgery/. However, this has  improved signficantly on higher doses of lasix. Weight down to 254 lbs. Breathing is ok. She uses her oxygen 24/7 - 3 liters with high flow. Her sternal pain is manageable.  She has not had any lightheadedness dizziness or syncope.  No PND or orthopnea.  No symptoms at rest.  No bleeding problems.     PAST MEDICAL HISTORY:   1.  Systemic lupus erythematosus.   2.  Lupus antiphospholipid antibody syndrome.   3.  History of deep venous thrombosis.   4.  Chronic thromboembolic pulmonary hypertension.   5.  Obesity.   6.  Obstructive sleep apnea.      MEDICATIONS:   Current Outpatient Medications   Medication Sig     ARIPiprazole (ABILIFY) 5 MG tablet Take 5 mg by mouth     atorvastatin (LIPITOR) 10 MG tablet Take 10 mg by mouth     clonazePAM (KLONOPIN) 1 MG tablet Take 1 mg by mouth     furosemide (LASIX) 20 MG tablet Take 4 tablets (80 mg) by mouth 2 times daily     hydroxychloroquine (PLAQUENIL) 200 MG tablet Take 200 mg by mouth 2 times daily      sertraline (ZOLOFT) 100 MG tablet Take 200 mg by mouth     traZODone (DESYREL) 100 MG tablet Take 100 mg by mouth     traZODone (DESYREL) 50 MG tablet      Warfarin Sodium (COUMADIN PO) Take  by mouth.     No current facility-administered medications for this visit.      REVIEW OF SYSTEMS:  A detailed 10-point review of systems was obtained as described in the History of Present Illness.  All other systems are reviewed and are negative.      PHYSICAL EXAMINATION:   She was awake, alert, oriented x3.  She was in no apparent distress.  Breathing comfortably  Good skin color  Limited exam due to video nature of the visit.      CBC RESULTS:   Recent Labs   Lab Test 07/31/20  1023   WBC 7.5   RBC 3.46*   HGB 10.5*   HCT 33.6*   MCV 97   MCH 30.3   MCHC 31.3*   RDW 14.3        Recent Labs   Lab Test 07/31/20  1023 06/11/20    137   POTASSIUM 3.8 4.3   CHLORIDE 104 103   CO2 28 26   ANIONGAP 6 12   GLC 98 94   BUN 18 23   CR 0.98 1.10   KATARINA 8.7 9.5     Liver  Function Studies -   Recent Labs   Lab Test 07/31/20  1023   PROTTOTAL 7.1   ALBUMIN 2.9*   BILITOTAL 0.4   ALKPHOS 119   AST 18   ALT 22     INR   Date Value Ref Range Status   07/31/2020 3.83 (H) 0.86 - 1.14 Final           ASSESSMENT AND PLAN:      Ms. Yuridia Barksdale is a 49-year-old female with chronic thromboembolic pulmonary hypertension due to hypercoagulable state from her antiphospholipid antibody syndrome.     Chronic thromboembolic pulmonary hypertension  -status post pulmonary thromboendarterectomy on 7/14/2020     Feeling better on the higher dose of lasix - lower extremity edema resolved. Weight down to 254 lbs.     Will check Chem 7 to make sure she is not too dry and adjust lasix dose gradually.     I have recommended her to wean off oxygen gradually as tolerated to maintain oxygen saturation > 90%.     She is currently on Coumadin for her anticoagulation.  She normally follows chromogenic factor goal of 20-30 which I recommended her to continue.  She has antiphospholipid antibody syndrome.    We will plan on repeating an echocardiogram, right heart catheterization, VQ scan and a CT scan of the chest PE protocol in mid October - 3 months from PTE as per our protocol to assess for residual chronic thromboembolic pulmonary hypertension.  Will consider repeating these test sooner if she were to clinically struggle with heart failure symptoms.     RTC in 2 months after completing the repeat testing.    It was a pleasure meeting Ms. Yuridia Barksdale in our Pulmonary Hypertension Clinic at the [facility].  We thank you for involving us in her care.  Please do not hesitate to call us in the interim if you have any further questions.       Sincerely,   Arcelia Paniagua MD   Center for Pulmonary Hypertension  Heart Failure, Transplant, and Mechanical Circulatory Support Cardiology   Cardiovascular Division  [facility]   [phone]

## 2020-08-18 ENCOUNTER — TRANSFERRED RECORDS (OUTPATIENT)
Dept: HEALTH INFORMATION MANAGEMENT | Facility: CLINIC | Age: 50
End: 2020-08-18

## 2020-08-18 ENCOUNTER — TELEPHONE (OUTPATIENT)
Dept: CARDIOLOGY | Facility: CLINIC | Age: 50
End: 2020-08-18

## 2020-08-18 DIAGNOSIS — E87.8 ELECTROLYTE IMBALANCE: Primary | ICD-10-CM

## 2020-08-18 NOTE — TELEPHONE ENCOUNTER
Patient LM that she called [facility] yesterday to make an appt to have labs drawn and they advised her she couldn't get them drawn there [facility].    patient is frustrated and is asking that I fax orders to [facility] where she usually goes for labs.  ----------------------------  Faxed BMP orders to [facility]. Yoly Villegas RN on 8/18/2020 at 10:13 AM  ---------------------------  Called patient and advised her orders had been faxed so she coild call and make an appt to have labs drawn.  Patient verbalized understanding, agreed with plan and denied any further questions. Yoly Villegas RN on 8/18/2020 at 10:15 AM  ---------------------------  Received lab results from [place].:         Called Dr. Paniagua and reviewed results.  Received the following TORB:  Keep patient on Lasix 80mg BID, have her get BMP again in 2 weeks and have virtual visit with Azucena Silva PA-C and have patient call us if she loses any more weight.  Verbalized understanding. Yoly Villegas RN on 8/18/2020 at 2:21 PM  ---------------------------  LM for patient with results and Dr. Paniagua's plan (as above).  Asked her to call me back and let me know what time on Tuesday 9/1/20  She would like the video visit set up with Azucena Silva PA-C.      BMP order was faxed to [facility]. Yoly Villegas RN on 8/18/2020 at 2:27 PM  --------------------------  Patient returned my call and she agreed to a video visit on 9/1/20 w/Azucena Silva PA-C.  She will obtain labs the day before. Yoly Villegas RN on 8/19/2020 at 1:17 PM

## 2020-08-31 ENCOUNTER — TRANSFERRED RECORDS (OUTPATIENT)
Dept: HEALTH INFORMATION MANAGEMENT | Facility: CLINIC | Age: 50
End: 2020-08-31

## 2020-08-31 NOTE — PROGRESS NOTES
CARDIOLOGY PH CLINIC VIDEO VISIT    Yuridia Barksdale is a 49 year old female who is being evaluated via a billable video visit.      The patient has been notified of following:     This video visit will be conducted via a call between you and your physician/provider. We have found that certain health care needs can be provided without the need for an in-person physical exam.  This service lets us provide the care you need with a video conversation.  If a prescription is necessary we can send it directly to your pharmacy.  If lab work is needed we can place an order for that and you can then stop by our lab to have the test done at a later time.    Virtual visits are billed at different rates depending on your insurance coverage. During this emergency period, for some insurers they may be billed the same as an in-person visit.  Please reach out to your insurance provider with any questions.    If during the course of the call the physician/provider feels a video visit is not appropriate, you will not be charged for this service.      Physician has received verbal consent for a Video Visit from the patient? Yes    Patient would like the video invitation sent by: Send to e-mail at: [email or web address]      Self reported vitals:  Weight: 247 lb       I have reviewed and updated the patient's Past Medical History, Social History, Family History and Medication List.    MEDICATIONS:  Current Outpatient Medications   Medication Sig Dispense Refill     ARIPiprazole (ABILIFY) 5 MG tablet Take 5 mg by mouth       atorvastatin (LIPITOR) 10 MG tablet Take 10 mg by mouth       clonazePAM (KLONOPIN) 1 MG tablet Take 1 mg by mouth       furosemide (LASIX) 20 MG tablet Take 4 tablets (80 mg) by mouth 2 times daily 720 tablet 1     hydroxychloroquine (PLAQUENIL) 200 MG tablet Take 200 mg by mouth 2 times daily        sertraline (ZOLOFT) 100 MG tablet Take 200 mg by mouth       traZODone (DESYREL) 100 MG tablet Take 100 mg by mouth        traZODone (DESYREL) 50 MG tablet        Warfarin Sodium (COUMADIN PO) Take  by mouth.         ALLERGIES  Heparin; Levaquin; Clindamycin; and Vancomycin    Brief physical exam:  General: In no acute distress, upright and calm.  Eyes: No apparent redness or discharge.   Chest: No labored breathing, no cough during exam or audible wheezing.   Neuro: No obvious focal defects or tremors.   Psych: Alert and oriented. Does not appear anxious.     The rest of a comprehensive physical examination is deferred due to public health emergency video visit restrictions.       Most recent labs:  8/31 CareEverywhere  Glu 94  BUN 17  SCr 0.80  Na 138  K 3.9  Chl 105  GFR >90    Primary PH cardiologist: Dr. Viviane Pan    HPI:  Ms. Barksdale is a very pleasant 49 year old female with a PMhx including SLE with lupus antiphospholipid antibody syndrome and hx DVT on anticoagulation, and DAVIS on CPAP. She also has chronic thromboembolic pulmonary arterial hypertension. She was placed on riociguat, but was then referred to [facility] for surgical evaluation. Ultimately, she underwent pulmonary thromboendarterectomy at [facility] on July 14, 2020.  She has type IV disease and her PTE was somewhat difficult to the distal nature of her disease. Her post operative course was reportedly uncomplicated, and postoperative hemodynamics showed an PA pressure of 37/12 with a mean of 21 mmHg.  Her cardiac output was 6.16 L. Echo showed mild right ventricular enlargement and mildly reduced right ventricular systolic pressure.  Her estimated PA pressure based on echocardiogram was 38 mmHg.  Her VQ scan score showed improved perfusion of the right lower lobe and left upper lobe. Her riociguat and her digoxin were discontinued at that time.     Post surgically she has been struggling with swelling and weight gain. When she saw Dr. Villafana in late July, weight was 278 # on our scale. She was feeling quite short of breath despite  wearing her oxygen at all times. Her Lasix was increased to 80mg BID which resulted in slow improvement. When I followed up with her in early Aug, weight was down to 262#. As renal function remains preserved, we have kept her on the higher dose of Lasix. She again returned to see. Dr. Villafana about 2 weeks ago and weight was down to 254#. Again, renal function was ok and she still had some mild edema and desaturated with walking. No changes were made.    Today we are doing another virtual visit to follow up. She says today her weight has crept back up slightly to 257#. 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 She skips the lasix those afternoons as it's difficult to stop and find a restroom. She says overall her breathing is still ok, but she is noting a bit more leg swelling again, L>R. She denies any dizzy spells. Labs done near her home yesterday continue to show normal renal function and electrolytes. She is checking her sats frequently; at rest on room air sats are 93-95% but she still desats quickly into the 80s with walking so she always wears 2.5L with exertion to maintain >90%.       CURRENT PULMONARY HYPERTENSION REGIMEN:    PAH Rx: None  (previously on riociguat, stopped post PTE)     Diuretics: lasix 80mg BID (occasioanlly missing PM dose)    Oxygen: RA rest, 2.5L exertion    Anticoagulation: warfarin (follows chrom factor goal 20-30)  Indication: DVT/antiphospholipid      Assessment/Plan:     1. Chronic thromboembolic pulmonary hypertension.              --CTEPH due to her hypercoagulable state from her antiphospholipid antibody syndrome. She is now s/p pulmonary  thromboendarterectomy mid July 2020 at [facility]. This was somewhat difficult due to the distal nature of her disease. She is no longer on riociguat or digoxin.               --Her post op course was complicated by volume  overload. 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 Her weight has crept back up slightly with a bit of lower extremity edema once again. As renal function remains preserved, I asked her to try to remain on current dose but will adjust timing. Currently she takes Lasix 80mg at 8am/3pm. First we will try 80mg 8am and 12noon on days she is traveling in the evening. If this is still a problem we can try to cut the PM dose to 40mg, but I asked her to try not to skip the second dose altogether. She is agreeable to this plan.               --I've asked her to continue to wear oxygen to maintain sats >90% which she is putting in a good effort to do.               --She is compliant with her CPAP but last visit inquired whether she needs a bipap as she felt this was helpful while in the hospital. I told her that with more volume removal her orthopnea should continue to improve, but if this is not the case, we can refer her back to review with her prior sleep provider.               --Continue Coumadin for anticoagulation, in the setting of CTEPH and antiphospholipid antibody syndrome. She normally follows chromogenic factor goal of 20-30.               --As per previous, we will plan on repeating an echocardiogram, right heart catheterization, VQ scan and a CT scan of the chest PE in mid October to assess for residual chronic thromboembolic pulmonary hypertension.  Will consider repeating these tests sooner if she were to clinically struggle with heart failure symptoms despite diuresis efforts.       Follow up plan: Return virtual visit with myself in ~3 weeks with labs. Tentatively return mid Oct to see Dr. Villafana with above testing as outlined.       Video-Visit Details    Type of service:  Video Visit    Video Start Time: 0850  Video End Time: 0905    Originating Location (pt. Location): Home    Distant Location (provider location):  [facility]    Platform used for Video Visit: Kaitlyn Silva PA-C

## 2020-09-01 ENCOUNTER — TELEPHONE (OUTPATIENT)
Dept: CARDIOLOGY | Facility: CLINIC | Age: 50
End: 2020-09-01

## 2020-09-01 ENCOUNTER — VIRTUAL VISIT (OUTPATIENT)
Dept: CARDIOLOGY | Facility: CLINIC | Age: 50
End: 2020-09-01
Attending: PHYSICIAN ASSISTANT
Payer: COMMERCIAL

## 2020-09-01 VITALS — OXYGEN SATURATION: 93 %

## 2020-09-01 DIAGNOSIS — I27.24 CTEPH (CHRONIC THROMBOEMBOLIC PULMONARY HYPERTENSION) (H): Primary | ICD-10-CM

## 2020-09-01 PROCEDURE — 99214 OFFICE O/P EST MOD 30 MIN: CPT | Mod: GT | Performed by: PHYSICIAN ASSISTANT

## 2020-09-01 ASSESSMENT — PAIN SCALES - GENERAL: PAINLEVEL: NO PAIN (0)

## 2020-09-01 NOTE — TELEPHONE ENCOUNTER
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 Weight crept back up.    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 I told her if that didn't work we could do 80mg am and 40mg PM on the days she drives and stick with 80mg BID other days. Will see how she does.    I'd like to see her in ~3 weeks with another video visit please and BMP the day prior again.  We still need to plug her in tentitively mid oct with Dr Villafana with the repeat testing as he outlined as well.     Thanks!  Azucena

## 2020-09-01 NOTE — PATIENT INSTRUCTIONS
Thank you for visiting the Pulmonary Hypertension Clinic today.      Today we discussed:   We will keep working on getting the fluid off.  Try not to skip your second dose of Lasix if possible. As we discussed, we will try taking your 80mg of Lasix at 8am and noon on days you are working. If this is still too much, try to take just 40mg in the PM.   Please call with further questions.     Results:   Your labs continue to show normal kidney function.       Additional Instructions:    1. Continue staying active and eat a heart healthy, low sodium diet.     2. Please keep current list of medications with you at all times.     3. Remember to weigh yourself daily after voiding and write it down on a log. If you have gained/lost 2 pounds overnight or 5 pounds in a week contact us for medication adjustments or further instructions.    4. Please call us immediately if you have syncope (fainting or passing out), chest pain, worsening edema (swelling or weight gain), or general worsening in how you are feeling.       Follow up Appointment Information:  Return in ~3 weeks for another video visit with Azucena, and get labs prior.  Tentatively plan mid October for repeat testing to see Dr. Villafana.   Our office will reach out to you to arrange this.     ---------------------------------------------------------------------------------------------------------------    If you have questions or concerns please contact us at:        Yoly Villegas, RN, BSN, PHN  Deneen Mccann  (Schedule,Prior Auth)  Nurse Coordinator     Clinic   Pulmonary Hypertension   Pulmonary Hypertension  [facility]             [facility]  (P)[phone]    (P) [phone]        (F) [phone]      ** Please note that you will NOT receive a reminder call regarding your scheduled testing, reminder calls are for provider appointments only.  If you are scheduled for testing within the  [employer] system you may receive a call regarding pre-registration for billing purposes only.**     ------------------------------------------------------------------------------------------------------------    Interested in joining a support group?    Pulmonary Hypertension Association  Https://www.phassociation.org/  **Look at the Events Tab** They even have Support Groups that you can call into    [facility] Support Group  Second Saturday of the Month from 1-3 PM   Location: [address] [place] [address]  Leader: Marika Vila  Phone: [phone] or [phone]  Email: [email or web address]

## 2020-09-01 NOTE — LETTER
9/1/2020      RE: Yuridia Barksdale  [address] [address]       Dear Colleague,    Thank you for the opportunity to participate in the care of your patient, Yuridia Barksdale, at the [facility]. Please see a copy of my visit note below.    CARDIOLOGY  CLINIC VIDEO VISIT    Yuridia Barksdale is a 49 year old female who is being evaluated via a billable video visit.      The patient has been notified of following:     This video visit will be conducted via a call between you and your physician/provider. We have found that certain health care needs can be provided without the need for an in-person physical exam.  This service lets us provide the care you need with a video conversation.  If a prescription is necessary we can send it directly to your pharmacy.  If lab work is needed we can place an order for that and you can then stop by our lab to have the test done at a later time.    Virtual visits are billed at different rates depending on your insurance coverage. During this emergency period, for some insurers they may be billed the same as an in-person visit.  Please reach out to your insurance provider with any questions.    If during the course of the call the physician/provider feels a video visit is not appropriate, you will not be charged for this service.      Physician has received verbal consent for a Video Visit from the patient? Yes    Patient would like the video invitation sent by: Send to e-mail at: [email or web address]      Self reported vitals:  Weight: 247 lb       I have reviewed and updated the patient's Past Medical History, Social History, Family History and Medication List.    MEDICATIONS:  Current Outpatient Medications   Medication Sig Dispense Refill     ARIPiprazole (ABILIFY) 5 MG tablet Take 5 mg by mouth       atorvastatin (LIPITOR) 10 MG tablet Take 10 mg by mouth       clonazePAM (KLONOPIN) 1 MG tablet Take 1 mg by  mouth       furosemide (LASIX) 20 MG tablet Take 4 tablets (80 mg) by mouth 2 times daily 720 tablet 1     hydroxychloroquine (PLAQUENIL) 200 MG tablet Take 200 mg by mouth 2 times daily        sertraline (ZOLOFT) 100 MG tablet Take 200 mg by mouth       traZODone (DESYREL) 100 MG tablet Take 100 mg by mouth       traZODone (DESYREL) 50 MG tablet        Warfarin Sodium (COUMADIN PO) Take  by mouth.         ALLERGIES  Heparin; Levaquin; Clindamycin; and Vancomycin    Brief physical exam:  General: In no acute distress, upright and calm.  Eyes: No apparent redness or discharge.   Chest: No labored breathing, no cough during exam or audible wheezing.   Neuro: No obvious focal defects or tremors.   Psych: Alert and oriented. Does not appear anxious.     The rest of a comprehensive physical examination is deferred due to public [facility] emergency video visit restrictions.       Most recent labs:  8/31 CareEverywhere  Glu 94  BUN 17  SCr 0.80  Na 138  K 3.9  Chl 105  GFR >90    Primary PH cardiologist: Dr. Viviane Pan    HPI:  Ms. Barksdale is a very pleasant 49 year old female with a PMhx including SLE with lupus antiphospholipid antibody syndrome and hx DVT on anticoagulation, and DAVIS on CPAP. She also has chronic thromboembolic pulmonary arterial hypertension. She was placed on riociguat, but was then referred to [facility] for surgical evaluation. Ultimately, she underwent pulmonary thromboendarterectomy at [facility] on July 14, 2020.  She has type IV disease and her PTE was somewhat difficult to the distal nature of her disease. Her post operative course was reportedly uncomplicated, and postoperative hemodynamics showed an PA pressure of 37/12 with a mean of 21 mmHg.  Her cardiac output was 6.16 L. Echo showed mild right ventricular enlargement and mildly reduced right ventricular systolic pressure.  Her estimated PA pressure based on echocardiogram was 38 mmHg.  Her VQ scan score showed  improved perfusion of the right lower lobe and left upper lobe. Her riociguat and her digoxin were discontinued at that time.     Post surgically she has been struggling with swelling and weight gain. When she saw Dr. Villafana in late July, weight was 278 # on our scale. She was feeling quite short of breath despite wearing her oxygen at all times. Her Lasix was increased to 80mg BID which resulted in slow improvement. When I followed up with her in early Aug, weight was down to 262#. As renal function remains preserved, we have kept her on the higher dose of Lasix. She again returned to see. Dr. Villafana about 2 weeks ago and weight was down to 254#. Again, renal function was ok and she still had some mild edema and desaturated with walking. No changes were made.    Today we are doing another virtual visit to follow up. She says today her weight has crept back up slightly to 257#. 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 She skips the lasix those afternoons as it's difficult to stop and find a restroom. She says overall her breathing is still ok, but she is noting a bit more leg swelling again, L>R. She denies any dizzy spells. Labs done near her home yesterday continue to show normal renal function and electrolytes. She is checking her sats frequently; at rest on room air sats are 93-95% but she still desats quickly into the 80s with walking so she always wears 2.5L with exertion to maintain >90%.       CURRENT PULMONARY HYPERTENSION REGIMEN:    PAH Rx: None  (previously on riociguat, stopped post PTE)     Diuretics: lasix 80mg BID (occasioanlly missing PM dose)    Oxygen: RA rest, 2.5L exertion    Anticoagulation: warfarin (follows chrom factor goal 20-30)  Indication: DVT/antiphospholipid      Assessment/Plan:     1. Chronic thromboembolic pulmonary hypertension.              --CTEPH due to  her hypercoagulable state from her antiphospholipid antibody syndrome. She is now s/p pulmonary  thromboendarterectomy mid July 2020 at [facility]. This was somewhat difficult due to the distal nature of her disease. She is no longer on riociguat or digoxin.               --Her post op course was complicated by volume overload. 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 Her weight has crept back up slightly with a bit of lower extremity edema once again. As renal function remains preserved, I asked her to try to remain on current dose but will adjust timing. Currently she takes Lasix 80mg at 8am/3pm. First we will try 80mg 8am and 12noon on days she is traveling in the evening. If this is still a problem we can try to cut the PM dose to 40mg, but I asked her to try not to skip the second dose altogether. She is agreeable to this plan.               --I've asked her to continue to wear oxygen to maintain sats >90% which she is putting in a good effort to do.               --She is compliant with her CPAP but last visit inquired whether she needs a bipap as she felt this was helpful while in the hospital. I told her that with more volume removal her orthopnea should continue to improve, but if this is not the case, we can refer her back to review with her prior sleep provider.               --Continue Coumadin for anticoagulation, in the setting of CTEPH and antiphospholipid antibody syndrome. She normally follows chromogenic factor goal of 20-30.               --As per previous, we will plan on repeating an echocardiogram, right heart catheterization, VQ scan and a CT scan of the chest PE in mid October to assess for residual chronic thromboembolic pulmonary hypertension.  Will consider repeating these tests sooner if she were to clinically struggle with heart failure symptoms despite diuresis efforts.       Follow up plan: Return virtual visit with myself in ~3 weeks  with labs. Tentatively return mid Oct to see Dr. Villafana with above testing as outlined.       Video-Visit Details    Type of service:  Video Visit    Video Start Time: 0850  Video End Time: 0905    Originating Location (pt. Location): Home    Distant Location (provider location):  [facility]    Platform used for Video Visit: Kaitlyn      Please do not hesitate to contact me if you have any questions/concerns.     Sincerely,     CEDRICK Zamarripa

## 2020-09-01 NOTE — TELEPHONE ENCOUNTER
LM for patient that I was advised Azucena Silva PA-C  Wants another video visit with her in 3 weeks which lands on Tues 9/22.  Azucena would also like labs drawn the day before at her local clinic ([facility]).  Asked her to call and let me know what time of day would work for the video visit on 9/22 and that I will go ahead and fax lab order to [facility] for 9/21. Left my direct dial number for call back.  Yoly Villegas RN on 9/1/2020 at 2:43 PM    Faxed BMP order tpo [facility]. Yoly Villegas RN on 9/1/2020 at 2:49 PM  -----------------------------------  Patient LM that she would like the 9:45am appt. She also was asking about how to schedule the RHC & Echo in Oct.    Secured patient appt and sent patient Cardiiot message advising her Deneen would call to help her schedule everything once the CV schedule was available for that month. Yoly Villegas RN on 9/2/2020 at 8:35 AM

## 2020-09-02 DIAGNOSIS — Z11.59 ENCOUNTER FOR SCREENING FOR OTHER VIRAL DISEASES: Primary | ICD-10-CM

## 2020-09-14 ENCOUNTER — TELEPHONE (OUTPATIENT)
Dept: CARDIOLOGY | Facility: CLINIC | Age: 50
End: 2020-09-14

## 2020-09-14 NOTE — TELEPHONE ENCOUNTER
Patient LM that she typically walks 1.4 miles at least twice a day while using her Oxygen at 1.5L.  She also walks up a steep hill and has noticed her O2 sats dip down into the 84-88 range.  She feels fine and can still talk through the activity, so she's wondering if she should increase her O2?  She was hoping she could decrease her O2 down to 1L soon.  --------------------------  LM for patient advising her we generally want everyone to keep their O2 sats above 90%, so if she needs to increase her Oxygen while exercising to do that, we would advise it.  Asked her to call me back so we could discuss further. Left my direct dial number for call back.  Yoly Villegas RN on 9/14/2020 at 2:29 PM  ------------------------  Patient LM that she received and understood my message and would increase her O2 when she is walking.  She was disappointed, as she was trying to wean off the O2.

## 2020-09-21 ENCOUNTER — TRANSFERRED RECORDS (OUTPATIENT)
Dept: HEALTH INFORMATION MANAGEMENT | Facility: CLINIC | Age: 50
End: 2020-09-21

## 2020-09-21 NOTE — PROGRESS NOTES
CARDIOLOGY PH CLINIC VIDEO VISIT    Yuridia Barksdale is a 49 year old female who is being evaluated via a billable video visit.      The patient has been notified of following:     This video visit will be conducted via a call between you and your physician/provider. We have found that certain health care needs can be provided without the need for an in-person physical exam.  This service lets us provide the care you need with a video conversation.  If a prescription is necessary we can send it directly to your pharmacy.  If lab work is needed we can place an order for that and you can then stop by our lab to have the test done at a later time.    Virtual visits are billed at different rates depending on your insurance coverage. During this emergency period, for some insurers they may be billed the same as an in-person visit.  Please reach out to your insurance provider with any questions.    If during the course of the call the physician/provider feels a video visit is not appropriate, you will not be charged for this service.      Physician has received verbal consent for a Video Visit from the patient? Yes    Patient would like the video invitation sent by: Text to cell phone: My Chart         I have reviewed and updated the patient's Past Medical History, Social History, Family History and Medication List.    MEDICATIONS:  Current Outpatient Medications   Medication Sig Dispense Refill     ARIPiprazole (ABILIFY) 5 MG tablet Take 5 mg by mouth       atorvastatin (LIPITOR) 10 MG tablet Take 10 mg by mouth       clonazePAM (KLONOPIN) 1 MG tablet Take 1 mg by mouth       furosemide (LASIX) 20 MG tablet Take 4 tablets (80 mg) by mouth 2 times daily 720 tablet 1     hydroxychloroquine (PLAQUENIL) 200 MG tablet Take 200 mg by mouth 2 times daily        sertraline (ZOLOFT) 100 MG tablet Take 200 mg by mouth       traZODone (DESYREL) 100 MG tablet Take 100 mg by mouth       traZODone (DESYREL) 50 MG tablet        Warfarin  "Sodium (COUMADIN PO) Take  by mouth.         ALLERGIES  Heparin; Levaquin; Clindamycin; and Vancomycin      Self reported vitals:    Vitals - Patient Reported  Weight (Patient Reported): 115.8 kg (255 lb 4.8 oz)  Height (Patient Reported): 167.6 cm (5' 6\")  BMI (Based on Pt Reported Ht/Wt): 41.21  Pain Score: No Pain (0)      Brief physical exam:  General: In no acute distress, upright and calm.  Eyes: No apparent redness or discharge.   Chest: No labored breathing, no cough during exam or audible wheezing.   Neuro: No obvious focal defects or tremors.   Psych: Alert and oriented. Does not appear anxious.     The rest of a comprehensive physical examination is deferred due to public health emergency video visit restrictions.       Most recent labs: 9/21/2020 [facility]  BUN 20  SCr 0.80    K 4.3  Chl 105  C02 26    Primary PH cardiologist: Dr. Viviane Pan      HPI:  Ms. Barksdale is a very pleasant 49 year old female with a PMhx including SLE with lupus antiphospholipid antibody syndrome and hx DVT on anticoagulation, and DAVIS on CPAP. She also has chronic thromboembolic pulmonary arterial hypertension. She was placed on riociguat, but was then referred to [email or web address] for [facility] evaluation. Ultimately, she underwent pulmonary thromboendarterectomy at [facility] on July 14, 2020.  She has type IV disease and her PTE was somewhat difficult to the distal nature of her disease. Her post operative course was reportedly uncomplicated, and postoperative hemodynamics showed an PA pressure of 37/12 with a mean of 21 mmHg.  Her cardiac output was 6.16 L. Echo showed mild right ventricular enlargement and mildly reduced right ventricular systolic pressure.  Her estimated PA pressure based on echocardiogram was 38 mmHg.  Her VQ scan score showed improved perfusion of the right lower lobe and left upper lobe. Her riociguat and her digoxin were discontinued at that time.      Post surgically she " has been struggling with swelling and weight gain. When she saw Dr. Villafana in late July, weight was 278 # on our scale. She was feeling quite short of breath despite wearing her oxygen at all times. Her Lasix was increased to 80mg BID which resulted in slow improvement. When I followed up with her in early Aug, weight was down to 262#. As renal function remains preserved, we have kept her on the higher dose of Lasix. She again returned to see. Dr. Villafana and weight went down further to 254 #. Again, renal function was ok and she still had some mild edema and desaturated with walking. No changes were made.    When I saw her last in early September, her weight had crept back up slightly to 257#. 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 She had been skipping the lasix those afternoons as it's difficult to stop and find a restroom. She noted more edema with this as well, L>R. She stated that she was still desatting quickly into the 80s with walking so she continued on 2.5L with exertion to maintain >90%. At that visit, I asked her to return to 80mg BID dosing, but try to take the second dose earlier in the day if necessary.     Today, we are doing another virtual visit for close follow up. Today she reports weight is 257 # once again. She did go down to 252-254# initially but says it is slowly creeping back up. She is doing a better job taking both doses of Lasix, though notes a bit less urination with her Lasix over the last week. She still has some SAHU but this is stable. She is still trying to wean her oxygen, now down to 1.5L, with sats generally 90-91% with walking. Her chromogenic factor has been on the low end of goal, most recently at 20, and she says they have recently made some adjustments with plan to recheck this next week.        CURRENT PULMONARY HYPERTENSION REGIMEN:     PAH Rx: None   (previously on riociguat, stopped post PTE)      Diuretics: lasix 80mg BID      Oxygen: RA rest, 1.5L exertion     Anticoagulation: warfarin (follows chrom factor goal 20-30)  Indication: DVT/antiphospholipid        Assessment/Plan:     1. Chronic thromboembolic pulmonary hypertension.              --CTEPH due to her hypercoagulable state from her antiphospholipid antibody syndrome. She is now s/p pulmonary  thromboendarterectomy mid July 2020 at [facility]. This was somewhat difficult due to the distal nature of her disease. She is no longer on riociguat or digoxin.               --Her post op course was complicated by volume overload. We were making good progress with high dose Lasix but now this has stalled a bit. Her weight has crept back up to 257# despite 80mg BID dosing. She also notes a bit less urine output recently. Renal function remains preserved. Thus, we will try a transition to torsemide today. I asked her to start 60mg once daily in the AM. We will get an update later this week and if she does not have a brisk response, will increase to 80mg daily over the weekend.               --I've asked her to continue to wear oxygen to maintain sats >90% which she is putting in a good effort to do.               --She is compliant with her CPAP but last visit inquired whether she needs a bipap as she felt this was helpful while in the hospital. I told her that with more volume removal her orthopnea should continue to improve, but if this is not the case, we can refer her back to review with her prior sleep provider.               --Continue Coumadin for anticoagulation, in the setting of CTEPH and antiphospholipid antibody syndrome. She normally follows chromogenic factor goal of 20-30. This is being adjusted and plans a recheck next week.               --As per previous, we will plan on repeating an echocardiogram, right heart catheterization, VQ scan and a CT scan of the chest PE in mid October to assess for  residual chronic thromboembolic pulmonary hypertension.  Will consider repeating these tests sooner if she were to clinically struggle with heart failure symptoms despite diuresis efforts.        Follow up plan: Check labs in ~1 week, with return virtual visit with Azucena. Plan on repeating an echocardiogram, right heart catheterization, VQ scan and a CT scan of the chest PE in mid October to see Dr. Villafana.       Video-Visit Details    Type of service:  Video Visit    Video Start Time: 0955  Video End Time: 1020    Originating Location (pt. Location): Home    Distant Location (provider location):  [facility] HEART [facility]    Platform used for Video Visit: Kaitlyn Silva PA-C  [facility]  Pager [phone]

## 2020-09-22 ENCOUNTER — VIRTUAL VISIT (OUTPATIENT)
Dept: CARDIOLOGY | Facility: CLINIC | Age: 50
End: 2020-09-22
Attending: PHYSICIAN ASSISTANT
Payer: COMMERCIAL

## 2020-09-22 DIAGNOSIS — I27.24 CTEPH (CHRONIC THROMBOEMBOLIC PULMONARY HYPERTENSION) (H): Primary | ICD-10-CM

## 2020-09-22 DIAGNOSIS — R06.09 DOE (DYSPNEA ON EXERTION): ICD-10-CM

## 2020-09-22 PROCEDURE — 99214 OFFICE O/P EST MOD 30 MIN: CPT | Mod: GT | Performed by: PHYSICIAN ASSISTANT

## 2020-09-22 RX ORDER — TORSEMIDE 20 MG/1
60 TABLET ORAL DAILY
Qty: 90 TABLET | Refills: 1 | Status: SHIPPED | OUTPATIENT
Start: 2020-09-22 | End: 2020-10-06

## 2020-09-22 NOTE — LETTER
9/22/2020      RE: Yuridia Barksdale  [address] [address]       Dear Colleague,    Thank you for the opportunity to participate in the care of your patient, Yuridia Barksdale, at the [facility]. Please see a copy of my visit note below.    CARDIOLOGY PH CLINIC VIDEO VISIT    Yuridia Barksdale is a 49 year old female who is being evaluated via a billable video visit.      The patient has been notified of following:     This video visit will be conducted via a call between you and your physician/provider. We have found that certain health care needs can be provided without the need for an in-person physical exam.  This service lets us provide the care you need with a video conversation.  If a prescription is necessary we can send it directly to your pharmacy.  If lab work is needed we can place an order for that and you can then stop by our lab to have the test done at a later time.    Virtual visits are billed at different rates depending on your insurance coverage. During this emergency period, for some insurers they may be billed the same as an in-person visit.  Please reach out to your insurance provider with any questions.    If during the course of the call the physician/provider feels a video visit is not appropriate, you will not be charged for this service.      Physician has received verbal consent for a Video Visit from the patient? Yes    Patient would like the video invitation sent by: Text to cell phone: My Chart         I have reviewed and updated the patient's Past Medical History, Social History, Family History and Medication List.    MEDICATIONS:  Current Outpatient Medications   Medication Sig Dispense Refill     ARIPiprazole (ABILIFY) 5 MG tablet Take 5 mg by mouth       atorvastatin (LIPITOR) 10 MG tablet Take 10 mg by mouth       clonazePAM (KLONOPIN) 1 MG tablet Take 1 mg by mouth       furosemide (LASIX) 20 MG tablet Take  "4 tablets (80 mg) by mouth 2 times daily 720 tablet 1     hydroxychloroquine (PLAQUENIL) 200 MG tablet Take 200 mg by mouth 2 times daily        sertraline (ZOLOFT) 100 MG tablet Take 200 mg by mouth       traZODone (DESYREL) 100 MG tablet Take 100 mg by mouth       traZODone (DESYREL) 50 MG tablet        Warfarin Sodium (COUMADIN PO) Take  by mouth.         ALLERGIES  Heparin; Levaquin; Clindamycin; and Vancomycin      Self reported vitals:    Vitals - Patient Reported  Weight (Patient Reported): 115.8 kg (255 lb 4.8 oz)  Height (Patient Reported): 167.6 cm (5' 6\")  BMI (Based on Pt Reported Ht/Wt): 41.21  Pain Score: No Pain (0)      Brief physical exam:  General: In no acute distress, upright and calm.  Eyes: No apparent redness or discharge.   Chest: No labored breathing, no cough during exam or audible wheezing.   Neuro: No obvious focal defects or tremors.   Psych: Alert and oriented. Does not appear anxious.     The rest of a comprehensive physical examination is deferred due to public [facility] emergency video visit restrictions.       Most recent labs: 9/21/2020 [facility]  BUN 20  SCr 0.80    K 4.3  Chl 105  C02 26    Primary PH cardiologist: Dr. Viviane Pan      HPI:  Ms. Barksdale is a very pleasant 49 year old female with a PMhx including SLE with lupus antiphospholipid antibody syndrome and hx DVT on anticoagulation, and DAVIS on CPAP. She also has chronic thromboembolic pulmonary arterial hypertension. She was placed on riociguat, but was then referred to [facility] for surgical evaluation. Ultimately, she underwent pulmonary thromboendarterectomy at [facility] on July 14, 2020.  She has type IV disease and her PTE was somewhat difficult to the distal nature of her disease. Her post operative course was reportedly uncomplicated, and postoperative hemodynamics showed an PA pressure of 37/12 with a mean of 21 mmHg.  Her cardiac output was 6.16 L. Echo showed mild right " ventricular enlargement and mildly reduced right ventricular systolic pressure.  Her estimated PA pressure based on echocardiogram was 38 mmHg.  Her VQ scan score showed improved perfusion of the right lower lobe and left upper lobe. Her riociguat and her digoxin were discontinued at that time.      Post surgically she has been struggling with swelling and weight gain. When she saw Dr. Villafana in late July, weight was 278 # on our scale. She was feeling quite short of breath despite wearing her oxygen at all times. Her Lasix was increased to 80mg BID which resulted in slow improvement. When I followed up with her in early Aug, weight was down to 262#. As renal function remains preserved, we have kept her on the higher dose of Lasix. She again returned to see. Dr. Villafana and weight went down further to 254 #. Again, renal function was ok and she still had some mild edema and desaturated with walking. No changes were made.    When I saw her last in early September, her weight had crept back up slightly to 257#. 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 She had been skipping the lasix those afternoons as it's difficult to stop and find a restroom. She noted more edema with this as well, L>R. She stated that she was still desatting quickly into the 80s with walking so she continued on 2.5L with exertion to maintain >90%. At that visit, I asked her to return to 80mg BID dosing, but try to take the second dose earlier in the day if necessary.     Today, we are doing another virtual visit for close follow up. Today she reports weight is 257 # once again. She did go down to 252-254# initially but says it is slowly creeping back up. She is doing a better job taking both doses of Lasix, though notes a bit less urination with her Lasix over the last week. She still has some SAHU but this is stable. She is still  trying to wean her oxygen, now down to 1.5L, with sats generally 90-91% with walking. Her chromogenic factor has been on the low end of goal, most recently at 20, and she says they have recently made some adjustments with plan to recheck this next week.        CURRENT PULMONARY HYPERTENSION REGIMEN:     PAH Rx: None  (previously on riociguat, stopped post PTE)      Diuretics: lasix 80mg BID      Oxygen: RA rest, 1.5L exertion     Anticoagulation: warfarin (follows chrom factor goal 20-30)  Indication: DVT/antiphospholipid        Assessment/Plan:     1. Chronic thromboembolic pulmonary hypertension.              --CTEPH due to her hypercoagulable state from her antiphospholipid antibody syndrome. She is now s/p pulmonary  thromboendarterectomy mid July 2020 at [facility]. This was somewhat difficult due to the distal nature of her disease. She is no longer on riociguat or digoxin.               --Her post op course was complicated by volume overload. We were making good progress with high dose Lasix but now this has stalled a bit. Her weight has crept back up to 257# despite 80mg BID dosing. She also notes a bit less urine output recently. Renal function remains preserved. Thus, we will try a transition to torsemide today. I asked her to start 60mg once daily in the AM. We will get an update later this week and if she does not have a brisk response, will increase to 80mg daily over the weekend.               --I've asked her to continue to wear oxygen to maintain sats >90% which she is putting in a good effort to do.               --She is compliant with her CPAP but last visit inquired whether she needs a bipap as she felt this was helpful while in the hospital. I told her that with more volume removal her orthopnea should continue to improve, but if this is not the case, we can refer her back to review with her prior sleep provider.               --Continue Coumadin for anticoagulation, in the setting of CTEPH and  antiphospholipid antibody syndrome. She normally follows chromogenic factor goal of 20-30. This is being adjusted and plans a recheck next week.               --As per previous, we will plan on repeating an echocardiogram, right heart catheterization, VQ scan and a CT scan of the chest PE in mid October to assess for residual chronic thromboembolic pulmonary hypertension.  Will consider repeating these tests sooner if she were to clinically struggle with heart failure symptoms despite diuresis efforts.        Follow up plan: Check labs in ~1 week, with return virtual visit with Azucena. Plan on repeating an echocardiogram, right heart catheterization, VQ scan and a CT scan of the chest PE in mid October to see Dr. Villafana.       Video-Visit Details    Type of service:  Video Visit    Video Start Time: 0955  Video End Time: 1020    Originating Location (pt. Location): Home    Distant Location (provider location):  [facility]    Platform used for Video Visit: Kaitlyn Silva PA-C  [facility]  Pager [phone]      Please do not hesitate to contact me if you have any questions/concerns.     Sincerely,     CEDRICK Zamarripa

## 2020-09-22 NOTE — PATIENT INSTRUCTIONS
Thank you for visiting the Pulmonary Hypertension Clinic today.      Today we discussed:   We will keep working on trying to get more fluid off.  We will STOP the lasix, and START torsemide (Demadex) 60mg once daily.   Our office will call you Friday, and if no improvement, we will increase the dose further before the weekend.     Additional Instructions:    1. Continue staying active and eat a heart healthy, low sodium diet.     2. Please keep current list of medications with you at all times.     3. Remember to weigh yourself daily after voiding and write it down on a log. If you have gained/lost 2 pounds overnight or 5 pounds in a week contact us for medication adjustments or further instructions.    4. Please call us immediately if you have syncope (fainting or passing out), chest pain, worsening edema (swelling or weight gain), or general worsening in how you are feeling.       Follow up Appointment Information:  Return in ~1 week with repeat labs near your home, and another virtual visit with Azucena.     ---------------------------------------------------------------------------------------------------------------    If you have questions or concerns please contact us at:        Yoly Villegas RN, BSN, PHN  Deneen Mccann  (Schedule,Prior Auth)  Nurse Coordinator     Clinic   Pulmonary Hypertension   Pulmonary Hypertension  [facility]             [facility]  [phone])[phone]    (P) [phone]        (F) [phone]      ** Please note that you will NOT receive a reminder call regarding your scheduled testing, reminder calls are for provider appointments only.  If you are scheduled for testing within the [facility] system you may receive a call regarding pre-registration for billing purposes only.**     ------------------------------------------------------------------------------------------------------------    Interested in joining a  support group?    Pulmonary Hypertension Association  Https://www.phassociation.org/  **Look at the Events Tab** They even have Support Groups that you can call into    [facility] PH Support Group  Second Saturday of the Month from 1-3 PM   [place] [facility] [address]  Leader: Marika Vial  Phone: [phone] or [phone]  Email: [email or web address]

## 2020-09-24 ENCOUNTER — TELEPHONE (OUTPATIENT)
Dept: CARDIOLOGY | Facility: CLINIC | Age: 50
End: 2020-09-24

## 2020-09-24 NOTE — TELEPHONE ENCOUNTER
Spoke with patient and relayed weight restrictions limitations & secured video visit on 10/6/20, as she just started Torsemide today.  Verified she will be obtaining labs next week at [facility].  Faxed order.  Yoly Villegas RN on 9/24/2020 at 1:04 PM      ----- Message from CEDRICK Zamarripa sent at 9/24/2020 12:39 PM CDT -----  Regarding: FW: visit update  Can you let her know that (as below) her weight restrictions are for a total of 8 weeks post surgery then can be lifted.    He also agreed with our plan for torsemide.  Thx  Azucena  ----- Message -----  From: Arcelia Paniagua MD  Sent: 9/24/2020   8:19 AM CDT  To: CEDRICK Zamarripa  Subject: RE: visit update                                 Agree with Torsemide.     Weight restriction for 8 weeks after surgery    TT    ----- Message -----  From: Azucena Silva PA  Sent: 9/22/2020  10:16 AM CDT  To: Arcelia Paniagua MD  Subject: visit update                                     We have stalled a bit on our diuresis. She's on Lasix 80mg BID, not urinating quite as much. She is stuck at 254-255 lb and still some leg swelling, SAHU, and o2 sats 91-92% on 1-2L NC.  Renal function has been fine.    Today I switched her to torsemide 60mg once daily first, and will go up further on Friday if no difference. My plan is to see her back in 1 week with BMP. I'm trying to be sure we get her euvolemic before you repeat your testing in October. Sound ok?    Side question: She was told lifting restriction of 10 pounds post PTE (in July). Wants to know if/when she can lift more. I wasn't sure if she still needed any restrictions or not?      Thanks  Azucena

## 2020-10-05 ENCOUNTER — TRANSFERRED RECORDS (OUTPATIENT)
Dept: HEALTH INFORMATION MANAGEMENT | Facility: CLINIC | Age: 50
End: 2020-10-05

## 2020-10-06 ENCOUNTER — VIRTUAL VISIT (OUTPATIENT)
Dept: CARDIOLOGY | Facility: CLINIC | Age: 50
End: 2020-10-06
Attending: PHYSICIAN ASSISTANT
Payer: COMMERCIAL

## 2020-10-06 ENCOUNTER — TELEPHONE (OUTPATIENT)
Dept: CARDIOLOGY | Facility: CLINIC | Age: 50
End: 2020-10-06

## 2020-10-06 DIAGNOSIS — E87.6 HYPOKALEMIA: Primary | ICD-10-CM

## 2020-10-06 DIAGNOSIS — R06.09 DOE (DYSPNEA ON EXERTION): ICD-10-CM

## 2020-10-06 DIAGNOSIS — I27.24 CTEPH (CHRONIC THROMBOEMBOLIC PULMONARY HYPERTENSION) (H): ICD-10-CM

## 2020-10-06 PROCEDURE — 99214 OFFICE O/P EST MOD 30 MIN: CPT | Mod: GT | Performed by: PHYSICIAN ASSISTANT

## 2020-10-06 RX ORDER — TORSEMIDE 20 MG/1
80 TABLET ORAL DAILY
Qty: 90 TABLET | Refills: 1
Start: 2020-10-06 | End: 2020-11-16

## 2020-10-06 RX ORDER — POTASSIUM CHLORIDE 1500 MG/1
20 TABLET, EXTENDED RELEASE ORAL DAILY
Qty: 90 TABLET | Refills: 3 | Status: SHIPPED | OUTPATIENT
Start: 2020-10-06 | End: 2020-10-06

## 2020-10-06 RX ORDER — POTASSIUM CHLORIDE 1.5 G/1.58G
20 POWDER, FOR SOLUTION ORAL DAILY
Qty: 90 PACKET | Refills: 3 | Status: SHIPPED | OUTPATIENT
Start: 2020-10-06 | End: 2021-02-01

## 2020-10-06 NOTE — PATIENT INSTRUCTIONS
Thank you for visiting the Pulmonary Hypertension Clinic today.      Today we discussed:   1. We will keep working on getting your weight down/fluid off before your heart catheterization.   2. INCREASE your torsemide to 80mg (4 tablets) once daily.  3. Your potassium levels were borderline low on labs. I would like to add a potassium supplement. As you stated the pills got stuck in the past, I will see if we can get a powder form covered by your insurance. Our office will contact you to arrange this, along with labs in 1 week.       Additional Instructions:    1. Continue staying active and eat a heart healthy, low sodium diet.     2. Please keep current list of medications with you at all times.     3. Remember to weigh yourself daily after voiding and write it down on a log. If you have gained/lost 2 pounds overnight or 5 pounds in a week contact us for medication adjustments or further instructions.    4. Please call us immediately if you have syncope (fainting or passing out), chest pain, worsening edema (swelling or weight gain), or general worsening in how you are feeling.       Follow up Appointment Information:  Return video visit with Azucena in 1 week, with labs prior.       ---------------------------------------------------------------------------------------------------------------    If you have questions or concerns please contact us at:        Yoly Villegas, RN, BSN, PHN  Deneen Mccann  (Schedule,Prior Auth)  Nurse Coordinator     Clinic   Pulmonary Hypertension   Pulmonary Hypertension  [facility]             [facility]  (P)[phone]    (P) [phone]        (F) [phone]      ** Please note that you will NOT receive a reminder call regarding your scheduled testing, reminder calls are for provider appointments only.  If you are scheduled for testing within the [employer] system you may receive a call regarding  pre-registration for billing purposes only.**     ------------------------------------------------------------------------------------------------------------    Interested in joining a support group?    Pulmonary Hypertension Association  Https://www.phassociation.org/  **Look at the Events Tab** They even have Support Groups that you can call into    [facility] Support Group  Second Saturday of the Month from 1-3 PM   Location: [address] [place] [address]  Leader: Marika Vila  Phone: [phone] or [phone]  Email: [email or web address]

## 2020-10-06 NOTE — TELEPHONE ENCOUNTER
----- Message from Micheline Chapa RN sent at 10/6/2020  2:01 PM CDT -----  Regarding: FW: update, help with potassium    ----- Message -----  From: Azucena Silva PA  Sent: 10/6/2020   1:54 PM CDT  To: Cardiology Ph Nurse-  Subject: update, help with potassium                      She did well initially with change to torsemide, then weight climbed up again.  I asked her to increase torsemide to 80mg daily as renal function ok.  K borderline at 3.6 so I'd like to add 20meq of K. She said last time she was on the pills they would get stuck in her esophagus--requested alternative. Can you send in the powder form potassium and see if her insurance will cover please? 20meq once daily.    I would again like to see her in another virtual visit in 1 week.  She will need a repeat BMP/BNP prior to that visit closer to home please.    Jose noonan  ---------------------------  Ordered potassium via [employer], faxed lab order to [facility].  Yoly Villegas RN on 10/6/2020 at 3:08 PM  ---------------------------  LM for patient that Lab order was faxed and prescription sent, now we just need to secure appt for next Tuesday 10/13/20.  Asked her to call me back and let me know what time works for her.  Yoly Villegas RN on 10/6/2020 at 3:12 PM  -------------------------  Patient LM that she would take the 1:45pm, video appt on Tue.  Appt secured. Yoly Villegas RN on 10/7/2020 at 4:04 PM  10/20/20.

## 2020-10-06 NOTE — PROGRESS NOTES
CARDIOLOGY PH CLINIC VIDEO VISIT    Yuridia Barksdale is a 49 year old female who is being evaluated via a billable video visit.      The patient has been notified of following:     This video visit will be conducted via a call between you and your physician/provider. We have found that certain health care needs can be provided without the need for an in-person physical exam.  This service lets us provide the care you need with a video conversation.  If a prescription is necessary we can send it directly to your pharmacy.  If lab work is needed we can place an order for that and you can then stop by our lab to have the test done at a later time.    Virtual visits are billed at different rates depending on your insurance coverage. During this emergency period, for some insurers they may be billed the same as an in-person visit.  Please reach out to your insurance provider with any questions.    If during the course of the call the physician/provider feels a video visit is not appropriate, you will not be charged for this service.      Physician has received verbal consent for a Video Visit from the patient? Yes    Patient would like the video invitation sent by: Send to e-mail at: [email or web address] pt will do mychart first if any issues email link      I have reviewed and updated the patient's Past Medical History, Social History, Family History and Medication List.    MEDICATIONS:  Current Outpatient Medications   Medication Sig Dispense Refill     ARIPiprazole (ABILIFY) 5 MG tablet Take 5 mg by mouth       atorvastatin (LIPITOR) 10 MG tablet Take 10 mg by mouth       clonazePAM (KLONOPIN) 1 MG tablet Take 1 mg by mouth       hydroxychloroquine (PLAQUENIL) 200 MG tablet Take 200 mg by mouth 2 times daily        sertraline (ZOLOFT) 100 MG tablet Take 200 mg by mouth       torsemide (DEMADEX) 20 MG tablet Take 3 tablets (60 mg) by mouth daily 90 tablet 1     traZODone (DESYREL) 100 MG tablet Take 100 mg by mouth        traZODone (DESYREL) 50 MG tablet        Warfarin Sodium (COUMADIN PO) Take  by mouth.         ALLERGIES  Heparin, Levaquin, Clindamycin, and Vancomycin      Self reported vitals:  Weight: 255 lbs   BP x  HR x      Brief physical exam:  General: In no acute distress, upright and calm.  Eyes: No apparent redness or discharge.   Chest: No labored breathing, no cough during exam or audible wheezing.   Neuro: No obvious focal defects or tremors.   Psych: Alert and oriented. Does not appear anxious.     The rest of a comprehensive physical examination is deferred due to public health emergency video visit restrictions.       Most recent labs:         Primary PH cardiologist: Dr. Arcelia Paniagua        HPI:  Ms. Barksdale is a very pleasant 49 year old female with a PMhx including SLE with lupus antiphospholipid antibody syndrome and hx DVT on anticoagulation, and DAVIS on CPAP. She also has chronic thromboembolic pulmonary arterial hypertension. She was placed on riociguat, but was then referred to [facility] for surgical evaluation. Ultimately, she underwent pulmonary thromboendarterectomy at [facility] on July 14, 2020.  She has type IV disease and her PTE was somewhat difficult to the distal nature of her disease. Her post operative course was reportedly uncomplicated, and postoperative hemodynamics showed an PA pressure of 37/12 with a mean of 21 mmHg.  Her cardiac output was 6.16 L. Echo showed mild right ventricular enlargement and mildly reduced right ventricular systolic pressure.  Her estimated PA pressure based on echocardiogram was 38 mmHg.  Her VQ scan score showed improved perfusion of the right lower lobe and left upper lobe. Her riociguat and her digoxin were discontinued at that time.      Post surgically she has been struggling with swelling and weight gain. When she saw Dr. Villafana in late July, weight was 278 # on our scale. She was feeling quite short of breath despite wearing her  oxygen at all times. Her Lasix was increased to 80mg BID which resulted in slow improvement. When I followed up with her in early Aug, weight was down to 262#. As renal function remains preserved, we have kept her on the higher dose of Lasix. She again returned to see. Dr. Villafana and weight went down further to 254 #.    We have continued to touch base frequently to adjust her diuretics. Last visit about 2 weeks ago her weight had crept back up to 257# after intermittently missing some Lasix doses while traveling to work. She continued to desat quickly into the 80s with walking. I opted to change her Lasix to torsemide at 60mg once daily last visit.    Today we are doing another virtual visit to follow up on these changes. She tells me that initially a few days after the switch her weight came down as low as 249#. However, over the last few days, once again, she crept back up to 255# today. She endorses compliance with her medications. She still desats to the high 80s with walking but says her resting sats are very good at 96-97% when she checks them. She is using the oxygen only with planned exercise. Her chromogenic factor was 18 on recent check, and adjustments have been made. Her repeat BMP done yesterday continues to show preserved renal function, though as we escalate her diuretics, her K is slowly coming down to 3.6.         CURRENT PULMONARY HYPERTENSION REGIMEN:     PAH Rx: None  (previously on riociguat, stopped post PTE)      Diuretics: Torsemide 60mg once daily     Oxygen: RA rest, 1.5L exertion     Anticoagulation: warfarin (follows chrom factor goal 20-30)  Indication: DVT/antiphospholipid        Assessment/Plan:     1. Chronic thromboembolic pulmonary hypertension.              --CTEPH due to her hypercoagulable state from her antiphospholipid antibody syndrome. She is now s/p pulmonary  thromboendarterectomy mid July 2020 at [facility]. This was somewhat difficult due to the distal nature of her disease.  She is no longer on riociguat or digoxin.               --Her post op course has been complicated by volume overload. She initially responded to higher Lasix doses, though this stalled a bit, and last visit we changed her to torsemide 60mg daily. She initially lost over 5 lbs, but again gained it back. Will escalate torsemide further to 80mg daily today. As K is borderline, will start 20meq daily. Recheck BMP in 1 week. Next visit will consider addition of spironolactone if minimal response.               --I've asked her to continue to wear oxygen to maintain sats >90% which she is putting in a good effort to do. She no longer requires oxygen at rest with sats 96-97%.               --She is compliant with her CPAP which I encouraged her to continue.              --Continue Coumadin for anticoagulation, in the setting of CTEPH and antiphospholipid antibody syndrome. She normally follows chromogenic factor goal of 20-30. This is being adjusted and plans a recheck next week. Will clarify plans for bridging with upcoming procedures.               --As per previous, plan is to repeat echocardiogram, right heart catheterization, VQ scan and a CT scan of the chest in late October. In the meantime will continue efforts to make her euvolemic.         Follow up plan: Check labs in ~1 week, with return virtual visit with Azucena. Plan on repeating an echocardiogram, right heart catheterization, VQ scan and a CT scan of the chest PE in late October to see Dr. Villafana.         Video-Visit Details    Type of service:  Video Visit    Video Start Time: 1336  Video End Time: 1354    Originating Location (pt. Location): Home    Distant Location (provider location):  [facility]    Platform used for Video Visit: Kaitlyn Silva PA-C  [facility]  Pager [phone]

## 2020-10-06 NOTE — LETTER
10/6/2020      RE: Yuridia Barksdale  [address] [address]       Dear Colleague,    Thank you for the opportunity to participate in the care of your patient, Yuridia Barksdale, at the [facility]. Please see a copy of my visit note below.      CARDIOLOGY  CLINIC VIDEO VISIT    Yuridia Barksdale is a 49 year old female who is being evaluated via a billable video visit.      The patient has been notified of following:     This video visit will be conducted via a call between you and your physician/provider. We have found that certain health care needs can be provided without the need for an in-person physical exam.  This service lets us provide the care you need with a video conversation.  If a prescription is necessary we can send it directly to your pharmacy.  If lab work is needed we can place an order for that and you can then stop by our lab to have the test done at a later time.    Virtual visits are billed at different rates depending on your insurance coverage. During this emergency period, for some insurers they may be billed the same as an in-person visit.  Please reach out to your insurance provider with any questions.    If during the course of the call the physician/provider feels a video visit is not appropriate, you will not be charged for this service.      Physician has received verbal consent for a Video Visit from the patient? Yes    Patient would like the video invitation sent by: Send to e-mail at: [email or web address] pt will do mychart first if any issues email link      I have reviewed and updated the patient's Past Medical History, Social History, Family History and Medication List.    MEDICATIONS:  Current Outpatient Medications   Medication Sig Dispense Refill     ARIPiprazole (ABILIFY) 5 MG tablet Take 5 mg by mouth       atorvastatin (LIPITOR) 10 MG tablet Take 10 mg by mouth       clonazePAM (KLONOPIN)  1 MG tablet Take 1 mg by mouth       hydroxychloroquine (PLAQUENIL) 200 MG tablet Take 200 mg by mouth 2 times daily        sertraline (ZOLOFT) 100 MG tablet Take 200 mg by mouth       torsemide (DEMADEX) 20 MG tablet Take 3 tablets (60 mg) by mouth daily 90 tablet 1     traZODone (DESYREL) 100 MG tablet Take 100 mg by mouth       traZODone (DESYREL) 50 MG tablet        Warfarin Sodium (COUMADIN PO) Take  by mouth.         ALLERGIES  Heparin, Levaquin, Clindamycin, and Vancomycin      Self reported vitals:  Weight: 255 lbs   BP x  HR x      Brief physical exam:  General: In no acute distress, upright and calm.  Eyes: No apparent redness or discharge.   Chest: No labored breathing, no cough during exam or audible wheezing.   Neuro: No obvious focal defects or tremors.   Psych: Alert and oriented. Does not appear anxious.     The rest of a comprehensive physical examination is deferred due to public health emergency video visit restrictions.       Most recent labs:         Primary PH cardiologist: Dr. Arcelia Paniagua        HPI:  Ms. Barksdale is a very pleasant 49 year old female with a PMhx including SLE with lupus antiphospholipid antibody syndrome and hx DVT on anticoagulation, and DAVIS on CPAP. She also has chronic thromboembolic pulmonary arterial hypertension. She was placed on riociguat, but was then referred to [facility] for surgical evaluation. Ultimately, she underwent pulmonary thromboendarterectomy at [facility] on July 14, 2020.  She has type IV disease and her PTE was somewhat difficult to the distal nature of her disease. Her post operative course was reportedly uncomplicated, and postoperative hemodynamics showed an PA pressure of 37/12 with a mean of 21 mmHg.  Her cardiac output was 6.16 L. Echo showed mild right ventricular enlargement and mildly reduced right ventricular systolic pressure.  Her estimated PA pressure based on echocardiogram was 38 mmHg.  Her VQ scan score showed  improved perfusion of the right lower lobe and left upper lobe. Her riociguat and her digoxin were discontinued at that time.      Post surgically she has been struggling with swelling and weight gain. When she saw Dr. Villafana in late July, weight was 278 # on our scale. She was feeling quite short of breath despite wearing her oxygen at all times. Her Lasix was increased to 80mg BID which resulted in slow improvement. When I followed up with her in early Aug, weight was down to 262#. As renal function remains preserved, we have kept her on the higher dose of Lasix. She again returned to see. Dr. Villafana and weight went down further to 254 #.    We have continued to touch base frequently to adjust her diuretics. Last visit about 2 weeks ago her weight had crept back up to 257# after intermittently missing some Lasix doses while traveling to work. She continued to desat quickly into the 80s with walking. I opted to change her Lasix to torsemide at 60mg once daily last visit.    Today we are doing another virtual visit to follow up on these changes. She tells me that initially a few days after the switch her weight came down as low as 249#. However, over the last few days, once again, she crept back up to 255# today. She endorses compliance with her medications. She still desats to the high 80s with walking but says her resting sats are very good at 96-97% when she checks them. She is using the oxygen only with planned exercise. Her chromogenic factor was 18 on recent check, and adjustments have been made. Her repeat BMP done yesterday continues to show preserved renal function, though as we escalate her diuretics, her K is slowly coming down to 3.6.         CURRENT PULMONARY HYPERTENSION REGIMEN:     PAH Rx: None  (previously on riociguat, stopped post PTE)      Diuretics: Torsemide 60mg once daily     Oxygen: RA rest, 1.5L exertion     Anticoagulation: warfarin (follows chrom factor goal  20-30)  Indication: DVT/antiphospholipid        Assessment/Plan:     1. Chronic thromboembolic pulmonary hypertension.              --CTEPH due to her hypercoagulable state from her antiphospholipid antibody syndrome. She is now s/p pulmonary  thromboendarterectomy mid July 2020 at [facility]. This was somewhat difficult due to the distal nature of her disease. She is no longer on riociguat or digoxin.               --Her post op course has been complicated by volume overload. She initially responded to higher Lasix doses, though this stalled a bit, and last visit we changed her to torsemide 60mg daily. She initially lost over 5 lbs, but again gained it back. Will escalate torsemide further to 80mg daily today. As K is borderline, will start 20meq daily. Recheck BMP in 1 week. Next visit will consider addition of spironolactone if minimal response.               --I've asked her to continue to wear oxygen to maintain sats >90% which she is putting in a good effort to do. She no longer requires oxygen at rest with sats 96-97%.               --She is compliant with her CPAP which I encouraged her to continue.              --Continue Coumadin for anticoagulation, in the setting of CTEPH and antiphospholipid antibody syndrome. She normally follows chromogenic factor goal of 20-30. This is being adjusted and plans a recheck next week. Will clarify plans for bridging with upcoming procedures.               --As per previous, plan is to repeat echocardiogram, right heart catheterization, VQ scan and a CT scan of the chest in late October. In the meantime will continue efforts to make her euvolemic.         Follow up plan: Check labs in ~1 week, with return virtual visit with Azucena. Plan on repeating an echocardiogram, right heart catheterization, VQ scan and a CT scan of the chest PE in late October to see Dr. Villafana.         Video-Visit Details    Type of service:  Video Visit    Video Start Time: 3428  Video End Time:  1354    Originating Location (pt. Location): Home    Distant Location (provider location):  [facility]    Platform used for Video Visit: Kaitlyn Silva PA-C  [facility]  Pager [phone]      Please do not hesitate to contact me if you have any questions/concerns.     Sincerely,     CEDRICK Zamarripa

## 2020-10-12 ENCOUNTER — TRANSFERRED RECORDS (OUTPATIENT)
Dept: HEALTH INFORMATION MANAGEMENT | Facility: CLINIC | Age: 50
End: 2020-10-12

## 2020-10-12 ENCOUNTER — TELEPHONE (OUTPATIENT)
Dept: CARDIOLOGY | Facility: CLINIC | Age: 50
End: 2020-10-12

## 2020-10-12 NOTE — TELEPHONE ENCOUNTER
Labs drawn after increasing potassium to 80meq daily. Yoly Vilelgas RN on 10/12/2020 at 5:53 PM    Routed to Azucena Silva PA-C

## 2020-10-19 ENCOUNTER — TRANSFERRED RECORDS (OUTPATIENT)
Dept: HEALTH INFORMATION MANAGEMENT | Facility: CLINIC | Age: 50
End: 2020-10-19

## 2020-10-19 ENCOUNTER — TELEPHONE (OUTPATIENT)
Dept: CARDIOLOGY | Facility: CLINIC | Age: 50
End: 2020-10-19

## 2020-10-19 DIAGNOSIS — R06.02 SOB (SHORTNESS OF BREATH): ICD-10-CM

## 2020-10-19 DIAGNOSIS — I27.24 CTEPH (CHRONIC THROMBOEMBOLIC PULMONARY HYPERTENSION) (H): Primary | ICD-10-CM

## 2020-10-19 NOTE — TELEPHONE ENCOUNTER
Routing Labs to Azucena Silva PA-C, as patient has appt tomorrow virtually with her.  I also faxed Standing orders for a BMP & BNP to patient's labs for future draws in the next year.  Yoly Villegas RN on 10/19/2020 at 1:34 PM

## 2020-10-20 ENCOUNTER — VIRTUAL VISIT (OUTPATIENT)
Dept: CARDIOLOGY | Facility: CLINIC | Age: 50
End: 2020-10-20
Attending: PHYSICIAN ASSISTANT
Payer: COMMERCIAL

## 2020-10-20 ENCOUNTER — TELEPHONE (OUTPATIENT)
Dept: CARDIOLOGY | Facility: CLINIC | Age: 50
End: 2020-10-20

## 2020-10-20 DIAGNOSIS — Z86.711 HISTORY OF PULMONARY EMBOLISM: ICD-10-CM

## 2020-10-20 DIAGNOSIS — I27.24 CTEPH (CHRONIC THROMBOEMBOLIC PULMONARY HYPERTENSION) (H): Primary | ICD-10-CM

## 2020-10-20 DIAGNOSIS — R06.09 DOE (DYSPNEA ON EXERTION): Primary | ICD-10-CM

## 2020-10-20 DIAGNOSIS — I27.24 CTEPH (CHRONIC THROMBOEMBOLIC PULMONARY HYPERTENSION) (H): ICD-10-CM

## 2020-10-20 PROCEDURE — 99214 OFFICE O/P EST MOD 30 MIN: CPT | Mod: GT | Performed by: PHYSICIAN ASSISTANT

## 2020-10-20 RX ORDER — SPIRONOLACTONE 25 MG/1
12.5 TABLET ORAL DAILY
Qty: 90 TABLET | Refills: 3 | Status: SHIPPED | OUTPATIENT
Start: 2020-10-20 | End: 2020-11-05

## 2020-10-20 RX ORDER — FONDAPARINUX SODIUM 7.5 MG/.6ML
7.5 INJECTION SUBCUTANEOUS EVERY 24 HOURS
Status: CANCELLED | OUTPATIENT
Start: 2020-10-20

## 2020-10-20 RX ORDER — FONDAPARINUX SODIUM 10 MG/.8ML
10 INJECTION SUBCUTANEOUS EVERY EVENING
Qty: 6 SYRINGE | Refills: 1 | Status: SHIPPED | OUTPATIENT
Start: 2020-10-20 | End: 2021-01-11

## 2020-10-20 NOTE — PATIENT INSTRUCTIONS
Grayson Sandhu  was seen today for a hearing aid recheck appointment. His right hearing aid has been intermittent. Issue resolved with fresh .    He will follow up, if further concerns.      Marquita Watkins, AUD       Thank you for visiting the Pulmonary Hypertension Clinic today.      Today we discussed:   We will add spironolactone 12.5mg once daily. If you develop dizziness/lightheaded feeling please stop the medication and call to let us know.     Continue other medications unchanged.     We will call you Friday for an update.     Additional Instructions:    1. Continue staying active and eat a heart healthy, low sodium diet.     2. Please keep current list of medications with you at all times.     3. Remember to weigh yourself daily after voiding and write it down on a log. If you have gained/lost 2 pounds overnight or 5 pounds in a week contact us for medication adjustments or further instructions.    4. Please call us immediately if you have syncope (fainting or passing out), chest pain, worsening edema (swelling or weight gain), or general worsening in how you are feeling.       Follow up Appointment Information:  Next week for testing, to  Visit with Dr. Paniagua.     ---------------------------------------------------------------------------------------------------------------    If you have questions or concerns please contact us at:        Yoly Villegas RN, BSN, PHN  Deneen Mccann  (Schedule,Prior Auth)  Nurse Coordinator     Clinic   Pulmonary Hypertension   Pulmonary Hypertension  [facility]             [facility]  (P)[phone]    (P) [phone]        (F) [phone]      ** Please note that you will NOT receive a reminder call regarding your scheduled testing, reminder calls are for provider appointments only.  If you are scheduled for testing within the [employer] system you may receive a call regarding pre-registration for billing purposes only.**     ------------------------------------------------------------------------------------------------------------    Interested in joining a support group?    Pulmonary Hypertension  Association  Https://www.phassociation.org/  **Look at the Events Tab** They even have Support Groups that you can call into    [facility] Support Group  Second Saturday of the Month from 1-3 PM   [place] [address]  Leader: Marika Vila  Phone: [phone] or [phone]  Email: [email or web address]

## 2020-10-20 NOTE — LETTER
10/20/2020      RE: Yuridia Barksdale  [address] [address]       Dear Colleague,    Thank you for the opportunity to participate in the care of your patient, Yuridia Barksdale, at the [facility]. Please see a copy of my visit note below.    CARDIOLOGY  CLINIC VIDEO VISIT    Yuridia Barksdale is a 49 year old female who is being evaluated via a billable video visit.      The patient has been notified of following:     This video visit will be conducted via a call between you and your physician/provider. We have found that certain health care needs can be provided without the need for an in-person physical exam.  This service lets us provide the care you need with a video conversation.  If a prescription is necessary we can send it directly to your pharmacy.  If lab work is needed we can place an order for that and you can then stop by our lab to have the test done at a later time.    Virtual visits are billed at different rates depending on your insurance coverage. During this emergency period, for some insurers they may be billed the same as an in-person visit.  Please reach out to your insurance provider with any questions.    If during the course of the call the physician/provider feels a video visit is not appropriate, you will not be charged for this service.      Physician has received verbal consent for a Video Visit from the patient? Yes    Patient would like the video invitation sent by: Patient will be using My Chart       I have reviewed and updated the patient's Past Medical History, Social History, Family History and Medication List.    MEDICATIONS:  Current Outpatient Medications   Medication Sig Dispense Refill     ARIPiprazole (ABILIFY) 5 MG tablet Take 5 mg by mouth       atorvastatin (LIPITOR) 10 MG tablet Take 10 mg by mouth       clonazePAM (KLONOPIN) 1 MG tablet Take 1 mg by mouth        hydroxychloroquine (PLAQUENIL) 200 MG tablet Take 200 mg by mouth 2 times daily        potassium chloride (KLOR-CON) 20 MEQ packet Take 20 mEq by mouth daily 90 packet 3     sertraline (ZOLOFT) 100 MG tablet Take 200 mg by mouth       torsemide (DEMADEX) 20 MG tablet Take 4 tablets (80 mg) by mouth daily 90 tablet 1     traZODone (DESYREL) 100 MG tablet Take 100 mg by mouth       traZODone (DESYREL) 50 MG tablet        Warfarin Sodium (COUMADIN PO) Take  by mouth.         ALLERGIES  Heparin, Levaquin, Clindamycin, and Vancomycin      Self reported vitals:  Weight: 253 lb (255 lb 10/6)  BP x  HR x      Brief physical exam:  General: In no acute distress, upright and calm.  Eyes: No apparent redness or discharge.   Chest: No labored breathing, no cough during exam or audible wheezing.   Neuro: No obvious focal defects or tremors.   Psych: Alert and oriented. Does not appear anxious.     The rest of a comprehensive physical examination is deferred due to public health emergency video visit restrictions.       Most recent labs:         Primary PH cardiologist: Dr. Arcelia Paniagua      HPI:  Ms. Barksdale is a very pleasant 49 year old female with a PMhx including SLE with lupus antiphospholipid antibody syndrome and hx DVT on anticoagulation, and DAVIS on CPAP. She also has chronic thromboembolic pulmonary arterial hypertension. She was placed on riociguat, but was then referred to [facility] for surgical evaluation. Ultimately, she underwent pulmonary thromboendarterectomy at [facility] on July 14, 2020.  She has type IV disease and her PTE was somewhat difficult to the distal nature of her disease. Her post operative course was reportedly uncomplicated, and postoperative hemodynamics showed an PA pressure of 37/12 with a mean of 21 mmHg.  Her cardiac output was 6.16 L. Echo showed mild right ventricular enlargement and mildly reduced right ventricular systolic pressure.  Her estimated PA pressure  based on echocardiogram was 38 mmHg.  Her VQ scan score showed improved perfusion of the right lower lobe and left upper lobe. Her riociguat and her digoxin were discontinued at that time.      Post surgically she has been struggling with swelling and weight gain. When she saw Dr. Villafana in late July, weight was 278 # on our scale. She was feeling quite short of breath despite wearing her oxygen at all times. Her Lasix was increased to 80mg BID which resulted in slow improvement. When I followed up with her in early Aug, weight was down to 262#. As renal function remains preserved, we have kept her on the higher dose of Lasix. She again returned to see. Dr. Villafana and weight went down further to 254 #.     We have continued to touch base frequently to adjust her diuretics. We switched her Lasix to torsemide which made some improvement initially. Her weight has continued to fluctuate a bit, and last visit about 2 weeks ago her weight was down to 253# but she felt she still had some fluid in her legs, and her sats were still dropping periodically with exertion. We opted at that time to increase torsemide to 80mg daily. In addition, we added kDur at 20meq daily due to mild hypokalemia.     Today we are doing another virtual visit to follow up on these changes. She tells me that there hasn't been much change and her weight appears to have plateaued out in the 253-255# range. (note we had it as low as 249# initially after our switch to torsemide). She also tells me that with walking on her treadmill she notes that her sats seem to be a dropping a bit more quickly recently. Resting sats remain good in the mid 90s, but with exertion go to mid 80s.  She is using the oxygen only with planned exercise. Her repeat BMP done yesterday continues to show overall reasonable renal function, though as we escalate her diuretics, her K is still a bit low at 3.4.         CURRENT PULMONARY HYPERTENSION REGIMEN:     PAH Rx: None   (previously on riociguat, stopped post PTE)      Diuretics: Torsemide 80mg once daily     Oxygen: RA rest, 1.5-2L exertion     Anticoagulation: warfarin (follows chrom factor goal 20-30)  Indication: DVT/antiphospholipid        Assessment/Plan:     1. Chronic thromboembolic pulmonary hypertension.              --CTEPH due to her hypercoagulable state from her antiphospholipid antibody syndrome. She is now s/p pulmonary  thromboendarterectomy mid July 2020 at [facility]. This was somewhat difficult due to the distal nature of her disease. She is no longer on riociguat or digoxin.               --Her post op course has been complicated by volume overload. We have been making frequent adjustments of her diuretics, and there was initally good improvement with change to torsemide.  However, her weight has now plateaued out at 253-255# (down as low as 249# a few weeks ago) and she continues to report lower extremity edema and sats dropping with exertion, despite torsemide 80mg daily.   --Today we will cautiously add spironolactone 12.5mg daily. As she does not have a BP cuff at home I am hesitant to add full dose to start. I asked her to call with any new dizziness. I will keep her Kdur at 20meq for now. She has labs planned for next week.               --I've asked her to continue to wear oxygen to maintain sats >90% which she is putting in a good effort to do. She no longer requires oxygen at rest with sats 96-97%.               --She is compliant with her CPAP which I encouraged her to continue.              --Continue Coumadin for anticoagulation, in the setting of CTEPH and antiphospholipid antibody syndrome. She normally follows chromogenic factor goal of 20-30. Will clarify plans for bridging with upcoming procedures, as she tells me today she was told not to use Lovenox due to a heparin allergy.         Follow up plan: Plan is already in place to repeat echocardiogram, right heart catheterization, VQ scan and a CT scan  of the chest next week and to see Dr. Villafana.       Video-Visit Details    Type of service:  Video Visit    Video Start Time: 1345  Video End Time: 1410    Originating Location (pt. Location): Home    Distant Location (provider location):  [facility]    Platform used for Video Visit: Kaitlyn Silva PA-C  [facility] Heart  Pager [phone]      Please do not hesitate to contact me if you have any questions/concerns.     Sincerely,     CEDRICK Zamarripa

## 2020-10-20 NOTE — PROGRESS NOTES
CARDIOLOGY PH CLINIC VIDEO VISIT    Yuridia Barksdale is a 49 year old female who is being evaluated via a billable video visit.      The patient has been notified of following:     This video visit will be conducted via a call between you and your physician/provider. We have found that certain health care needs can be provided without the need for an in-person physical exam.  This service lets us provide the care you need with a video conversation.  If a prescription is necessary we can send it directly to your pharmacy.  If lab work is needed we can place an order for that and you can then stop by our lab to have the test done at a later time.    Virtual visits are billed at different rates depending on your insurance coverage. During this emergency period, for some insurers they may be billed the same as an in-person visit.  Please reach out to your insurance provider with any questions.    If during the course of the call the physician/provider feels a video visit is not appropriate, you will not be charged for this service.      Physician has received verbal consent for a Video Visit from the patient? Yes    Patient would like the video invitation sent by: Patient will be using My Chart       I have reviewed and updated the patient's Past Medical History, Social History, Family History and Medication List.    MEDICATIONS:  Current Outpatient Medications   Medication Sig Dispense Refill     ARIPiprazole (ABILIFY) 5 MG tablet Take 5 mg by mouth       atorvastatin (LIPITOR) 10 MG tablet Take 10 mg by mouth       clonazePAM (KLONOPIN) 1 MG tablet Take 1 mg by mouth       hydroxychloroquine (PLAQUENIL) 200 MG tablet Take 200 mg by mouth 2 times daily        potassium chloride (KLOR-CON) 20 MEQ packet Take 20 mEq by mouth daily 90 packet 3     sertraline (ZOLOFT) 100 MG tablet Take 200 mg by mouth       torsemide (DEMADEX) 20 MG tablet Take 4 tablets (80 mg) by mouth daily 90 tablet 1     traZODone (DESYREL) 100 MG  tablet Take 100 mg by mouth       traZODone (DESYREL) 50 MG tablet        Warfarin Sodium (COUMADIN PO) Take  by mouth.         ALLERGIES  Heparin, Levaquin, Clindamycin, and Vancomycin      Self reported vitals:  Weight: 253 lb (255 lb 10/6)  BP x  HR x      Brief physical exam:  General: In no acute distress, upright and calm.  Eyes: No apparent redness or discharge.   Chest: No labored breathing, no cough during exam or audible wheezing.   Neuro: No obvious focal defects or tremors.   Psych: Alert and oriented. Does not appear anxious.     The rest of a comprehensive physical examination is deferred due to public [facility] emergency video visit restrictions.       Most recent labs:         Primary PH cardiologist: Dr. Arcelia Paniagua      HPI:  Ms. Barksdale is a very pleasant 49 year old female with a PMhx including SLE with lupus antiphospholipid antibody syndrome and hx DVT on anticoagulation, and DAVIS on CPAP. She also has chronic thromboembolic pulmonary arterial hypertension. She was placed on riociguat, but was then referred to [facility] for surgical evaluation. Ultimately, she underwent pulmonary thromboendarterectomy at [facility] on July 14, 2020.  She has type IV disease and her PTE was somewhat difficult to the distal nature of her disease. Her post operative course was reportedly uncomplicated, and postoperative hemodynamics showed an PA pressure of 37/12 with a mean of 21 mmHg.  Her cardiac output was 6.16 L. Echo showed mild right ventricular enlargement and mildly reduced right ventricular systolic pressure.  Her estimated PA pressure based on echocardiogram was 38 mmHg.  Her VQ scan score showed improved perfusion of the right lower lobe and left upper lobe. Her riociguat and her digoxin were discontinued at that time.      Post surgically she has been struggling with swelling and weight gain. When she saw Dr. Villafana in late July, weight was 278 # on our scale. She was feeling  quite short of breath despite wearing her oxygen at all times. Her Lasix was increased to 80mg BID which resulted in slow improvement. When I followed up with her in early Aug, weight was down to 262#. As renal function remains preserved, we have kept her on the higher dose of Lasix. She again returned to see. Dr. Villafana and weight went down further to 254 #.     We have continued to touch base frequently to adjust her diuretics. We switched her Lasix to torsemide which made some improvement initially. Her weight has continued to fluctuate a bit, and last visit about 2 weeks ago her weight was down to 253# but she felt she still had some fluid in her legs, and her sats were still dropping periodically with exertion. We opted at that time to increase torsemide to 80mg daily. In addition, we added kDur at 20meq daily due to mild hypokalemia.     Today we are doing another virtual visit to follow up on these changes. She tells me that there hasn't been much change and her weight appears to have plateaued out in the 253-255# range. (note we had it as low as 249# initially after our switch to torsemide). She also tells me that with walking on her treadmill she notes that her sats seem to be a dropping a bit more quickly recently. Resting sats remain good in the mid 90s, but with exertion go to mid 80s.  She is using the oxygen only with planned exercise. Her repeat BMP done yesterday continues to show overall reasonable renal function, though as we escalate her diuretics, her K is still a bit low at 3.4.         CURRENT PULMONARY HYPERTENSION REGIMEN:     PAH Rx: None  (previously on riociguat, stopped post PTE)      Diuretics: Torsemide 80mg once daily     Oxygen: RA rest, 1.5-2L exertion     Anticoagulation: warfarin (follows chrom factor goal 20-30)  Indication: DVT/antiphospholipid        Assessment/Plan:     1. Chronic thromboembolic pulmonary hypertension.              --CTEPH due to her hypercoagulable state  from her antiphospholipid antibody syndrome. She is now s/p pulmonary  thromboendarterectomy mid July 2020 at [facility]. This was somewhat difficult due to the distal nature of her disease. She is no longer on riociguat or digoxin.               --Her post op course has been complicated by volume overload. We have been making frequent adjustments of her diuretics, and there was initally good improvement with change to torsemide.  However, her weight has now plateaued out at 253-255# (down as low as 249# a few weeks ago) and she continues to report lower extremity edema and sats dropping with exertion, despite torsemide 80mg daily.   --Today we will cautiously add spironolactone 12.5mg daily. As she does not have a BP cuff at home I am hesitant to add full dose to start. I asked her to call with any new dizziness. I will keep her Kdur at 20meq for now. She has labs planned for next week.               --I've asked her to continue to wear oxygen to maintain sats >90% which she is putting in a good effort to do. She no longer requires oxygen at rest with sats 96-97%.               --She is compliant with her CPAP which I encouraged her to continue.              --Continue Coumadin for anticoagulation, in the setting of CTEPH and antiphospholipid antibody syndrome. She normally follows chromogenic factor goal of 20-30. Will clarify plans for bridging with upcoming procedures, as she tells me today she was told not to use Lovenox due to a heparin allergy.         Follow up plan: Plan is already in place to repeat echocardiogram, right heart catheterization, VQ scan and a CT scan of the chest next week and to see Dr. Villafana.       Video-Visit Details    Type of service:  Video Visit    Video Start Time: 1345  Video End Time: 1410    Originating Location (pt. Location): Home    Distant Location (provider location):  [facility]    Platform used for Video Visit: Kaitlyn Silva  ALESHA  [facility] Heart  Pager [phone]

## 2020-10-20 NOTE — TELEPHONE ENCOUNTER
----- Message from CEDRICK Zamarripa sent at 10/20/2020  2:03 PM CDT -----  Regarding: bridging for procedures next week  We are arranging for Bridging with Lovenox for next week for her RHC/angio.    She told me today that [facility] told her she should not take Lovenox and instead should use Arixtra due to her new found heparin allergy? Do you know anything about this?    Please advise. She has her procedures next Wednesday.     Thx  shaista  --------------------------------------  Called [facility] and spoke with Pharmacist regarding dosing of Arixtra for bridging purposes. (278lbs/126.1kg)    2.5mg subcutaneous daily .  She thought maybe I should speak with an in-patient pharmacist r/t procedure considerations.  --------------------------------------  Called central pharmacy @ [facility] and spoke with PharmacistRasheeda.   Recommend Full dose of 10mg subcutaneous once daily.  Half life elimination is 17-20hrs, so patient would have to hold ~36-40 hrs prior to procedure start time. Procedure at noon on Wed 10/28.  Last dose to be taken 8pm Monday 10/26/20.  Re-start night of her procedure.  We will assume patient will need 5 days post to bring her Factor X to therapeutic level, so I will give her 6 syringes with 1 refill.  Pharmacist agrees. Yoly Villegas RN on 10/20/2020 at 5:32 PM  -------------------------------------  Called patient and we reviewed all Arixtra/Coumadin instructions along with pre-procedure instructions.  Patient verbalized understanding, agreed with plan and denied any further questions. Yoly Villegas RN on 10/20/2020 at 5:46 PM

## 2020-10-21 NOTE — TELEPHONE ENCOUNTER
Received VM from Rasheeda, Pharmacist advising me she had looked at literature for pre-procedure holding and by comparing to protocols for Heparin (Lovenox) we should have patient hold Arixtra for 48 hours in total.  She left her cell phone if we had further questions [phone].  --------------------------  Sent patient [employer] message with change in time of injection.    Sent Dr. Paniagua staff msg with Arixtra recommended dosing from Pharmacist. Yoly Villegas RN on 10/21/2020 at 2:13 PM

## 2020-10-23 ENCOUNTER — TELEPHONE (OUTPATIENT)
Dept: CARDIOLOGY | Facility: CLINIC | Age: 50
End: 2020-10-23

## 2020-10-23 NOTE — TELEPHONE ENCOUNTER
PA Initiation    Medication: Arixtra 10mg (0.8mg/ml)  Insurance Company: [employer] - Phone [phone] Fax [phone]  Start Date: 10/23/2020

## 2020-10-23 NOTE — TELEPHONE ENCOUNTER
patient's weight is 251.6lbs today and there is just a little bit of swelling left in the ankle.  Denies any dizziness or cramping.  She was on the treadmill when I spoke with her.    patient's stated she still hasn't been able to get the Arixtra as it's pending insurance approval.  She asked what will happen if she can't get it in time.  I wasn't sure, so I said I would call MD to find out backup plan.  Patient verbalized understanding, agreed with plan and denied any further questions. Yoly Villegas RN on 10/23/2020 at 1:33 PM      ----- Message from Yoly Villegas RN sent at 10/20/2020  2:54 PM CDT -----  Regarding: Call pt  call her Friday just to get an update before the weekend (weight, symptoms, swelling) to make sure we don't need to make any adjustments before her procedures next week?    Started Spironolactone 12.5mg  ----- Message -----  From: Micheline Chapa RN  Sent: 10/20/2020   2:12 PM CDT  To: Yoly Villegas RN  Subject: FW: visit                                          ----- Message -----  From: Azucena Silva PA  Sent: 10/20/2020   2:05 PM CDT  To: Cardiology Ph Nurse-  Subject: visit                                            Going to add spironolactone 12.5mg daily to see if I can dry her out a bit further before her RHC next week. She does not have a BP cuff at home so that is my only concern.    I asked her to start just 12.5mg and to be sure to call us with any new dizziness etc.  Could we please call her Friday just to get an update before the weekend (weight, symptoms, swelling) to make sure we don't need to make any adjustments before her procedures next week?    Side note: I have a message out to TT to clarify her bridging. She tells me today that [facility] told her she should not use Lovenox due to a heparin allergy, and use Arixtra instead. Will see if we need to adjust our plan.    thx

## 2020-10-23 NOTE — TELEPHONE ENCOUNTER
Called pharmacy to find out status of Arixtra approval.  Spoke with pharmacy Tech who states the med needs a Prior Auth.  Verbalized understanding.  ------------------------  Contacted Deneen Mccann who submitted an urgent Prior Auth for the Arixtra.    Spoke with Dr. Paniagua about situation in case med is not approved in time.  He was going to call patient and discuss then call me denise. Yoly Villegas RN on 10/23/2020 at 2:14 PM  ---------------------------    Called patient and reviewed status of PA having been submitted and now waiting on Insurance approval.  We also discussed Dr. Paniagua's wish to have her take Arixtra last dose 24 hours prior to procedure.  Patient verbalized understanding, agreed with plan and denied any further questions. Yoly Villegas RN on 10/23/2020 at 3:59 PM

## 2020-10-24 DIAGNOSIS — Z11.59 ENCOUNTER FOR SCREENING FOR OTHER VIRAL DISEASES: ICD-10-CM

## 2020-10-24 PROCEDURE — U0003 INFECTIOUS AGENT DETECTION BY NUCLEIC ACID (DNA OR RNA); SEVERE ACUTE RESPIRATORY SYNDROME CORONAVIRUS 2 (SARS-COV-2) (CORONAVIRUS DISEASE [COVID-19]), AMPLIFIED PROBE TECHNIQUE, MAKING USE OF HIGH THROUGHPUT TECHNOLOGIES AS DESCRIBED BY CMS-2020-01-R: HCPCS | Performed by: INTERNAL MEDICINE

## 2020-10-25 LAB
SARS-COV-2 RNA SPEC QL NAA+PROBE: NOT DETECTED
SPECIMEN SOURCE: NORMAL

## 2020-10-26 ENCOUNTER — TELEPHONE (OUTPATIENT)
Dept: CARDIOLOGY | Facility: CLINIC | Age: 50
End: 2020-10-26

## 2020-10-26 NOTE — TELEPHONE ENCOUNTER
Spoke with patient and advised PA was approved on Saturday and she should be able to  her Rx this AM. Advised patient call clinic if she has any issues. Micheline Chapa, RN on 10/26/2020 at 9:12 AM    ----- Message from Deneen Mccann sent at 10/26/2020  8:51 AM CDT -----  Regarding: FW: Did pt receive her Arixtra?  Hi ladies,    Pt's Arixtra was approved on Saturday.    -Deneen  ----- Message -----  From: Yoly Villegas, RN  Sent: 10/26/2020  To: Deneen Mccann, Cardiology Ph Nurse-  Subject: Did pt receive her Arixtra?                      Urgent PA for Arixtra was submitted Friday afternoon for Arixtra (bridging med) due to heparin allergy.  Per Dr. Paniagua, if patient did not receive it by Monday Morning, she needs to re-start her Coumadin and have her testing re-scheduled.    Patient is aware of this plan as well, but please call and find out if she was able to get the Medication in time.    Thanks!  Yoly

## 2020-10-26 NOTE — TELEPHONE ENCOUNTER
Spoke with patient who advised that there are no locations near her home that carry her Arixtra and she will not be able to get it by noon today (if today at all). Per pharmacy notes, patient needs to take dose today at noon and hold 36-40 hrs prior. Patient advised she will need to cancel and reschedule for another time. Micheline Chapa RN on 10/26/2020 at 10:52 AM    Staff message sent to Celso to reschedule patient. Micheline Chapa RN on 10/26/2020 at 10:53 AM      [facility] Call Center    Phone Message    May a detailed message be left on voicemail: no     Reason for Call: Other: Pt would like a call back to discuss her procedure and medication so she knows when to takew her medication so she does not have to cancel her appt. PLease reach out to pt to discuss     Action Taken: Message routed to:  [facility] Cardio    Travel Screening: Not Applicable

## 2020-10-26 NOTE — TELEPHONE ENCOUNTER
Prior Authorization Approval    Authorization Effective Date: 9/24/2020  Authorization Expiration Date: 10/24/2021  Medication: Arixtra 10mg (0.8mg/ml) - Approved  Approved Dose/Quantity: 6 vials/6 days  Reference #: [id number]   Insurance Company: [employer] - Phone [phone] Fax [phone]   Which Pharmacy is filling the prescription (Not needed for infusion/clinic administered): [id number] - [place] - [address] AT [facility] & [address] [place] [address]  ----------------------------  Insurance: [employer] ([place])  BIN: N/A  PCN: N/A  ID#: [id number]  GRP#: N/A

## 2020-10-27 ENCOUNTER — TELEPHONE (OUTPATIENT)
Dept: CARDIOLOGY | Facility: CLINIC | Age: 50
End: 2020-10-27

## 2020-10-27 NOTE — TELEPHONE ENCOUNTER
Called to complete pre procedure reminder, travel screen and updated visitor policy. Pt states she had to cancel her procedure.

## 2020-10-28 ENCOUNTER — TELEPHONE (OUTPATIENT)
Dept: CARDIOLOGY | Facility: CLINIC | Age: 50
End: 2020-10-28

## 2020-10-28 DIAGNOSIS — Z11.59 ENCOUNTER FOR SCREENING FOR OTHER VIRAL DISEASES: Primary | ICD-10-CM

## 2020-10-28 NOTE — TELEPHONE ENCOUNTER
LM for patient I was following up on the Arixtra, as I know she wasn't able to get it in time to keep RHC appt.    I am calling to figure out where we can get it so we get it now and don't have this problem next time we re-scheduled the test.  Left my direct dial number for call back. Yoly Villegas RN on 10/28/2020 at 9:30 AM  -------------------  FW: Rescheduling RHC/appts  Received: Today  Message Contents   Deneen Mccann sent to Yoly Villegas, RENA             Belkis Frederick,     Pt is rescheduled for 11/11 for all of her testing. Could you please call her? She needs a re-review of the pre-procedure instructions and she also has questions about her diuretic. Thank you!     -Deneen    --------------------  Called patient and reviewed pre-procedure instructions including Arixtra dosing.  She has already called her pharmacy so they can order med in for her.    Patient questioned her diuretic dosing, as her weight is still dropping, today it is 250.8lbs.  There is always some swelling in her left leg, but she can still see her sock indentation on both ankles.  Her breathing is the same with activity, dropping to 88% with 2L O2.  Discussed increasing the O2 to 3L while on the treadmill to help her body function with less stress, she agreed to increase next time she was on the treadmill.  Patient stated she has been taking a whole tablet of Spironolactone instead of the half that was recommended.  Advised her to cut back to the half tab that is prescribed.    patient asked for a closer [facility] testing facility.  Offered to fax order anywhere if she could find facility and provide me with name and fax number via [employer].  Patient verbalized understanding, agreed with plan and denied any further questions. Yoly Villegas RN on 10/28/2020 at 2:06 PM    Being that patient's procedure got delayed, we agreed she should have a video visit with Azucena Silva PA-C   the week.  We will have labs drawn 1-2 days before her  video visit on Thursday 11/5/20 locally.

## 2020-11-03 ENCOUNTER — TRANSFERRED RECORDS (OUTPATIENT)
Dept: HEALTH INFORMATION MANAGEMENT | Facility: CLINIC | Age: 50
End: 2020-11-03

## 2020-11-04 NOTE — PROGRESS NOTES
CARDIOLOGY PH CLINIC VIDEO VISIT    Yuridia Barksdale is a 49 year old female who is being evaluated via a billable video visit.      The patient has been notified of following:     This video visit will be conducted via a call between you and your physician/provider. We have found that certain health care needs can be provided without the need for an in-person physical exam.  This service lets us provide the care you need with a video conversation.  If a prescription is necessary we can send it directly to your pharmacy.  If lab work is needed we can place an order for that and you can then stop by our lab to have the test done at a later time.    Virtual visits are billed at different rates depending on your insurance coverage. During this emergency period, for some insurers they may be billed the same as an in-person visit.  Please reach out to your insurance provider with any questions.    If during the course of the call the physician/provider feels a video visit is not appropriate, you will not be charged for this service.      Physician has received verbal consent for a Video Visit from the patient? Yes    Patient would like the video invitation sent by: Pt is using My Chart        I have reviewed and updated the patient's Past Medical History, Social History, Family History and Medication List.    MEDICATIONS:  Current Outpatient Medications   Medication Sig Dispense Refill     ARIPiprazole (ABILIFY) 5 MG tablet Take 5 mg by mouth       atorvastatin (LIPITOR) 10 MG tablet Take 10 mg by mouth       clonazePAM (KLONOPIN) 1 MG tablet Take 1 mg by mouth       fondaparinux ANTICOAGULANT (ARIXTRA ANTICOAGULANT) 10 MG/0.8ML injection Inject 0.8 mLs (10 mg) Subcutaneous every evening At 8pm 6 Syringe 1     hydroxychloroquine (PLAQUENIL) 200 MG tablet Take 200 mg by mouth 2 times daily        potassium chloride (KLOR-CON) 20 MEQ packet Take 20 mEq by mouth daily 90 packet 3     sertraline (ZOLOFT) 100 MG tablet Take  200 mg by mouth       spironolactone (ALDACTONE) 25 MG tablet Take 0.5 tablets (12.5 mg) by mouth daily 90 tablet 3     torsemide (DEMADEX) 20 MG tablet Take 4 tablets (80 mg) by mouth daily 90 tablet 1     traZODone (DESYREL) 50 MG tablet        Warfarin Sodium (COUMADIN PO) Take  by mouth.         ALLERGIES  Lovenox [enoxaparin], Heparin, Levaquin, Clindamycin, and Vancomycin      Self reported vitals:  Weight: 254 lbs  (250 lb 10/28, 253 lb 10/20, 255 lb 10/6)  BP x  HR x    Brief physical exam:  General: In no acute distress, upright and calm.  Eyes: No apparent redness or discharge.   Chest: No labored breathing, no cough during exam or audible wheezing.   Neuro: No obvious focal defects or tremors.   Psych: Alert and oriented. Does not appear anxious.     The rest of a comprehensive physical examination is deferred due to public health emergency video visit restrictions.       Most recent labs:           Primary PH cardiologist: Dr. Arcelia Paniagua      HPI:  Ms. Barksdale is a very pleasant 49 year old female with a PMhx including SLE with lupus antiphospholipid antibody syndrome and hx DVT on anticoagulation, and DAVIS on CPAP. She also has chronic thromboembolic pulmonary arterial hypertension. She was initially placed on riociguat, and then referred to [facility] for surgical evaluation. Ultimately, she underwent pulmonary thromboendarterectomy at [facility] on July 14, 2020.  She has type IV disease and her PTE was somewhat difficult to the distal nature of her disease. Her post operative course was reportedly uncomplicated, and postoperative hemodynamics showed an PA pressure of 37/12 with a mean of 21 mmHg.  Her cardiac output was 6.16 L. Echo showed mild right ventricular enlargement and mildly reduced right ventricular systolic pressure.  Her estimated PA pressure based on echocardiogram was 38 mmHg.  Her VQ scan score showed improved perfusion of the right lower lobe and left upper  lobe. Her riociguat and her digoxin were discontinued at that time.      Post surgically she has been struggling with swelling and weight gain. When she saw Dr. Villafana in late July, weight was 278 # on our scale. She was feeling short of breath despite wearing her oxygen at all times. Her Lasix was increased to 80mg BID which resulted in slow improvement. However, she has continued to have some swelling and desaturation with exercise.  As renal function remains preserved, we have been slowly escalating her diuretics. I switched her lasix to torsemide, and she is now up to 80mg daily. We then added KDur at 20meq daily due to mild hypokalemia. At our last virtual visit on 10/20, her weights had plateaued out in the 253-255# range. (note we had it as low as 249# initially after our switch to torsemide). Thus, we decide to add spironolactone at 12.5mg daily. Plan was for RHC/echo the following week.     Over the next several days her weight came down as low as 250 lbs, though upon questioning she was accidentally taking a full 25mg of spironolactone. Our nursing staff then corrected her to cut them to 12.5mg a few days ago. Unfortunately, her testing got postponed as she had trouble getting the Arixtra for bridging for her procedures. Thus, we are doing another virtual visit today.    Her weight over the last few days has gone back up to 254 lbs this morning. However, she admits she hasn't been exercising or watching her diet as much, so is unsure if this is caloric weight instead. She had noted that her oxygen sats were dropping a bit quicker while on the treadmill, but since increasing to 2.5L with exertion, they now remain in the 90-91% range with exercise. She had labs done on 11/3 which show a BUN/Scr of 24/1.10, and K has normalized at 4.2.         CURRENT PULMONARY HYPERTENSION REGIMEN:     PAH Rx: None  (previously on riociguat, stopped post PTE)      Diuretics: Torsemide 80mg once daily, spironolactone  12.5mg daily      Oxygen: RA rest, 2.5L exertion     Anticoagulation: warfarin (follows chrom factor goal 20-30) [now has Arixtra for bridging]  Indication: DVT/antiphospholipid        Assessment/Plan:     1. Chronic thromboembolic pulmonary hypertension.              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 She is now s/p pulmonary  thromboendarterectomy mid July 2020 at [facility]. This was somewhat difficult due to the distal nature of her disease. She is no longer on riociguat or digoxin.               --Her post op course has been complicated by volume overload. We have been making frequent adjustments of her diuretics, including a change to torsemide, now up to 80mg daily. Today I asked her to increase her spironolactone back to 25mg once daily as well, with a goal weight of 249-250# prior to her RHC. Will await repeat BMP next week, and may be able to stop her potassium supplements.               --I've asked her to continue to wear oxygen to maintain sats >90% which she is putting in a good effort to do. She no longer requires oxygen at rest with sats 96-97%. She is wearing 2.5L with exercise with reported sats 90-91%.               --She is compliant with her CPAP which I encouraged her to continue.              --Continue Coumadin for anticoagulation, in the setting of CTEPH and antiphospholipid antibody syndrome. She normally follows chromogenic factor goal of 20-30. She now has Arixtra, with instructions for bridging prior to her RHC next week.         Follow up plan: Plan is already in place to repeat echocardiogram, right heart catheterization, VQ scan and a CT scan of the chest next week.       Video-Visit Details    Type of service:  Video Visit    Video Start Time: 0925  Video End Time: 0938    Originating Location (pt. Location): Home    Distant Location (provider location):  [facility]    Platform used for  Video Visit: Kaitlyn Silva PA-C  [facility]  Pager [phone]

## 2020-11-05 ENCOUNTER — VIRTUAL VISIT (OUTPATIENT)
Dept: CARDIOLOGY | Facility: CLINIC | Age: 50
End: 2020-11-05
Attending: PHYSICIAN ASSISTANT
Payer: COMMERCIAL

## 2020-11-05 DIAGNOSIS — R06.09 DOE (DYSPNEA ON EXERTION): ICD-10-CM

## 2020-11-05 DIAGNOSIS — I27.24 CTEPH (CHRONIC THROMBOEMBOLIC PULMONARY HYPERTENSION) (H): ICD-10-CM

## 2020-11-05 PROCEDURE — 99214 OFFICE O/P EST MOD 30 MIN: CPT | Mod: GT | Performed by: PHYSICIAN ASSISTANT

## 2020-11-05 RX ORDER — SPIRONOLACTONE 25 MG/1
25 TABLET ORAL DAILY
Qty: 90 TABLET | Refills: 3 | Status: SHIPPED | OUTPATIENT
Start: 2020-11-05 | End: 2021-02-01

## 2020-11-05 NOTE — LETTER
11/5/2020      RE: Yuridia Barksdale  [address] [address]       Dear Colleague,    Thank you for the opportunity to participate in the care of your patient, Yuridia Barksdale, at the [facility] [place] at [facility]. Please see a copy of my visit note below.    CARDIOLOGY  CLINIC VIDEO VISIT    Yuridia Barksdale is a 49 year old female who is being evaluated via a billable video visit.      The patient has been notified of following:     This video visit will be conducted via a call between you and your physician/provider. We have found that certain health care needs can be provided without the need for an in-person physical exam.  This service lets us provide the care you need with a video conversation.  If a prescription is necessary we can send it directly to your pharmacy.  If lab work is needed we can place an order for that and you can then stop by our lab to have the test done at a later time.    Virtual visits are billed at different rates depending on your insurance coverage. During this emergency period, for some insurers they may be billed the same as an in-person visit.  Please reach out to your insurance provider with any questions.    If during the course of the call the physician/provider feels a video visit is not appropriate, you will not be charged for this service.      Physician has received verbal consent for a Video Visit from the patient? Yes    Patient would like the video invitation sent by: Pt is using My Chart        I have reviewed and updated the patient's Past Medical History, Social History, Family History and Medication List.    MEDICATIONS:  Current Outpatient Medications   Medication Sig Dispense Refill     ARIPiprazole (ABILIFY) 5 MG tablet Take 5 mg by mouth       atorvastatin (LIPITOR) 10 MG tablet Take 10 mg by mouth       clonazePAM (KLONOPIN) 1 MG tablet Take 1 mg by mouth       fondaparinux ANTICOAGULANT  (ARIXTRA ANTICOAGULANT) 10 MG/0.8ML injection Inject 0.8 mLs (10 mg) Subcutaneous every evening At 8pm 6 Syringe 1     hydroxychloroquine (PLAQUENIL) 200 MG tablet Take 200 mg by mouth 2 times daily        potassium chloride (KLOR-CON) 20 MEQ packet Take 20 mEq by mouth daily 90 packet 3     sertraline (ZOLOFT) 100 MG tablet Take 200 mg by mouth       spironolactone (ALDACTONE) 25 MG tablet Take 0.5 tablets (12.5 mg) by mouth daily 90 tablet 3     torsemide (DEMADEX) 20 MG tablet Take 4 tablets (80 mg) by mouth daily 90 tablet 1     traZODone (DESYREL) 50 MG tablet        Warfarin Sodium (COUMADIN PO) Take  by mouth.         ALLERGIES  Lovenox [enoxaparin], Heparin, Levaquin, Clindamycin, and Vancomycin      Self reported vitals:  Weight: 254 lbs  (250 lb 10/28, 253 lb 10/20, 255 lb 10/6)  BP x  HR x    Brief physical exam:  General: In no acute distress, upright and calm.  Eyes: No apparent redness or discharge.   Chest: No labored breathing, no cough during exam or audible wheezing.   Neuro: No obvious focal defects or tremors.   Psych: Alert and oriented. Does not appear anxious.     The rest of a comprehensive physical examination is deferred due to public health emergency video visit restrictions.       Most recent labs:           Primary PH cardiologist: Dr. Arcelia Paniagua      HPI:  Ms. Barksdale is a very pleasant 49 year old female with a PMhx including SLE with lupus antiphospholipid antibody syndrome and hx DVT on anticoagulation, and DAVIS on CPAP. She also has chronic thromboembolic pulmonary arterial hypertension. She was initially placed on riociguat, and then referred to [facility] for surgical evaluation. Ultimately, she underwent pulmonary thromboendarterectomy at [facility] on July 14, 2020.  She has type IV disease and her PTE was somewhat difficult to the distal nature of her disease. Her post operative course was reportedly uncomplicated, and postoperative hemodynamics showed  an PA pressure of 37/12 with a mean of 21 mmHg.  Her cardiac output was 6.16 L. Echo showed mild right ventricular enlargement and mildly reduced right ventricular systolic pressure.  Her estimated PA pressure based on echocardiogram was 38 mmHg.  Her VQ scan score showed improved perfusion of the right lower lobe and left upper lobe. Her riociguat and her digoxin were discontinued at that time.      Post surgically she has been struggling with swelling and weight gain. When she saw Dr. Villafana in late July, weight was 278 # on our scale. She was feeling short of breath despite wearing her oxygen at all times. Her Lasix was increased to 80mg BID which resulted in slow improvement. However, she has continued to have some swelling and desaturation with exercise.  As renal function remains preserved, we have been slowly escalating her diuretics. I switched her lasix to torsemide, and she is now up to 80mg daily. We then added KDur at 20meq daily due to mild hypokalemia. At our last virtual visit on 10/20, her weights had plateaued out in the 253-255# range. (note we had it as low as 249# initially after our switch to torsemide). Thus, we decide to add spironolactone at 12.5mg daily. Plan was for RHC/echo the following week.     Over the next several days her weight came down as low as 250 lbs, though upon questioning she was accidentally taking a full 25mg of spironolactone. Our nursing staff then corrected her to cut them to 12.5mg a few days ago. Unfortunately, her testing got postponed as she had trouble getting the Arixtra for bridging for her procedures. Thus, we are doing another virtual visit today.    Her weight over the last few days has gone back up to 254 lbs this morning. However, she admits she hasn't been exercising or watching her diet as much, so is unsure if this is caloric weight instead. She had noted that her oxygen sats were dropping a bit quicker while on the treadmill, but since increasing to  2.5L with exertion, they now remain in the 90-91% range with exercise. She had labs done on 11/3 which show a BUN/Scr of 24/1.10, and K has normalized at 4.2.         CURRENT PULMONARY HYPERTENSION REGIMEN:     PAH Rx: None  (previously on riociguat, stopped post PTE)      Diuretics: Torsemide 80mg once daily, spironolactone 12.5mg daily      Oxygen: RA rest, 2.5L exertion     Anticoagulation: warfarin (follows chrom factor goal 20-30) [now has Arixtra for bridging]  Indication: DVT/antiphospholipid        Assessment/Plan:     1. Chronic thromboembolic pulmonary hypertension.              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 She is now s/p pulmonary  thromboendarterectomy mid July 2020 at [facility]. This was somewhat difficult due to the distal nature of her disease. She is no longer on riociguat or digoxin.               --Her post op course has been complicated by volume overload. We have been making frequent adjustments of her diuretics, including a change to torsemide, now up to 80mg daily. Today I asked her to increase her spironolactone back to 25mg once daily as well, with a goal weight of 249-250# prior to her RHC. Will await repeat BMP next week, and may be able to stop her potassium supplements.               --I've asked her to continue to wear oxygen to maintain sats >90% which she is putting in a good effort to do. She no longer requires oxygen at rest with sats 96-97%. She is wearing 2.5L with exercise with reported sats 90-91%.               --She is compliant with her CPAP which I encouraged her to continue.              --Continue Coumadin for anticoagulation, in the setting of CTEPH and antiphospholipid antibody syndrome. She normally follows chromogenic factor goal of 20-30. She now has Arixtra, with instructions for bridging prior to her RHC next week.         Follow up plan: Plan is already in place to repeat echocardiogram, right heart  catheterization, VQ scan and a CT scan of the chest next week.       Video-Visit Details    Type of service:  Video Visit    Video Start Time: 0925  Video End Time: 0938    Originating Location (pt. Location): Home    Distant Location (provider location):  [facility]    Platform used for Video Visit: Kaitlyn Silva PA-C  [facility]  Pager [phone]        Please do not hesitate to contact me if you have any questions/concerns.     Sincerely,     CEDRICK Zamarripa

## 2020-11-05 NOTE — PATIENT INSTRUCTIONS
Thank you for visiting the Pulmonary Hypertension Clinic today.      Today we discussed:   We will keep working on getting your fluid status in an optimal place prior to your heart catheterization.     Medication Changes:   INCREASE your spironolactone back to 25mg (a full pill) once daily. I sent in a new prescription for you after our visit.       Follow up Appointment Information:  As scheduled on 11/11 for testing.       Additional Instructions:    1. Continue staying active and eat a heart healthy, low sodium diet.     2. Please keep current list of medications with you at all times.     3. Remember to weigh yourself daily after voiding and write it down on a log. If you have gained/lost 2 pounds overnight or 5 pounds in a week contact us for medication adjustments or further instructions.    4. Please call us immediately if you have syncope (fainting or passing out), chest pain, worsening edema (swelling or weight gain), or general worsening in how you are feeling.         ---------------------------------------------------------------------------------------------------------------    If you have questions or concerns please contact us at:        Yoly Villegas RN, BSN, PHN  Deneen Mccann  (Schedule,Prior Auth)  Nurse Coordinator     Clinic   Pulmonary Hypertension   Pulmonary Hypertension  [facility]             [facility]  (P)[phone]    (P) [phone]        (F) [phone]      ** Please note that you will NOT receive a reminder call regarding your scheduled testing, reminder calls are for provider appointments only.  If you are scheduled for testing within the [employer] system you may receive a call regarding pre-registration for billing purposes only.**     ------------------------------------------------------------------------------------------------------------    Interested in joining a support group?    Pulmonary  Hypertension Association  Https://www.phassociation.org/  **Look at the Events Tab** They even have Support Groups that you can call into    [facility] Support Group  Second Saturday of the Month from 1-3 PM   [facility] [address]  Leader: Marika Vila  Phone: [phone] or [phone]  Email: [email or web address]

## 2020-11-10 ENCOUNTER — TELEPHONE (OUTPATIENT)
Dept: CARDIOLOGY | Facility: CLINIC | Age: 50
End: 2020-11-10

## 2020-11-10 DIAGNOSIS — R06.09 DYSPNEA ON EXERTION: ICD-10-CM

## 2020-11-10 DIAGNOSIS — I27.20 PULMONARY HYPERTENSION (H): ICD-10-CM

## 2020-11-11 ENCOUNTER — APPOINTMENT (OUTPATIENT)
Dept: MEDSURG UNIT | Facility: CLINIC | Age: 50
End: 2020-11-11
Attending: INTERNAL MEDICINE
Payer: COMMERCIAL

## 2020-11-11 ENCOUNTER — HOSPITAL ENCOUNTER (OUTPATIENT)
Dept: CT IMAGING | Facility: CLINIC | Age: 50
End: 2020-11-11
Attending: INTERNAL MEDICINE
Payer: COMMERCIAL

## 2020-11-11 ENCOUNTER — HOSPITAL ENCOUNTER (OUTPATIENT)
Facility: CLINIC | Age: 50
Discharge: HOME | End: 2020-11-11
Attending: INTERNAL MEDICINE | Admitting: INTERNAL MEDICINE
Payer: COMMERCIAL

## 2020-11-11 ENCOUNTER — TELEPHONE (OUTPATIENT)
Dept: CARDIOLOGY | Facility: CLINIC | Age: 50
End: 2020-11-11

## 2020-11-11 ENCOUNTER — HOSPITAL ENCOUNTER (OUTPATIENT)
Dept: CARDIOLOGY | Facility: CLINIC | Age: 50
End: 2020-11-11
Attending: INTERNAL MEDICINE
Payer: COMMERCIAL

## 2020-11-11 ENCOUNTER — HOSPITAL ENCOUNTER (OUTPATIENT)
Dept: NUCLEAR MEDICINE | Facility: CLINIC | Age: 50
Setting detail: NUCLEAR MEDICINE
Discharge: HOME | End: 2020-11-11
Attending: INTERNAL MEDICINE | Admitting: INTERNAL MEDICINE
Payer: COMMERCIAL

## 2020-11-11 VITALS
TEMPERATURE: 98.2 F | BODY MASS INDEX: 40.82 KG/M2 | HEART RATE: 67 BPM | DIASTOLIC BLOOD PRESSURE: 88 MMHG | HEIGHT: 66 IN | RESPIRATION RATE: 18 BRPM | OXYGEN SATURATION: 96 % | SYSTOLIC BLOOD PRESSURE: 141 MMHG | WEIGHT: 254 LBS

## 2020-11-11 DIAGNOSIS — I27.24 CTEPH (CHRONIC THROMBOEMBOLIC PULMONARY HYPERTENSION) (H): ICD-10-CM

## 2020-11-11 DIAGNOSIS — R06.02 SOB (SHORTNESS OF BREATH): ICD-10-CM

## 2020-11-11 DIAGNOSIS — R06.09 DYSPNEA ON EXERTION: ICD-10-CM

## 2020-11-11 DIAGNOSIS — I27.20 PULMONARY HYPERTENSION (H): ICD-10-CM

## 2020-11-11 LAB
ANION GAP SERPL CALCULATED.3IONS-SCNC: 5 MMOL/L (ref 3–14)
BUN SERPL-MCNC: 26 MG/DL (ref 7–30)
CALCIUM SERPL-MCNC: 9.4 MG/DL (ref 8.5–10.1)
CHLORIDE SERPL-SCNC: 105 MMOL/L (ref 94–109)
CO2 SERPL-SCNC: 28 MMOL/L (ref 20–32)
CREAT SERPL-MCNC: 1.14 MG/DL (ref 0.52–1.04)
ERYTHROCYTE [DISTWIDTH] IN BLOOD BY AUTOMATED COUNT: 14.1 % (ref 10–15)
GFR SERPL CREATININE-BSD FRML MDRD: 57 ML/MIN/{1.73_M2}
GLUCOSE SERPL-MCNC: 91 MG/DL (ref 70–99)
HCG SERPL QL: NEGATIVE
HCT VFR BLD AUTO: 45 % (ref 35–47)
HGB BLD-MCNC: 14.4 G/DL (ref 11.7–15.7)
INR PPP: 1.57 (ref 0.86–1.14)
MCH RBC QN AUTO: 29 PG (ref 26.5–33)
MCHC RBC AUTO-ENTMCNC: 32 G/DL (ref 31.5–36.5)
MCV RBC AUTO: 91 FL (ref 78–100)
NT-PROBNP SERPL-MCNC: 398 PG/ML (ref 0–125)
PLATELET # BLD AUTO: 269 10E9/L (ref 150–450)
POTASSIUM SERPL-SCNC: 4 MMOL/L (ref 3.4–5.3)
RADIOLOGIST FLAGS: ABNORMAL
RBC # BLD AUTO: 4.97 10E12/L (ref 3.8–5.2)
SODIUM SERPL-SCNC: 139 MMOL/L (ref 133–144)
WBC # BLD AUTO: 4.2 10E9/L (ref 4–11)

## 2020-11-11 PROCEDURE — C1894 INTRO/SHEATH, NON-LASER: HCPCS | Performed by: INTERNAL MEDICINE

## 2020-11-11 PROCEDURE — 250N000011 HC RX IP 250 OP 636: Performed by: INTERNAL MEDICINE

## 2020-11-11 PROCEDURE — 80048 BASIC METABOLIC PNL TOTAL CA: CPT | Performed by: INTERNAL MEDICINE

## 2020-11-11 PROCEDURE — 83880 ASSAY OF NATRIURETIC PEPTIDE: CPT | Performed by: INTERNAL MEDICINE

## 2020-11-11 PROCEDURE — 250N000009 HC RX 250: Performed by: INTERNAL MEDICINE

## 2020-11-11 PROCEDURE — 78830 RP LOCLZJ TUM SPECT W/CT 1: CPT

## 2020-11-11 PROCEDURE — 272N000001 HC OR GENERAL SUPPLY STERILE: Performed by: INTERNAL MEDICINE

## 2020-11-11 PROCEDURE — 85027 COMPLETE CBC AUTOMATED: CPT | Performed by: INTERNAL MEDICINE

## 2020-11-11 PROCEDURE — 93451 RIGHT HEART CATH: CPT | Mod: 26 | Performed by: INTERNAL MEDICINE

## 2020-11-11 PROCEDURE — 85610 PROTHROMBIN TIME: CPT | Performed by: INTERNAL MEDICINE

## 2020-11-11 PROCEDURE — 343N000001 HC RX 343: Performed by: INTERNAL MEDICINE

## 2020-11-11 PROCEDURE — 71275 CT ANGIOGRAPHY CHEST: CPT

## 2020-11-11 PROCEDURE — 99214 OFFICE O/P EST MOD 30 MIN: CPT | Mod: 25 | Performed by: INTERNAL MEDICINE

## 2020-11-11 PROCEDURE — 71275 CT ANGIOGRAPHY CHEST: CPT | Mod: 26 | Performed by: RADIOLOGY

## 2020-11-11 PROCEDURE — 36415 COLL VENOUS BLD VENIPUNCTURE: CPT | Performed by: INTERNAL MEDICINE

## 2020-11-11 PROCEDURE — 84703 CHORIONIC GONADOTROPIN ASSAY: CPT | Performed by: INTERNAL MEDICINE

## 2020-11-11 PROCEDURE — 93306 TTE W/DOPPLER COMPLETE: CPT | Mod: 26 | Performed by: INTERNAL MEDICINE

## 2020-11-11 PROCEDURE — A9540 TC99M MAA: HCPCS | Performed by: INTERNAL MEDICINE

## 2020-11-11 PROCEDURE — 999N000132 HC STATISTIC PP CARE STAGE 1

## 2020-11-11 PROCEDURE — 255N000002 HC RX 255 OP 636: Performed by: INTERNAL MEDICINE

## 2020-11-11 PROCEDURE — 272N000002 HC OR SUPPLY OTHER OPNP: Performed by: INTERNAL MEDICINE

## 2020-11-11 PROCEDURE — 93451 RIGHT HEART CATH: CPT | Performed by: INTERNAL MEDICINE

## 2020-11-11 PROCEDURE — 78580 LUNG PERFUSION IMAGING: CPT | Mod: 26 | Performed by: RADIOLOGY

## 2020-11-11 RX ORDER — LIDOCAINE 40 MG/G
CREAM TOPICAL
Status: COMPLETED | OUTPATIENT
Start: 2020-11-11 | End: 2020-11-11

## 2020-11-11 RX ORDER — IOPAMIDOL 755 MG/ML
100 INJECTION, SOLUTION INTRAVASCULAR ONCE
Status: COMPLETED | OUTPATIENT
Start: 2020-11-11 | End: 2020-11-11

## 2020-11-11 RX ADMIN — LIDOCAINE: 40 CREAM TOPICAL at 14:01

## 2020-11-11 RX ADMIN — KIT FOR THE PREPARATION OF TECHNETIUM TC 99M ALBUMIN AGGREGATED 6.2 MILLICURIE: 2.5 INJECTION, POWDER, FOR SOLUTION INTRAVENOUS at 09:17

## 2020-11-11 RX ADMIN — HUMAN ALBUMIN MICROSPHERES AND PERFLUTREN 5 ML: 10; .22 INJECTION, SOLUTION INTRAVENOUS at 12:30

## 2020-11-11 RX ADMIN — IOPAMIDOL 100 ML: 755 INJECTION, SOLUTION INTRAVENOUS at 10:09

## 2020-11-11 ASSESSMENT — MIFFLIN-ST. JEOR: SCORE: 1803.64

## 2020-11-11 NOTE — TELEPHONE ENCOUNTER
I notified Yoly BARRAZA RN for the PH team regarding this message and she will contact. I will route this message to the PH team for further review.     Johnny OLIVAS  ------------------------------  Spoke with Evelin who wanted to make sure MD was aware of the results of patient's VQ scan notable for thromboembolic disease.  Verbalized understanding and sent Dr. Paniagua a message, as he is scheduled to perform a RHC on this patient today.  Yoly Villegas RN on 11/11/2020 at 1:23 PM       Dr. Paniagua confirmed he received my message.

## 2020-11-11 NOTE — IP AVS SNAPSHOT
[facility]  [address] [place] [id number]  Phone: [phone]                                    After Visit Summary   11/11/2020    Yuridia Barksdale    MRN: [id number]           After Visit Summary Signature Page    I have received my discharge instructions, and my questions have been answered. I have discussed any challenges I see with this plan with the nurse or doctor.    ..........................................................................................................................................  Patient/Patient Representative Signature      ..........................................................................................................................................  Patient Representative Print Name and Relationship to Patient    ..................................................               ................................................  Date                                   Time    ..........................................................................................................................................  Reviewed by Signature/Title    ...................................................              ..............................................  Date                                               Time          22EPIC Rev 08/18

## 2020-11-11 NOTE — DISCHARGE INSTRUCTIONS
[facility]                        Interventional Cardiology  Discharge Instructions   Post Right Heart Cath      AFTER YOU GO HOME:    DO drink plenty of fluids    DO resume your regular diet and medications unless otherwise instructed by your Primary Physician    Do Not scrub the procedure site vigorously    No lotion or powder to the puncture site for 3 days    CALL YOUR PRIMARY PHYSICIAN IF: You may resume all normal activity.  Monitor neck site for bleeding, swelling, or voice changes. If you notice bleeding or swelling immediately apply pressure to the site and call number below to speak with Cardiology Fellow.  If you experience any changes in your breathing you should call your doctor immediately or come to the closest Emergency Department.  Do not drive yourself.    ADDITIONAL INSTRUCTIONS: Medications: You are to resume all home medications including anticoagulation therapy unless otherwise advised by your primary cardiologist or nurse coordinator.    Follow Up: Per your primary cardiology team    If you have any questions or concerns regarding your procedure site please call [phone] at anytime and ask for Cardiology Fellow on call.  They are available 24 hours a day.  You may also contact the Cardiology Clinic after hours number at [phone].                                                       Telephone Numbers [phone] Monday-Friday 8:00 am to 4:30 pm    [phone] After 4:30 pm Monday-Friday, Weekends & Holidays  Ask for Interventional Cardiologist on call. Someone is on call 24 hours/day   [facility] toll free number [phone] Monday-Friday 8:00 am to 4:30 pm   [facility] Emergency Dept [phone]

## 2020-11-11 NOTE — IP AVS SNAPSHOT
MRN:[id number]                      After Visit Summary   11/11/2020    Yuridia Barksdale    MRN: [id number]           Visit Information        Department      11/11/2020 12:02 PM [facility] [address] [place]          Review of your medicines      UNREVIEWED medicines. Ask your doctor about these medicines       Dose / Directions   ARIPiprazole 5 MG tablet  Commonly known as: ABILIFY      Dose: 5 mg  Take 5 mg by mouth  Refills: 0     atorvastatin 10 MG tablet  Commonly known as: LIPITOR      Dose: 10 mg  Take 10 mg by mouth  Refills: 0     clonazePAM 1 MG tablet  Commonly known as: klonoPIN      Dose: 1 mg  Take 1 mg by mouth  Refills: 0     COUMADIN PO      Take  by mouth.  Refills: 0     fondaparinux ANTICOAGULANT 10 MG/0.8ML injection  Commonly known as: ARIXTRA ANTICOAGULANT  Used for: CTEPH (chronic thromboembolic pulmonary hypertension) (H), History of pulmonary embolism      Dose: 10 mg  Inject 0.8 mLs (10 mg) Subcutaneous every evening At 8pm  Quantity: 6 Syringe  Refills: 1     hydroxychloroquine 200 MG tablet  Commonly known as: PLAQUENIL      Dose: 200 mg  Take 200 mg by mouth 2 times daily  Refills: 0     potassium chloride 20 MEQ packet  Commonly known as: KLOR-CON  Used for: Hypokalemia      Dose: 20 mEq  Take 20 mEq by mouth daily  Quantity: 90 packet  Refills: 3     sertraline 100 MG tablet  Commonly known as: ZOLOFT      Dose: 200 mg  Take 200 mg by mouth  Refills: 0     spironolactone 25 MG tablet  Commonly known as: ALDACTONE  Used for: SAHU (dyspnea on exertion), CTEPH (chronic thromboembolic pulmonary hypertension) (H)      Dose: 25 mg  Take 1 tablet (25 mg) by mouth daily  Quantity: 90 tablet  Refills: 3     torsemide 20 MG tablet  Commonly known as: DEMADEX  Used for: CTEPH (chronic thromboembolic pulmonary hypertension) (H), SAHU (dyspnea on exertion)      Dose: 80 mg  Take 4 tablets (80 mg) by mouth daily  Quantity: 90 tablet  Refills: 1     traZODone 50 MG  tablet  Commonly known as: DESYREL      Refills: 0              Protect others around you: Learn how to safely use, store and throw away your medicines at www.disposemymeds.org.       Follow-ups after your visit       Care Instructions       Further instructions from your care team       [facility]                        Interventional Cardiology  Discharge Instructions   Post Right Heart Cath      AFTER YOU GO HOME:    DO drink plenty of fluids    DO resume your regular diet and medications unless otherwise instructed by your Primary Physician    Do Not scrub the procedure site vigorously    No lotion or powder to the puncture site for 3 days    CALL YOUR PRIMARY PHYSICIAN IF: You may resume all normal activity.  Monitor neck site for bleeding, swelling, or voice changes. If you notice bleeding or swelling immediately apply pressure to the site and call number below to speak with Cardiology Fellow.  If you experience any changes in your breathing you should call your doctor immediately or come to the closest Emergency Department.  Do not drive yourself.    ADDITIONAL INSTRUCTIONS: Medications: You are to resume all home medications including anticoagulation therapy unless otherwise advised by your primary cardiologist or nurse coordinator.    Follow Up: Per your primary cardiology team    If you have any questions or concerns regarding your procedure site please call [phone] at anytime and ask for Cardiology Fellow on call.  They are available 24 hours a day.  You may also contact the Cardiology Clinic after hours number at [phone].                                                       Telephone Numbers [phone] Monday-Friday 8:00 am to 4:30 pm    [phone] After 4:30 pm Monday-Friday, Weekends & Holidays  Ask for Interventional Cardiologist on call. Someone is on call 24 hours/day   [facility] toll free number [phone] Monday-Friday 8:00 am to 4:30 pm   [facility]  "Emergency Dept [phone]                   Additional Information About Your Visit       MyChart Information    [employer] gives you secure access to your electronic health record. If you see a primary care provider, you can also send messages to your care team and make appointments. If you have questions, please call your primary care clinic.  If you do not have a primary care provider, please call [phone] and they will assist you.       Care EveryWhere ID    This is your Care EveryWhere ID. This could be used by other organizations to access your [facility] medical records  [id number]       Your Vitals Were  Most recent update: 11/11/2020  1:56 PM    Blood Pressure   140/90   (BP Location: Right arm, Cuff Size: Adult Large)          Pulse   65          Temperature   98.2  F (36.8  C) (Oral)          Respirations   18          Height   1.676 m (5' 5.98\")             Weight   115.2 kg (254 lb)    Pulse Oximetry   99%    BMI (Body Mass Index)   41.02 kg/m           Primary Care Provider    Werner Saeed      Equal Access to Services    [facility] : Hadii aad ku hadasho Soomaali, waaxda luqadaha, qaybta kaalmada adeegyada, waxay idiin hayaan adeeg kharakeyshawn la'derejen . So [facility] [phone].    ATENCIÓN: Si habla español, tiene a aviles disposición servicios gratuitos de asistencia lingüística. Llame al [phone].    We comply with applicable federal and state civil rights laws, including the [place] Human Rights Act. We do not discriminate on the basis of race, color, creed, Jainism, national origin, marital status, age, disability, sex, sexual orientation, or gender identity.       Thank you!    Thank you for choosing [facility] for your care. Our goal is always to provide you with excellent care. Hearing back from our patients is one way we can continue to improve our services. Please take a few minutes to complete the written survey that you may receive in the mail after you visit with us. Thank you!          "   Medication List      ASK your doctor about these medications          Morning Afternoon Evening Bedtime As Needed    ARIPiprazole 5 MG tablet  Also known as: ABILIFY  INSTRUCTIONS: Take 5 mg by mouth                     atorvastatin 10 MG tablet  Also known as: LIPITOR  INSTRUCTIONS: Take 10 mg by mouth                     clonazePAM 1 MG tablet  Also known as: klonoPIN  INSTRUCTIONS: Take 1 mg by mouth                     COUMADIN PO  INSTRUCTIONS: Take  by mouth.                     fondaparinux ANTICOAGULANT 10 MG/0.8ML injection  Also known as: ARIXTRA ANTICOAGULANT  INSTRUCTIONS: Inject 0.8 mLs (10 mg) Subcutaneous every evening At 8pm                     hydroxychloroquine 200 MG tablet  Also known as: PLAQUENIL  INSTRUCTIONS: Take 200 mg by mouth 2 times daily                     potassium chloride 20 MEQ packet  Also known as: KLOR-CON  INSTRUCTIONS: Take 20 mEq by mouth daily                     sertraline 100 MG tablet  Also known as: ZOLOFT  INSTRUCTIONS: Take 200 mg by mouth                     spironolactone 25 MG tablet  Also known as: ALDACTONE  INSTRUCTIONS: Take 1 tablet (25 mg) by mouth daily                     torsemide 20 MG tablet  Also known as: DEMADEX  INSTRUCTIONS: Take 4 tablets (80 mg) by mouth daily                     traZODone 50 MG tablet  Also known as: DESYREL

## 2020-11-11 NOTE — PROGRESS NOTES
Service Date: 2020     Ochoa Sheth MD   [facility] [address]  [address]      Mario Gerber MD    [facility]      Clint Olsen MD   [facility]   [address]  [address]       RE: Yuridia Barksdale   MRN: [id number]   : 1970      Dear Meryl Rondon and Zabrina:      We had the pleasure of seeing Ms. Yuridia Barksdale for followup in our Pulmonary Hypertension Clinic at the [facility].  As you know, she is a very pleasant 49-year-old female with chronic thromboembolic pulmonary hypertension who underwent pulmonary thromboendarterectomy at [facility] 2 weeks ago.  She had type 3 disease.  She is returning today for follow-up.    She underwent pulmonary thromboendarterectomy on .  She had bilateral type 3 disease. Her PTE was difficult due to the distal nature of her disease. She had total circulatory arrest time of 38 minutes. Her post-operative  Her postoperative course was uncomplicated.  She was discharged on day 11.  She was discharged on supplemental oxygen 2 L at rest and 4 L with exertion.  Her Adempas and digoxin were discontinued after surgery.  She was discharged home on Coumadin with an INR goal of 3-4 and Lasix 60 mg twice a day.    Her postoperative hemodynamics showed an PA pressure of 37/12 with a mean of 21 mmHg.  Her cardiac output was 6.16 L.  Her CVP was 5 mmHg.  Her calculated PVR based on CVP was 143 dynes/sec/cm5. Her postoperative echocardiogram showed mild right ventricular enlargement and mildly reduced right ventricular systolic pressure.  Her estimated PA pressure based on echocardiogram was 38 mmHg.  Her postoperative VQ scan score showed improved perfusion of the right lower lobe and left upper lobe.    She returns today for follow-up.  She underwent a repeat right heart catheterization, VQ scan,  echocardiogram and CT chest PE protocol for assessment of residual chronic thromboembolic pulmonary hypertension.    Overall, she is feeling much better now.  She does not have shortness of breath with her usual activities.  She has not been very active secondary to Covid.  She exercise on a stationary bike at home.  Her oxygen saturation still drops to high 80s while exercising on her stationary bike.  She uses 2.5 L of oxygen during exercise.  She does not use oxygen with regular activities or during sleep.  I would currently characterize her as functional class II.  Her lower extremity swelling and abdominal distention has significantly improved on current dose of diuretics.  Her weight has been stable.  She has no exertional chest pain or chest pressure.  No exertional presyncope or syncope.  No recent hospitalizations or ER visits.       PAST MEDICAL HISTORY:   1.  Systemic lupus erythematosus.   2.  Lupus antiphospholipid antibody syndrome.   3.  History of deep venous thrombosis.   4.  Chronic thromboembolic pulmonary hypertension.   5.  Obesity.   6.  Obstructive sleep apnea.      MEDICATIONS:   No current facility-administered medications for this encounter.      Current Outpatient Medications   Medication Sig     ARIPiprazole (ABILIFY) 5 MG tablet Take 5 mg by mouth     atorvastatin (LIPITOR) 10 MG tablet Take 10 mg by mouth     clonazePAM (KLONOPIN) 1 MG tablet Take 1 mg by mouth     fondaparinux ANTICOAGULANT (ARIXTRA ANTICOAGULANT) 10 MG/0.8ML injection Inject 0.8 mLs (10 mg) Subcutaneous every evening At 8pm     hydroxychloroquine (PLAQUENIL) 200 MG tablet Take 200 mg by mouth 2 times daily      potassium chloride (KLOR-CON) 20 MEQ packet Take 20 mEq by mouth daily     sertraline (ZOLOFT) 100 MG tablet Take 200 mg by mouth     spironolactone (ALDACTONE) 25 MG tablet Take 1 tablet (25 mg) by mouth daily     torsemide (DEMADEX) 20 MG tablet Take 4 tablets (80 mg) by mouth daily     traZODone (DESYREL) 50  "MG tablet      Warfarin Sodium (COUMADIN PO) Take  by mouth.       REVIEW OF SYSTEMS:  A detailed 10-point review of systems was obtained as described in the History of Present Illness.  All other systems are reviewed and are negative.      PHYSICAL EXAMINATION:   /61 (BP Location: Right arm)   Pulse 75   Temp 98.2  F (36.8  C) (Oral)   Resp 18   Ht 1.676 m (5' 5.98\")   Wt 115.2 kg (254 lb)   SpO2 97%   BMI 41.02 kg/m    She was awake, alert, oriented x3.  She was comfortable.  She was in no apparent distress.  Her neck exam revealed no jugular venous distention.  Carotids are 2+ bilaterally.  She had no pallor sounds, cyanosis or jaundice.  Cardiac auscultation revealed normal S1 and normal S2 with no murmur rub or gallop.  Auscultation of her lungs revealed equal air entry on both sides with no added sounds.  Her abdomen was soft with no was no tenderness, or no guarding.  She had no focal neurological deficit.    CBC RESULTS:   Recent Labs   Lab Test 11/11/20  1224   WBC 4.2   RBC 4.97   HGB 14.4   HCT 45.0   MCV 91   MCH 29.0   MCHC 32.0   RDW 14.1        Recent Labs   Lab Test 11/11/20  1224 07/31/20  1023    137   POTASSIUM 4.0 3.8   CHLORIDE 105 104   CO2 28 28   ANIONGAP 5 6   GLC 91 98   BUN 26 18   CR 1.14* 0.98   KATARINA 9.4 8.7     Liver Function Studies -   Recent Labs   Lab Test 07/31/20  1023   PROTTOTAL 7.1   ALBUMIN 2.9*   BILITOTAL 0.4   ALKPHOS 119   AST 18   ALT 22     No results found for: NTBNPI  Lab Results   Component Value Date    NTBNP 398 (H) 11/11/2020     INR   Date Value Ref Range Status   11/11/2020 1.57 (H) 0.86 - 1.14 Final      Echocardiogram (11/2020)  Her right ventricle was normal in size with mildly reduced systolic function.  Her interventricular septum was not flattened.  Right atrium was moderately enlarged.  Right ventricular systolic pressure was 35 mmHg.  IVC was normal size and collapsing more than 50% with respiration.  She had mild to moderate " tricuspid regurgitation.  Her left ventricle was normal in size and function.  She had no other significant valvular abnormalities.  No pericardial effusion.    RHC (11/2020)  RA 5  RV 50/5  PA 50/20 (29)  PCWP 15  PA% 69  Thermodilution CO/CI 7.4/3.3  Measured CO/CI 5.6/2.5  PVR 2.52 by TD      ASSESSMENT AND PLAN:      Ms. Yuridia Barksdale is a 49-year-old female with chronic thromboembolic pulmonary hypertension due to hypercoagulable state from her antiphospholipid antibody syndrome.  She underwent pulmonary thromboendarterectomy at [facility] on July 4, 2020.  She returns today for repeat assessment for residual chronic thromboembolic pulmonary potential.    Ms. Jerry has mild residual chronic thromboembolic pulmonary hypertension.  Her repeat VQ scan shows multiple perfusion defects consistent with chronic thromboembolic pulmonary hypertension.  Repeat CT pulmonary angiogram showed no proximal clots but showed segmental and subsegmental chronic thromboembolic disease.  Her mean PA pressure is 29 mmHg and PVR is 2.5 Wood units.  While her RV is mildly dilated her RV function is normal.  Her cardiac output is normal.  Her right-sided filling pressure is normal.  Her NT proBNP is slightly elevated.    I discussed the option of Adempas with her.  She is currently not interested in it as she is feeling better.  Since her mean PA pressure and PVR are only mildly elevated with normal RV function preserved cardiac output, I think it is reasonable to watch her without any pulmonary vasodilator therapy.  I doubt that Adempas would help her VQ mismatch to wean her off of oxygen.  Adempas usually improves the cardiac output.    I also discussed the option of pulmonary balloon angioplasty.  This could potentially help her to be weaned off of supplemental oxygen therapy.  She is interested in pursuing this further if she has treatable lesions.  Her pulmonary angiogram prior to thromboendarterectomy showed some  lesions that are amenable for pulmonary balloon angioplasty.  We will discuss her case in our multidisciplinary meeting to decide on her candidacy for pulmonary balloon angioplasty.    In the interim, I have recommended her to continue Coumadin with an chromogenic factor goal of 20-30.  She is currently being bridged with Arixtra.  I recommended to start both Arixtra and Coumadin today.  She is euvolemic on torsemide 80 mg once daily, which she will continue.  I have recommended her to use supplemental oxygen 2 to 2-1/2 L when she exercises.    I have recommended her to return to clinic in 3 months.  She will call us in the interim of any further questions.  It was a pleasure meeting Ms. Yuridia huynh in her pulmonary hypertension clinic at [facility].  Thank you for involving us in her care.    It was a pleasure meeting Ms. Yuridia Barksdale in our Pulmonary Hypertension Clinic at the [facility].  We thank you for involving us in her care.  Please do not hesitate to call us in the interim if you have any further questions.       Sincerely,   Arcelia Paniagua MD   [facility]  Heart Failure, Transplant, and Mechanical Circulatory Support Cardiology   Cardiovascular Division  [facility]   [phone]

## 2020-11-11 NOTE — PROGRESS NOTES
Pt has returned to unit 2a s/p RHC. Right neck site CDI, soft, flat, non tender. Discharge instructions reviewed with pt, pt verbalizes understanding. Pt waiting to speak with Dr Paniagua. PIV d/c'd.     4253 Pt ambulated off unit with steady gait.

## 2020-11-11 NOTE — Clinical Note
dry, intact, no bleeding and no hematoma. 7 Fr RIJV sheath removed, manual pressure held until hemostasis. No bleeding, oozing, or hematoma.

## 2020-11-11 NOTE — PROGRESS NOTES
Pt prepped for right heart cath.Right neck site C/D/I no hematoma and denies pain.Tolerating food and fluids.Consent signed and questions answered.Discharge instructions were reviewed and signed because pt has had them before.

## 2020-11-12 ENCOUNTER — TELEPHONE (OUTPATIENT)
Dept: CARDIOLOGY | Facility: CLINIC | Age: 50
End: 2020-11-12

## 2020-11-12 DIAGNOSIS — R06.02 SOB (SHORTNESS OF BREATH): Primary | ICD-10-CM

## 2020-11-12 NOTE — TELEPHONE ENCOUNTER
----- Message from Micheline Chapa RN sent at 11/11/2020  3:12 PM CST -----    ----- Message -----  From: Arcelia Paniagua MD  Sent: 11/11/2020   3:06 PM CST  To: Deneen Mccann, Cardiology Ph Nurse-    Team:    She does have residual CTEPH after her surgery but mild.     She is not interested in Adempas, which I think is ok.     Plan:  1. Restart coumadin 5 mg daily and Arixtra tonight and continue Arixtra until her chromogenic factor becomes therapeutic  2. INR on Friday 11/13/2020  3. Please include her for discussion in our next CTEPH meeting for possible BPA    RTC in 3 months with me    Thank you    TT  -----------------------  Verified orders placed, and patient added to BPA review list. Yoly Villegas RN on 11/17/2020 at 12:14 PM

## 2020-11-16 ENCOUNTER — MYC MEDICAL ADVICE (OUTPATIENT)
Dept: CARDIOLOGY | Facility: CLINIC | Age: 50
End: 2020-11-16

## 2020-11-16 DIAGNOSIS — R06.09 DOE (DYSPNEA ON EXERTION): ICD-10-CM

## 2020-11-16 DIAGNOSIS — I27.24 CTEPH (CHRONIC THROMBOEMBOLIC PULMONARY HYPERTENSION) (H): ICD-10-CM

## 2020-11-16 RX ORDER — TORSEMIDE 20 MG/1
80 TABLET ORAL DAILY
Qty: 360 TABLET | Refills: 3 | Status: SHIPPED | OUTPATIENT
Start: 2020-11-16 | End: 2021-11-26

## 2020-11-18 ENCOUNTER — TELEPHONE (OUTPATIENT)
Dept: CARDIOLOGY | Facility: CLINIC | Age: 50
End: 2020-11-18

## 2020-11-18 NOTE — TELEPHONE ENCOUNTER
Message sent to Yoly for follow up.  Susan Foley RN on 11/18/2020 at 7:58 AM    ----- Message from Susan Foley RN sent at 11/18/2020  7:34 AM CST -----  PT was presented at the Multidisciplinary Meeting for possible BPA.  Aryan reviewed her imaging and there is not a lot of lesions that he can see.  Betty suggested an invasive pulmonary angiogram to further assess the lesions.  If there is perfusion defects they also mentioned Adempas (riociguat).    The hemodynamics wont probably change greatly but may improve oxygenation.      Plan:  Invasive Pulmonary Angiogram and Bridigning with Arixtra and then follow up after testing with Arcelia Paniagua MD.      Susan Foley RN  -------------------------------------  Order placed for procedure. Yoly Villegas RN on 11/23/2020 at 3:57 PM  -------------------------------------  Called patient and discussed outcome of interdisciplinary mtg and request for Pulmonary Angiogram to make full determination.  patient wants to move forward with test, so she will contact her pharmacy for a refill of the Arixtra and I will send Deneen doherty msg to help her schedule testing.  Patient verbalized understanding, agreed with plan and denied any further questions. Yoly Villegas RN on 11/23/2020 at 4:03 PM

## 2020-11-19 ENCOUNTER — TRANSFERRED RECORDS (OUTPATIENT)
Dept: HEALTH INFORMATION MANAGEMENT | Facility: CLINIC | Age: 50
End: 2020-11-19

## 2020-11-23 DIAGNOSIS — I27.24 CTEPH (CHRONIC THROMBOEMBOLIC PULMONARY HYPERTENSION) (H): Primary | ICD-10-CM

## 2020-11-23 RX ORDER — POTASSIUM CHLORIDE 1500 MG/1
40 TABLET, EXTENDED RELEASE ORAL
Status: CANCELLED | OUTPATIENT
Start: 2020-11-23

## 2020-11-23 RX ORDER — POTASSIUM CHLORIDE 1500 MG/1
20 TABLET, EXTENDED RELEASE ORAL
Status: CANCELLED | OUTPATIENT
Start: 2020-11-23

## 2020-11-23 RX ORDER — LIDOCAINE 40 MG/G
CREAM TOPICAL
Status: CANCELLED | OUTPATIENT
Start: 2020-11-23

## 2020-11-23 RX ORDER — SODIUM CHLORIDE 9 MG/ML
INJECTION, SOLUTION INTRAVENOUS CONTINUOUS
Status: CANCELLED | OUTPATIENT
Start: 2020-11-23

## 2020-11-25 DIAGNOSIS — Z11.59 ENCOUNTER FOR SCREENING FOR OTHER VIRAL DISEASES: Primary | ICD-10-CM

## 2020-12-01 ENCOUNTER — TELEPHONE (OUTPATIENT)
Dept: CARDIOLOGY | Facility: CLINIC | Age: 50
End: 2020-12-01

## 2020-12-01 ENCOUNTER — TRANSFERRED RECORDS (OUTPATIENT)
Dept: HEALTH INFORMATION MANAGEMENT | Facility: CLINIC | Age: 50
End: 2020-12-01

## 2020-12-01 NOTE — TELEPHONE ENCOUNTER
Called patient who stated Covid test is scheduled for 12/11/12 @ 11:30am at [facility] in [place].     patient confirmed she has the Arixtra syringes and we reviewed her anticoagulation schedule.  I agreed to call next week to review all other instructions. Yoly Villegas RN on 12/1/2020 at 2:08 PM      ----- Message from Deneen Mccann sent at 11/24/2020 11:18 AM CST -----  Regarding: RE: Pulm Angio  FLAVIA Frederick,    Pt is scheduled for her procedure on 12/15 @ 7:30am and follow-up with TT on 12/28/2020.    Please call pt when you get a chance. Pt will also provide you the fax number for the clinic that she wants to complete her covid test at. Thank you!    -Deneen  ----- Message -----  From: Yoly Villegas RN  Sent: 11/23/2020   4:04 PM CST  To: Deneen Mccann  Subject: Pulm Angio                                       Deneen,    Patient needs Pulmonary Angiogram with Dr. Mcknight for next available.  She is already contacting her pharmacy for more Arixtra to make sure there are no problems getting it.  Orders are in.    Please just let me know date so I can review teaching with her.    Thanks!  Yoly

## 2020-12-06 ENCOUNTER — HEALTH MAINTENANCE LETTER (OUTPATIENT)
Age: 50
End: 2020-12-06

## 2020-12-08 ENCOUNTER — TELEPHONE (OUTPATIENT)
Dept: CARDIOLOGY | Facility: CLINIC | Age: 50
End: 2020-12-08

## 2020-12-08 NOTE — TELEPHONE ENCOUNTER
Sent Dr. Paniagua a staff msg asking when she should re-start her Arixtra after the procedure, as she is nervous about waiting 48 hours between doses. Yoly Villegas RN on 12/8/2020 at 1:27 PM    ------------------------  Called patient and reviewed pre-procedure instructions including bridging with Arixtra and holding AM Torsemide.  We agreed I would clarify with Dr. Paniagua when exactly she should re-start her Arixtra dosing after procedure and send her a [employer] message.  Patient verbalized understanding, agreed with plan and denied any further questions. Yoly Villegas RN on 12/8/2020 at 1:20 PM      ----- Message from Yoly Villegas RN sent at 12/1/2020  2:09 PM CST -----  Regarding: FW: Pulm Angio  Call to review pre-procedure instructions for Pulm Angio on 12/15 @ 7:30am and follow-up with TT on 12/28/2020.   ----- Message -----  From: Deneen Mccann  Sent: 11/24/2020  11:18 AM CST  To: Yoly Villegas RN  Subject: RE: Pulm Angio                                   FLAVIA Frederick,    Pt is scheduled for her procedure on 12/15 @ 7:30am and follow-up with TT on 12/28/2020.    Please call pt when you get a chance. Pt will also provide you the fax number for the clinic that she wants to complete her covid test at. Thank you!    -Deneen  ----- Message -----  From: Yoly Villegas RN  Sent: 11/23/2020   4:04 PM CST  To: Deneen Mccann  Subject: Pulm Angio                                       Deneen,    Patient needs Pulmonary Angiogram with Dr. Mcknight for next available.  She is already contacting her pharmacy for more Arixtra to make sure there are no problems getting it.  Orders are in.    Please just let me know date so I can review teaching with her.    Thanks!  Yoly

## 2020-12-14 ENCOUNTER — TELEPHONE (OUTPATIENT)
Dept: CARDIOLOGY | Facility: CLINIC | Age: 50
End: 2020-12-14

## 2020-12-15 ENCOUNTER — APPOINTMENT (OUTPATIENT)
Dept: MEDSURG UNIT | Facility: CLINIC | Age: 50
End: 2020-12-15
Payer: COMMERCIAL

## 2020-12-15 ENCOUNTER — APPOINTMENT (OUTPATIENT)
Dept: LAB | Facility: CLINIC | Age: 50
End: 2020-12-15
Attending: INTERNAL MEDICINE
Payer: COMMERCIAL

## 2020-12-15 ENCOUNTER — HOSPITAL ENCOUNTER (OUTPATIENT)
Facility: CLINIC | Age: 50
Discharge: HOME | End: 2020-12-15
Attending: INTERNAL MEDICINE | Admitting: INTERNAL MEDICINE
Payer: COMMERCIAL

## 2020-12-15 ENCOUNTER — TELEPHONE (OUTPATIENT)
Dept: CARDIOLOGY | Facility: CLINIC | Age: 50
End: 2020-12-15

## 2020-12-15 VITALS
SYSTOLIC BLOOD PRESSURE: 115 MMHG | DIASTOLIC BLOOD PRESSURE: 88 MMHG | TEMPERATURE: 97.9 F | OXYGEN SATURATION: 100 % | BODY MASS INDEX: 41.66 KG/M2 | HEART RATE: 73 BPM | RESPIRATION RATE: 18 BRPM | WEIGHT: 258 LBS

## 2020-12-15 DIAGNOSIS — I27.24 CTEPH (CHRONIC THROMBOEMBOLIC PULMONARY HYPERTENSION) (H): ICD-10-CM

## 2020-12-15 LAB
ANION GAP SERPL CALCULATED.3IONS-SCNC: 6 MMOL/L (ref 3–14)
APTT PPP: 46 SEC (ref 22–37)
B-HCG SERPL-ACNC: <1 IU/L (ref 0–5)
BUN SERPL-MCNC: 18 MG/DL (ref 7–30)
CALCIUM SERPL-MCNC: 8.9 MG/DL (ref 8.5–10.1)
CHLORIDE SERPL-SCNC: 107 MMOL/L (ref 94–109)
CO2 SERPL-SCNC: 28 MMOL/L (ref 20–32)
CREAT SERPL-MCNC: 1.13 MG/DL (ref 0.52–1.04)
ERYTHROCYTE [DISTWIDTH] IN BLOOD BY AUTOMATED COUNT: 14 % (ref 10–15)
FACT X ACT/NOR PPP CHRO: 39 % (ref 70–130)
GFR SERPL CREATININE-BSD FRML MDRD: 57 ML/MIN/{1.73_M2}
GLUCOSE SERPL-MCNC: 92 MG/DL (ref 70–99)
HCT VFR BLD AUTO: 42.1 % (ref 35–47)
HGB BLD-MCNC: 13.6 G/DL (ref 11.7–15.7)
INR PPP: 1.57 (ref 0.86–1.14)
MCH RBC QN AUTO: 29.5 PG (ref 26.5–33)
MCHC RBC AUTO-ENTMCNC: 32.3 G/DL (ref 31.5–36.5)
MCV RBC AUTO: 91 FL (ref 78–100)
PLATELET # BLD AUTO: 254 10E9/L (ref 150–450)
POTASSIUM SERPL-SCNC: 3.5 MMOL/L (ref 3.4–5.3)
RBC # BLD AUTO: 4.61 10E12/L (ref 3.8–5.2)
SODIUM SERPL-SCNC: 141 MMOL/L (ref 133–144)
WBC # BLD AUTO: 5.4 10E9/L (ref 4–11)

## 2020-12-15 PROCEDURE — 85610 PROTHROMBIN TIME: CPT | Performed by: INTERNAL MEDICINE

## 2020-12-15 PROCEDURE — 93568 NJX CAR CTH NSLC P-ART ANGRP: CPT | Performed by: INTERNAL MEDICINE

## 2020-12-15 PROCEDURE — 250N000011 HC RX IP 250 OP 636: Performed by: INTERNAL MEDICINE

## 2020-12-15 PROCEDURE — 84702 CHORIONIC GONADOTROPIN TEST: CPT | Performed by: INTERNAL MEDICINE

## 2020-12-15 PROCEDURE — 93568 NJX CAR CTH NSLC P-ART ANGRP: CPT | Mod: GC | Performed by: INTERNAL MEDICINE

## 2020-12-15 PROCEDURE — 999N000132 HC STATISTIC PP CARE STAGE 1

## 2020-12-15 PROCEDURE — 250N000009 HC RX 250: Performed by: INTERNAL MEDICINE

## 2020-12-15 PROCEDURE — 36415 COLL VENOUS BLD VENIPUNCTURE: CPT | Performed by: INTERNAL MEDICINE

## 2020-12-15 PROCEDURE — 250N000013 HC RX MED GY IP 250 OP 250 PS 637: Performed by: INTERNAL MEDICINE

## 2020-12-15 PROCEDURE — 93451 RIGHT HEART CATH: CPT | Mod: 26 | Performed by: INTERNAL MEDICINE

## 2020-12-15 PROCEDURE — 93451 RIGHT HEART CATH: CPT | Performed by: INTERNAL MEDICINE

## 2020-12-15 PROCEDURE — 272N000001 HC OR GENERAL SUPPLY STERILE: Performed by: INTERNAL MEDICINE

## 2020-12-15 PROCEDURE — 85027 COMPLETE CBC AUTOMATED: CPT | Performed by: INTERNAL MEDICINE

## 2020-12-15 PROCEDURE — 80048 BASIC METABOLIC PNL TOTAL CA: CPT | Performed by: INTERNAL MEDICINE

## 2020-12-15 PROCEDURE — 85730 THROMBOPLASTIN TIME PARTIAL: CPT | Performed by: INTERNAL MEDICINE

## 2020-12-15 PROCEDURE — 85130 CHROMOGENIC SUBSTRATE ASSAY: CPT | Performed by: INTERNAL MEDICINE

## 2020-12-15 RX ORDER — LIDOCAINE 40 MG/G
CREAM TOPICAL
Status: DISCONTINUED | OUTPATIENT
Start: 2020-12-15 | End: 2020-12-15 | Stop reason: HOSPADM

## 2020-12-15 RX ORDER — POTASSIUM CHLORIDE 750 MG/1
40 TABLET, EXTENDED RELEASE ORAL
Status: DISCONTINUED | OUTPATIENT
Start: 2020-12-15 | End: 2020-12-15 | Stop reason: HOSPADM

## 2020-12-15 RX ORDER — POTASSIUM CHLORIDE 750 MG/1
20 TABLET, EXTENDED RELEASE ORAL
Status: COMPLETED | OUTPATIENT
Start: 2020-12-15 | End: 2020-12-15

## 2020-12-15 RX ORDER — IOPAMIDOL 755 MG/ML
INJECTION, SOLUTION INTRAVASCULAR
Status: DISCONTINUED | OUTPATIENT
Start: 2020-12-15 | End: 2020-12-15 | Stop reason: HOSPADM

## 2020-12-15 RX ORDER — SODIUM CHLORIDE 9 MG/ML
INJECTION, SOLUTION INTRAVENOUS CONTINUOUS
Status: DISCONTINUED | OUTPATIENT
Start: 2020-12-15 | End: 2020-12-15 | Stop reason: HOSPADM

## 2020-12-15 RX ADMIN — LIDOCAINE: 40 CREAM TOPICAL at 08:27

## 2020-12-15 RX ADMIN — POTASSIUM CHLORIDE 20 MEQ: 750 TABLET, EXTENDED RELEASE ORAL at 08:43

## 2020-12-15 NOTE — TELEPHONE ENCOUNTER
Dr. Paniagua called and advised patient completed her Pulmonary Angiogram today for which they found some potential lesions that might be amenable to BPA.  He would like her name added to the next list of patient's to be reviewed at the [facility] multidisciplinary mtg.  Also, she can take a dose of Arixtra tonight at 6pm along with restarting her Warfarin.  She should also have an INR Friday.  Verbalized understanding. Yoly Villegas RN on 12/15/2020 at 11:01 AM  ---------------------  LM advising patient I received the above information from Dr. Paniagua including instructions for restarting her blood thinners.  She should call me with any questions. Yoly Villegas RN on 12/15/2020 at 11:04 AM

## 2020-12-15 NOTE — Clinical Note
dry, intact, no bleeding and no hematoma. 7  Fr venous sheath removed, manual pressure held until hemostasis. No bleeding, oozing, or hematoma.

## 2020-12-15 NOTE — PROGRESS NOTES
Patient tolerated recovery stage well. VSS, right neck site clean/dry/intact, no hematoma, and denies pain. Patient tolerated PO food and fluids. Teaching was done and discharge instructions were given. Patient ambulated, voided, and PIV was removed. Patient discharged from the hospital to home.

## 2020-12-15 NOTE — IP AVS SNAPSHOT
MRN:[id number]                      After Visit Summary   12/15/2020    Yuridia Barksdale    MRN: [id number]           Visit Information        Department      12/15/2020  7:31 AM [facility] [address] [place]          Review of your medicines      UNREVIEWED medicines. Ask your doctor about these medicines       Dose / Directions   ARIPiprazole 5 MG tablet  Commonly known as: ABILIFY      Dose: 5 mg  Take 5 mg by mouth  Refills: 0     atorvastatin 10 MG tablet  Commonly known as: LIPITOR      Dose: 10 mg  Take 10 mg by mouth  Refills: 0     clonazePAM 1 MG tablet  Commonly known as: klonoPIN      Dose: 1 mg  Take 1 mg by mouth  Refills: 0     COUMADIN PO      Take  by mouth.  Refills: 0     fondaparinux ANTICOAGULANT 10 MG/0.8ML injection  Commonly known as: ARIXTRA ANTICOAGULANT  Used for: CTEPH (chronic thromboembolic pulmonary hypertension) (H), History of pulmonary embolism      Dose: 10 mg  Inject 0.8 mLs (10 mg) Subcutaneous every evening At 8pm  Quantity: 6 Syringe  Refills: 1     hydroxychloroquine 200 MG tablet  Commonly known as: PLAQUENIL      Dose: 200 mg  Take 200 mg by mouth 2 times daily  Refills: 0     potassium chloride 20 MEQ packet  Commonly known as: KLOR-CON  Used for: Hypokalemia      Dose: 20 mEq  Take 20 mEq by mouth daily  Quantity: 90 packet  Refills: 3     sertraline 100 MG tablet  Commonly known as: ZOLOFT      Dose: 200 mg  Take 200 mg by mouth  Refills: 0     spironolactone 25 MG tablet  Commonly known as: ALDACTONE  Used for: SAHU (dyspnea on exertion), CTEPH (chronic thromboembolic pulmonary hypertension) (H)      Dose: 25 mg  Take 1 tablet (25 mg) by mouth daily  Quantity: 90 tablet  Refills: 3     torsemide 20 MG tablet  Commonly known as: DEMADEX  Used for: CTEPH (chronic thromboembolic pulmonary hypertension) (H), SAHU (dyspnea on exertion)      Dose: 80 mg  Take 4 tablets (80 mg) by mouth daily  Quantity: 360 tablet  Refills: 3     traZODone 50 MG  tablet  Commonly known as: DESYREL      Refills: 0              Protect others around you: Learn how to safely use, store and throw away your medicines at www.disposemymeds.org.       Follow-ups after your visit       Your next 10 appointments already scheduled    Dec 15, 2020  Procedure with Lonnie Hanna MD  [facility] ([facility], [facility]) [address] [address] [phone]   The [facility] is located on the corner of [address] and [address] on the [facility]. It is easily accessible from virtually any point in the [place] area, via [address] and [address].   Dec 28, 2020  9:30 AM  (Arrive by 9:15 AM)  Video Visit with Arcelia Paniagua MD  [facility] ([facility]) [address] [address] [phone]   [facility]  Note: this is not an onsite visit; there is no need to come to the facility.  Please have a list of all current medications available for appointment.         Care Instructions       Further instructions from your care team       [facility]                        Interventional Cardiology  Discharge Instructions   Post Right Heart Cath      AFTER YOU GO HOME:    DO drink plenty of fluids    DO resume your regular diet and medications unless otherwise instructed by your Primary Physician    Do Not scrub the procedure site vigorously    No lotion or powder to the puncture site for 3 days    CALL YOUR PRIMARY PHYSICIAN IF: You may resume all normal activity.  Monitor neck site for bleeding, swelling, or voice changes. If you notice bleeding or swelling immediately apply pressure to the site and call number below to speak with Cardiology Fellow.  If you experience any changes in your breathing you  should call your doctor immediately or come to the closest Emergency Department.  Do not drive yourself.    ADDITIONAL INSTRUCTIONS: Medications: You are to resume all home medications including anticoagulation therapy unless otherwise advised by your primary cardiologist or nurse coordinator.    Follow Up: Per your primary cardiology team    If you have any questions or concerns regarding your procedure site please call [phone] at anytime and ask for Cardiology Fellow on call.  They are available 24 hours a day.  You may also contact the Cardiology Clinic after hours number at [phone].                                                       Telephone Numbers [phone] Monday-Friday 8:00 am to 4:30 pm    [phone] After 4:30 pm Monday-Friday, Weekends & Holidays  Ask for Interventional Cardiologist on call. Someone is on call 24 hours/day   [facility] toll free number [phone] Monday-Friday 8:00 am to 4:30 pm   [facility] Emergency Dept [phone]                   Additional Information About Your Visit       [employer][a]list games Information    [employer] gives you secure access to your electronic health record. If you see a primary care provider, you can also send messages to your care team and make appointments. If you have questions, please call your primary care clinic.  If you do not have a primary care provider, please call [phone] and they will assist you.       Care EveryWhere ID    This is your Care EveryWhere ID. This could be used by other organizations to access your [place] medical records  [id number]       Your Vitals Were  Most recent update: 12/15/2020  8:18 AM    Blood Pressure   134/89 (BP Location: Right arm)    Pulse   64    Temperature   97.9  F (36.6  C) (Oral)    Respirations   16    Pulse Oximetry   93%          Primary Care Provider    Werner Saeed      Equal Access to Services    LATOYA INGRAM : Solomon Sams, harsh paz, joshua leavitt,  laura burksderejen lynne khiwonash la'aan ah. So [facility] [phone].    ATENCIÓN: Si habla polly, tiene a aviles disposición servicios gratuitos de asistencia lingüística. Haim al [phone].    We comply with applicable federal and state civil rights laws, including the [place] Human Rights Act. We do not discriminate on the basis of race, color, creed, Alevism, national origin, marital status, age, disability, sex, sexual orientation, or gender identity.       Thank you!    Thank you for choosing [facility] for your care. Our goal is always to provide you with excellent care. Hearing back from our patients is one way we can continue to improve our services. Please take a few minutes to complete the written survey that you may receive in the mail after you visit with us. Thank you!            Medication List      ASK your doctor about these medications          Morning Afternoon Evening Bedtime As Needed    ARIPiprazole 5 MG tablet  Also known as: ABILIFY  INSTRUCTIONS: Take 5 mg by mouth                     atorvastatin 10 MG tablet  Also known as: LIPITOR  INSTRUCTIONS: Take 10 mg by mouth                     clonazePAM 1 MG tablet  Also known as: klonoPIN  INSTRUCTIONS: Take 1 mg by mouth                     COUMADIN PO  INSTRUCTIONS: Take  by mouth.                     fondaparinux ANTICOAGULANT 10 MG/0.8ML injection  Also known as: ARIXTRA ANTICOAGULANT  INSTRUCTIONS: Inject 0.8 mLs (10 mg) Subcutaneous every evening At 8pm                     hydroxychloroquine 200 MG tablet  Also known as: PLAQUENIL  INSTRUCTIONS: Take 200 mg by mouth 2 times daily                     potassium chloride 20 MEQ packet  Also known as: KLOR-CON  INSTRUCTIONS: Take 20 mEq by mouth daily                     sertraline 100 MG tablet  Also known as: ZOLOFT  INSTRUCTIONS: Take 200 mg by mouth                     spironolactone 25 MG tablet  Also known as: ALDACTONE  INSTRUCTIONS: Take 1 tablet (25 mg) by mouth daily                      torsemide 20 MG tablet  Also known as: DEMADEX  INSTRUCTIONS: Take 4 tablets (80 mg) by mouth daily                     traZODone 50 MG tablet  Also known as: DESYREL

## 2020-12-15 NOTE — DISCHARGE INSTRUCTIONS
[facility]                        Interventional Cardiology  Discharge Instructions   Post Right Heart Cath      AFTER YOU GO HOME:    DO drink plenty of fluids    DO resume your regular diet and medications unless otherwise instructed by your Primary Physician    Do Not scrub the procedure site vigorously    No lotion or powder to the puncture site for 3 days    CALL YOUR PRIMARY PHYSICIAN IF: You may resume all normal activity.  Monitor neck site for bleeding, swelling, or voice changes. If you notice bleeding or swelling immediately apply pressure to the site and call number below to speak with Cardiology Fellow.  If you experience any changes in your breathing you should call your doctor immediately or come to the closest Emergency Department.  Do not drive yourself.    ADDITIONAL INSTRUCTIONS: Medications: You are to resume all home medications including anticoagulation therapy unless otherwise advised by your primary cardiologist or nurse coordinator.    Follow Up: Per your primary cardiology team    If you have any questions or concerns regarding your procedure site please call [phone] at anytime and ask for Cardiology Fellow on call.  They are available 24 hours a day.  You may also contact the Cardiology Clinic after hours number at [phone].                                                       Telephone Numbers [phone] Monday-Friday 8:00 am to 4:30 pm    [phone] After 4:30 pm Monday-Friday, Weekends & Holidays  Ask for Interventional Cardiologist on call. Someone is on call 24 hours/day   [facility] toll free number [phone] Monday-Friday 8:00 am to 4:30 pm   [facility] Emergency Dept [phone]

## 2020-12-15 NOTE — PROGRESS NOTES
[facility]   Interventional Cardiology        Consenting/Education for Right Heart Catheterization and Pulmonary Angiogram      Patient understands as a part of routine surveillance of CTEPH we will perform a right heart catheterization and pulmonary angiogram.  This procedure will be performed by Dr. Hanna.    Patient understands during the portion for right heart catheterization a fine tube (catheter) is put into the vein of the groin/neck.  It is carefully passed along until it reaches the heart and then goes up into the blood vessels of the lungs. This is done to measure a variety of pressures in your heart and can tell us how well the heart is filling and emptying, as well as monitor fluid status. While the catheter is in your neck/groin vein, a small tube will be inserted through the catheter into the pulmonary arteries. Contrast dye will be injected and an x-ray machine will be used to visualize the pulmonary arteries.     Patient also understands risks and complications of the procedure which include, but are not limited to bruising around the incision site, infection, bleeding, and allergic reaction to local anesthetic and/or contrast dye.      Patient verbalized understanding of risks and benefits of the right heart catheterization and pulmonary angiogram and has elected to proceed with the procedure.       Nora Jenkins DNP APRN CNP   [facility] Interventional Cardiology

## 2020-12-15 NOTE — IP AVS SNAPSHOT
[facility] [address] [address]                                    After Visit Summary   12/15/2020    Yuridia Barksdale    MRN: [id number]           After Visit Summary Signature Page    I have received my discharge instructions, and my questions have been answered. I have discussed any challenges I see with this plan with the nurse or doctor.    ..........................................................................................................................................  Patient/Patient Representative Signature      ..........................................................................................................................................  Patient Representative Print Name and Relationship to Patient    ..................................................               ................................................  Date                                   Time    ..........................................................................................................................................  Reviewed by Signature/Title    ...................................................              ..............................................  Date                                               Time          22EPIC Rev 08/18

## 2020-12-17 ENCOUNTER — DOCUMENTATION ONLY (OUTPATIENT)
Dept: CARDIOLOGY | Facility: CLINIC | Age: 50
End: 2020-12-17

## 2020-12-17 DIAGNOSIS — I27.24 CTEPH (CHRONIC THROMBOEMBOLIC PULMONARY HYPERTENSION) (H): Primary | ICD-10-CM

## 2020-12-17 RX ORDER — SODIUM CHLORIDE 9 MG/ML
INJECTION, SOLUTION INTRAVENOUS CONTINUOUS
Status: CANCELLED | OUTPATIENT
Start: 2020-12-17

## 2020-12-17 RX ORDER — LIDOCAINE 40 MG/G
CREAM TOPICAL
Status: CANCELLED | OUTPATIENT
Start: 2020-12-17

## 2020-12-17 NOTE — PROGRESS NOTES
Message  Received: Today  Message Contents   Deneen Mccann sent to Yoly Villegas RN   Cc: Arcelia Paniagua MD; Artie Hernández MD; P Cardiology Ph Nurse-             Olu Frederick,     Could you place the case request for the BPA? Pt will be scheduled for 1/18/2021 with Dr. Kiser @ 9:00am (11:00 case). Pt stated she will contact [facility] about her covid test.     I have her scheduled to follow-up with Dr. Paniagua on 2/1/2021.     Her 6MWT will be scheduled on 12/29/2020 and she wanted to know if she should bring her portable oxygen. Could you please advise?     Thank you,   -Deneen    Previous Messages    ----- Message -----   From: Arcelia Paniagua MD   Sent: 12/17/2020  10:10 AM CST   To: Artie Hernández MD, Deneen Mccann, *     Team:     1. I spoke to her and she is willing to do BPA. Please schedule her   2. Also, can we schedule her for a 6MWT in the next couple of weeks.     Thank you     TT

## 2020-12-17 NOTE — PROGRESS NOTES
She completed her pulmonary angiogram.  This showed potential targets for pulmonary balloon angioplasty in the right lower lobe as well as left upper lobe.    Her mean PA pressure is still mildly elevated.  Her right ventricle is mildly dilated.  She is still requiring supplemental oxygen with exertion.  Her NT proBNP is mildly elevated.  In light of this I think it is reasonable to consider pulmonary balloon angioplasty versus medical therapy with Adempas.    I discussed the risk and benefits of pulmonary balloon angioplasty.  We discussed 1% risk of pulmonary hemorrhage requiring mechanical ventilation and death.  We also discussed 5% risk of mild to moderate hemoptysis requiring hospitalization on monitoring.  She will need at least 2-3 settings.  During these procedures, she needs to be bridged with Arixtra given her lupus anticoagulant.  She understands this and would like to try pulmonary balloon angioplasty.  We will schedule her with Dr. Arnol Mayberry in the next several weeks based on his availability.    Sincerely,  Arcelia Paniagua MD   [facility]  Heart Failure, Transplant, and Mechanical Circulatory Support Cardiology   Cardiovascular Division  [facility]   [phone]    HPI      ROS      Physical Exam

## 2020-12-24 ENCOUNTER — DOCUMENTATION ONLY (OUTPATIENT)
Dept: CARDIOLOGY | Facility: CLINIC | Age: 50
End: 2020-12-24

## 2020-12-29 DIAGNOSIS — I27.20 PULMONARY HTN (H): Primary | ICD-10-CM

## 2020-12-29 LAB
6 MIN WALK (FT): 1450 FT
6 MIN WALK (M): 442 M
FIO2-PRE: 21 %

## 2020-12-29 PROCEDURE — 94618 PULMONARY STRESS TESTING: CPT | Performed by: INTERNAL MEDICINE

## 2021-01-01 DIAGNOSIS — Z11.59 ENCOUNTER FOR SCREENING FOR OTHER VIRAL DISEASES: Primary | ICD-10-CM

## 2021-01-11 ENCOUNTER — TELEPHONE (OUTPATIENT)
Dept: CARDIOLOGY | Facility: CLINIC | Age: 51
End: 2021-01-11

## 2021-01-11 DIAGNOSIS — I27.24 CTEPH (CHRONIC THROMBOEMBOLIC PULMONARY HYPERTENSION) (H): ICD-10-CM

## 2021-01-11 DIAGNOSIS — Z86.711 HISTORY OF PULMONARY EMBOLISM: ICD-10-CM

## 2021-01-11 RX ORDER — FONDAPARINUX SODIUM 10 MG/.8ML
10 INJECTION SUBCUTANEOUS DAILY
Qty: 6 SYRINGE | Refills: 1 | Status: ON HOLD | OUTPATIENT
Start: 2021-01-11 | End: 2021-01-21

## 2021-01-11 NOTE — TELEPHONE ENCOUNTER
Patient LM that she needed a refill of her Arixtra for upcoming procedure and it hurts to take a breath.  She also gained 3 pounds over the weekend, as well as her sats only reading 92-93%.    ---------------------  Called patient back and she has an ongoing heaviness in her chest which she started noticing yesterday.  She has pain with deep inspiration as well.  Normally her RA sats are 97-99%, but they are now only 92-93%.  They were higher yesterday.    She denies any fever or new cough, but admits to 3lb from Friday to today.  She denies having missed any doses of her Warfarin, but did stop her Abilify 1 week ago.  Advised patient she should go to the closest ER to rule out a PE.  She agreed and said she would go shortly.  Agreed with plan. Yoly Villegas RN on 1/11/2021 at 2:27 PM  -------------------------  Called & discussed with Dr. Paniagua.  We will continue to watch her chart.  If CT is negative and she goes home, we will increase her diuretics to optimize her prior to her BPA next week.  Agreed with plan and agreed to  Update him. Yoly Villegas RN on 1/11/2021 at 2:31 PM

## 2021-01-11 NOTE — TELEPHONE ENCOUNTER
----- Message from Yoly Villegas RN sent at 12/17/2020  5:02 PM CST -----  Regarding: Pre-Procedure education  Call patient and make sure she gets her Covid test scheduled & her Arixtra syringes.  ----- Message -----  From: Deneen Mccann  Sent: 12/17/2020  12:29 PM CST  To: Yoly Villegas RN, Artie Hernández MD, #    Hi Yoly,    Could you place the case request for the BPA? Pt will be scheduled for 1/18/2021 with Dr. Kiser @ 9:00am (11:00 case). Pt stated she will contact [facility] about her covid test.     I have her scheduled to follow-up with Dr. Paniagua on 2/1/2021.    Her 6MWT will be scheduled on 12/29/2020 and she wanted to know if she should bring her portable oxygen. Could you please advise?    Thank you,  -Deneen  ----- Message -----  From: Arcelia Paniagua MD  Sent: 12/17/2020  10:10 AM CST  To: Artie Hernández MD, Deneen Mccann, #    Team:    1. I spoke to her and she is willing to do BPA. Please schedule her   2. Also, can we schedule her for a 6MWT in the next couple of weeks.     Thank you    TT

## 2021-01-12 ENCOUNTER — TELEPHONE (OUTPATIENT)
Dept: CARDIOLOGY | Facility: CLINIC | Age: 51
End: 2021-01-12

## 2021-01-12 NOTE — TELEPHONE ENCOUNTER
patient presented to ER yesterday for chest pain.  CT negative for PE, and INR elevated by 5.5.  Discussed with Dr. Paniagua and he would like me to call Yuridia and ask how she's doing today, call her coumadin clinic and discuss her INR vs Chromogenic factor goals and the plan for bridging her for the upcoming BPA procedure.  He will review her chart today and we will talk again.  Verbalized understanding.  Yoly Villegas RN on 1/12/2021 at 9:22 AM  ----------------------  Patient LM that the ER told her it was musculoskeletal pain, but her INR was high.  She had just had labs drawn yesterday for her INR clinic, but they use a chromogenic factor 10.  She would like instructions on holding coumadin and when to start Arixtra.  ---------------------  Called patient and she still has tightness, but took tylenol and believes it is helping.  The discomfort is reproducible with palpation.    She spoke with her INR clinic today and they advised her to hold her warfarin today & tomorrow due to the high INR. Due to her BPA being scheduled for 1/18/21, I advised her she should NOT restart her warfarin prior to beginning the Arixtra on Friday 1/15, and she will administer the Arixtra around 9am starting Friday and last dose Sunday.      Patient asked if it was an overnight stay, and I thought it was a same day procedure, so I will ask Dr. Paniagua and call her back.  Patient verbalized understanding, agreed with plan and denied any further questions. Yoly Villegas RN on 1/12/2021 at 1:17 PM  ---------------------  Spoke with Dr. Paniagua and he agreed with Warfarin and Arixtra schedule.  He will check with Dr. Hernández about it being a same day procedure and msg me back.  Agreed with plan. Yoly Villegas RN on 1/12/2021 at 1:18 PM  --------------------  Spoke with patient and advised her I confirmed with Dr. Hernández that she will stay the night at the hospital after her BPA procedure Monday 1/18/21.  Also advised her I  spoke with Dr. Paniagua about her warfarin & Arixtra schedule and he agreed with our plan. Patient verbalized understanding, agreed with plan and denied any further questions. Yoly Villegas RN on 1/12/2021 at 3:18 PM    [id number].

## 2021-01-14 DIAGNOSIS — Z11.59 ENCOUNTER FOR SCREENING FOR OTHER VIRAL DISEASES: ICD-10-CM

## 2021-01-14 LAB
LABORATORY COMMENT REPORT: NORMAL
SARS-COV-2 RNA RESP QL NAA+PROBE: NEGATIVE
SARS-COV-2 RNA RESP QL NAA+PROBE: NORMAL
SPECIMEN SOURCE: NORMAL
SPECIMEN SOURCE: NORMAL

## 2021-01-14 PROCEDURE — U0003 INFECTIOUS AGENT DETECTION BY NUCLEIC ACID (DNA OR RNA); SEVERE ACUTE RESPIRATORY SYNDROME CORONAVIRUS 2 (SARS-COV-2) (CORONAVIRUS DISEASE [COVID-19]), AMPLIFIED PROBE TECHNIQUE, MAKING USE OF HIGH THROUGHPUT TECHNOLOGIES AS DESCRIBED BY CMS-2020-01-R: HCPCS | Performed by: INTERNAL MEDICINE

## 2021-01-14 PROCEDURE — U0005 INFEC AGEN DETEC AMPLI PROBE: HCPCS | Performed by: INTERNAL MEDICINE

## 2021-01-15 ENCOUNTER — TELEPHONE (OUTPATIENT)
Dept: CARDIOLOGY | Facility: CLINIC | Age: 51
End: 2021-01-15

## 2021-01-18 ENCOUNTER — APPOINTMENT (OUTPATIENT)
Dept: MEDSURG UNIT | Facility: CLINIC | Age: 51
DRG: 253 | End: 2021-01-18
Attending: INTERNAL MEDICINE
Payer: COMMERCIAL

## 2021-01-18 ENCOUNTER — HOSPITAL ENCOUNTER (INPATIENT)
Facility: CLINIC | Age: 51
LOS: 3 days | Discharge: HOME | DRG: 253 | End: 2021-01-21
Attending: INTERNAL MEDICINE | Admitting: INTERNAL MEDICINE
Payer: COMMERCIAL

## 2021-01-18 DIAGNOSIS — I27.24 CTEPH (CHRONIC THROMBOEMBOLIC PULMONARY HYPERTENSION) (H): ICD-10-CM

## 2021-01-18 DIAGNOSIS — Z86.711 HISTORY OF PULMONARY EMBOLISM: ICD-10-CM

## 2021-01-18 DIAGNOSIS — F41.9 ANXIETY: Primary | ICD-10-CM

## 2021-01-18 LAB
ANION GAP SERPL CALCULATED.3IONS-SCNC: 6 MMOL/L (ref 3–14)
APTT PPP: 65 SEC (ref 22–37)
B-HCG SERPL-ACNC: <1 IU/L (ref 0–5)
BUN SERPL-MCNC: 29 MG/DL (ref 7–30)
CALCIUM SERPL-MCNC: 9.2 MG/DL (ref 8.5–10.1)
CHLORIDE SERPL-SCNC: 106 MMOL/L (ref 94–109)
CO2 SERPL-SCNC: 26 MMOL/L (ref 20–32)
CREAT SERPL-MCNC: 1.15 MG/DL (ref 0.52–1.04)
ERYTHROCYTE [DISTWIDTH] IN BLOOD BY AUTOMATED COUNT: 13.2 % (ref 10–15)
GFR SERPL CREATININE-BSD FRML MDRD: 56 ML/MIN/{1.73_M2}
GLUCOSE SERPL-MCNC: 86 MG/DL (ref 70–99)
HCT VFR BLD AUTO: 42.2 % (ref 35–47)
HGB BLD-MCNC: 13.7 G/DL (ref 11.7–15.7)
INR PPP: 1.19 (ref 0.86–1.14)
MCH RBC QN AUTO: 29.8 PG (ref 26.5–33)
MCHC RBC AUTO-ENTMCNC: 32.5 G/DL (ref 31.5–36.5)
MCV RBC AUTO: 92 FL (ref 78–100)
PLATELET # BLD AUTO: 296 10E9/L (ref 150–450)
POTASSIUM SERPL-SCNC: 3.9 MMOL/L (ref 3.4–5.3)
RBC # BLD AUTO: 4.6 10E12/L (ref 3.8–5.2)
SODIUM SERPL-SCNC: 138 MMOL/L (ref 133–144)
WBC # BLD AUTO: 4.6 10E9/L (ref 4–11)

## 2021-01-18 PROCEDURE — 85027 COMPLETE CBC AUTOMATED: CPT | Performed by: PHYSICIAN ASSISTANT

## 2021-01-18 PROCEDURE — 250N000011 HC RX IP 250 OP 636: Performed by: NURSE PRACTITIONER

## 2021-01-18 PROCEDURE — 999N000127 HC STATISTIC PERIPHERAL IV START W US GUIDANCE

## 2021-01-18 PROCEDURE — 214N000001 HC R&B CCU UMMC

## 2021-01-18 PROCEDURE — 258N000003 HC RX IP 258 OP 636: Performed by: NURSE PRACTITIONER

## 2021-01-18 PROCEDURE — 36415 COLL VENOUS BLD VENIPUNCTURE: CPT | Performed by: INTERNAL MEDICINE

## 2021-01-18 PROCEDURE — 85730 THROMBOPLASTIN TIME PARTIAL: CPT | Performed by: INTERNAL MEDICINE

## 2021-01-18 PROCEDURE — 99222 1ST HOSP IP/OBS MODERATE 55: CPT | Mod: GC | Performed by: INTERNAL MEDICINE

## 2021-01-18 PROCEDURE — 36415 COLL VENOUS BLD VENIPUNCTURE: CPT | Performed by: PHYSICIAN ASSISTANT

## 2021-01-18 PROCEDURE — 250N000013 HC RX MED GY IP 250 OP 250 PS 637: Performed by: NURSE PRACTITIONER

## 2021-01-18 PROCEDURE — 999N000142 HC STATISTIC PROCEDURE PREP ONLY

## 2021-01-18 PROCEDURE — 99222 1ST HOSP IP/OBS MODERATE 55: CPT | Mod: AI | Performed by: PHYSICIAN ASSISTANT

## 2021-01-18 PROCEDURE — 85610 PROTHROMBIN TIME: CPT | Performed by: PHYSICIAN ASSISTANT

## 2021-01-18 PROCEDURE — 80048 BASIC METABOLIC PNL TOTAL CA: CPT | Performed by: PHYSICIAN ASSISTANT

## 2021-01-18 PROCEDURE — 84702 CHORIONIC GONADOTROPIN TEST: CPT | Performed by: PHYSICIAN ASSISTANT

## 2021-01-18 RX ORDER — LIDOCAINE 40 MG/G
CREAM TOPICAL
Status: DISCONTINUED | OUTPATIENT
Start: 2021-01-18 | End: 2021-01-21 | Stop reason: HOSPADM

## 2021-01-18 RX ORDER — TORSEMIDE 20 MG/1
80 TABLET ORAL DAILY
Status: DISCONTINUED | OUTPATIENT
Start: 2021-01-18 | End: 2021-01-21 | Stop reason: HOSPADM

## 2021-01-18 RX ORDER — NITROGLYCERIN 0.4 MG/1
0.4 TABLET SUBLINGUAL EVERY 5 MIN PRN
Status: DISCONTINUED | OUTPATIENT
Start: 2021-01-18 | End: 2021-01-21 | Stop reason: HOSPADM

## 2021-01-18 RX ORDER — ONDANSETRON 4 MG/1
4 TABLET, ORALLY DISINTEGRATING ORAL EVERY 6 HOURS PRN
Status: DISCONTINUED | OUTPATIENT
Start: 2021-01-18 | End: 2021-01-21

## 2021-01-18 RX ORDER — ACETAMINOPHEN 650 MG/1
650 SUPPOSITORY RECTAL EVERY 4 HOURS PRN
Status: DISCONTINUED | OUTPATIENT
Start: 2021-01-18 | End: 2021-01-21 | Stop reason: HOSPADM

## 2021-01-18 RX ORDER — ACETAMINOPHEN 325 MG/1
650 TABLET ORAL EVERY 4 HOURS PRN
Status: DISCONTINUED | OUTPATIENT
Start: 2021-01-18 | End: 2021-01-21 | Stop reason: HOSPADM

## 2021-01-18 RX ORDER — ONDANSETRON 2 MG/ML
4 INJECTION INTRAMUSCULAR; INTRAVENOUS EVERY 6 HOURS PRN
Status: DISCONTINUED | OUTPATIENT
Start: 2021-01-18 | End: 2021-01-21

## 2021-01-18 RX ORDER — ASPIRIN 81 MG/1
324 TABLET, CHEWABLE ORAL ONCE
Status: COMPLETED | OUTPATIENT
Start: 2021-01-18 | End: 2021-01-18

## 2021-01-18 RX ORDER — CLONAZEPAM 1 MG/1
1 TABLET ORAL DAILY
Status: DISCONTINUED | OUTPATIENT
Start: 2021-01-18 | End: 2021-01-21 | Stop reason: HOSPADM

## 2021-01-18 RX ORDER — TRAZODONE HYDROCHLORIDE 50 MG/1
50 TABLET, FILM COATED ORAL AT BEDTIME
Status: DISCONTINUED | OUTPATIENT
Start: 2021-01-18 | End: 2021-01-21 | Stop reason: HOSPADM

## 2021-01-18 RX ORDER — MAGNESIUM HYDROXIDE/ALUMINUM HYDROXICE/SIMETHICONE 120; 1200; 1200 MG/30ML; MG/30ML; MG/30ML
30 SUSPENSION ORAL EVERY 4 HOURS PRN
Status: DISCONTINUED | OUTPATIENT
Start: 2021-01-18 | End: 2021-01-21 | Stop reason: HOSPADM

## 2021-01-18 RX ORDER — HYDROXYCHLOROQUINE SULFATE 200 MG/1
200 TABLET, FILM COATED ORAL 2 TIMES DAILY
Status: DISCONTINUED | OUTPATIENT
Start: 2021-01-18 | End: 2021-01-21 | Stop reason: HOSPADM

## 2021-01-18 RX ORDER — SPIRONOLACTONE 25 MG/1
25 TABLET ORAL DAILY
Status: DISCONTINUED | OUTPATIENT
Start: 2021-01-18 | End: 2021-01-21 | Stop reason: HOSPADM

## 2021-01-18 RX ORDER — LIDOCAINE 40 MG/G
CREAM TOPICAL
Status: DISCONTINUED | OUTPATIENT
Start: 2021-01-18 | End: 2021-01-20 | Stop reason: HOSPADM

## 2021-01-18 RX ORDER — PROCHLORPERAZINE MALEATE 5 MG
10 TABLET ORAL EVERY 6 HOURS PRN
Status: DISCONTINUED | OUTPATIENT
Start: 2021-01-18 | End: 2021-01-21 | Stop reason: HOSPADM

## 2021-01-18 RX ORDER — ATORVASTATIN CALCIUM 10 MG/1
10 TABLET, FILM COATED ORAL DAILY
Status: DISCONTINUED | OUTPATIENT
Start: 2021-01-18 | End: 2021-01-21 | Stop reason: HOSPADM

## 2021-01-18 RX ORDER — SERTRALINE HYDROCHLORIDE 100 MG/1
200 TABLET, FILM COATED ORAL DAILY
Status: DISCONTINUED | OUTPATIENT
Start: 2021-01-19 | End: 2021-01-21 | Stop reason: HOSPADM

## 2021-01-18 RX ORDER — PROCHLORPERAZINE 25 MG
25 SUPPOSITORY, RECTAL RECTAL EVERY 12 HOURS PRN
Status: DISCONTINUED | OUTPATIENT
Start: 2021-01-18 | End: 2021-01-21 | Stop reason: HOSPADM

## 2021-01-18 RX ORDER — SODIUM CHLORIDE 9 MG/ML
INJECTION, SOLUTION INTRAVENOUS CONTINUOUS
Status: DISCONTINUED | OUTPATIENT
Start: 2021-01-18 | End: 2021-01-20 | Stop reason: HOSPADM

## 2021-01-18 RX ADMIN — HYDROXYCHLOROQUINE SULFATE 200 MG: 200 TABLET, FILM COATED ORAL at 19:15

## 2021-01-18 RX ADMIN — Medication 80 MG: at 16:41

## 2021-01-18 RX ADMIN — TRAZODONE HYDROCHLORIDE 50 MG: 50 TABLET ORAL at 22:24

## 2021-01-18 RX ADMIN — ATORVASTATIN CALCIUM 10 MG: 10 TABLET, FILM COATED ORAL at 16:40

## 2021-01-18 RX ADMIN — CLONAZEPAM 1 MG: 1 TABLET ORAL at 22:24

## 2021-01-18 RX ADMIN — BIVALIRUDIN 0.15 MG/KG/HR: 250 INJECTION, POWDER, LYOPHILIZED, FOR SOLUTION INTRAVENOUS at 16:37

## 2021-01-18 RX ADMIN — ASPIRIN 324 MG: 81 TABLET, CHEWABLE ORAL at 16:40

## 2021-01-18 RX ADMIN — SPIRONOLACTONE 25 MG: 25 TABLET ORAL at 16:40

## 2021-01-18 ASSESSMENT — ACTIVITIES OF DAILY LIVING (ADL)
ADLS_ACUITY_SCORE: 17
ADLS_ACUITY_SCORE: 12

## 2021-01-18 ASSESSMENT — MIFFLIN-ST. JEOR
SCORE: 1835.17
SCORE: 1838.8

## 2021-01-18 NOTE — Clinical Note
dry, intact, no bleeding and no hematoma. 8fr sheath removed from R femoral vein. Perc close utilized.

## 2021-01-18 NOTE — LETTER
"Transition Communication Hand-off for Care Transitions to Next Level of Care Provider    Name: Yuridia Barksdale  : 1970  MRN #: [id number]  Primary Care Provider: Werner Saeed     Primary Clinic: No address on file     Reason for Hospitalization:  CTEPH (chronic thromboembolic pulmonary hypertension) (H) [I27.24]  Admit Date/Time: 2021  9:18 AM  Discharge Date: ***  Payor Source: Payor: [employer] / Plan: [employer] CARE MA / Product Type: HMO /     Readmission Assessment Measure (JESSY) Risk Score/category: ***    Plan of Care Goals/Milestone Events:   Patient Concerns: ***   Patient Goals:   Short-term ***   Long-term ***   Medical Goals   Short-term ***   Long-term ***         Reason for Communication Hand-off Referral: {CCREASONCMCTNHANDOFFREFERRALCTS:34489}    Discharge Plan:       Concern for non-adherence with plan of care:   Y/N ***  Discharge Needs Assessment:  Needs      Most Recent Value   Equipment Currently Used at Home  none          Already enrolled in Tele-monitoring program and name of program:  ***  Follow-up specialty is recommended: {YES / NO:53784::\"Yes\"}    Follow-up plan:    Future Appointments   Date Time Provider Department Center   2021  8:30 AM Arcelia Paniagua MD [facility]       Any outstanding tests or procedures:              Key Recommendations:      Antonella Oviedo RN    AVS/Discharge Summary is the source of truth; this is a helpful guide for improved communication of patient story          "

## 2021-01-18 NOTE — Clinical Note
Handoff given to 6C RN Siomara. Instructed to restart Bivalirudin gtt in 2 hours. Patient will be on bedrest for 2 hours. All questions answered.

## 2021-01-18 NOTE — PROGRESS NOTES
Prepped for RH, BPA, pulmonary angioplasty. Denies pain.  Has family available for post procedure transport.  H & P current.  Consent needed.  Labs pending.

## 2021-01-18 NOTE — H&P
[facility]   Interventional Cardiology   History and Physical     Yuridia Barksdale MRN# [id number]   YOB: 1970 Age: 50 year old     Chief Complaint: CTEPH    HPI: 49 yo female with PMH of pulmonary hypertension 2/2 CTEPH, SLE, lupus antiphospholipid antibody syndrome, DAVIS, obesity who is presenting for balloon pulmonary angioplasty, pulmonary angiogram and right heart catheterization.    She underwent pulmonary thromboendarterectomy on July 14.  She had bilateral type 3 disease. Her PTE was difficult due to the distal nature of her disease. She had total circulatory arrest time of 38 minutes. Her post-operative  Her postoperative course was uncomplicated.  She was discharged on day 11.  She was discharged on supplemental oxygen 2 L at rest and 4 L with exertion.  Her Adempas and digoxin were discontinued after surgery.  She was discharged home on Coumadin with an INR goal of 3-4 and Lasix 60 mg twice a day.    Today she is feeling in her normally state of health. She denies fever/chills, chest pain, cough, N/V/D, dysuria. Stable swelling in her left leg. She has stable SAHU after a few minutes of exertion.     Her last dose of fondaparinux was yesterday at 9:15 AM.     ROS: The patient is currently denying fever/chills, chest pain, shortness of breath, cough, nausea/vomiting/diarrhea, and swelling in the legs. All other systems were reviewed and were negative.      Past Medical History:   Diagnosis Date     Acute blood loss anemia 8/2014    needed blood transfusion     DVT (deep venous thrombosis) (H)      Lupus (H)      Pneumonia 12/2013    hospitalized x8days       Past Surgical History:   Procedure Laterality Date     APPENDECTOMY  9/1994     CV PULMONARY ANGIOGRAM N/A 12/2/2019    Procedure: CV PULMONARY ANGIOGRAM;  Surgeon: Arnol Kiser MD;  Location:  HEART CARDIAC CATH LAB     CV PULMONARY ANGIOGRAM N/A 12/15/2020    Procedure: CV PULMONARY ANGIOGRAM;   "Surgeon: Lonnie Hanna MD;  Location:  HEART CARDIAC CATH LAB     CV RIGHT HEART CATH N/A 12/2/2019    Procedure: CV RIGHT HEART CATH;  Surgeon: Arnol Kiser MD;  Location:  HEART CARDIAC CATH LAB     CV RIGHT HEART CATH N/A 11/11/2020    Procedure: CV RIGHT HEART CATH;  Surgeon: Arcelia Paniagua MD;  Location:  HEART CARDIAC CATH LAB     CV RIGHT HEART CATH N/A 12/15/2020    Procedure: CV RIGHT HEART CATH;  Surgeon: Lonnie Hanna MD;  Location:  HEART CARDIAC CATH LAB     HYSTERECTOMY  4/21/2016       No current facility-administered medications on file prior to encounter.        atorvastatin (LIPITOR) 10 MG tablet, Take 10 mg by mouth       clonazePAM (KLONOPIN) 1 MG tablet, Take 1 mg by mouth       hydroxychloroquine (PLAQUENIL) 200 MG tablet, Take 200 mg by mouth 2 times daily        potassium chloride (KLOR-CON) 20 MEQ packet, Take 20 mEq by mouth daily       sertraline (ZOLOFT) 100 MG tablet, Take 200 mg by mouth       spironolactone (ALDACTONE) 25 MG tablet, Take 1 tablet (25 mg) by mouth daily       torsemide (DEMADEX) 20 MG tablet, Take 4 tablets (80 mg) by mouth daily       traZODone (DESYREL) 50 MG tablet,        Warfarin Sodium (COUMADIN PO), Take  by mouth.        History reviewed. No pertinent family history.    Social History     Tobacco Use     Smoking status: Never Smoker     Smokeless tobacco: Never Used   Substance Use Topics     Alcohol use: Yes     Comment: occasionally       Allergies   Allergen Reactions     Lovenox [Enoxaparin]      Heparin allergy, do not use this.  Use Arixtra instead     Heparin Other (See Comments)     thrombocytopenia     Levaquin      Clindamycin Rash     Vancomycin Rash         Physical Examination:  Vitals: /79 (BP Location: Right arm)   Pulse 67   Temp 97.5  F (36.4  C) (Oral)   Resp 16   Ht 1.676 m (5' 6\")   Wt 118.8 kg (262 lb)   SpO2 95%   BMI 42.29 kg/m    BMI= Body mass index is 42.29 " kg/m .    Constitutional: Alert and awake, relatively well appearing, in no significant pain or respiratory distress  ENT: Mallampati III  Skin: No erythema or signs of infection  Cardiac: RRR, no r/m/g  Pulmonary: Normal respiratory effort, good air movement, no adventitious lung sounds, no areas of decreased lung sounds  GI: +BS, NTTP, non-distended  Extremities: Left LE edema no right lower extremity edema. All extremities are warm and well-perfused    Laboratory:  CMP  Recent Labs   Lab 01/18/21  0939      POTASSIUM 3.9   CHLORIDE 106   CO2 26   ANIONGAP 6   GLC 86   BUN 29   CR 1.15*   GFRESTIMATED 56*   GFRESTBLACK 65   KATARINA 9.2     CBC  Recent Labs   Lab 01/18/21  0939   WBC 4.6   RBC 4.60   HGB 13.7   HCT 42.2   MCV 92   MCH 29.8   MCHC 32.5   RDW 13.2          No results found for: TROPI, TROPONIN, TROPR, TROPN      EKG: Last EKG report for July 2020 reviewed, no LBBB    Assessment and plan:     - Labs reviewed with stable Cr, no leukocytosis, stable Hgb. COVID negative.  - Fondaparinux held only for 24 hours, will delay procedure  - admit to cards one for bridging  - Has heparin allergy- likely bivalrudin bridge  - Please consult hematology for bridging assistance as well as plan formulation for future procedures  - tentative plan for procedure on Wednesday- will need to formally schedule with Dr. Kiser/cath lab    Christel Mccarty PA-C  [facility]      Attending Attestation: Patient seen and examined independent of Christel PHILIP on January 19, 2020. The history and physical findings are accurate as recorded. My additional findings, if any, have been incorporated into the body of the note. All labs, imaging studies, ECG and telemetry data have been reviewed personally. The assessment and plan outlined reflect the heart teams joint decision making.  -Anticoagulation bridge prior to catheterization with Bival

## 2021-01-18 NOTE — PROGRESS NOTES
HEMATOLOGY BRIEF NOTE  January 18, 2021    Patient was not seen by the hematology team today as she was undergoing procedures.     Recommendations  - Start coumadin with fondaparinux bridging if no further procedures planned  - If additional procedures are planned, recommend starting bivalirudin or argatroban    Full consult note to follow tomorrow.     Discussed with Dr. Tolbert.     Urszula Castelan  Hematology, Oncology & Transplantation fellow  Pager: [phone]  1/18/2021

## 2021-01-18 NOTE — PROGRESS NOTES
Procedure postponed due to status of anticoagulants, will reschedule later this week.    She has eaten lunch.  Now admitted to .

## 2021-01-18 NOTE — Clinical Note
The first balloon was inserted into the right pulmonary artery.Max pressure = 12 violeta. Total duration = 10 seconds.     A8.

## 2021-01-18 NOTE — CONSULTS
Hematology Consult Note    Yuridia Barksdale MRN# [id number]   Age: 50 year old YOB: 1970     Date of Admission:  1/18/2021    Reason for consult:  History of SLE, APLS on fondaparinux, recs for holding anticoagulation            History of Present Illness:   Yuridia Barksdale is a 50 y.o female with history of SLE, antiphospholipid syndrome, DAVIS, obesity, pulmonary HTN due to CTEPH who was admitted on 1/18/21 for a planned balloon angioplasty, pulmonary angiogram and right heart catheterization. Hematology team has been consulted for recommendations regarding anticoagulation.     She has a history of lupus diagnosed many years ago and she follows with rheumatologist, Dr. Graf and is on hydroxychloroquine. She reports a history of LLE DVT in 2010 for which she was on Xarelto. She then developed pulmonary embolism in 2018 while on Xarelto and workup at that point confirmed APLS. She was then switched to warfarin and has remained on it until now. She takes 5mg on two days of the week and 4mg rest of the days. She follows with Dr. Benito at [facility] and follows in anticoagulation clinic with her PCP. Coumadin was held and fondaparinux started on 1/15 for the procedures. Last dose of fondaparinux was on 1/17. Of note, she reports a history of HIT that was noted in 7/2020 while she was at [facility].     Balloon angioplasty, pulmonary angiogram and right heart catheterization was originally planned on 1/18/21 however, given that last dose of fondaparinix was taken on 1/17, the procedure has been delayed. Bivalirudin gtt was started on 1/18.         Review of Systems:     14-point review of systems was otherwise negative except as noted in HPI.          Past Medical History:   Past medical history was reviewed.   Past Medical History:   Diagnosis Date     Acute blood loss anemia 8/2014    needed blood transfusion     DVT (deep venous thrombosis) (H)      Lupus (H)      Pneumonia 12/2013     hospitalized x8days             Past Surgical History:   Past surgical history was reviewed.   Past Surgical History:   Procedure Laterality Date     APPENDECTOMY  9/1994     CV PULMONARY ANGIOGRAM N/A 12/2/2019    Procedure: CV PULMONARY ANGIOGRAM;  Surgeon: Arnol Kiser MD;  Location:  HEART CARDIAC CATH LAB     CV PULMONARY ANGIOGRAM N/A 12/15/2020    Procedure: CV PULMONARY ANGIOGRAM;  Surgeon: Lonnie Hanna MD;  Location:  HEART CARDIAC CATH LAB     CV RIGHT HEART CATH N/A 12/2/2019    Procedure: CV RIGHT HEART CATH;  Surgeon: Arnol Kiser MD;  Location:  HEART CARDIAC CATH LAB     CV RIGHT HEART CATH N/A 11/11/2020    Procedure: CV RIGHT HEART CATH;  Surgeon: Arcelia Paniagua MD;  Location:  HEART CARDIAC CATH LAB     CV RIGHT HEART CATH N/A 12/15/2020    Procedure: CV RIGHT HEART CATH;  Surgeon: Lonnie Hanna MD;  Location:  HEART CARDIAC CATH LAB     HYSTERECTOMY  4/21/2016             Social History:   Social history was reviewed.   Social History     Tobacco Use     Smoking status: Never Smoker     Smokeless tobacco: Never Used   Substance Use Topics     Alcohol use: Yes     Comment: occasionally             Family History:   Family history was reviewed.  History reviewed. No pertinent family history.          Allergies:     Allergies   Allergen Reactions     Lovenox [Enoxaparin]      Heparin allergy, do not use this.  Use Arixtra instead     Heparin Other (See Comments)     thrombocytopenia     Levaquin      Clindamycin Rash     Vancomycin Rash             Medications:   Medications Reviewed.   Current Facility-Administered Medications   Medication     acetaminophen (TYLENOL) Suppository 650 mg     acetaminophen (TYLENOL) tablet 650 mg     alum & mag hydroxide-simethicone (MAALOX) suspension 30 mL     aspirin (ASA) chewable tablet 324 mg     atorvastatin (LIPITOR) tablet 10 mg     clonazePAM (klonoPIN) tablet 1 mg     HOLD: fondaparinux  "(ARIXTRA) 72 hours pre-procedure     Hold: warfarin (COUMADIN) pre-procedure     hydroxychloroquine (PLAQUENIL) tablet 200 mg     lidocaine (LMX4) cream     lidocaine (LMX4) cream     lidocaine 1 % 0.1-1 mL     lidocaine 1 % 0.1-1 mL     medication instruction     nitroGLYcerin (NITROSTAT) sublingual tablet 0.4 mg     ondansetron (ZOFRAN-ODT) ODT tab 4 mg    Or     ondansetron (ZOFRAN) injection 4 mg     Patient is already receiving anticoagulation with heparin, enoxaparin (LOVENOX), warfarin (COUMADIN)  or other anticoagulant medication     Patient is NOT allergic to contrast dye OR was given pre-procedure oral steroids for treatment     prochlorperazine (COMPAZINE) injection 10 mg    Or     prochlorperazine (COMPAZINE) tablet 10 mg    Or     prochlorperazine (COMPAZINE) suppository 25 mg     sertraline (ZOLOFT) tablet 200 mg     sodium chloride (PF) 0.9% PF flush 3 mL     sodium chloride (PF) 0.9% PF flush 3 mL     sodium chloride (PF) 0.9% PF flush 3 mL     sodium chloride (PF) 0.9% PF flush 3 mL     sodium chloride 0.9% infusion     spironolactone (ALDACTONE) tablet 25 mg     torsemide (DEMADEX) tablet 80 mg     traZODone (DESYREL) tablet 50 mg             Physical Exam:   Vitals were reviewed.  Blood pressure 117/79, pulse 67, temperature 97.5  F (36.4  C), temperature source Oral, resp. rate 16, height 1.676 m (5' 6\"), weight 118.8 kg (262 lb), SpO2 95 %.    Constitutional: awake, laying in bed, appears comfortable, in NAD  CV: RRR  Resp: Clear to auscultation bilaterally, breathing comfortably on room air  Abd: Soft, non-tender, BS+  MSK:  Warm, FROM, no joint swelling  Skin: Bruise and a superficial ulcer over the LLE  Neuro: Moves all four extremities  Psych: Mood and affect WNL           Data:   No intake/output data recorded.    ROUTINE LABS (Last four results)  CMP  Recent Labs   Lab 01/18/21  0939      POTASSIUM 3.9   CHLORIDE 106   CO2 26   ANIONGAP 6   GLC 86   BUN 29   CR 1.15*   GFRESTIMATED " 56*   GFRESTBLACK 65   KATARINA 9.2     CBC  Recent Labs   Lab 01/18/21  0939   WBC 4.6   RBC 4.60   HGB 13.7   HCT 42.2   MCV 92   MCH 29.8   MCHC 32.5   RDW 13.2        INR  Recent Labs   Lab 01/18/21  0939   INR 1.19*            Assessment:   50 y.o female with history of SLE, antiphospholipid syndrome on warfarin, DAVIS, obesity, pulmonary HTN due to CTEPH who was admitted on 1/18/21 for a planned balloon angioplasty, pulmonary angiogram and right heart catheterization. Warfarin was switched to fondaparinux per anticoagulation clinic recs on 1/15 for the procedure. Last dose of fondaparinux was on 1/17.    Recommendations  -- Continue bivalirudin and stop 1.5-3 hours prior to the procedure  -- Start bridging fondaparinux to warfarin after the procedure if no further procedures are planned. If additional procedures are planned, continue bivalirudin drip.   -- Continue follow-up in anticoagulation clinic and her primary hematologist after discharge    Thank you for involving us in the care of the patient. Recommendations communicated with the primary team.     Patient was seen and plan discussed with attending physician, Dr. Tolbert.     Urszula Castelan  Hematology, Oncology & Transplantation fellow  Pager: [phone]  01/18/2021

## 2021-01-19 LAB
ANION GAP SERPL CALCULATED.3IONS-SCNC: 4 MMOL/L (ref 3–14)
APTT PPP: 84 SEC (ref 22–37)
APTT PPP: 85 SEC (ref 22–37)
BUN SERPL-MCNC: 30 MG/DL (ref 7–30)
CALCIUM SERPL-MCNC: 9 MG/DL (ref 8.5–10.1)
CHLORIDE SERPL-SCNC: 108 MMOL/L (ref 94–109)
CO2 SERPL-SCNC: 27 MMOL/L (ref 20–32)
CREAT SERPL-MCNC: 1.21 MG/DL (ref 0.52–1.04)
ERYTHROCYTE [DISTWIDTH] IN BLOOD BY AUTOMATED COUNT: 13.2 % (ref 10–15)
GFR SERPL CREATININE-BSD FRML MDRD: 53 ML/MIN/{1.73_M2}
GLUCOSE SERPL-MCNC: 88 MG/DL (ref 70–99)
HCT VFR BLD AUTO: 41.5 % (ref 35–47)
HGB BLD-MCNC: 13.3 G/DL (ref 11.7–15.7)
MCH RBC QN AUTO: 30.1 PG (ref 26.5–33)
MCHC RBC AUTO-ENTMCNC: 32 G/DL (ref 31.5–36.5)
MCV RBC AUTO: 94 FL (ref 78–100)
PLATELET # BLD AUTO: 267 10E9/L (ref 150–450)
POTASSIUM SERPL-SCNC: 3.6 MMOL/L (ref 3.4–5.3)
RBC # BLD AUTO: 4.42 10E12/L (ref 3.8–5.2)
SODIUM SERPL-SCNC: 140 MMOL/L (ref 133–144)
WBC # BLD AUTO: 4.7 10E9/L (ref 4–11)

## 2021-01-19 PROCEDURE — 250N000011 HC RX IP 250 OP 636: Performed by: NURSE PRACTITIONER

## 2021-01-19 PROCEDURE — 99207 PR NO CHARGE LOS: CPT | Performed by: INTERNAL MEDICINE

## 2021-01-19 PROCEDURE — 85027 COMPLETE CBC AUTOMATED: CPT | Performed by: NURSE PRACTITIONER

## 2021-01-19 PROCEDURE — 250N000013 HC RX MED GY IP 250 OP 250 PS 637: Performed by: NURSE PRACTITIONER

## 2021-01-19 PROCEDURE — 258N000003 HC RX IP 258 OP 636: Performed by: NURSE PRACTITIONER

## 2021-01-19 PROCEDURE — 36415 COLL VENOUS BLD VENIPUNCTURE: CPT | Performed by: NURSE PRACTITIONER

## 2021-01-19 PROCEDURE — 214N000001 HC R&B CCU UMMC

## 2021-01-19 PROCEDURE — 85730 THROMBOPLASTIN TIME PARTIAL: CPT | Performed by: NURSE PRACTITIONER

## 2021-01-19 PROCEDURE — 80048 BASIC METABOLIC PNL TOTAL CA: CPT | Performed by: NURSE PRACTITIONER

## 2021-01-19 RX ADMIN — SPIRONOLACTONE 25 MG: 25 TABLET ORAL at 07:58

## 2021-01-19 RX ADMIN — ATORVASTATIN CALCIUM 10 MG: 10 TABLET, FILM COATED ORAL at 07:58

## 2021-01-19 RX ADMIN — TRAZODONE HYDROCHLORIDE 50 MG: 50 TABLET ORAL at 22:05

## 2021-01-19 RX ADMIN — CLONAZEPAM 1 MG: 1 TABLET ORAL at 22:05

## 2021-01-19 RX ADMIN — BIVALIRUDIN 0.15 MG/KG/HR: 250 INJECTION, POWDER, LYOPHILIZED, FOR SOLUTION INTRAVENOUS at 06:28

## 2021-01-19 RX ADMIN — SERTRALINE HYDROCHLORIDE 200 MG: 100 TABLET ORAL at 07:58

## 2021-01-19 RX ADMIN — HYDROXYCHLOROQUINE SULFATE 200 MG: 200 TABLET, FILM COATED ORAL at 07:58

## 2021-01-19 RX ADMIN — HYDROXYCHLOROQUINE SULFATE 200 MG: 200 TABLET, FILM COATED ORAL at 19:21

## 2021-01-19 RX ADMIN — Medication 80 MG: at 07:59

## 2021-01-19 ASSESSMENT — ACTIVITIES OF DAILY LIVING (ADL)
ADLS_ACUITY_SCORE: 12

## 2021-01-19 ASSESSMENT — MIFFLIN-ST. JEOR: SCORE: 1836.53

## 2021-01-19 NOTE — UTILIZATION REVIEW
"  Admission Status; Secondary Review Determination     Admission Date: 1/18/2021  9:18 AM      Under the authority of the Utilization Management Committee, the utilization review process indicated a secondary review on the above patient.  The review outcome is based on review of the medical records, discussions with staff, and applying clinical experience noted on the date of the review.        (x)      Inpatient Status Appropriate - This patient's medical care is consistent with medical management for inpatient care and reasonable inpatient medical practice.      () Observation Status Appropriate - This patient does not meet hospital inpatient criteria and is placed in observation status. If this patient's primary payer is Medicare and was admitted as an inpatient, Condition Code 44 should be used and patient status changed to \"observation\".   () Admission Status NOT Appropriate - This patient's medical care is not consistent with medical management for Inpatient or Observation Status.          RATIONALE FOR DETERMINATION   Ms. Barksdale is a pleasant 50 y.o female with past medical history significant for pulmonary HTN 2/2 CTEPH, SLE, antiphospholipid antibody syndrome, DAVIS, and obesity who presented for outpatient balloon pulmonary angioplasty on 1/18.  Unfortunately, she is on chronic anticoagulation with warfarin and fondaparinux.  Her anticoagulation does need to be held for 72 hours before procedure.  She does requiring bridging anticoagulation with continuous IV bivalirudin.  Due to this patient's complex past medical history, need for balloon pulmonary angioplasty, chronic anticoagulation, and current need for bridging anticoagulation with continuous IV bivalirudin, with need for prolonged hospitalization, it is appropriate to have her admitted to the hospital as an inpatient.      The severity of illness, intensity of service provided, expected LOS and risk for adverse outcome make the care complex, high risk " and appropriate for hospital admission.        The information on this document is developed by the utilization review team in order for the business office to ensure compliance.  This only denotes the appropriateness of proper admission status and does not reflect the quality of care rendered.         The definitions of Inpatient Status and Observation Status used in making the determination above are those provided in the CMS Coverage Manual, Chapter 1 and Chapter 6, section 70.4.      Sincerely,     Omar Cannon D.O.  Utilization Review/ Case Management  [facility].

## 2021-01-19 NOTE — PLAN OF CARE
D: Pt admit 1/18/21 for balloon pulmonary angioplasty, pulmonary angiogram and RHC, PMH pHTN 2/2 CTEPH, SLE, lupus, antiphospholipid antibody syndrome, DAVIS, obesity. Pt had pulmonary thromboendarterectomy at [facility] 7/14/20    I/A:   Neuro: A&Ox4. Pleasant, makes needs known  VS: VSS. RA  Tele: SR w/BBB  Pain: denies  GI/: Urinating adequately. No BM this shift.  Diet: regular  IV/Drips: L PIV infusing angiomax gtt 0.15 mg/kg/hr. PTT therapeutic this AM (85)  Activity: Independent  Skin/drains: Pt skin intact. Generalized bruising. Scab on shin.    P: Plan for balloon pulmonary angioplasty, pulmonary angiogram, and RHC Wednesday. Continue to monitor pt status and report changes to Cards 1.     Edith Ware RN

## 2021-01-19 NOTE — PLAN OF CARE
"BP (!) 134/93   Pulse 66   Temp 98.5  F (36.9  C) (Oral)   Resp 16   Ht 1.676 m (5' 6\")   Wt 119 kg (262 lb 4.8 oz)   SpO2 97%   BMI 42.34 kg/m       Neuro: A/Ox4  Cardiac: VSS. SR. Afebrile  Respiratory: RA. No respiratory distress noted.   GI/: BM x1. Voiding spontaneously without difficulty.   Diet/Appetite: Regular diet, good appetite. Denies nausea   Skin: No new skin deficits noted.   LDA: PIV infusing angiomax gtt at 0.15mg/kg/hr. PTT therapeutic, next recheck tomorrow AM.   Activity: Up ad steven. Ambulating in menjivar frequently  Pain: Denies  Plan: NPO at midnight for BPA tomorrow. Continue to monitor and follow POC. Notify Cards 1 with any changes or concerns     "

## 2021-01-19 NOTE — PROGRESS NOTES
Interventional Cardiology Progress Note      Assessment & Plan:  Yuridia Barksdale is a pleasant 50 y.o female with past medical history significant for pulmonary HTN 2/2 CTEPH, SLE, antiphospholipid antibody syndrome, DAVIS, and obesity who presented for outpatient balloon pulmonary angioplasty on 1/18. Unfortunately, procedure had to be delayed since patient held fondaparinux for only 24 hours (vs 72 hours). Given her hypercoagulable state, she was admitted for bridging with plans to proceed with BPA on Wednesday, 1/20.      # Pulmonary HTN 2/2 CTEPH  She underwent pulmonary thromboendarterectomy on July 14.  She had bilateral type 3 disease. Her PTE was difficult due to the distal nature of her disease. Her postoperative course was uncomplicated.  She was discharged on supplemental oxygen 2 L at rest and 4 L with exertion.  Her Adempas and digoxin were discontinued after surgery.  She was discharged home on Coumadin with an INR goal of 3-4.  - Continue PTA spironolactone 25 mg daily   - Continue PTA torsemide 80 mg daily   - NPO for BPA tomorrow, 1/20    # SLE with lupus antiphospholipid antibody syndrome   # Hx of DVT  Hx of HIT. On fondaparinux and warfarin for anticoagulation with goal INR 3-4.   - Continue PTA hydroxychloroquine 200 mg BID   - Continue holding coumadin and fondaparinux  - Continue bivalrudin infusion for bridging    # DAVIS  -Compliant with CPAP    # Obesity  - BMI 42    Prophylaxis/DVT: bivalrudin     Diet: Regular   Activity: as tolerated   Code Status: Full   Disposition: likely 1-2 days post BPA    Patient seen and discussed w/ Dr. Joy.      Sammie Loera DNP, APRN, CNP  [facility]  Interventional Cardiology  [phone]      Interval History: No acute events overnight. Denies chest pain, SOB, or dizziness. Bivalrudin infusing. Plan for BPA tomorrow, 1/20      Most recent vital signs:  /75 (BP Location: Right arm)   Pulse 64   Temp 98  F (36.7  C) (Oral)   " Resp 16   Ht 1.676 m (5' 6\")   Wt 119 kg (262 lb 4.8 oz)   SpO2 92%   BMI 42.34 kg/m    Temp:  [97.5  F (36.4  C)-98.3  F (36.8  C)] 98  F (36.7  C)  Pulse:  [64-74] 64  Resp:  [16] 16  BP: ()/(63-93) 115/75  SpO2:  [92 %-98 %] 92 %  Wt Readings from Last 2 Encounters:   01/19/21 119 kg (262 lb 4.8 oz)   12/15/20 117 kg (258 lb)       Intake/Output Summary (Last 24 hours) at 1/19/2021 0952  Last data filed at 1/19/2021 0900  Gross per 24 hour   Intake 1035.73 ml   Output 2600 ml   Net -1564.27 ml       Physical exam:  General: In bed, in NAD  HEENT: EOMI, PERRLA, no scleral icterus or injection  CARDIAC: RRR, no m/r/g appreciated. Peripheral pulses 2+  RESP: CTAB, no wheezes, rhonchi or crackles appreciated.  GI: NABS, NT/ND, no guarding or rebound  EXTREMITIES: NO LE edema, pulses 2+.   SKIN: No acute lesions appreciated  NEURO: Alert and oriented X3, CN II-XII grossly intact, no focal neurological deficits noted    Drains and Tubes: No drains or tubes present  Access: No central lines or devices in place    Labs (Past three days):  CBC  Recent Labs   Lab 01/19/21  0514 01/18/21  0939   WBC 4.7 4.6   RBC 4.42 4.60   HGB 13.3 13.7   HCT 41.5 42.2   MCV 94 92   MCH 30.1 29.8   MCHC 32.0 32.5   RDW 13.2 13.2    296     BMP  Recent Labs   Lab 01/19/21  0514 01/18/21  0939    138   POTASSIUM 3.6 3.9   CHLORIDE 108 106   CO2 27 26   ANIONGAP 4 6   GLC 88 86   BUN 30 29   CR 1.21* 1.15*   GFRESTIMATED 53* 56*   GFRESTBLACK 61 65   KATARINA 9.0 9.2     Troponins: No results found for: TROPI, TROPONIN, TROPR, TROPN     INR  Recent Labs   Lab 01/18/21  0939   INR 1.19*     Liver panelNo lab results found in last 7 days.    Imaging/procedure results:  ECHO: 11/11/20  Interpretation Summary  Left ventricular function, chamber size, wall motion, and wall thickness are  normal.The EF is 55-60%.  Probably moderately dilated RV with mildly reduced function on limited views.  Estimated pulmonary artery systolic " pressure is 30 mmHg plus right atrial  pressure.  The inferior vena cava was normal in size with preserved respiratory  variability which suggests a normal RA pressure of 3 mmHg.  No pericardial effusion is present.     This study was compared with the study from 12/02/2019: There has been no  significant change, however the RV was better imaged on the prior study.      v

## 2021-01-19 NOTE — PLAN OF CARE
Admission [id number]    D:  Pt with PMH of pulmonary hypertension 2/2 CTEPH, SLE, lupus antiphospholipid antibody syndrome, DAVIS, obesity who is presenting for balloon pulmonary angioplasty, pulmonary angiogram and right heart catheterization.    A/I:  Pt had only held anticoagulation drugs for 24 hours but required 72 prior to angioplasty. Pt admitted to be started on angiomax to bridge through procedure and then resume. Pt is alert and oriented. Pt up independently. Pt eating drinking and voiding. Pt started on angiomax at 0.3 mg /kg / min. Pt was therapeutic 2 hours after initiation unfortunately PIV started to hurt and required new PIV placed. Pt scheduled for PTT at midnight d/t interruption. Pt has been in SR with a BBB. Pt's other VSS. Pt's skin assessed and documented. Admission complete. Pt denies any c/o pain. Pt's lungs clear but diminished. Pt requiring 02 with exercise or strenuous activity.  P:  Pt to have procedure on Wednesday with Dr Heredia. Continue with current POC. Discuss with MD's doing MRSA swab for r/o

## 2021-01-20 LAB
ABO + RH BLD: NORMAL
ABO + RH BLD: NORMAL
APTT PPP: 91 SEC (ref 22–37)
APTT PPP: >240 SEC (ref 22–37)
BLD GP AB SCN SERPL QL: NORMAL
BLOOD BANK CMNT PATIENT-IMP: NORMAL
BUN SERPL-MCNC: 23 MG/DL (ref 7–30)
CREAT SERPL-MCNC: 1.03 MG/DL (ref 0.52–1.04)
ERYTHROCYTE [DISTWIDTH] IN BLOOD BY AUTOMATED COUNT: 13.2 % (ref 10–15)
GFR SERPL CREATININE-BSD FRML MDRD: 64 ML/MIN/{1.73_M2}
HCT VFR BLD AUTO: 40.5 % (ref 35–47)
HGB BLD-MCNC: 13.2 G/DL (ref 11.7–15.7)
HGB BLD-MCNC: 13.2 G/DL (ref 11.7–15.7)
KCT BLD-ACNC: 127 SEC (ref 75–150)
KCT BLD-ACNC: 221 SEC (ref 75–150)
KCT BLD-ACNC: 262 SEC (ref 75–150)
MCH RBC QN AUTO: 30.3 PG (ref 26.5–33)
MCHC RBC AUTO-ENTMCNC: 32.6 G/DL (ref 31.5–36.5)
MCV RBC AUTO: 93 FL (ref 78–100)
OXYHGB MFR BLD: 70 % (ref 92–100)
PLATELET # BLD AUTO: 263 10E9/L (ref 150–450)
RBC # BLD AUTO: 4.35 10E12/L (ref 3.8–5.2)
SPECIMEN EXP DATE BLD: NORMAL
WBC # BLD AUTO: 5.6 10E9/L (ref 4–11)

## 2021-01-20 PROCEDURE — 258N000003 HC RX IP 258 OP 636: Performed by: NURSE PRACTITIONER

## 2021-01-20 PROCEDURE — 85018 HEMOGLOBIN: CPT

## 2021-01-20 PROCEDURE — 258N000003 HC RX IP 258 OP 636: Performed by: PHYSICIAN ASSISTANT

## 2021-01-20 PROCEDURE — C1769 GUIDE WIRE: HCPCS | Performed by: INTERNAL MEDICINE

## 2021-01-20 PROCEDURE — C1887 CATHETER, GUIDING: HCPCS | Performed by: INTERNAL MEDICINE

## 2021-01-20 PROCEDURE — 99207 PR APP CREDIT; MD BILLING SHARED VISIT: CPT | Performed by: PHYSICIAN ASSISTANT

## 2021-01-20 PROCEDURE — 82565 ASSAY OF CREATININE: CPT | Performed by: NURSE PRACTITIONER

## 2021-01-20 PROCEDURE — 99232 SBSQ HOSP IP/OBS MODERATE 35: CPT | Mod: 25 | Performed by: INTERNAL MEDICINE

## 2021-01-20 PROCEDURE — 250N000011 HC RX IP 250 OP 636: Performed by: PHYSICIAN ASSISTANT

## 2021-01-20 PROCEDURE — 36415 COLL VENOUS BLD VENIPUNCTURE: CPT | Performed by: PHYSICIAN ASSISTANT

## 2021-01-20 PROCEDURE — 92997 PUL ART BALLOON REPR PERCUT: CPT | Performed by: INTERNAL MEDICINE

## 2021-01-20 PROCEDURE — 92920 PRQ TRLUML C ANGIOP 1ART&/BR: CPT | Performed by: INTERNAL MEDICINE

## 2021-01-20 PROCEDURE — 214N000001 HC R&B CCU UMMC

## 2021-01-20 PROCEDURE — 4A023N6 MEASUREMENT OF CARDIAC SAMPLING AND PRESSURE, RIGHT HEART, PERCUTANEOUS APPROACH: ICD-10-PCS | Performed by: INTERNAL MEDICINE

## 2021-01-20 PROCEDURE — C1725 CATH, TRANSLUMIN NON-LASER: HCPCS | Performed by: INTERNAL MEDICINE

## 2021-01-20 PROCEDURE — C1760 CLOSURE DEV, VASC: HCPCS | Performed by: INTERNAL MEDICINE

## 2021-01-20 PROCEDURE — 85730 THROMBOPLASTIN TIME PARTIAL: CPT | Performed by: NURSE PRACTITIONER

## 2021-01-20 PROCEDURE — 250N000009 HC RX 250: Performed by: INTERNAL MEDICINE

## 2021-01-20 PROCEDURE — 250N000011 HC RX IP 250 OP 636: Performed by: INTERNAL MEDICINE

## 2021-01-20 PROCEDURE — 93568 NJX CAR CTH NSLC P-ART ANGRP: CPT

## 2021-01-20 PROCEDURE — C1894 INTRO/SHEATH, NON-LASER: HCPCS | Performed by: INTERNAL MEDICINE

## 2021-01-20 PROCEDURE — 36415 COLL VENOUS BLD VENIPUNCTURE: CPT | Performed by: NURSE PRACTITIONER

## 2021-01-20 PROCEDURE — 85347 COAGULATION TIME ACTIVATED: CPT

## 2021-01-20 PROCEDURE — 027Q3ZZ DILATION OF RIGHT PULMONARY ARTERY, PERCUTANEOUS APPROACH: ICD-10-PCS | Performed by: INTERNAL MEDICINE

## 2021-01-20 PROCEDURE — 272N000001 HC OR GENERAL SUPPLY STERILE: Performed by: INTERNAL MEDICINE

## 2021-01-20 PROCEDURE — 86850 RBC ANTIBODY SCREEN: CPT | Performed by: INTERNAL MEDICINE

## 2021-01-20 PROCEDURE — 250N000011 HC RX IP 250 OP 636: Performed by: NURSE PRACTITIONER

## 2021-01-20 PROCEDURE — 85730 THROMBOPLASTIN TIME PARTIAL: CPT | Performed by: PHYSICIAN ASSISTANT

## 2021-01-20 PROCEDURE — 85027 COMPLETE CBC AUTOMATED: CPT | Performed by: NURSE PRACTITIONER

## 2021-01-20 PROCEDURE — 86900 BLOOD TYPING SEROLOGIC ABO: CPT | Performed by: INTERNAL MEDICINE

## 2021-01-20 PROCEDURE — 93451 RIGHT HEART CATH: CPT | Performed by: INTERNAL MEDICINE

## 2021-01-20 PROCEDURE — 82810 BLOOD GASES O2 SAT ONLY: CPT

## 2021-01-20 PROCEDURE — 84520 ASSAY OF UREA NITROGEN: CPT | Performed by: NURSE PRACTITIONER

## 2021-01-20 PROCEDURE — 86901 BLOOD TYPING SEROLOGIC RH(D): CPT | Performed by: INTERNAL MEDICINE

## 2021-01-20 PROCEDURE — 250N000013 HC RX MED GY IP 250 OP 250 PS 637: Performed by: NURSE PRACTITIONER

## 2021-01-20 RX ORDER — ONDANSETRON 2 MG/ML
4 INJECTION INTRAMUSCULAR; INTRAVENOUS EVERY 6 HOURS PRN
Status: DISCONTINUED | OUTPATIENT
Start: 2021-01-20 | End: 2021-01-21 | Stop reason: HOSPADM

## 2021-01-20 RX ORDER — NALOXONE HYDROCHLORIDE 0.4 MG/ML
0.2 INJECTION, SOLUTION INTRAMUSCULAR; INTRAVENOUS; SUBCUTANEOUS
Status: DISCONTINUED | OUTPATIENT
Start: 2021-01-20 | End: 2021-01-21

## 2021-01-20 RX ORDER — IOPAMIDOL 755 MG/ML
INJECTION, SOLUTION INTRAVASCULAR
Status: DISCONTINUED | OUTPATIENT
Start: 2021-01-20 | End: 2021-01-20 | Stop reason: HOSPADM

## 2021-01-20 RX ORDER — SODIUM CHLORIDE 9 MG/ML
INJECTION, SOLUTION INTRAVENOUS CONTINUOUS
Status: DISCONTINUED | OUTPATIENT
Start: 2021-01-20 | End: 2021-01-21 | Stop reason: HOSPADM

## 2021-01-20 RX ORDER — NALOXONE HYDROCHLORIDE 0.4 MG/ML
0.2 INJECTION, SOLUTION INTRAMUSCULAR; INTRAVENOUS; SUBCUTANEOUS
Status: DISCONTINUED | OUTPATIENT
Start: 2021-01-20 | End: 2021-01-21 | Stop reason: HOSPADM

## 2021-01-20 RX ORDER — NALOXONE HYDROCHLORIDE 0.4 MG/ML
0.4 INJECTION, SOLUTION INTRAMUSCULAR; INTRAVENOUS; SUBCUTANEOUS
Status: DISCONTINUED | OUTPATIENT
Start: 2021-01-20 | End: 2021-01-21 | Stop reason: HOSPADM

## 2021-01-20 RX ORDER — NALOXONE HYDROCHLORIDE 0.4 MG/ML
0.4 INJECTION, SOLUTION INTRAMUSCULAR; INTRAVENOUS; SUBCUTANEOUS
Status: DISCONTINUED | OUTPATIENT
Start: 2021-01-20 | End: 2021-01-21

## 2021-01-20 RX ORDER — FENTANYL CITRATE 50 UG/ML
25-50 INJECTION, SOLUTION INTRAMUSCULAR; INTRAVENOUS
Status: ACTIVE | OUTPATIENT
Start: 2021-01-20 | End: 2021-01-20

## 2021-01-20 RX ORDER — ONDANSETRON 4 MG/1
4 TABLET, ORALLY DISINTEGRATING ORAL EVERY 6 HOURS PRN
Status: DISCONTINUED | OUTPATIENT
Start: 2021-01-20 | End: 2021-01-21 | Stop reason: HOSPADM

## 2021-01-20 RX ORDER — FONDAPARINUX SODIUM 10 MG/.8ML
10 INJECTION SUBCUTANEOUS DAILY
Status: DISCONTINUED | OUTPATIENT
Start: 2021-01-21 | End: 2021-01-21 | Stop reason: HOSPADM

## 2021-01-20 RX ORDER — FLUMAZENIL 0.1 MG/ML
0.2 INJECTION, SOLUTION INTRAVENOUS
Status: ACTIVE | OUTPATIENT
Start: 2021-01-20 | End: 2021-01-20

## 2021-01-20 RX ORDER — ATROPINE SULFATE 0.1 MG/ML
0.5 INJECTION INTRAVENOUS
Status: ACTIVE | OUTPATIENT
Start: 2021-01-20 | End: 2021-01-20

## 2021-01-20 RX ADMIN — SERTRALINE HYDROCHLORIDE 200 MG: 100 TABLET ORAL at 17:49

## 2021-01-20 RX ADMIN — SODIUM CHLORIDE: 9 INJECTION, SOLUTION INTRAVENOUS at 19:45

## 2021-01-20 RX ADMIN — SPIRONOLACTONE 25 MG: 25 TABLET ORAL at 17:50

## 2021-01-20 RX ADMIN — Medication 80 MG: at 17:49

## 2021-01-20 RX ADMIN — TRAZODONE HYDROCHLORIDE 50 MG: 50 TABLET ORAL at 22:28

## 2021-01-20 RX ADMIN — ATORVASTATIN CALCIUM 10 MG: 10 TABLET, FILM COATED ORAL at 17:49

## 2021-01-20 RX ADMIN — BIVALIRUDIN 0.04 MG/KG/HR: 250 INJECTION, POWDER, LYOPHILIZED, FOR SOLUTION INTRAVENOUS at 22:28

## 2021-01-20 RX ADMIN — BIVALIRUDIN 0.15 MG/KG/HR: 250 INJECTION, POWDER, LYOPHILIZED, FOR SOLUTION INTRAVENOUS at 00:53

## 2021-01-20 RX ADMIN — CLONAZEPAM 1 MG: 1 TABLET ORAL at 22:28

## 2021-01-20 RX ADMIN — SODIUM CHLORIDE 250 ML: 9 INJECTION, SOLUTION INTRAVENOUS at 17:41

## 2021-01-20 RX ADMIN — HYDROXYCHLOROQUINE SULFATE 200 MG: 200 TABLET, FILM COATED ORAL at 19:44

## 2021-01-20 ASSESSMENT — ACTIVITIES OF DAILY LIVING (ADL)
ADLS_ACUITY_SCORE: 12

## 2021-01-20 ASSESSMENT — MIFFLIN-ST. JEOR: SCORE: 1835.17

## 2021-01-20 NOTE — CONSULTS
Care Management Initial Consult    General Information  Assessment completed with: Patient,    Type of CM/SW Visit: Initial Assessment    Primary Care Provider verified and updated as needed: Yes(Dr. Werner Saeed with [facility])   Readmission within the last 30 days: no previous admission in last 30 days      Reason for Consult: discharge planning     Communication Assessment  Patient's communication style: spoken language (English or Bilingual)    Hearing Difficulty or Deaf: no   Wear Glasses or Blind: no    Cognitive  Cognitive/Neuro/Behavioral: WDL                      Living Environment:   People in home: parent(s). Pt states she is currently living with her parents ([address] [place] [id number])  Current living Arrangements: house      Able to return to prior arrangements: yes       Family/Social Support:  Care provided by: self  Provides care for:    Marital Status: Single  Parent(s)          Description of Support System: Supportive, Involved         Current Resources:   Skilled Home Care Services:    Community Resources: DME(Home O2- with activity ( Home Medical))  Equipment currently used at home: none  Supplies currently used at home: None    Employment/Financial:  Financial Concerns: No concerns identified     Lifestyle & Psychosocial Needs:    Socioeconomic History     Marital status: Single     Spouse name: Not on file     Number of children: Not on file     Years of education: Not on file     Highest education level: Not on file     Tobacco Use     Smoking status: Never Smoker     Smokeless tobacco: Never Used   Substance and Sexual Activity     Alcohol use: Yes     Comment: occasionally     Drug use: No     Sexual activity: Never       Functional Status:  Prior to admission patient needed assistance: Pt states she is independent with her cares.    Mental Health Status: no concerns noted        Chemical Dependency Status: N/A        Values/Beliefs:  Spiritual, Cultural Beliefs,  Taoist Practices, Values that affect care: no       Additional Information:  This writer called and spoke with pt to introduce self and role of RNCC. Per PA, plan is for BPA today. Pt confirmed that she is on home oxygen with activity at baseline and stated she will not need transport O2 at discharge. Pt also confirmed that she is on warfarin and it is monitored outpatient by her [facility] PCP, Dr. Werner Saeed. Pt stated that family will come pick her up when she is ready to discharge, but it is about a 3 hr drive for them to come down. No additional discharge needs/questions per patient.    CC will continue to monitor patient's medical condition and progress towards discharge.  Malinda Trejo RN BSN  6C Unit Care Coordinator  Phone number: [phone]  Pager: [phone]

## 2021-01-20 NOTE — PLAN OF CARE
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  Unfortunately, she is on chronic anticoagulation with warfarin and fondaparinux.  Her anticoagulation needed to be held for 72 hours before procedure requiring bridging anticoagulation with continuous IV bivalirudin. Pt admitted for bridging.  A/I:  Pt has had uneventful shift. Pt continues on angiomax at 0.15 mg /kg /min. Last PTT therapeutic next PTT in am. Pt up independently. Pt eating,drinking and voiding. Pt's lungs diminished bilaterally, sating well on RA. Pt has been in SR other VSS.Pt denies any complaints.  P: Pt is NPO after midnight for balloon angioplasty.Continue current POC

## 2021-01-20 NOTE — PLAN OF CARE
Pt in good spirits this morning; denies complaints. Ambulating independently in room, vss on RA. NPO since MN for balloon pulmonary angioplasty today. Bilvalirudin gtt stopped at 1020 per cath lab request-went for procedure around noon. Anticipate pt to return to 6C post-procedure.

## 2021-01-20 NOTE — PROGRESS NOTES
Brief cardiology progress note.     Addendum:  Due to contrast load, will not pursue repeat BPA in AM  - bival to stop 1/21/2021 0600  - fondaparinux 10mg subcutaneous tomorrow 0800    Patient now s/p BPA procedure without immediate complication. Will resume bridging with bivalirudin this evening. Possible repeat BPA in AM pending renal function.   - give 250 NS bolus tonight given NPO, contrast load today  - resume bivalirudin at 1730   - NPO at MN        Kilo Saeed PA-C  [facility] Cardiology Team

## 2021-01-20 NOTE — PLAN OF CARE
Admitted 1/18 for planned BPA. History of CTEPH, pulmonary thromboendarterectomy in July 2020, HIT, lupus, DAVIS, obesity, and antiphospholipid antibody syndrome.    Neuro: A&Ox4. Slept well overnight.  Cardiac: SR. VSS.   Respiratory: Sating 90s on RA.  GI/: Adequate urine output. No BM this shift.  Diet/appetite: NPO since midnight.  Activity:  Up independently   Pain: denies pain  Skin: No new deficits noted.  LDA's: PIV infusing Angiomax at 0.15mg/kg/hr    Plan: Plan for BPA today. Per cardiology cross cover, Angiomax is to be stopped one hour prior to procedure. Will continue to monitor and notify team accordingly.

## 2021-01-20 NOTE — PROGRESS NOTES
"  Interventional Cardiology Progress Note      Assessment & Plan:  Yuridia Barksdale is a pleasant 50 y.o female with past medical history significant for pulmonary HTN 2/2 CTEPH, SLE, antiphospholipid antibody syndrome, DAVIS, and obesity who presented for outpatient balloon pulmonary angioplasty on 1/18. Unfortunately, procedure had to be delayed since patient held fondaparinux for only 24 hours (vs 72 hours). Given her hypercoagulable state, she was admitted for bridging with plans to proceed with BPA on Wednesday, 1/20.      # Pulmonary HTN 2/2 CTEPH  She underwent pulmonary thromboendarterectomy on July 14.  She had bilateral type 3 disease. Her PTE was difficult due to the distal nature of her disease. Her postoperative course was uncomplicated.  She was discharged on supplemental oxygen 2 L at rest and 4 L with exertion.  Her Adempas and digoxin were discontinued after surgery.  She was discharged home on Coumadin with an INR goal of 3-4.  - Continue PTA spironolactone 25 mg daily   - Continue PTA torsemide 80 mg daily   - NPO for BPA    # SLE with lupus antiphospholipid antibody syndrome   # Hx of DVT  Hx of HIT. On fondaparinux and warfarin for anticoagulation with goal INR 3-4.   - Continue PTA hydroxychloroquine 200 mg BID   - Continue holding coumadin and fondaparinux  - Continue bivalrudin infusion for bridging.     # DAVIS  -Compliant with CPAP    # Obesity  - BMI 42    Prophylaxis/DVT: bivalrudin     Diet: Regular   Activity: as tolerated   Code Status: Full   Disposition: likely 1-2 days post BPA    Patient seen and discussed w/ Dr. Joy.      Kilo Saeed PA-C  [facility] Cardiology Team    Interval History: No acute events overnight. Breathing remains stable this AM, and off oxygen at rest. Anxious for BPA      Most recent vital signs:  /76 (BP Location: Right arm)   Pulse 69   Temp 97.6  F (36.4  C) (Oral)   Resp 18   Ht 1.676 m (5' 6\")   Wt 118.8 kg (262 lb)   SpO2 94%   BMI 42.29 kg/m  "   Temp:  [97.5  F (36.4  C)-98.5  F (36.9  C)] 97.6  F (36.4  C)  Pulse:  [64-75] 69  Resp:  [16-18] 18  BP: ()/(61-93) 122/76  SpO2:  [93 %-100 %] 94 %  Wt Readings from Last 2 Encounters:   01/20/21 118.8 kg (262 lb)   12/15/20 117 kg (258 lb)       Intake/Output Summary (Last 24 hours) at 1/20/2021 0949  Last data filed at 1/20/2021 0900  Gross per 24 hour   Intake 827.2 ml   Output 2450 ml   Net -1622.8 ml     Physical exam:  General: In bed, in NAD  HEENT: EOMI, PERRLA, no scleral icterus or injection  CARDIAC: RRR, no m/r/g appreciated. Peripheral pulses 2+  RESP: CTAB, no wheezes, rhonchi or crackles appreciated.  GI: NABS, NT/ND, no guarding or rebound  EXTREMITIES: NO LE edema, pulses 2+.   SKIN: No acute lesions appreciated  NEURO: Alert and oriented X3, CN II-XII grossly intact, no focal neurological deficits noted    Drains and Tubes: No drains or tubes present  Access: No central lines or devices in place    Labs (Past three days):  CBC  Recent Labs   Lab 01/20/21  0525 01/19/21  0514 01/18/21  0939   WBC 5.6 4.7 4.6   RBC 4.35 4.42 4.60   HGB 13.2 13.3 13.7   HCT 40.5 41.5 42.2   MCV 93 94 92   MCH 30.3 30.1 29.8   MCHC 32.6 32.0 32.5   RDW 13.2 13.2 13.2    267 296     BMP  Recent Labs   Lab 01/19/21  0514 01/18/21  0939    138   POTASSIUM 3.6 3.9   CHLORIDE 108 106   CO2 27 26   ANIONGAP 4 6   GLC 88 86   BUN 30 29   CR 1.21* 1.15*   GFRESTIMATED 53* 56*   GFRESTBLACK 61 65   KATARINA 9.0 9.2     Troponins: No results found for: TROPI, TROPONIN, TROPR, TROPN     INR  Recent Labs   Lab 01/18/21  0939   INR 1.19*     Liver panelNo lab results found in last 7 days.    Imaging/procedure results:  ECHO: 11/11/20  Interpretation Summary  Left ventricular function, chamber size, wall motion, and wall thickness are  normal.The EF is 55-60%.  Probably moderately dilated RV with mildly reduced function on limited views.  Estimated pulmonary artery systolic pressure is 30 mmHg plus right  atrial  pressure.  The inferior vena cava was normal in size with preserved respiratory  variability which suggests a normal RA pressure of 3 mmHg.  No pericardial effusion is present.     This study was compared with the study from 12/02/2019: There has been no  significant change, however the RV was better imaged on the prior study.    Attending Attestation: Patient seen and examined with Kilo Saeed PA-C. The history and physical findings are accurate as recorded. My additional findings, if any, have been incorporated into the body of the note. All labs, imaging studies, ECG and telemetry data have been reviewed personally. The assessment and plan outlined reflect our joint decision making.

## 2021-01-21 ENCOUNTER — PATIENT OUTREACH (OUTPATIENT)
Dept: CARE COORDINATION | Facility: CLINIC | Age: 51
End: 2021-01-21

## 2021-01-21 ENCOUNTER — APPOINTMENT (OUTPATIENT)
Dept: OCCUPATIONAL THERAPY | Facility: CLINIC | Age: 51
DRG: 253 | End: 2021-01-21
Attending: NURSE PRACTITIONER
Payer: COMMERCIAL

## 2021-01-21 VITALS
OXYGEN SATURATION: 97 % | HEART RATE: 68 BPM | RESPIRATION RATE: 18 BRPM | TEMPERATURE: 97.9 F | BODY MASS INDEX: 42.11 KG/M2 | SYSTOLIC BLOOD PRESSURE: 120 MMHG | DIASTOLIC BLOOD PRESSURE: 87 MMHG | WEIGHT: 262 LBS | HEIGHT: 66 IN

## 2021-01-21 LAB
ANION GAP SERPL CALCULATED.3IONS-SCNC: 9 MMOL/L (ref 3–14)
APTT PPP: 53 SEC (ref 22–37)
APTT PPP: 69 SEC (ref 22–37)
BUN SERPL-MCNC: 24 MG/DL (ref 7–30)
CALCIUM SERPL-MCNC: 8.8 MG/DL (ref 8.5–10.1)
CHLORIDE SERPL-SCNC: 104 MMOL/L (ref 94–109)
CO2 SERPL-SCNC: 26 MMOL/L (ref 20–32)
CREAT SERPL-MCNC: 1.04 MG/DL (ref 0.52–1.04)
ERYTHROCYTE [DISTWIDTH] IN BLOOD BY AUTOMATED COUNT: 13.1 % (ref 10–15)
FACT X ACT/NOR PPP CHRO: 83 % (ref 70–130)
GFR SERPL CREATININE-BSD FRML MDRD: 63 ML/MIN/{1.73_M2}
GLUCOSE SERPL-MCNC: 84 MG/DL (ref 70–99)
HCT VFR BLD AUTO: 39.3 % (ref 35–47)
HGB BLD-MCNC: 12.7 G/DL (ref 11.7–15.7)
INR PPP: 1.41 (ref 0.86–1.14)
MCH RBC QN AUTO: 29.3 PG (ref 26.5–33)
MCHC RBC AUTO-ENTMCNC: 32.3 G/DL (ref 31.5–36.5)
MCV RBC AUTO: 91 FL (ref 78–100)
PLATELET # BLD AUTO: 236 10E9/L (ref 150–450)
POTASSIUM SERPL-SCNC: 3.3 MMOL/L (ref 3.4–5.3)
POTASSIUM SERPL-SCNC: 3.6 MMOL/L (ref 3.4–5.3)
RBC # BLD AUTO: 4.33 10E12/L (ref 3.8–5.2)
SODIUM SERPL-SCNC: 139 MMOL/L (ref 133–144)
WBC # BLD AUTO: 5.8 10E9/L (ref 4–11)

## 2021-01-21 PROCEDURE — 85730 THROMBOPLASTIN TIME PARTIAL: CPT | Performed by: NURSE PRACTITIONER

## 2021-01-21 PROCEDURE — 36415 COLL VENOUS BLD VENIPUNCTURE: CPT | Performed by: INTERNAL MEDICINE

## 2021-01-21 PROCEDURE — 250N000013 HC RX MED GY IP 250 OP 250 PS 637: Performed by: PHYSICIAN ASSISTANT

## 2021-01-21 PROCEDURE — 99207 PR APP CREDIT; MD BILLING SHARED VISIT: CPT | Performed by: INTERNAL MEDICINE

## 2021-01-21 PROCEDURE — 85730 THROMBOPLASTIN TIME PARTIAL: CPT | Performed by: INTERNAL MEDICINE

## 2021-01-21 PROCEDURE — 85610 PROTHROMBIN TIME: CPT | Performed by: NURSE PRACTITIONER

## 2021-01-21 PROCEDURE — 97530 THERAPEUTIC ACTIVITIES: CPT | Mod: GO

## 2021-01-21 PROCEDURE — 80048 BASIC METABOLIC PNL TOTAL CA: CPT | Performed by: NURSE PRACTITIONER

## 2021-01-21 PROCEDURE — 84132 ASSAY OF SERUM POTASSIUM: CPT | Performed by: INTERNAL MEDICINE

## 2021-01-21 PROCEDURE — 97165 OT EVAL LOW COMPLEX 30 MIN: CPT | Mod: GO

## 2021-01-21 PROCEDURE — 250N000013 HC RX MED GY IP 250 OP 250 PS 637: Performed by: NURSE PRACTITIONER

## 2021-01-21 PROCEDURE — 36415 COLL VENOUS BLD VENIPUNCTURE: CPT | Performed by: NURSE PRACTITIONER

## 2021-01-21 PROCEDURE — 85027 COMPLETE CBC AUTOMATED: CPT | Performed by: NURSE PRACTITIONER

## 2021-01-21 PROCEDURE — 99238 HOSP IP/OBS DSCHRG MGMT 30/<: CPT | Performed by: PHYSICIAN ASSISTANT

## 2021-01-21 PROCEDURE — 85130 CHROMOGENIC SUBSTRATE ASSAY: CPT | Performed by: NURSE PRACTITIONER

## 2021-01-21 PROCEDURE — 250N000011 HC RX IP 250 OP 636: Performed by: PHYSICIAN ASSISTANT

## 2021-01-21 RX ORDER — WARFARIN SODIUM 5 MG/1
5 TABLET ORAL
Status: DISCONTINUED | OUTPATIENT
Start: 2021-01-21 | End: 2021-01-21 | Stop reason: HOSPADM

## 2021-01-21 RX ORDER — POLYETHYLENE GLYCOL 3350 17 G/17G
17 POWDER, FOR SOLUTION ORAL DAILY
Status: DISCONTINUED | OUTPATIENT
Start: 2021-01-21 | End: 2021-01-21 | Stop reason: HOSPADM

## 2021-01-21 RX ORDER — CLONAZEPAM 1 MG/1
1 TABLET ORAL DAILY PRN
Start: 2021-01-21

## 2021-01-21 RX ORDER — WARFARIN SODIUM 1 MG/1
5 TABLET ORAL DAILY
Qty: 30 TABLET
Start: 2021-01-21

## 2021-01-21 RX ORDER — TRAZODONE HYDROCHLORIDE 50 MG/1
100 TABLET, FILM COATED ORAL AT BEDTIME
Start: 2021-01-21

## 2021-01-21 RX ORDER — SERTRALINE HYDROCHLORIDE 100 MG/1
200 TABLET, FILM COATED ORAL DAILY
Start: 2021-01-21

## 2021-01-21 RX ORDER — POTASSIUM CHLORIDE 750 MG/1
40 TABLET, EXTENDED RELEASE ORAL ONCE
Status: COMPLETED | OUTPATIENT
Start: 2021-01-21 | End: 2021-01-21

## 2021-01-21 RX ORDER — POLYETHYLENE GLYCOL 3350 17 G/17G
17 POWDER, FOR SOLUTION ORAL DAILY PRN
Status: DISCONTINUED | OUTPATIENT
Start: 2021-01-21 | End: 2021-01-21 | Stop reason: HOSPADM

## 2021-01-21 RX ORDER — ATORVASTATIN CALCIUM 10 MG/1
10 TABLET, FILM COATED ORAL DAILY
Start: 2021-01-21

## 2021-01-21 RX ORDER — FONDAPARINUX SODIUM 10 MG/.8ML
10 INJECTION SUBCUTANEOUS DAILY
Qty: 4.8 ML | Refills: 0 | Status: ON HOLD | OUTPATIENT
Start: 2021-01-21 | End: 2021-03-11

## 2021-01-21 RX ADMIN — ATORVASTATIN CALCIUM 10 MG: 10 TABLET, FILM COATED ORAL at 07:44

## 2021-01-21 RX ADMIN — SERTRALINE HYDROCHLORIDE 200 MG: 100 TABLET ORAL at 07:44

## 2021-01-21 RX ADMIN — POTASSIUM CHLORIDE 40 MEQ: 750 TABLET, EXTENDED RELEASE ORAL at 07:44

## 2021-01-21 RX ADMIN — POLYETHYLENE GLYCOL 3350 17 G: 17 POWDER, FOR SOLUTION ORAL at 07:45

## 2021-01-21 RX ADMIN — SPIRONOLACTONE 25 MG: 25 TABLET ORAL at 07:44

## 2021-01-21 RX ADMIN — HYDROXYCHLOROQUINE SULFATE 200 MG: 200 TABLET, FILM COATED ORAL at 07:44

## 2021-01-21 RX ADMIN — FONDAPARINUX SODIUM 10 MG: 10 INJECTION, SOLUTION SUBCUTANEOUS at 07:45

## 2021-01-21 RX ADMIN — Medication 80 MG: at 07:44

## 2021-01-21 ASSESSMENT — ACTIVITIES OF DAILY LIVING (ADL)
ADLS_ACUITY_SCORE: 12
PREVIOUS_RESPONSIBILITIES: LAUNDRY;SHOPPING;MEDICATION MANAGEMENT;FINANCES;DRIVING
ADLS_ACUITY_SCORE: 12

## 2021-01-21 NOTE — PROGRESS NOTES
01/21/21 0800   Quick Adds   Type of Visit Initial Occupational Therapy Evaluation   Living Environment   People in home parent(s)   Current Living Arrangements house   Home Accessibility stairs to enter home;stairs within home   Number of Stairs, Main Entrance 3   Stair Railings, Main Entrance railings on both sides of stairs   Number of Stairs, Within Home, Primary 10   Stair Railings, Within Home, Primary railing on right side (ascending)   Transportation Anticipated car, drives self   Living Environment Comments Stairs up to bedroom, walk-in shower, shower seat in shower   Self-Care   Usual Activity Tolerance good   Current Activity Tolerance good   Regular Exercise Yes   Activity/Exercise Type walking   Exercise Amount/Frequency daily   Equipment Currently Used at Home none   Activity/Exercise/Self-Care Comment 30 min walking on treadmill, 3 times a day.    Disability/Function   Hearing Difficulty or Deaf no   Wear Glasses or Blind no   Concentrating, Remembering or Making Decisions Difficulty no   Difficulty Communicating no   Difficulty Eating/Swallowing no   Walking or Climbing Stairs Difficulty no   Dressing/Bathing Difficulty no   Toileting issues no   Doing Errands Independently Difficulty (such as shopping) no   Fall history within last six months yes   Number of times patient has fallen within last six months 1  (fell on ice)   Change in Functional Status Since Onset of Current Illness/Injury yes   General Information   Onset of Illness/Injury or Date of Surgery 01/18/21   Referring Physician Noelle Lynn APRN CNP   Patient/Family Therapy Goal Statement (OT) To go home   Additional Occupational Profile Info/Pertinent History of Current Problem Admitted 1/18 for planned BPA. History of CTEPH, pulmonary thromboendarterectomy in July 2020, HIT, lupus, DAVIS, obesity, and antiphospholipid antibody syndrome.   Existing Precautions/Restrictions other (see comments);cardiac  (No repetitive hip flexion x 2  weeks)   Left Upper Extremity (Weight-bearing Status) full weight-bearing (FWB)   Right Upper Extremity (Weight-bearing Status) full weight-bearing (FWB)   Left Lower Extremity (Weight-bearing Status) full weight-bearing (FWB)   Right Lower Extremity (Weight-bearing Status) full weight-bearing (FWB)   Heart Disease Risk Factors Overweight;Medical history   General Observations and Info Activity: Ambulate   Cognitive Status Examination   Orientation Status orientation to person, place and time   Cognitive Status Comments No cognitive deficits observed   Visual Perception   Visual Impairment/Limitations WNL   Sensory   Sensory Quick Adds No deficits were identified   Pain Assessment   Patient Currently in Pain No   Integumentary/Edema   Integumentary/Edema no deficits were identifed   Range of Motion Comprehensive   General Range of Motion no range of motion deficits identified   Comment, General Range of Motion BUE WNL   Strength Comprehensive (MMT)   General Manual Muscle Testing (MMT) Assessment no strength deficits identified   Comment, General Manual Muscle Testing (MMT) Assessment BUE WFL   Coordination   Upper Extremity Coordination No deficits were identified   Coordination Comments BUE WNL   Instrumental Activities of Daily Living (IADL)   Previous Responsibilities laundry;shopping;medication management;finances;driving   IADL Comments Does meal clean-up, but no prep. manages all other IADLs on own.   Clinical Impression   Criteria for Skilled Therapeutic Interventions Met (OT) yes   OT Diagnosis Decreased I with ADLs/IADLs   OT Problem List-Impairments impacting ADL activity tolerance impaired;strength;post-surgical precautions   Assessment of Occupational Performance 1-3 Performance Deficits   Identified Performance Deficits showering, home mgmt, functional mobility   Planned Therapy Interventions (OT) ADL retraining;IADL retraining;strengthening;home program guidelines;progressive activity/exercise;risk  factor education   Clinical Decision Making Complexity (OT) low complexity   Therapy Frequency (OT) 1x eval and treat   Predicted Duration of Therapy 1x eval and treat   Risks and Benefits of Treatment have been explained. Yes   Patient, Family & other staff in agreement with plan of care Yes   Comment-Clinical Impression Pt would benefit from OP CR to progress exercise. See daily flowsheet for tx details   OT Discharge Planning    OT Discharge Recommendation (DC Rec) home with outpatient cardiac rehab   OT Rationale for DC Rec Pt would benefit from OP CR to maximize independence in ADL/IADLs and progress functional endurance   OT Brief overview of current status  SBA functional mobility   Total Evaluation Time (Minutes)   Total Evaluation Time (Minutes) 10   Skin WDL   Inspection of bony prominences Full   Skin WDL X   Skin Temperature warm   Skin Moisture dry   Skin Integrity/Characteristics bruised;scab;other (see comments)  (groin puncture site)

## 2021-01-21 NOTE — PLAN OF CARE
Occupational Therapy Discharge Summary    Reason for therapy discharge:    Discharged to home.    Progress towards therapy goal(s). See goals on Care Plan in [facility] electronic health record for goal details.  Goals partially met.  Barriers to achieving goals:   discharge on same date as initial evaluation.    Therapy recommendation(s):    Continued therapy is recommended.  Rationale/Recommendations:  Pt would benefit from OP CR to progress functional endurance and maximize I with ADLs/IADLs.

## 2021-01-21 NOTE — PLAN OF CARE
Admitted 1/18 for planned BPA. History of CTEPH, pulmonary thromboendarterectomy in July 2020, HIT, lupus, DAVIS, obesity, and antiphospholipid antibody syndrome.     Neuro: A&Ox4. Slept well overnight.  Cardiac: SR. VSS.       Respiratory: Sating 90s on RA.  GI/: Adequate urine output. Last BM 1/19.  Diet/appetite: Tolerating regular diet. Good appetite.  Activity:  Up independently   Pain: denies pain  Skin: right groin site WNL.  LDA's: PIV saline locked.     Plan: Anticipate possible discharge to home today or tomorrow. Will continue to monitor and notify team accordingly.

## 2021-01-21 NOTE — DISCHARGE SUMMARY
[address] [address]  p: [phone]    Discharge Summary: Cardiology Service    Yuridia Barksdale MRN# [id number]   YOB: 1970 Age: 50 year old       Admission Date: 1/18/2021  Discharge Date: 01/21/2021      Discharge Diagnoses:  1. WHO IV Pulmonary HTN 2/2 CTEPH   2. History of DVT  3. SLE with lupus antiphospholipid antibody syndrome   4. DAVIS   5. Obesity    Pertinent Procedures:  1. RHC  2. Pulmonary angiogram with balloon pulmonary angioplasty     Consults:  NA    Imaging with results:  RHC/BPA 1/20/2021    Right sided filling pressures are normal.    Mild elevated pulmonary hypertension.    Left sided filling pressures are normal.    Normal cardiac output level.           Conclusions  -Successful pulmonary balloon angioplasty to A8 with up to 3.0 mm balloon  -Successful closure of venotomy site with a Perclose device  -Chronic thromboembolic pulmonary hypertension s/p surgical thromboarterectomy    Brief HPI:  Yuridia Barksdale is a pleasant 50 y.o female with past medical history significant for pulmonary HTN 2/2 CTEPH, SLE, antiphospholipid antibody syndrome, DAVIS, and obesity who presented for outpatient balloon pulmonary angioplasty on 1/18. Unfortunately, procedure had to be delayed since patient held fondaparinux for only 24 hours (vs 72 hours). Given her hypercoagulable state, she was admitted for bridging with plans to proceed with BPA on Wednesday, 1/20.    Hospital Course by Diagnosis:  # Pulmonary HTN 2/2 CTEPH  She underwent pulmonary thromboendarterectomy on July 14.  She had bilateral type 3 disease. Her PTE was difficult due to the distal nature of her disease. Her postoperative course was uncomplicated.  She was discharged on supplemental oxygen 2 L at rest and 4 L with exertion.  Her Adempas and digoxin were discontinued after surgery.  She was discharged home on Coumadin with a chromogenic factor goal of 20-30.  Given her hypercoagulable state, she was admitted for bridging with bivalirudin prior to her procedure. She underwent successful BPA on 1/20 without immediate complication. Due to contrast load, additional BPA was deferred this admission. She was continued on bivalirudin overnight, then transitioned back to her PTA fondaparinux on 1/21. She will continue fondaparinux and coumadin and have a chromogenic factor checked 2 days after discharge.   - Continue PTA spironolactone 25 mg daily   - Continue PTA torsemide 80 mg daily      # SLE with lupus antiphospholipid antibody syndrome   # Hx of DVT  - Continue PTA hydroxychloroquine 200 mg BID   - continue PTA fondaparinux 10mg subcutaneous daily. Additional doses provided (has 4 remaining at home)  - resume coumadin with 5mg dosing on 1/21 and 1/22.   - chromogenic factor to be checked on 1/23    # DAVIS  -Compliant with CPAP     # Obesity  - BMI 42      Condition on discharge  Temp:  [97.2  F (36.2  C)-98.3  F (36.8  C)] 97.9  F (36.6  C)  Pulse:  [63-72] 68  Resp:  [16-18] 18  BP: ()/(57-90) 120/87  SpO2:  [94 %-100 %] 97 %  General: Alert, interactive, NAD  Eyes: sclera anicteric, EOMI  Neck: JVP not appreciably elevated, carotid 2+ bilaterally  Cardiovascular: regular rate and rhythm, normal S1 and S2, no murmurs, gallops, or rubs  Resp: clear to auscultation bilaterally, no rales, wheezes, or rhonchi  GI: Soft, nontender, nondistended. +BS.  No HSM or masses, no rebound or guarding.  Extremities: no edema, no cyanosis or clubbing, dorsalis pedis and posterior tibialis pulses 2+ bilaterally. Groin incision c/d/i, no hematoma, no pain with palpation  Skin: Warm and dry, no jaundice or rash  Neuro: CN 2-12 intact, moves all extremities equally  Psych: Alert & oriented x 3      Medication Changes:    Discharge medications:   Current Discharge Medication List      CONTINUE these medications which have CHANGED    Details   atorvastatin (LIPITOR) 10 MG tablet Take 1  tablet (10 mg) by mouth daily    Associated Diagnoses: CTEPH (chronic thromboembolic pulmonary hypertension) (H)      clonazePAM (KLONOPIN) 1 MG tablet Take 1 tablet (1 mg) by mouth daily as needed for anxiety    Associated Diagnoses: Anxiety      fondaparinux ANTICOAGULANT (ARIXTRA ANTICOAGULANT) 10 MG/0.8ML injection Inject 0.8 mLs (10 mg) Subcutaneous daily for 6 doses  Qty: 4.8 mL, Refills: 0    Associated Diagnoses: CTEPH (chronic thromboembolic pulmonary hypertension) (H); History of pulmonary embolism      sertraline (ZOLOFT) 100 MG tablet Take 2 tablets (200 mg) by mouth daily    Associated Diagnoses: Anxiety      traZODone (DESYREL) 50 MG tablet Take 2 tablets (100 mg) by mouth At Bedtime    Associated Diagnoses: Anxiety      warfarin ANTICOAGULANT (COUMADIN ANTICOAGULANT) 1 MG tablet Take 5 tablets (5 mg) by mouth daily On 1/21/2021 and 1/22/2021. Have your chromogenic factor checked on 1/23/2021.  Qty: 30 tablet    Associated Diagnoses: CTEPH (chronic thromboembolic pulmonary hypertension) (H)         CONTINUE these medications which have NOT CHANGED    Details   hydroxychloroquine (PLAQUENIL) 200 MG tablet Take 200 mg by mouth 2 times daily       potassium chloride (KLOR-CON) 20 MEQ packet Take 20 mEq by mouth daily  Qty: 90 packet, Refills: 3    Associated Diagnoses: Hypokalemia      spironolactone (ALDACTONE) 25 MG tablet Take 1 tablet (25 mg) by mouth daily  Qty: 90 tablet, Refills: 3    Associated Diagnoses: SAHU (dyspnea on exertion); CTEPH (chronic thromboembolic pulmonary hypertension) (H)      torsemide (DEMADEX) 20 MG tablet Take 4 tablets (80 mg) by mouth daily  Qty: 360 tablet, Refills: 3    Associated Diagnoses: CTEPH (chronic thromboembolic pulmonary hypertension) (H); SAHU (dyspnea on exertion)             Labs or imaging requiring follow-up after discharge:  Chromogenic factor 1/23/2021    Follow-up:  Dr. Paniagua on 2/1/2021 as previously arranged    Code status:  Full    Patient seen and  discussed with Dr. Joy.  Kilo Saeed PA-C  [facility] Cardiology Team    Attending Attestation: I saw and evaluated the patient for the discharge with Kilo Saeed PA-C. I agree with the findings and plan of care documented in the note and we summarized our rivera findings with the patient. I spent less than 30 minutes to coordinate and direct this patient's discharge.

## 2021-01-21 NOTE — PLAN OF CARE
Pt doing well this morning; ambulating around unit without complaints. R groin site C/D/I. Vss on RA; denies any SOB or SAHU.   Pt cleared to discharge home today. New rxs sent to pt's home pharmacy. Pt to have INR rechecked on 1/23-care coordinator working on arranging appointment. To continue arixtra subcutaneous injections daily until INR therapeutic. Follow up virtual appointment set for 2/1.  Reviewed post-cath restrictions with pt.   Will review discharge paperwork once completed. Pt's parents to provide transport home.

## 2021-01-21 NOTE — PROGRESS NOTES
Care Management Discharge Note    Discharge Date:  1/21/2021       Discharge Disposition:  To her home:  [address] [place] [address]    Discharge Services:  Continue with [facility]      Discharge DME:  none    Discharge Transportation: car, drives self    Private pay costs discussed: Not applicable    PAS Confirmation Code:    Patient/family educated on Medicare website which has current facility and service quality ratings:  n/a    Education Provided on the Discharge Plan:  Yes   Persons Notified of Discharge Plans: Pt  Patient/Family in Agreement with the Plan:  Yes    Handoff Referral Completed: Yes    Additional Information:  Have your chromogenic factor checked on Saturday,1/23/2021.    Yuridia, for this lab you will need to go to:   [facility]   [address] [place] [address]   Saturday Hours: 7:30am-1pm  Time: 11:30am     Ph: [phone]--ask for: lab scheduling or lab results   1/21 I have called and left lab scheduling a message that you request between 10:30 and 11am and they are to call you back   Fax: [phone] --I have fax'd discharge orders to them  Result for chromogenic Facotor will take 24 hours     Results to INR RN @ [facility]   Ph: [phone]   Fax: [phone]--I have fax'd discharge orders/a/c flowsheet  INR on call RN, will call Yuridia on Sunday, 1/24 for result per Yuridia        INR Monitoring with:  [facility] - [address]   [address]   [place] [address]   Fax: [phone]   Get directions   [phone]      I have confirmed above with pt--she is aware that she should establish care with a new PCP closer to her home as she has recently moved to this above address.        Antonella Oviedo RN

## 2021-01-21 NOTE — PLAN OF CARE
AVSS.  A&OX4.  No neuro changes noted.  R groin  puncture site CDI.  No hematoma.  CMS+.  Pt ambulating with SBA to BR.  Good UOP.  SR.  Denies discomfort/SOB.  PTT >240.  Bivalirudin adjusted per protocol-infusing at 0.038 mg/kg/hr.  Next PTT at 2315.  Orders to discontinue Bivalirudin at 0600.  Continue to monitor s/p balloon pulmonary angioplasty.  Notify team with any issues/concerns.

## 2021-01-21 NOTE — PHARMACY-ANTICOAGULATION SERVICE
Clinical Pharmacy - Warfarin Dosing Consult     Pharmacy has been consulted to manage this patient s warfarin therapy.  Indication: lupus antiphospholipid antibody syndrome h/o clot  Therapy Goal: Chr Factor 10: 22% (20-30%)  OP Anticoag Clinic: [facility]  Warfarin Prior to Admission: Yes  Warfarin PTA Regimen: 5 mg MF and 4 mg ROW (1/15/2021)  Significant drug interactions: Fondaparinux  Recent documented change in oral intake/nutrition: Yes(Was NPO for procedure. Now tolerating regular diet)    INR   Date Value Ref Range Status   01/21/2021 1.41 (H) 0.86 - 1.14 Final   01/18/2021 1.19 (H) 0.86 - 1.14 Final     Chromogenic Factor 10   Date Value Ref Range Status   01/21/2021 83 70 - 130 % Final     Comment:     Therapeutic Range:  A Chromogenic Factor 10 level of approximately 20-40%   inversely correlates with an INR of 2-3 for patients receiving Warfarin.   Chromogenic Factor 10 levels below 20% indicate an INR greater than 3 and   levels above 40% indicate an INR less than 2.         Recommend warfarin 5 mg today.  Pharmacy will monitor Yuridia Barksdale daily and order warfarin doses to achieve specified goal.      Please contact pharmacy as soon as possible if the warfarin needs to be held for a procedure or if the warfarin goals change.

## 2021-01-21 NOTE — PLAN OF CARE
D: Admitted 1/18 for planned BPA. Hx of CTEPH, pulmonary thromboendarterectomy in July 2020, HIT, lupus, DAVIS, obesity, and antiphospholipid antibody syndrome.    I: Monitored vitals and assessed pt status.   Changed: BPA completed this morning.  Running: Bivalirudin 0.075 mg/kg/hr.     A: A0x4. VSS on RA. Afebrile. Urinating adequately.     P: Continue to monitor Pt status and report changes to cards 1. Discontinue bivalirudin tomorrow AM, and restart fondaparinux injections in the morning.

## 2021-01-22 ENCOUNTER — DOCUMENTATION ONLY (OUTPATIENT)
Dept: CARE COORDINATION | Facility: CLINIC | Age: 51
End: 2021-01-22

## 2021-01-22 NOTE — PROGRESS NOTES
Attempted to contact the patient x2 for post-hospital call without answer. Will close encounter at this time.    Eloisa Vizcaino CMA, [facility]  Post Hospital Discharge Team

## 2021-02-01 ENCOUNTER — VIRTUAL VISIT (OUTPATIENT)
Dept: CARDIOLOGY | Facility: CLINIC | Age: 51
End: 2021-02-01
Attending: INTERNAL MEDICINE
Payer: COMMERCIAL

## 2021-02-01 DIAGNOSIS — I27.24 CTEPH (CHRONIC THROMBOEMBOLIC PULMONARY HYPERTENSION) (H): ICD-10-CM

## 2021-02-01 DIAGNOSIS — R06.09 DOE (DYSPNEA ON EXERTION): ICD-10-CM

## 2021-02-01 DIAGNOSIS — I27.20 PULMONARY HYPERTENSION (H): Primary | ICD-10-CM

## 2021-02-01 DIAGNOSIS — R06.02 SOB (SHORTNESS OF BREATH): ICD-10-CM

## 2021-02-01 PROCEDURE — 99215 OFFICE O/P EST HI 40 MIN: CPT | Performed by: INTERNAL MEDICINE

## 2021-02-01 RX ORDER — SPIRONOLACTONE 25 MG/1
25 TABLET ORAL 2 TIMES DAILY
Qty: 180 TABLET | Refills: 3 | Status: SHIPPED | OUTPATIENT
Start: 2021-02-01 | End: 2022-02-23

## 2021-02-01 RX ORDER — POTASSIUM CHLORIDE 1500 MG/1
20 TABLET, EXTENDED RELEASE ORAL
Status: CANCELLED | OUTPATIENT
Start: 2021-02-01

## 2021-02-01 RX ORDER — SODIUM CHLORIDE 9 MG/ML
INJECTION, SOLUTION INTRAVENOUS CONTINUOUS
Status: CANCELLED | OUTPATIENT
Start: 2021-02-01

## 2021-02-01 RX ORDER — POTASSIUM CHLORIDE 1500 MG/1
40 TABLET, EXTENDED RELEASE ORAL
Status: CANCELLED | OUTPATIENT
Start: 2021-02-01

## 2021-02-01 RX ORDER — LIDOCAINE 40 MG/G
CREAM TOPICAL
Status: CANCELLED | OUTPATIENT
Start: 2021-02-01

## 2021-02-01 NOTE — PATIENT INSTRUCTIONS
Medication Changes:  - STOP potassium  - INCREASE spironolactone to 25 mg twice a day    Patient Instructions:  1. Continue staying active and eat a heart healthy diet.    2. Please keep current list of medications with you at all times.    3. Remember to weigh yourself daily after voiding and before you consume any food or beverages and log the numbers.  If you have gained 2 pounds overnight or 5 pounds in a week contact us immediately for medication adjustments or further instructions.    4. **Please call us immediately if you have any syncope (fainting or passing out), chest pain, edema (swelling or weight gain), or decline in your functional status (general decline in how you are feeling).    Follow up Appointment Information:  - Deneen will call you to schedule your second BPA session. You will need to be admitted to the hospital 2 days prior to your procedure for Angiomax bridging  - 3 month follow up with Dr. Paniagua, labs prior  Check-In  Time Check-In Location Estimated Length Procedure   Name        [facility]  waiting room  minutes Balloon Pulmonary Angiogram**     Procedure Preparations & Instructions     This is an invasive procedure that DOES require preparation:    - Nothing to eat for 6 hours prior. You may drink clear liquids up until 2 hours prior to exam (no pop, or carbonated drinks).   - DO NOT use alcohol, tobacco or food/beverages containing caffeine for at least 12 hours prior to your test (ie. Coffee, tea, soda, chocolate, de-caffeinated beverages, certain medications including Excedrin, Anacin, NoDoz)  - Following the exam you will be on complete bedrest for 2-4 hours and will be admitted for observation.  - When you are discharged you may resume normal activities but no strenuous exercise or lifting for 48 hours.     *For Patients with Diabetes: ? DO NOT take any oral diabetic medication, short-acting diabetes medications/insulin, humalog or regular insulin the morning of your  test  ? Take   dose of long-acting insulin (Lantus, Levemir) the day of your test  ? Remember to  bring your glucometer and insulin with you to take after your test if needed     *Mandatory COVID Testing:   Pt will need to complete a COVID test no sooner than 3 days prior to their procedure.      To schedule COVID testing Please call [phone]    If you are running into and issues please call us.      We are located on the third floor of the [facility] on the [facility].  Our address is     [address] [place] [address] on [address] [place] [address]    Thank you for allowing us to be a part of your care here at the [facility]    If you have questions or concerns please contact us at:    Yoly Villegas RN, BSN, PHN Deneen Mccann (Scheduling,Prior Auth)  Nurse Coordinator     Clinic   Pulmonary Hypertension   Pulmonary Hypertension  [facility]  (Phone)[phone]   (Phone) [phone]        (Fax) [phone]    ** Please note that you will NOT receive a reminder call regarding your scheduled testing, reminder calls are for provider appointments only.  If you are scheduled for testing within the [employer] system you may receive a call regarding pre-registration for billing purposes only.**     Support Group:  Pulmonary Hypertension Association  Https://www.phassociation.org/  **Look at the Events Tab** They even have Support Groups that you can call into    [facility] Support Group  Second Saturday of the Month from 1-3 PM   Location: [address] [place] [address]  Leader: Marika Quiroz and Amisha Vila  Phone: [phone] or [phone]  Email: [email or web address]

## 2021-02-01 NOTE — PROGRESS NOTES
Service Date: 2021     Ochoa Sheth MD   [facility] [address] [place] [address]      Mario Gerber MD    [facility]      Clint Olsen MD   [facility]   [address] [place] [address]  [address]       RE: Yuridia Barksdale   MRN: [id number]   : 1970      Dear Meryl Rondon and Zabrina:      We had the pleasure of seeing Ms. Yuridia Barksdale for followup in our Pulmonary Hypertension Clinic at the [facility].  As you know, she is a very pleasant 49-year-old female with chronic thromboembolic pulmonary hypertension who underwent pulmonary thromboendarterectomy at [facility] and more recently pulmonary balloon angioplasty of the right A8 branch.  She had type 3 disease.  She is returning today for follow-up.    She is feeling good.  She feels like her exercise capacity is probably better after her pulmonary balloon angioplasty however she has not noticed a significant changes.  She is currently functional class II.  She uses supplemental oxygen and she runs on the treadmill.  She has not had any worsening lower extremity swelling or abdominal distention.  No chest pain or chest pressure.  No recent presyncope or syncope.  No bleeding complications.  Her groin site is okay.  She is off of Arixtra.  Her last chromogenic factor level was 35 and this is being managed by her Coumadin clinic.     PAST MEDICAL HISTORY:   1.  Systemic lupus erythematosus.   2.  Lupus antiphospholipid antibody syndrome.   3.  History of deep venous thrombosis.   4.  Chronic thromboembolic pulmonary hypertension.   5.  Obesity.   6.  Obstructive sleep apnea.      MEDICATIONS:   Current Outpatient Medications   Medication Sig     atorvastatin (LIPITOR) 10 MG tablet Take 1 tablet (10 mg) by mouth daily     clonazePAM (KLONOPIN) 1 MG tablet Take 1 tablet (1 mg) by mouth daily as needed for  anxiety     hydroxychloroquine (PLAQUENIL) 200 MG tablet Take 200 mg by mouth 2 times daily      potassium chloride (KLOR-CON) 20 MEQ packet Take 20 mEq by mouth daily     sertraline (ZOLOFT) 100 MG tablet Take 2 tablets (200 mg) by mouth daily     spironolactone (ALDACTONE) 25 MG tablet Take 1 tablet (25 mg) by mouth daily     torsemide (DEMADEX) 20 MG tablet Take 4 tablets (80 mg) by mouth daily     traZODone (DESYREL) 50 MG tablet Take 2 tablets (100 mg) by mouth At Bedtime     warfarin ANTICOAGULANT (COUMADIN ANTICOAGULANT) 1 MG tablet Take 5 tablets (5 mg) by mouth daily On 1/21/2021 and 1/22/2021. Have your chromogenic factor checked on 1/23/2021.     fondaparinux ANTICOAGULANT (ARIXTRA ANTICOAGULANT) 10 MG/0.8ML injection Inject 0.8 mLs (10 mg) Subcutaneous daily for 6 doses (Patient not taking: Reported on 2/1/2021)     No current facility-administered medications for this visit.        REVIEW OF SYSTEMS:  A detailed 10-point review of systems was obtained as described in the History of Present Illness.  All other systems are reviewed and are negative.      PHYSICAL EXAMINATION:   There were no vitals taken for this visit.  She was awake, alert, oriented x3.  She was comfortable.  She was in no apparent distress.  Her neck exam revealed no jugular venous distention.  Carotids are 2+ bilaterally.  She had no pallor sounds, cyanosis or jaundice.  Cardiac auscultation revealed normal S1 and normal S2 with no murmur rub or gallop.  Auscultation of her lungs revealed equal air entry on both sides with no added sounds.  Her abdomen was soft with no was no tenderness, or no guarding.  She had no focal neurological deficit.    CBC RESULTS:   Recent Labs   Lab Test 11/11/20  1224   WBC 4.2   RBC 4.97   HGB 14.4   HCT 45.0   MCV 91   MCH 29.0   MCHC 32.0   RDW 14.1        Recent Labs   Lab Test 11/11/20  1224 07/31/20  1023    137   POTASSIUM 4.0 3.8   CHLORIDE 105 104   CO2 28 28   ANIONGAP 5 6   GLC 91  98   BUN 26 18   CR 1.14* 0.98   KATARINA 9.4 8.7     Liver Function Studies -   Recent Labs   Lab Test 07/31/20  1023   PROTTOTAL 7.1   ALBUMIN 2.9*   BILITOTAL 0.4   ALKPHOS 119   AST 18   ALT 22     No results found for: NTBNPI  Lab Results   Component Value Date    NTBNP 398 (H) 11/11/2020     INR   Date Value Ref Range Status   01/21/2021 1.41 (H) 0.86 - 1.14 Final      Echocardiogram (11/2020)  Her right ventricle was normal in size with mildly reduced systolic function.  Her interventricular septum was not flattened.  Right atrium was moderately enlarged.  Right ventricular systolic pressure was 35 mmHg.  IVC was normal size and collapsing more than 50% with respiration.  She had mild to moderate tricuspid regurgitation.  Her left ventricle was normal in size and function.  She had no other significant valvular abnormalities.  No pericardial effusion.    RHC (11/2020)  RA 5  RV 50/5  PA 50/20 (29)  PCWP 15  PA% 69  Thermodilution CO/CI 7.4/3.3  Measured CO/CI 5.6/2.5  PVR 2.52 by TD    Pulmonary angiogram (12/2020):    Mild residual CTEPH    Mild elevated pulmonary hypertension.    Right sided filling pressures are mildly elevated.    Left sided filling pressures are mildly elevated.    Normal cardiac output level.    Hemodynamic data has been modified in Epic per physician review.     There are a number of potential lesions A2, A7 (left) and A5, A9, A10 (right) that could be intervened upon.         ASSESSMENT AND PLAN:      Ms. Yuridia Barksdale is a 49-year-old female with chronic thromboembolic pulmonary hypertension due to hypercoagulable state from her antiphospholipid antibody syndrome.  She underwent pulmonary thromboendarterectomy at [facility] on July 4, 2020.  She has residual inoperable chronic thromboembolic pulmonary hypertension for which she is currently undergoing pulmonary balloon angioplasty.      She had 1 session of pulmonary balloon angioplasty on her right A8 branch.  This was  overall uneventful.  She has couple of targets on the left lower lobe.  She is interested in undergoing further pulmonary bone angioplasty.  She understands the risk and benefits and will agreeable.  We will schedule her for next session on the left lower lobe with Dr. Kiser.     She is allergic to heparin and Lovenox.  She was last time bridged with bivalirudin in the hospital prior to the procedure given her antiphospholipid antibody syndrome and the risk of not being on Arixtra for more than 2 days.  We will plan similar approach for her next session.  We will stop her Coumadin 4 days prior to the procedure and admit her 2 days prior to the procedure and bridge her with bivalirudin.      She is not interested in riociguat which I think is reasonable.  Her cardiac output is preserved.      She will continue on Coumadin with a chromogenic factor of 25-30.  She is being managed by her Coumadin clinic.  She will continue on torsemide 80 mg once a day.  I took the liberty and stop her potassium supplement and increase her spironolactone to 25 mg twice a day.      I recommend her to return to clinic in 3 months with me or nurse practitioner.  She will call us in the interim of any further worsening symptoms.      It was a pleasure meeting Ms. Shi people in her pulmonary hypertension clinic at [facility].  Thank you for involving us in her care.      Sincerely,   Arcelia Paniagua MD   Center for Pulmonary Hypertension  Heart Failure, Transplant, and Mechanical Circulatory Support Cardiology   Cardiovascular Division  [facility]   [phone]

## 2021-02-01 NOTE — LETTER
2021      RE: Yuridia Barksdale  [address] [address]       Dear Colleague,    Thank you for the opportunity to participate in the care of your patient, Yuridia Barksdale, at the [facility] at [facility]. Please see a copy of my visit note below.    Service Date: 2021     Ochoa Sheth MD   [facility] [address] [place] [address]      Mario Gerber MD    [facility]      Clint Olsen MD   [facility]   [address]  [address]       RE: Yuridia Barksdale   MRN: [id number]   : 1970      Dear Meryl Rondon and Zabrina:      We had the pleasure of seeing Ms. Yuridia Barksdale for followup in our Pulmonary Hypertension Clinic at the [facility].  As you know, she is a very pleasant 49-year-old female with chronic thromboembolic pulmonary hypertension who underwent pulmonary thromboendarterectomy at [facility] and more recently pulmonary balloon angioplasty of the right A8 branch.  She had type 3 disease.  She is returning today for follow-up.    She is feeling good.  She feels like her exercise capacity is probably better after her pulmonary balloon angioplasty however she has not noticed a significant changes.  She is currently functional class II.  She uses supplemental oxygen and she runs on the treadmill.  She has not had any worsening lower extremity swelling or abdominal distention.  No chest pain or chest pressure.  No recent presyncope or syncope.  No bleeding complications.  Her groin site is okay.  She is off of Arixtra.  Her last chromogenic factor level was 35 and this is being managed by her Coumadin clinic.     PAST MEDICAL HISTORY:   1.  Systemic lupus erythematosus.   2.  Lupus antiphospholipid antibody syndrome.   3.  History of deep venous thrombosis.   4.   Chronic thromboembolic pulmonary hypertension.   5.  Obesity.   6.  Obstructive sleep apnea.      MEDICATIONS:   Current Outpatient Medications   Medication Sig     atorvastatin (LIPITOR) 10 MG tablet Take 1 tablet (10 mg) by mouth daily     clonazePAM (KLONOPIN) 1 MG tablet Take 1 tablet (1 mg) by mouth daily as needed for anxiety     hydroxychloroquine (PLAQUENIL) 200 MG tablet Take 200 mg by mouth 2 times daily      potassium chloride (KLOR-CON) 20 MEQ packet Take 20 mEq by mouth daily     sertraline (ZOLOFT) 100 MG tablet Take 2 tablets (200 mg) by mouth daily     spironolactone (ALDACTONE) 25 MG tablet Take 1 tablet (25 mg) by mouth daily     torsemide (DEMADEX) 20 MG tablet Take 4 tablets (80 mg) by mouth daily     traZODone (DESYREL) 50 MG tablet Take 2 tablets (100 mg) by mouth At Bedtime     warfarin ANTICOAGULANT (COUMADIN ANTICOAGULANT) 1 MG tablet Take 5 tablets (5 mg) by mouth daily On 1/21/2021 and 1/22/2021. Have your chromogenic factor checked on 1/23/2021.     fondaparinux ANTICOAGULANT (ARIXTRA ANTICOAGULANT) 10 MG/0.8ML injection Inject 0.8 mLs (10 mg) Subcutaneous daily for 6 doses (Patient not taking: Reported on 2/1/2021)     No current facility-administered medications for this visit.        REVIEW OF SYSTEMS:  A detailed 10-point review of systems was obtained as described in the History of Present Illness.  All other systems are reviewed and are negative.      PHYSICAL EXAMINATION:   There were no vitals taken for this visit.  She was awake, alert, oriented x3.  She was comfortable.  She was in no apparent distress.  Her neck exam revealed no jugular venous distention.  Carotids are 2+ bilaterally.  She had no pallor sounds, cyanosis or jaundice.  Cardiac auscultation revealed normal S1 and normal S2 with no murmur rub or gallop.  Auscultation of her lungs revealed equal air entry on both sides with no added sounds.  Her abdomen was soft with no was no tenderness, or no guarding.  She had  no focal neurological deficit.    CBC RESULTS:   Recent Labs   Lab Test 11/11/20  1224   WBC 4.2   RBC 4.97   HGB 14.4   HCT 45.0   MCV 91   MCH 29.0   MCHC 32.0   RDW 14.1        Recent Labs   Lab Test 11/11/20  1224 07/31/20  1023    137   POTASSIUM 4.0 3.8   CHLORIDE 105 104   CO2 28 28   ANIONGAP 5 6   GLC 91 98   BUN 26 18   CR 1.14* 0.98   KATARINA 9.4 8.7     Liver Function Studies -   Recent Labs   Lab Test 07/31/20  1023   PROTTOTAL 7.1   ALBUMIN 2.9*   BILITOTAL 0.4   ALKPHOS 119   AST 18   ALT 22     No results found for: NTBNPI  Lab Results   Component Value Date    NTBNP 398 (H) 11/11/2020     INR   Date Value Ref Range Status   01/21/2021 1.41 (H) 0.86 - 1.14 Final      Echocardiogram (11/2020)  Her right ventricle was normal in size with mildly reduced systolic function.  Her interventricular septum was not flattened.  Right atrium was moderately enlarged.  Right ventricular systolic pressure was 35 mmHg.  IVC was normal size and collapsing more than 50% with respiration.  She had mild to moderate tricuspid regurgitation.  Her left ventricle was normal in size and function.  She had no other significant valvular abnormalities.  No pericardial effusion.    RHC (11/2020)  RA 5  RV 50/5  PA 50/20 (29)  PCWP 15  PA% 69  Thermodilution CO/CI 7.4/3.3  Measured CO/CI 5.6/2.5  PVR 2.52 by TD    Pulmonary angiogram (12/2020):    Mild residual CTEPH    Mild elevated pulmonary hypertension.    Right sided filling pressures are mildly elevated.    Left sided filling pressures are mildly elevated.    Normal cardiac output level.    Hemodynamic data has been modified in Epic per physician review.     There are a number of potential lesions A2, A7 (left) and A5, A9, A10 (right) that could be intervened upon.         ASSESSMENT AND PLAN:      Ms. Yuridia Barksdale is a 49-year-old female with chronic thromboembolic pulmonary hypertension due to hypercoagulable state from her antiphospholipid antibody syndrome.   She underwent pulmonary thromboendarterectomy at [facility] on July 4, 2020.  She has residual inoperable chronic thromboembolic pulmonary hypertension for which she is currently undergoing pulmonary balloon angioplasty.      She had 1 session of pulmonary balloon angioplasty on her right A8 branch.  This was overall uneventful.  She has couple of targets on the left lower lobe.  She is interested in undergoing further pulmonary bone angioplasty.  She understands the risk and benefits and will agreeable.  We will schedule her for next session on the left lower lobe with Dr. Kiser.     She is allergic to heparin and Lovenox.  She was last time bridged with bivalirudin in the hospital prior to the procedure given her antiphospholipid antibody syndrome and the risk of not being on Arixtra for more than 2 days.  We will plan similar approach for her next session.  We will stop her Coumadin 4 days prior to the procedure and admit her 2 days prior to the procedure and bridge her with bivalirudin.      She is not interested in riociguat which I think is reasonable.  Her cardiac output is preserved.      She will continue on Coumadin with a chromogenic factor of 25-30.  She is being managed by her Coumadin clinic.  She will continue on torsemide 80 mg once a day.  I took the liberty and stop her potassium supplement and increase her spironolactone to 25 mg twice a day.      I recommend her to return to clinic in 3 months with me or nurse practitioner.  She will call us in the interim of any further worsening symptoms.      It was a pleasure meeting Ms. Shi people in her pulmonary hypertension clinic at [facility].  Thank you for involving us in her care.      Sincerely,   Arcelia Paniagua MD   Center for Pulmonary Hypertension  Heart Failure, Transplant, and Mechanical Circulatory Support Cardiology   Cardiovascular Division  [facility]    [phone]            Please do not hesitate to contact me if you have any questions/concerns.     Sincerely,     Arcelia Paniagua MD

## 2021-02-01 NOTE — NURSING NOTE
Procedures and/or Testing: Patient given instructions regarding  BPA. Discussed purpose, preparation, procedure and when to expect results reported back to the patient. Patient demonstrated understanding of this information and agreed to call with further questions or concerns.  Diet: Patient instructed regarding a heart healthy diet, including discussion of reduced fat and sodium intake. Patient demonstrated understanding of this information and agreed to call with further questions or concerns.  Return Appointment: Patient given instructions regarding scheduling next clinic visit. Patient demonstrated understanding of this information and agreed to call with further questions or concerns.  Patient stated she understood all health information given and agreed to call with further questions or concerns.    Staff message sent to PH team regarding scheduling and bridging instructions. Micheline Chapa RN on 2/1/2021 at 9:13 AM

## 2021-02-16 ENCOUNTER — HOSPITAL ENCOUNTER (OUTPATIENT)
Facility: CLINIC | Age: 51
End: 2021-02-16
Attending: INTERNAL MEDICINE | Admitting: INTERNAL MEDICINE
Payer: COMMERCIAL

## 2021-02-16 DIAGNOSIS — I27.20 PULMONARY HYPERTENSION (H): ICD-10-CM

## 2021-02-16 DIAGNOSIS — R06.02 SOB (SHORTNESS OF BREATH): ICD-10-CM

## 2021-02-20 ENCOUNTER — HEALTH MAINTENANCE LETTER (OUTPATIENT)
Age: 51
End: 2021-02-20

## 2021-02-24 ENCOUNTER — TELEPHONE (OUTPATIENT)
Dept: CARDIOLOGY | Facility: CLINIC | Age: 51
End: 2021-02-24

## 2021-02-24 DIAGNOSIS — Z11.59 ENCOUNTER FOR SCREENING FOR OTHER VIRAL DISEASES: ICD-10-CM

## 2021-02-24 NOTE — TELEPHONE ENCOUNTER
Called patient and reviewed plan for admission as well as last dose of Warfarin to be taken on Monday 3/8/21. Patient verbalized understanding, agreed with plan and denied any further questions. Yoly Villegas RN on 2/24/2021 at 11:35 AM    --------------------------------  Admission set up for 3/10/21.  [id number]    Staff msg sent to Trinh Raygoza updating him to the upcoming admission.  Yoly Villegas RN on 2/24/21      ----- Message from Arcelia Paniagua MD sent at 2/16/2021  4:59 PM CST -----  Regarding: RE: BPA  She needs to be admitted 2 days prior to the procedure to bridge with Angiomax. After the procedure, she will stay overnight and likely go home the next day.     Artie - correct me if I am wrong    TT    ----- Message -----  From: Deneen Mccann  Sent: 2/16/2021   3:34 PM CST  To: Yoly Villegas RN, Artie Hernández MD, #  Subject: RE: BPA                                          Hi Yoly,    Pt is scheduled for covid testing 3 hours prior to admission @ 1:00 - (so 10:00am covid test at [facility] [place], [place]).    She is scheduled for her BPA on 3/12 @ 11:00. She wanted to confirm whether she will be staying overnight or whether she will be discharged in the same day. Are you able to confirm the details with the patient? I told her that for right now, it would be a planned overnight, but I would ask. Thank you!    -Deneen  ----- Message -----  From: Micheline Chapa RN  Sent: 2/1/2021   9:14 AM CST  To: Yoly Villegas RN, Deneen Mccann, #  Subject: BPA                                              Hi ladies,    Patient will need a second BPA session for her L lower lobe. Deneen, please call the patient to schedule as soon as there is availability.    Yoly, due to bridging issues with her first BPA, Dr. Paniagua would like her to hold her coumadin 4 days prior to the procedure, but will need to be scheduled for a direct admission to the hospital (2 day prior) to start bridging with Angiomax.      Please call the patient to review pre-procedure instructions and schedule admission. Thank you!Eliceo

## 2021-03-10 ENCOUNTER — HOSPITAL ENCOUNTER (INPATIENT)
Facility: CLINIC | Age: 51
LOS: 3 days | Discharge: HOME | DRG: 253 | End: 2021-03-13
Attending: INTERNAL MEDICINE | Admitting: INTERNAL MEDICINE
Payer: COMMERCIAL

## 2021-03-10 DIAGNOSIS — R06.02 SOB (SHORTNESS OF BREATH): ICD-10-CM

## 2021-03-10 DIAGNOSIS — Z11.59 ENCOUNTER FOR SCREENING FOR OTHER VIRAL DISEASES: ICD-10-CM

## 2021-03-10 DIAGNOSIS — I27.20 PULMONARY HYPERTENSION (H): ICD-10-CM

## 2021-03-10 DIAGNOSIS — I27.24 CTEPH (CHRONIC THROMBOEMBOLIC PULMONARY HYPERTENSION) (H): Primary | ICD-10-CM

## 2021-03-10 DIAGNOSIS — E87.6 HYPOKALEMIA: ICD-10-CM

## 2021-03-10 LAB
APTT PPP: 134 SEC (ref 22–37)
APTT PPP: NORMAL SEC (ref 22–37)
APTT PPP: NORMAL SEC (ref 22–37)
LABORATORY COMMENT REPORT: NORMAL
SARS-COV-2 RNA RESP QL NAA+PROBE: NEGATIVE
SARS-COV-2 RNA RESP QL NAA+PROBE: NORMAL
SPECIMEN SOURCE: NORMAL
SPECIMEN SOURCE: NORMAL

## 2021-03-10 PROCEDURE — 36415 COLL VENOUS BLD VENIPUNCTURE: CPT | Performed by: INTERNAL MEDICINE

## 2021-03-10 PROCEDURE — U0005 INFEC AGEN DETEC AMPLI PROBE: HCPCS | Mod: 90 | Performed by: PATHOLOGY

## 2021-03-10 PROCEDURE — 99221 1ST HOSP IP/OBS SF/LOW 40: CPT | Mod: AI | Performed by: PHYSICIAN ASSISTANT

## 2021-03-10 PROCEDURE — 258N000003 HC RX IP 258 OP 636: Performed by: PHYSICIAN ASSISTANT

## 2021-03-10 PROCEDURE — 250N000011 HC RX IP 250 OP 636: Performed by: PHYSICIAN ASSISTANT

## 2021-03-10 PROCEDURE — 999N000128 HC STATISTIC PERIPHERAL IV START W/O US GUIDANCE

## 2021-03-10 PROCEDURE — 214N000001 HC R&B CCU UMMC

## 2021-03-10 PROCEDURE — 250N000013 HC RX MED GY IP 250 OP 250 PS 637: Performed by: PHYSICIAN ASSISTANT

## 2021-03-10 PROCEDURE — U0003 INFECTIOUS AGENT DETECTION BY NUCLEIC ACID (DNA OR RNA); SEVERE ACUTE RESPIRATORY SYNDROME CORONAVIRUS 2 (SARS-COV-2) (CORONAVIRUS DISEASE [COVID-19]), AMPLIFIED PROBE TECHNIQUE, MAKING USE OF HIGH THROUGHPUT TECHNOLOGIES AS DESCRIBED BY CMS-2020-01-R: HCPCS | Mod: 90 | Performed by: PATHOLOGY

## 2021-03-10 PROCEDURE — 85730 THROMBOPLASTIN TIME PARTIAL: CPT | Performed by: INTERNAL MEDICINE

## 2021-03-10 RX ORDER — ATORVASTATIN CALCIUM 10 MG/1
10 TABLET, FILM COATED ORAL DAILY
Status: DISCONTINUED | OUTPATIENT
Start: 2021-03-10 | End: 2021-03-13 | Stop reason: HOSPADM

## 2021-03-10 RX ORDER — SPIRONOLACTONE 25 MG/1
25 TABLET ORAL 2 TIMES DAILY
Status: DISCONTINUED | OUTPATIENT
Start: 2021-03-10 | End: 2021-03-13 | Stop reason: HOSPADM

## 2021-03-10 RX ORDER — BISACODYL 5 MG
5 TABLET, DELAYED RELEASE (ENTERIC COATED) ORAL DAILY PRN
Status: DISCONTINUED | OUTPATIENT
Start: 2021-03-10 | End: 2021-03-13 | Stop reason: HOSPADM

## 2021-03-10 RX ORDER — ONDANSETRON 2 MG/ML
4 INJECTION INTRAMUSCULAR; INTRAVENOUS EVERY 6 HOURS PRN
Status: DISCONTINUED | OUTPATIENT
Start: 2021-03-10 | End: 2021-03-13 | Stop reason: HOSPADM

## 2021-03-10 RX ORDER — TRAZODONE HYDROCHLORIDE 100 MG/1
100 TABLET ORAL AT BEDTIME
Status: DISCONTINUED | OUTPATIENT
Start: 2021-03-10 | End: 2021-03-13 | Stop reason: HOSPADM

## 2021-03-10 RX ORDER — TORSEMIDE 20 MG/1
80 TABLET ORAL DAILY
Status: DISCONTINUED | OUTPATIENT
Start: 2021-03-10 | End: 2021-03-13 | Stop reason: HOSPADM

## 2021-03-10 RX ORDER — ACETAMINOPHEN 325 MG/1
650 TABLET ORAL EVERY 4 HOURS PRN
Status: DISCONTINUED | OUTPATIENT
Start: 2021-03-10 | End: 2021-03-13 | Stop reason: HOSPADM

## 2021-03-10 RX ORDER — ONDANSETRON 4 MG/1
4 TABLET, ORALLY DISINTEGRATING ORAL EVERY 6 HOURS PRN
Status: DISCONTINUED | OUTPATIENT
Start: 2021-03-10 | End: 2021-03-13 | Stop reason: HOSPADM

## 2021-03-10 RX ORDER — CLONAZEPAM 1 MG/1
1 TABLET ORAL DAILY PRN
Status: DISCONTINUED | OUTPATIENT
Start: 2021-03-10 | End: 2021-03-13 | Stop reason: HOSPADM

## 2021-03-10 RX ORDER — PROCHLORPERAZINE MALEATE 5 MG
10 TABLET ORAL EVERY 6 HOURS PRN
Status: DISCONTINUED | OUTPATIENT
Start: 2021-03-10 | End: 2021-03-13 | Stop reason: HOSPADM

## 2021-03-10 RX ORDER — SERTRALINE HYDROCHLORIDE 100 MG/1
200 TABLET, FILM COATED ORAL DAILY
Status: DISCONTINUED | OUTPATIENT
Start: 2021-03-10 | End: 2021-03-13 | Stop reason: HOSPADM

## 2021-03-10 RX ORDER — BISACODYL 5 MG
15 TABLET, DELAYED RELEASE (ENTERIC COATED) ORAL DAILY PRN
Status: DISCONTINUED | OUTPATIENT
Start: 2021-03-10 | End: 2021-03-13 | Stop reason: HOSPADM

## 2021-03-10 RX ORDER — BISACODYL 5 MG
10 TABLET, DELAYED RELEASE (ENTERIC COATED) ORAL DAILY PRN
Status: DISCONTINUED | OUTPATIENT
Start: 2021-03-10 | End: 2021-03-13 | Stop reason: HOSPADM

## 2021-03-10 RX ORDER — HYDROXYCHLOROQUINE SULFATE 200 MG/1
200 TABLET, FILM COATED ORAL 2 TIMES DAILY
Status: DISCONTINUED | OUTPATIENT
Start: 2021-03-10 | End: 2021-03-13 | Stop reason: HOSPADM

## 2021-03-10 RX ORDER — POLYETHYLENE GLYCOL 3350 17 G/17G
17 POWDER, FOR SOLUTION ORAL DAILY PRN
Status: DISCONTINUED | OUTPATIENT
Start: 2021-03-10 | End: 2021-03-13 | Stop reason: HOSPADM

## 2021-03-10 RX ORDER — PROCHLORPERAZINE 25 MG
25 SUPPOSITORY, RECTAL RECTAL EVERY 12 HOURS PRN
Status: DISCONTINUED | OUTPATIENT
Start: 2021-03-10 | End: 2021-03-13 | Stop reason: HOSPADM

## 2021-03-10 RX ORDER — ACETAMINOPHEN 650 MG/1
650 SUPPOSITORY RECTAL EVERY 4 HOURS PRN
Status: DISCONTINUED | OUTPATIENT
Start: 2021-03-10 | End: 2021-03-13 | Stop reason: HOSPADM

## 2021-03-10 RX ORDER — LIDOCAINE 40 MG/G
CREAM TOPICAL
Status: DISCONTINUED | OUTPATIENT
Start: 2021-03-10 | End: 2021-03-13 | Stop reason: HOSPADM

## 2021-03-10 RX ORDER — MAGNESIUM HYDROXIDE/ALUMINUM HYDROXICE/SIMETHICONE 120; 1200; 1200 MG/30ML; MG/30ML; MG/30ML
30 SUSPENSION ORAL EVERY 4 HOURS PRN
Status: DISCONTINUED | OUTPATIENT
Start: 2021-03-10 | End: 2021-03-13 | Stop reason: HOSPADM

## 2021-03-10 RX ADMIN — HYDROXYCHLOROQUINE SULFATE 200 MG: 200 TABLET, FILM COATED ORAL at 19:56

## 2021-03-10 RX ADMIN — SPIRONOLACTONE 25 MG: 25 TABLET ORAL at 19:56

## 2021-03-10 RX ADMIN — ATORVASTATIN CALCIUM 10 MG: 10 TABLET, FILM COATED ORAL at 23:11

## 2021-03-10 RX ADMIN — TRAZODONE HYDROCHLORIDE 100 MG: 100 TABLET ORAL at 23:11

## 2021-03-10 RX ADMIN — CLONAZEPAM 1 MG: 1 TABLET ORAL at 23:11

## 2021-03-10 RX ADMIN — BIVALIRUDIN 0.15 MG/KG/HR: 250 INJECTION, POWDER, LYOPHILIZED, FOR SOLUTION INTRAVENOUS at 16:56

## 2021-03-10 ASSESSMENT — ACTIVITIES OF DAILY LIVING (ADL)
ADLS_ACUITY_SCORE: 12
ADLS_ACUITY_SCORE: 12

## 2021-03-10 ASSESSMENT — MIFFLIN-ST. JEOR: SCORE: 1827.01

## 2021-03-10 NOTE — PROGRESS NOTES
Admission    Diagnosis: bridging for BPA  Admitted from: Home  Via: ambulation  Accompanied by: self  Belongings: Placed in closet; valuables sent home with family, declined sending any items to security.  Admission Profile: Complete  Teaching: orientation to unit, call don't fall, use of console, meal times, visiting hours, when to call for the RN (angina/sob/dizziness, etc.), and enforced importance of safety   Access: PIV  Telemetry: Placed on patient  Height/Weight: Complete    2 person skin check completed w/ Shazia.

## 2021-03-10 NOTE — H&P
[facility]   Cardiology Service  History and Physical      Yuridia Barksdale MRN# [id number]   YOB: 1970 Age: 50 year old       Admission Date: 3/10/2021      Assessment and plan:   Yuridia Barksdale is a pleasant 50 y.o female with past medical history significant for pulmonary HTN 2/2 CTEPH, SLE, antiphospholipid antibody syndrome, DAVIS, and obesity who presented for outpatient balloon pulmonary angioplasty on 3/12/2021 of the left lower lobe. She was admitted on 3/10/21 for bridging with bivalrudin due to her history of hypercoagulability and HIT.     # Anticoagulation, bridging prior to procedure  # Pulmonary hypertension, WHO type 3, functional class 2  # CTEPH  # History of DVT and PE  # SLE with lupus antiphospholipid antibody syndrome  # History of HIT  # Obstructive sleep apnea   # Obesity  Patient admitted for planned balloon pulmonary angioplasty of the left lower lobe on 3/12/2021. Due to hypercoagulability, she is admitted for bridging with bivalrudin.   - last warfarin dose on 3/8/2021  - bivalrudin gtt   - continue infusion until 1.5-3 hours before procedure (currently scheduled for 11:00 on 3/12/2021)  - follow PTT   - daily CBC  - anticipate bridging warfarin after procedure with fondaparinux. Goal INR 3-4    - Continue PTA spironolactone 25 mg daily   - Continue PTA torsemide 80 mg daily   - CPAP use encouraged  - daily BMP    FEN: 2 g sodium restriction  Code status: Full  Prophylaxis:  bivalrudin   Isolation: none  Disposition: anticipate discharge on 3/13/2021    Patient seen and discussed with Dr. Trinh Otero PA-C  [facility] Cardiology  [phone]      Chief Complaint: bridging with bivalrudin prior to BPA    HPI:   Yuridia Barksdale is a pleasant 50 y.o female with past medical history significant for pulmonary HTN 2/2 CTEPH, SLE, antiphospholipid antibody syndrome, DAVIS, and obesity who presented for outpatient balloon pulmonary angioplasty  on 3/12/2021 of the left lower lobe. She was admitted on 3/10/21 for bridging with bivalrudin due to her history of hypercoagulability and HIT.     She reports that since her last BPA, she has noticed significant improvement in her breathing. She has been able to walk up to 4 miles on level ground without oxygen and states that she was able to use the elliptical yesterday for 30 minutes without difficulty.     No chest discomfort, dyspnea at rest, orthopnea, or PND. No dizziness, lightheadedness, presyncope, or palpitations.    Last warfarin dose was 3/8/2021.       Past Medical History:   Diagnosis Date     Acute blood loss anemia 8/2014    needed blood transfusion     DVT (deep venous thrombosis) (H)      Lupus (H)      Pneumonia 12/2013    hospitalized x8days       Past Surgical History:   Procedure Laterality Date     APPENDECTOMY  9/1994     CV BALLOON PULMONARY ANGIOPLASTY N/A 1/20/2021    Procedure: Balloon Pulmonary Angioplasty;  Surgeon: Arnol Kiser MD;  Location:  HEART CARDIAC CATH LAB     CV PULMONARY ANGIOGRAM N/A 12/2/2019    Procedure: CV PULMONARY ANGIOGRAM;  Surgeon: Arnol Kiser MD;  Location:  HEART CARDIAC CATH LAB     CV PULMONARY ANGIOGRAM N/A 12/15/2020    Procedure: CV PULMONARY ANGIOGRAM;  Surgeon: Lonnie Hanna MD;  Location:  HEART CARDIAC CATH LAB     CV RIGHT HEART CATH MEASUREMENTS RECORDED N/A 12/2/2019    Procedure: CV RIGHT HEART CATH;  Surgeon: Arnol Kiser MD;  Location:  HEART CARDIAC CATH LAB     CV RIGHT HEART CATH MEASUREMENTS RECORDED N/A 11/11/2020    Procedure: CV RIGHT HEART CATH;  Surgeon: Arcelia Paniagua MD;  Location:  HEART CARDIAC CATH LAB     CV RIGHT HEART CATH MEASUREMENTS RECORDED N/A 12/15/2020    Procedure: CV RIGHT HEART CATH;  Surgeon: Lonnie Hanna MD;  Location:  HEART CARDIAC CATH LAB     CV RIGHT HEART CATH MEASUREMENTS RECORDED N/A 1/20/2021    Procedure: Right Heart Cath;  Surgeon:  Arnol Kiser MD;  Location:  HEART CARDIAC CATH LAB     HYSTERECTOMY  4/21/2016       No current facility-administered medications on file prior to encounter.   atorvastatin (LIPITOR) 10 MG tablet, Take 1 tablet (10 mg) by mouth daily  clonazePAM (KLONOPIN) 1 MG tablet, Take 1 tablet (1 mg) by mouth daily as needed for anxiety  hydroxychloroquine (PLAQUENIL) 200 MG tablet, Take 200 mg by mouth 2 times daily   sertraline (ZOLOFT) 100 MG tablet, Take 2 tablets (200 mg) by mouth daily  spironolactone (ALDACTONE) 25 MG tablet, Take 1 tablet (25 mg) by mouth 2 times daily  torsemide (DEMADEX) 20 MG tablet, Take 4 tablets (80 mg) by mouth daily  traZODone (DESYREL) 50 MG tablet, Take 2 tablets (100 mg) by mouth At Bedtime  warfarin ANTICOAGULANT (COUMADIN ANTICOAGULANT) 1 MG tablet, Take 5 tablets (5 mg) by mouth daily On 1/21/2021 and 1/22/2021. Have your chromogenic factor checked on 1/23/2021.  fondaparinux ANTICOAGULANT (ARIXTRA ANTICOAGULANT) 10 MG/0.8ML injection, Inject 0.8 mLs (10 mg) Subcutaneous daily for 6 doses (Patient not taking: Reported on 2/1/2021)        No family history on file.    Social History     Tobacco Use     Smoking status: Never Smoker     Smokeless tobacco: Never Used   Substance Use Topics     Alcohol use: Yes     Comment: occasionally       Allergies   Allergen Reactions     Lovenox [Enoxaparin]      Heparin allergy, do not use this.  Use Arixtra instead     Heparin Other (See Comments)     thrombocytopenia     Levaquin      Clindamycin Rash     Vancomycin Rash         ROS:   CONSTITUTIONAL:No report of fevers or chills  RESPIRATORY: No cough, wheezing, SOB, or hemoptysis  CARDIOVASCULAR: see HPI  MUSCULO-SKELETAL: No joint pain/swelling, no muscle pain  NEURO: No paresthesias, syncope, pre-syncope, lightheadness, dizziness or vertigo  ENDOCRINE: No temperature intolerance, no skin/hair changes  PSYCHIATRIC: No change in mood, feeling down/anxious, no change in sleep or  "appetite  GI: no melena or hematochezia, no change in bowel habits  : no hematuria or dysuria, no hesitancy, dribbling or incontinence  HEME: no easy bruising or bleeding, no history of anemia, no history of blood clots  SKIN: no rashes or sores, no unusual itching      Physical Examination:  Vitals: /84 (BP Location: Right arm)   Pulse 86   Temp 97.6  F (36.4  C) (Oral)   Resp 16   Ht 1.676 m (5' 6\")   Wt 118 kg (260 lb 3.2 oz)   SpO2 97%   BMI 42.00 kg/m    BMI= Body mass index is 42 kg/m .    GENERAL APPEARANCE: Very pleasant and well apperaing middle age female. Appears comfortable and in no acute distress. Alert and interactive.   HEENT: Normocephalic, atraumatic. Sclera clear. No xanthelasmas. normal pupil size  NECK: JVP not elevated   CHEST: Normal work of breathing. Lungs clear to auscultation without rales, rhonchi or wheezes, no use of accessory muscles, no retractions  CARDIOVASCULAR: regular rhythm, normal S1 and S2, no S3 or S4 and no murmur, click or rub.  ABDOMEN: soft, non tender  EXTREMITIES: warm, trace bilateral lower extremity edema, L>R, Radial pulses 2+ bilaterally   NEURO: alert and oriented to person/place/time, normal speech  PSYCH: Mood and affect are appropriate  SKIN: no ecchymoses, no rashes. Hemosiderin deposition on left anterior lower extremity due to hx of DVT.     "

## 2021-03-10 NOTE — Clinical Note
The first balloon was inserted into the left pulmonary artery.Max pressure = 9 violeta. Total duration = 10 seconds.

## 2021-03-11 LAB
ANION GAP SERPL CALCULATED.3IONS-SCNC: 6 MMOL/L (ref 3–14)
APTT PPP: 111 SEC (ref 22–37)
APTT PPP: 77 SEC (ref 22–37)
BASOPHILS # BLD AUTO: 0 10E9/L (ref 0–0.2)
BASOPHILS NFR BLD AUTO: 0.7 %
BUN SERPL-MCNC: 19 MG/DL (ref 7–30)
CALCIUM SERPL-MCNC: 9 MG/DL (ref 8.5–10.1)
CHLORIDE SERPL-SCNC: 104 MMOL/L (ref 94–109)
CO2 SERPL-SCNC: 29 MMOL/L (ref 20–32)
CREAT SERPL-MCNC: 1.01 MG/DL (ref 0.52–1.04)
DIFFERENTIAL METHOD BLD: NORMAL
EOSINOPHIL # BLD AUTO: 0.1 10E9/L (ref 0–0.7)
EOSINOPHIL NFR BLD AUTO: 2.8 %
ERYTHROCYTE [DISTWIDTH] IN BLOOD BY AUTOMATED COUNT: 13 % (ref 10–15)
GFR SERPL CREATININE-BSD FRML MDRD: 66 ML/MIN/{1.73_M2}
GLUCOSE SERPL-MCNC: 90 MG/DL (ref 70–99)
HCT VFR BLD AUTO: 42 % (ref 35–47)
HGB BLD-MCNC: 13.8 G/DL (ref 11.7–15.7)
IMM GRANULOCYTES # BLD: 0 10E9/L (ref 0–0.4)
IMM GRANULOCYTES NFR BLD: 0.2 %
LYMPHOCYTES # BLD AUTO: 1.3 10E9/L (ref 0.8–5.3)
LYMPHOCYTES NFR BLD AUTO: 28.4 %
MAGNESIUM SERPL-MCNC: 2 MG/DL (ref 1.6–2.3)
MCH RBC QN AUTO: 31.2 PG (ref 26.5–33)
MCHC RBC AUTO-ENTMCNC: 32.9 G/DL (ref 31.5–36.5)
MCV RBC AUTO: 95 FL (ref 78–100)
MONOCYTES # BLD AUTO: 0.3 10E9/L (ref 0–1.3)
MONOCYTES NFR BLD AUTO: 7.4 %
NEUTROPHILS # BLD AUTO: 2.8 10E9/L (ref 1.6–8.3)
NEUTROPHILS NFR BLD AUTO: 60.5 %
NRBC # BLD AUTO: 0 10*3/UL
NRBC BLD AUTO-RTO: 0 /100
PLATELET # BLD AUTO: 229 10E9/L (ref 150–450)
POTASSIUM SERPL-SCNC: 3.3 MMOL/L (ref 3.4–5.3)
POTASSIUM SERPL-SCNC: 3.6 MMOL/L (ref 3.4–5.3)
RBC # BLD AUTO: 4.42 10E12/L (ref 3.8–5.2)
SODIUM SERPL-SCNC: 139 MMOL/L (ref 133–144)
WBC # BLD AUTO: 4.6 10E9/L (ref 4–11)

## 2021-03-11 PROCEDURE — 83735 ASSAY OF MAGNESIUM: CPT | Performed by: STUDENT IN AN ORGANIZED HEALTH CARE EDUCATION/TRAINING PROGRAM

## 2021-03-11 PROCEDURE — 250N000013 HC RX MED GY IP 250 OP 250 PS 637: Performed by: INTERNAL MEDICINE

## 2021-03-11 PROCEDURE — 80048 BASIC METABOLIC PNL TOTAL CA: CPT | Performed by: PHYSICIAN ASSISTANT

## 2021-03-11 PROCEDURE — 99232 SBSQ HOSP IP/OBS MODERATE 35: CPT | Performed by: INTERNAL MEDICINE

## 2021-03-11 PROCEDURE — 99207 PR APP CREDIT; MD BILLING SHARED VISIT: CPT | Performed by: PHYSICIAN ASSISTANT

## 2021-03-11 PROCEDURE — 84132 ASSAY OF SERUM POTASSIUM: CPT | Performed by: STUDENT IN AN ORGANIZED HEALTH CARE EDUCATION/TRAINING PROGRAM

## 2021-03-11 PROCEDURE — 258N000003 HC RX IP 258 OP 636: Performed by: PHYSICIAN ASSISTANT

## 2021-03-11 PROCEDURE — 36415 COLL VENOUS BLD VENIPUNCTURE: CPT | Performed by: PHYSICIAN ASSISTANT

## 2021-03-11 PROCEDURE — 85025 COMPLETE CBC W/AUTO DIFF WBC: CPT | Performed by: PHYSICIAN ASSISTANT

## 2021-03-11 PROCEDURE — 36415 COLL VENOUS BLD VENIPUNCTURE: CPT | Performed by: INTERNAL MEDICINE

## 2021-03-11 PROCEDURE — 36415 COLL VENOUS BLD VENIPUNCTURE: CPT | Performed by: STUDENT IN AN ORGANIZED HEALTH CARE EDUCATION/TRAINING PROGRAM

## 2021-03-11 PROCEDURE — 85730 THROMBOPLASTIN TIME PARTIAL: CPT | Performed by: PHYSICIAN ASSISTANT

## 2021-03-11 PROCEDURE — 85730 THROMBOPLASTIN TIME PARTIAL: CPT | Performed by: INTERNAL MEDICINE

## 2021-03-11 PROCEDURE — 250N000011 HC RX IP 250 OP 636: Performed by: PHYSICIAN ASSISTANT

## 2021-03-11 PROCEDURE — 214N000001 HC R&B CCU UMMC

## 2021-03-11 PROCEDURE — 250N000013 HC RX MED GY IP 250 OP 250 PS 637: Performed by: PHYSICIAN ASSISTANT

## 2021-03-11 RX ORDER — POTASSIUM CHLORIDE 750 MG/1
40 TABLET, EXTENDED RELEASE ORAL ONCE
Status: COMPLETED | OUTPATIENT
Start: 2021-03-11 | End: 2021-03-11

## 2021-03-11 RX ORDER — MAGNESIUM OXIDE 400 MG/1
400 TABLET ORAL 2 TIMES DAILY
Status: COMPLETED | OUTPATIENT
Start: 2021-03-11 | End: 2021-03-13

## 2021-03-11 RX ADMIN — POTASSIUM CHLORIDE 40 MEQ: 750 TABLET, EXTENDED RELEASE ORAL at 21:09

## 2021-03-11 RX ADMIN — BIVALIRUDIN 0.04 MG/KG/HR: 250 INJECTION, POWDER, LYOPHILIZED, FOR SOLUTION INTRAVENOUS at 16:33

## 2021-03-11 RX ADMIN — MAGNESIUM OXIDE 400 MG: 400 TABLET ORAL at 21:09

## 2021-03-11 RX ADMIN — TORSEMIDE 80 MG: 20 TABLET ORAL at 09:02

## 2021-03-11 RX ADMIN — SERTRALINE HYDROCHLORIDE 200 MG: 100 TABLET ORAL at 09:02

## 2021-03-11 RX ADMIN — HYDROXYCHLOROQUINE SULFATE 200 MG: 200 TABLET, FILM COATED ORAL at 19:45

## 2021-03-11 RX ADMIN — TRAZODONE HYDROCHLORIDE 100 MG: 100 TABLET ORAL at 22:02

## 2021-03-11 RX ADMIN — ATORVASTATIN CALCIUM 10 MG: 10 TABLET, FILM COATED ORAL at 22:02

## 2021-03-11 RX ADMIN — SPIRONOLACTONE 25 MG: 25 TABLET ORAL at 19:45

## 2021-03-11 RX ADMIN — SPIRONOLACTONE 25 MG: 25 TABLET ORAL at 09:02

## 2021-03-11 RX ADMIN — HYDROXYCHLOROQUINE SULFATE 200 MG: 200 TABLET, FILM COATED ORAL at 09:05

## 2021-03-11 RX ADMIN — CLONAZEPAM 1 MG: 1 TABLET ORAL at 22:02

## 2021-03-11 ASSESSMENT — ACTIVITIES OF DAILY LIVING (ADL)
ADLS_ACUITY_SCORE: 12

## 2021-03-11 ASSESSMENT — MIFFLIN-ST. JEOR: SCORE: 1826.1

## 2021-03-11 NOTE — PROGRESS NOTES
[facility]   Cardiology Consults  Progress Note     Interval History:  - no acute events overnight  - continuing on bival. No evidence of bleeding at this time.   - reports significant improvement with exercise capacity after last BPA. Anxious to complete repeat BPA tomorrow     Physical Exam:  Temp:  [97.6  F (36.4  C)-98.3  F (36.8  C)] 98.3  F (36.8  C)  Pulse:  [61-86] 72  Resp:  [16] 16  BP: ()/(60-84) 117/78  SpO2:  [91 %-97 %] 93 %      Intake/Output Summary (Last 24 hours) at 3/11/2021 1358  Last data filed at 3/11/2021 1333  Gross per 24 hour   Intake 1720 ml   Output 1650 ml   Net 70 ml       Wt:   Wt Readings from Last 5 Encounters:   03/11/21 117.9 kg (260 lb)   01/20/21 118.8 kg (262 lb)   12/15/20 117 kg (258 lb)   11/11/20 115.2 kg (254 lb)   07/31/20 126.1 kg (278 lb)       GEN: NAD, awake, alert  Pulm: CTAB, no wheeze, no crackles  Cardiac: RRR, JVP not appreciably elevated, no murmurs  Vascular: no lower extremity edema and palpable pulses  GI: soft, non distended  Neuro: CN II-XII grossly intact    Medications:    atorvastatin  10 mg Oral Daily     hydroxychloroquine  200 mg Oral BID     sertraline  200 mg Oral Daily     spironolactone  25 mg Oral BID     torsemide  80 mg Oral Daily     traZODone  100 mg Oral At Bedtime       bivalirudin ANTICOAGULANT (ANGIOMAX) 250mg/250mL in 0.9% Sodium Chloride 0.038 mg/kg/hr (03/11/21 0859)     - MEDICATION INSTRUCTIONS -       - MEDICATION INSTRUCTIONS -         Labs:   CMP  Recent Labs   Lab 03/11/21  0531      POTASSIUM 3.6   CHLORIDE 104   CO2 29   ANIONGAP 6   GLC 90   BUN 19   CR 1.01   GFRESTIMATED 66   GFRESTBLACK 76   KATARINA 9.0     CBC  Recent Labs   Lab 03/11/21  0531   WBC 4.6   RBC 4.42   HGB 13.8   HCT 42.0   MCV 95   MCH 31.2   MCHC 32.9   RDW 13.0        INRNo lab results found in last 7 days.  Arterial Blood GasNo lab results found in last 7 days.    Diagnostics:      ASSESSMENT/PLAN:  Yuridia  Apolonia is a pleasant 50 y.o female with past medical history significant for pulmonary HTN 2/2 CTEPH, SLE, antiphospholipid antibody syndrome, DAVIS, and obesity who presented for outpatient balloon pulmonary angioplasty on 3/12/2021 of the left lower lobe. She was admitted on 3/10/21 for bridging with bivalrudin due to her history of hypercoagulability and HIT.      # Pulmonary hypertension, WHO type 3, functional class 2   # History of DVT and PE c/b CTEPH  # SLE with lupus antiphospholipid antibody syndrome  # History of HIT  # Obstructive sleep apnea   # Obesity  Patient admitted for planned balloon pulmonary angioplasty of the left lower lobe on 3/12/2021. Due to hypercoagulability, she is admitted for bridging with bivalrudin. Briefly above goal last PM, but resolved with pausing bival.   - last warfarin dose on 3/8/2021  - bivalrudin gtt              - stop infusion 2h prior to BPA  - follow PTT   - daily CBC  - anticipate bridging warfarin after procedure with fondaparinux. Goal INR 3-4  - Continue PTA spironolactone 25 mg daily   - Continue PTA torsemide 80 mg daily   - CPAP use encouraged  - daily BMP     FEN: 2 g sodium restriction, NPO MN  Code status: Full  Prophylaxis:  bivalrudin   Isolation: none  Disposition: anticipate discharge on 3/13/2021     Patient seen and discussed with Dr. Trinh Saeed PA-C  [facility] Cardiology Team

## 2021-03-11 NOTE — PLAN OF CARE
Shift: [id number]    Status: Admitted 3/10 for angiomax bridging in anticipation for balloon pump of LLL. History of pulmonary HTN secondary to CTEPH, SLE, antiphospholipid antibody syndrome, DAVIS, obesity. Unable to bridge with heparin d/t history of HIT.    Neuro: Alert and oriented x4, able to make needs known, calls appropriately  Cardiac/VS: VSS, Sinus rhythm, afebrile  Respiratory: RA  GI: Denies N/V  Diet/Appetite: 2g Na+ diet  : Voids spontaneously   Activity: Up independently   Pain: Denies pain  Skin: No new deficits   LDA(s): PIV with angiomax as below    Infusion(s): Angiomax    -0.075mg/kg/hour at 2312, scheduled recheck PTT at 0215    -PTT @ 0112 was 134 seconds, gtt stopped at 0140    -Resumed gtt at 0240 at 1/2 rate per MAR, 0.038mg/kg/hour. Recheck scheduled for 0445 with morning labs.     -PTT @ 0531 was 77, no change in rate, first value within goal.         Plan: Continue bridgning angiomax, planned balloon pumpContinue plan of care

## 2021-03-11 NOTE — PROVIDER NOTIFICATION
"Contacted Cards 1 resident on call per \"Notify Provider\" order in regards to 2 consecutive PTTs greater than 88 seconds. Patient is not bleeding and provider agreed with pausing and titrating angiomax per MAR orders.   "

## 2021-03-11 NOTE — PLAN OF CARE
Pt admitted 3/10 bridging with angio max for planned BPA of LLL on 3/12 @ 1100.  Angio max started at 0.15 mg/kg/hr and currently paused for one hour and will restarted at 0.075 mg/kg/hr.  Next PTT draw scheduled for 115.  SR, VS'S on RA and denied pain.  Will give prn klonopin HS.  Otherwise, pt resting well and up ab steven.  Continue to monitor and with POC.

## 2021-03-11 NOTE — PHARMACY-ADMISSION MEDICATION HISTORY
Admission Medication History Completed by Pharmacy    See [facility] Admission Navigator for allergy information, preferred outpatient pharmacy, prior to admission medications and immunization status.     Medication History Sources:     Patient, chart review, Surescripts    Changes made to PTA medication list (reason):    Added: None    Deleted: Fondaparinux (therapy complete)    Changed: Warfarin - per pt home dose is 5 mg Monday and Friday, 4 mg Tu/We/Th/Sat/Sun with CFX goal 20-30%, held since 3/8/21; clonazepam - per pt 1 mg daily at bedtime for insomnia    Additional Information:    Patient is a good historian and knowledgeable about her medications.    Prior to Admission medications    Medication Sig Last Dose Taking? Auth Provider   atorvastatin (LIPITOR) 10 MG tablet Take 1 tablet (10 mg) by mouth daily 3/10/2021 at AM Yes Kilo Saeed PA-C   clonazePAM (KLONOPIN) 1 MG tablet Take 1 tablet (1 mg) by mouth daily as needed for anxiety  Patient taking differently: Take 1 mg by mouth At Bedtime  3/10/2021 at bedtime Yes Kilo Saeed PA-C   hydroxychloroquine (PLAQUENIL) 200 MG tablet Take 200 mg by mouth 2 times daily  3/10/2021 at PM Yes Reported, Patient   sertraline (ZOLOFT) 100 MG tablet Take 2 tablets (200 mg) by mouth daily 3/10/2021 at AM Yes Kilo Saeed PA-C   spironolactone (ALDACTONE) 25 MG tablet Take 1 tablet (25 mg) by mouth 2 times daily 3/10/2021 at AM Yes Arcelia Paniagua MD   torsemide (DEMADEX) 20 MG tablet Take 4 tablets (80 mg) by mouth daily 3/10/2021 at PM Yes Azucena Silva PA   traZODone (DESYREL) 50 MG tablet Take 2 tablets (100 mg) by mouth At Bedtime 3/10/2021 at PM Yes Kilo Saeed PA-C   warfarin ANTICOAGULANT (COUMADIN ANTICOAGULANT) 1 MG tablet Take 5 tablets (5 mg) by mouth daily On 1/21/2021 and 1/22/2021. Have your chromogenic factor checked on 1/23/2021.  Patient taking differently: Take 5 mg by mouth daily 5 mg on Monday and Friday, 4 mg on  TuWeThSaSu. CFX goal 20-30% Past Week at 3/8/21 Yes Kilo Saeed PA-C       Date completed: 03/11/21    Medication history completed by: Tom Baltazar (P4 [employer] Student)

## 2021-03-11 NOTE — UTILIZATION REVIEW
Admission Status; Secondary Review Determination     Admission Date: 3/10/2021  1:22 PM       Under the authority of the Utilization Management Committee, the utilization review process indicated a secondary review on the above patient.  The review outcome is based on review of the medical records, discussions with staff, and applying clinical experience noted on the date of the review.        (x)      Inpatient Status Appropriate - This patient's medical care is consistent with medical management for inpatient care and reasonable inpatient medical practice.       RATIONALE FOR DETERMINATION      Brief clinical presentation, information copied from the chart, abbreviated and edited for relevant content:       Yuridia Barksdale is a 50 y.o female with past medical history significant for pulmonary HTN 2/2 CTEPH, SLE, antiphospholipid antibody syndrome, DAVIS, and obesity who presented for outpatient balloon pulmonary angioplasty on 3/12/2021 of the left lower lobe. She was admitted on 3/10/21 for bridging with bivalrudin due to her history of hypercoagulability and HIT.  On bivalrudin gtt - with plan to continue infusion until 1.5-3 hours before procedure (currently scheduled for 11:00 on 3/12/2021). Plan for daily CBC. Team does anticipate bridging warfarin after procedure with fondaparinux. Goal INR 3-4. Discharge not anticipated until 3/13/21      At the time of admission with the information available to the attending physician, more than 2 nights hospital complex care was anticipated. Also, there was a risk of adverse outcome if patient was treated outpatient or observation. High intensity of services anticipated. Inpatient admission appropriate based on Medicare guidelines.       The information on this document is developed by the utilization review team in order for the business office to ensure compliance.  This only denotes the appropriateness of proper admission status and does not reflect the quality of care  rendered.         The definitions of Inpatient Status and Observation Status used in making the determination above are those provided in the CMS Coverage Manual, Chapter 1 and Chapter 6, section 70.4.      Sincerely,      Zoila Potts MD   Utilization Review/ Case Management  [facility].

## 2021-03-12 LAB
ANION GAP SERPL CALCULATED.3IONS-SCNC: 5 MMOL/L (ref 3–14)
APTT PPP: 70 SEC (ref 22–37)
APTT PPP: 88 SEC (ref 22–37)
B-HCG SERPL-ACNC: <1 IU/L (ref 0–5)
BASOPHILS # BLD AUTO: 0 10E9/L (ref 0–0.2)
BASOPHILS NFR BLD AUTO: 0.4 %
BUN SERPL-MCNC: 22 MG/DL (ref 7–30)
CALCIUM SERPL-MCNC: 8.6 MG/DL (ref 8.5–10.1)
CHLORIDE SERPL-SCNC: 106 MMOL/L (ref 94–109)
CO2 SERPL-SCNC: 26 MMOL/L (ref 20–32)
CREAT SERPL-MCNC: 0.98 MG/DL (ref 0.52–1.04)
DIFFERENTIAL METHOD BLD: NORMAL
EOSINOPHIL # BLD AUTO: 0.1 10E9/L (ref 0–0.7)
EOSINOPHIL NFR BLD AUTO: 2.9 %
ERYTHROCYTE [DISTWIDTH] IN BLOOD BY AUTOMATED COUNT: 12.8 % (ref 10–15)
GFR SERPL CREATININE-BSD FRML MDRD: 68 ML/MIN/{1.73_M2}
GLUCOSE SERPL-MCNC: 88 MG/DL (ref 70–99)
HCT VFR BLD AUTO: 40.4 % (ref 35–47)
HGB BLD-MCNC: 13.1 G/DL (ref 11.7–15.7)
HGB BLD-MCNC: 13.7 G/DL (ref 11.7–15.7)
IMM GRANULOCYTES # BLD: 0 10E9/L (ref 0–0.4)
IMM GRANULOCYTES NFR BLD: 0.2 %
INR PPP: 2.18 (ref 0.86–1.14)
LYMPHOCYTES # BLD AUTO: 1.1 10E9/L (ref 0.8–5.3)
LYMPHOCYTES NFR BLD AUTO: 21.8 %
MAGNESIUM SERPL-MCNC: 2.4 MG/DL (ref 1.6–2.3)
MCH RBC QN AUTO: 30.5 PG (ref 26.5–33)
MCHC RBC AUTO-ENTMCNC: 32.4 G/DL (ref 31.5–36.5)
MCV RBC AUTO: 94 FL (ref 78–100)
MONOCYTES # BLD AUTO: 0.4 10E9/L (ref 0–1.3)
MONOCYTES NFR BLD AUTO: 8.7 %
NEUTROPHILS # BLD AUTO: 3.2 10E9/L (ref 1.6–8.3)
NEUTROPHILS NFR BLD AUTO: 66 %
NRBC # BLD AUTO: 0 10*3/UL
NRBC BLD AUTO-RTO: 0 /100
NT-PROBNP SERPL-MCNC: 473 PG/ML (ref 0–450)
OXYHGB MFR BLDV: 68 %
PLATELET # BLD AUTO: 198 10E9/L (ref 150–450)
POTASSIUM SERPL-SCNC: 3.2 MMOL/L (ref 3.4–5.3)
POTASSIUM SERPL-SCNC: 3.8 MMOL/L (ref 3.4–5.3)
POTASSIUM SERPL-SCNC: 4.4 MMOL/L (ref 3.4–5.3)
RBC # BLD AUTO: 4.3 10E12/L (ref 3.8–5.2)
SODIUM SERPL-SCNC: 138 MMOL/L (ref 133–144)
WBC # BLD AUTO: 4.8 10E9/L (ref 4–11)

## 2021-03-12 PROCEDURE — 93451 RIGHT HEART CATH: CPT | Performed by: INTERNAL MEDICINE

## 2021-03-12 PROCEDURE — 250N000013 HC RX MED GY IP 250 OP 250 PS 637: Performed by: PHYSICIAN ASSISTANT

## 2021-03-12 PROCEDURE — 84702 CHORIONIC GONADOTROPIN TEST: CPT | Performed by: INTERNAL MEDICINE

## 2021-03-12 PROCEDURE — 85018 HEMOGLOBIN: CPT

## 2021-03-12 PROCEDURE — 83735 ASSAY OF MAGNESIUM: CPT | Performed by: INTERNAL MEDICINE

## 2021-03-12 PROCEDURE — 85027 COMPLETE CBC AUTOMATED: CPT | Performed by: INTERNAL MEDICINE

## 2021-03-12 PROCEDURE — 36415 COLL VENOUS BLD VENIPUNCTURE: CPT | Performed by: INTERNAL MEDICINE

## 2021-03-12 PROCEDURE — C1725 CATH, TRANSLUMIN NON-LASER: HCPCS | Performed by: INTERNAL MEDICINE

## 2021-03-12 PROCEDURE — 85730 THROMBOPLASTIN TIME PARTIAL: CPT | Performed by: INTERNAL MEDICINE

## 2021-03-12 PROCEDURE — 82810 BLOOD GASES O2 SAT ONLY: CPT

## 2021-03-12 PROCEDURE — 80048 BASIC METABOLIC PNL TOTAL CA: CPT | Performed by: INTERNAL MEDICINE

## 2021-03-12 PROCEDURE — C1894 INTRO/SHEATH, NON-LASER: HCPCS | Performed by: INTERNAL MEDICINE

## 2021-03-12 PROCEDURE — 250N000013 HC RX MED GY IP 250 OP 250 PS 637: Performed by: INTERNAL MEDICINE

## 2021-03-12 PROCEDURE — C1887 CATHETER, GUIDING: HCPCS | Performed by: INTERNAL MEDICINE

## 2021-03-12 PROCEDURE — 92997 PUL ART BALLOON REPR PERCUT: CPT | Performed by: INTERNAL MEDICINE

## 2021-03-12 PROCEDURE — 250N000009 HC RX 250: Performed by: INTERNAL MEDICINE

## 2021-03-12 PROCEDURE — 250N000011 HC RX IP 250 OP 636: Performed by: INTERNAL MEDICINE

## 2021-03-12 PROCEDURE — 99232 SBSQ HOSP IP/OBS MODERATE 35: CPT | Mod: 25 | Performed by: PHYSICIAN ASSISTANT

## 2021-03-12 PROCEDURE — 258N000003 HC RX IP 258 OP 636: Performed by: INTERNAL MEDICINE

## 2021-03-12 PROCEDURE — 258N000003 HC RX IP 258 OP 636: Performed by: PHYSICIAN ASSISTANT

## 2021-03-12 PROCEDURE — C1769 GUIDE WIRE: HCPCS | Performed by: INTERNAL MEDICINE

## 2021-03-12 PROCEDURE — 85610 PROTHROMBIN TIME: CPT | Performed by: INTERNAL MEDICINE

## 2021-03-12 PROCEDURE — 4A023N6 MEASUREMENT OF CARDIAC SAMPLING AND PRESSURE, RIGHT HEART, PERCUTANEOUS APPROACH: ICD-10-PCS | Performed by: INTERNAL MEDICINE

## 2021-03-12 PROCEDURE — 214N000001 HC R&B CCU UMMC

## 2021-03-12 PROCEDURE — 83880 ASSAY OF NATRIURETIC PEPTIDE: CPT | Performed by: INTERNAL MEDICINE

## 2021-03-12 PROCEDURE — 250N000011 HC RX IP 250 OP 636: Performed by: PHYSICIAN ASSISTANT

## 2021-03-12 PROCEDURE — 84132 ASSAY OF SERUM POTASSIUM: CPT | Performed by: INTERNAL MEDICINE

## 2021-03-12 PROCEDURE — 027R3ZZ DILATION OF LEFT PULMONARY ARTERY, PERCUTANEOUS APPROACH: ICD-10-PCS | Performed by: INTERNAL MEDICINE

## 2021-03-12 PROCEDURE — 272N000001 HC OR GENERAL SUPPLY STERILE: Performed by: INTERNAL MEDICINE

## 2021-03-12 PROCEDURE — 85004 AUTOMATED DIFF WBC COUNT: CPT | Performed by: INTERNAL MEDICINE

## 2021-03-12 PROCEDURE — 999N000127 HC STATISTIC PERIPHERAL IV START W US GUIDANCE

## 2021-03-12 RX ORDER — SODIUM CHLORIDE 9 MG/ML
INJECTION, SOLUTION INTRAVENOUS CONTINUOUS
Status: DISCONTINUED | OUTPATIENT
Start: 2021-03-12 | End: 2021-03-12 | Stop reason: HOSPADM

## 2021-03-12 RX ORDER — FONDAPARINUX SODIUM 10 MG/.8ML
10 INJECTION SUBCUTANEOUS EVERY 24 HOURS
Status: DISCONTINUED | OUTPATIENT
Start: 2021-03-13 | End: 2021-03-12

## 2021-03-12 RX ORDER — POTASSIUM CHLORIDE 750 MG/1
40 TABLET, EXTENDED RELEASE ORAL
Status: DISCONTINUED | OUTPATIENT
Start: 2021-03-12 | End: 2021-03-12 | Stop reason: HOSPADM

## 2021-03-12 RX ORDER — POTASSIUM CHLORIDE 750 MG/1
40 TABLET, EXTENDED RELEASE ORAL ONCE
Status: COMPLETED | OUTPATIENT
Start: 2021-03-12 | End: 2021-03-12

## 2021-03-12 RX ORDER — LIDOCAINE 40 MG/G
CREAM TOPICAL
Status: DISCONTINUED | OUTPATIENT
Start: 2021-03-12 | End: 2021-03-12

## 2021-03-12 RX ORDER — FONDAPARINUX SODIUM 10 MG/.8ML
10 INJECTION SUBCUTANEOUS EVERY 24 HOURS
Status: DISCONTINUED | OUTPATIENT
Start: 2021-03-13 | End: 2021-03-13 | Stop reason: HOSPADM

## 2021-03-12 RX ORDER — IOPAMIDOL 755 MG/ML
INJECTION, SOLUTION INTRAVASCULAR
Status: DISCONTINUED | OUTPATIENT
Start: 2021-03-12 | End: 2021-03-12 | Stop reason: HOSPADM

## 2021-03-12 RX ORDER — POTASSIUM CHLORIDE 750 MG/1
20 TABLET, EXTENDED RELEASE ORAL
Status: DISCONTINUED | OUTPATIENT
Start: 2021-03-12 | End: 2021-03-12 | Stop reason: HOSPADM

## 2021-03-12 RX ADMIN — TRAZODONE HYDROCHLORIDE 100 MG: 100 TABLET ORAL at 21:14

## 2021-03-12 RX ADMIN — MAGNESIUM OXIDE 400 MG: 400 TABLET ORAL at 19:39

## 2021-03-12 RX ADMIN — BIVALIRUDIN 0.04 MG/KG/HR: 250 INJECTION, POWDER, LYOPHILIZED, FOR SOLUTION INTRAVENOUS at 16:46

## 2021-03-12 RX ADMIN — SODIUM CHLORIDE: 9 INJECTION, SOLUTION INTRAVENOUS at 09:07

## 2021-03-12 RX ADMIN — HYDROXYCHLOROQUINE SULFATE 200 MG: 200 TABLET, FILM COATED ORAL at 19:39

## 2021-03-12 RX ADMIN — SPIRONOLACTONE 25 MG: 25 TABLET ORAL at 19:39

## 2021-03-12 RX ADMIN — SERTRALINE HYDROCHLORIDE 200 MG: 100 TABLET ORAL at 07:59

## 2021-03-12 RX ADMIN — POTASSIUM CHLORIDE 40 MEQ: 750 TABLET, EXTENDED RELEASE ORAL at 02:05

## 2021-03-12 RX ADMIN — MAGNESIUM OXIDE 400 MG: 400 TABLET ORAL at 07:59

## 2021-03-12 RX ADMIN — ATORVASTATIN CALCIUM 10 MG: 10 TABLET, FILM COATED ORAL at 21:14

## 2021-03-12 RX ADMIN — SPIRONOLACTONE 25 MG: 25 TABLET ORAL at 07:59

## 2021-03-12 RX ADMIN — CLONAZEPAM 1 MG: 1 TABLET ORAL at 21:16

## 2021-03-12 RX ADMIN — HYDROXYCHLOROQUINE SULFATE 200 MG: 200 TABLET, FILM COATED ORAL at 07:59

## 2021-03-12 ASSESSMENT — ACTIVITIES OF DAILY LIVING (ADL)
ADLS_ACUITY_SCORE: 12

## 2021-03-12 ASSESSMENT — MIFFLIN-ST. JEOR: SCORE: 1822.93

## 2021-03-12 NOTE — PROGRESS NOTES
[facility]   Cardiology Consults  Progress Note     Interval History:  - no acute events overnight. BP stable, remains off oxygen  - NPO for BPA    Physical Exam:  Temp:  [97.4  F (36.3  C)-98.7  F (37.1  C)] 98.7  F (37.1  C)  Pulse:  [65-73] 69  Resp:  [16-18] 17  BP: ()/(56-79) 121/79  SpO2:  [91 %-96 %] 96 %      Intake/Output Summary (Last 24 hours) at 3/12/2021 1227  Last data filed at 3/12/2021 0800  Gross per 24 hour   Intake 1556.5 ml   Output 3100 ml   Net -1543.5 ml       Wt:   Wt Readings from Last 5 Encounters:   03/12/21 117.6 kg (259 lb 4.8 oz)   01/20/21 118.8 kg (262 lb)   12/15/20 117 kg (258 lb)   11/11/20 115.2 kg (254 lb)   07/31/20 126.1 kg (278 lb)       GEN: NAD, awake, alert, pleasant  Pulm: CTAB, no wheeze, no crackles  Cardiac: RRR, JVP not appreciably elevated, no murmurs  Vascular: no lower extremity edema and palpable pulses  GI: soft, non distended  Neuro: CN II-XII grossly intact    Medications:    atorvastatin  10 mg Oral Daily     hydroxychloroquine  200 mg Oral BID     magnesium oxide  400 mg Oral BID     sertraline  200 mg Oral Daily     spironolactone  25 mg Oral BID     [Held by provider] torsemide  80 mg Oral Daily     traZODone  100 mg Oral At Bedtime       bivalirudin ANTICOAGULANT (ANGIOMAX) 250mg/250mL in 0.9% Sodium Chloride Stopped (03/12/21 0930)     - MEDICATION INSTRUCTIONS -       - MEDICATION INSTRUCTIONS -       - MEDICATION INSTRUCTIONS -       sodium chloride 10 mL/hr at 03/12/21 0907       Labs:   [facility]  Recent Labs   Lab 03/12/21  0553 03/12/21  0434 03/12/21  0110 03/11/21  1929 03/11/21  0531   NA  --  138  --   --  139   POTASSIUM 4.4 3.8 3.2* 3.3* 3.6   CHLORIDE  --  106  --   --  104   CO2  --  26  --   --  29   ANIONGAP  --  5  --   --  6   GLC  --  88  --   --  90   BUN  --  22  --   --  19   CR  --  0.98  --   --  1.01   GFRESTIMATED  --  68  --   --  66   GFRESTBLACK  --  79  --   --  76   KATARINA  --  8.6  --   --  9.0    MAG  --  2.4*  --  2.0  --      CBC  Recent Labs   Lab 03/12/21  0434 03/11/21  0531   WBC 4.8 4.6   RBC 4.30 4.42   HGB 13.1 13.8   HCT 40.4 42.0   MCV 94 95   MCH 30.5 31.2   MCHC 32.4 32.9   RDW 12.8 13.0    229     INR  Recent Labs   Lab 03/12/21  0434   INR 2.18*     Arterial Blood GasNo lab results found in last 7 days.    Diagnostics:      ASSESSMENT/PLAN:  Yuridia Barksdale is a pleasant 50 y.o female with past medical history significant for pulmonary HTN 2/2 CTEPH, SLE, antiphospholipid antibody syndrome, DAVIS, and obesity who presented for outpatient balloon pulmonary angioplasty on 3/12/2021 of the left lower lobe. She was admitted on 3/10/21 for bridging with bivalrudin due to her history of hypercoagulability and HIT.      # Pulmonary hypertension, WHO type 3, functional class 2   # History of DVT and PE c/b CTEPH  # SLE with lupus antiphospholipid antibody syndrome  # History of HIT  # Obstructive sleep apnea   # Obesity  Patient admitted for planned balloon pulmonary angioplasty of the left lower lobe on 3/12/2021. Due to hypercoagulability, she is admitted for bridging with bivalrudin.   - last warfarin dose on 3/8/2021  - bivalrudin gtt              - stop infusion 2h prior to BPA  - follow PTT   - daily CBC  - anticipate bridging warfarin after procedure with fondaparinux. Goal INR 3-4  - Continue PTA spironolactone 25 mg daily   - Continue PTA torsemide 80 mg daily   - CPAP use encouraged  - daily BMP  - NPO for BPA today     FEN: NPO  Code status: Full  Prophylaxis:  bivalrudin   Isolation: none  Disposition: anticipate discharge on 3/13/2021     Patient discussed with Dr. Trinh Saeed PA-C  [facility] Cardiology Team

## 2021-03-12 NOTE — PLAN OF CARE
I/A: L PIV w/ angiomax running at 0.038mg/kg/hr, therapeutic ptt, redraw scheduled 3/12 AM  D: Angiomax bridging prior to BPA on 3/12  P: Hold angiomax 2 hr prior to procedure

## 2021-03-12 NOTE — PROGRESS NOTES
Admitted 3/10 for angiomax bridging in anticipation for balloon pump of LLL. History of pulmonary HTN secondary to CTEPH, SLE, antiphospholipid antibody syndrome, DAVIS, obesity. Unable to bridge with heparin d/t history of HIT.    Code status: Full     Team: cards 1  Running: angiomax @ 0.038 mg/kg/min (4.5 ml/hr)    Neuro: ao*4  Cardiac/Tele:  SR  Respiratory: Room air  GI/: urinating via toliet  Diet/Appetite: regular  Skin: L groin site WDL  (cms intact)  LDAs: R & L PIV  Activity: up ad steven  Pain: denies     Plan: discharge tomorrow @ 1130    Annetta Brewer, RN  Time cared for 0700 - 1930

## 2021-03-12 NOTE — PLAN OF CARE
Admitted 3/10 for angiomax bridging in anticipation for balloon pump of LLL. History of pulmonary HTN secondary to CTEPH, SLE, antiphospholipid antibody syndrome, DAVIS, obesity. Unable to bridge with heparin d/t history of HIT.     Neuro: A&O x4. PRN clonazepam given x1 at bedtime.  Cardiac: VSS. SR.  Respiratory: Room air. Shortness of breath with activity.  GI/: Adequate urine output. LBM 3/11.  Diet: NPO since midnight.  Skin: No new issues noted.  LDAs: Left PIV, angiomax infusing at 0.038 mg/kg/hr. PTT therapeutic this AM. Order to stop infusion 2 hours prior to procedure.  Activity: Independent.  Pain: Denies.     Plan: Balloon pump angioplasty to left lower lobe today.

## 2021-03-13 VITALS
HEIGHT: 66 IN | OXYGEN SATURATION: 96 % | HEART RATE: 73 BPM | SYSTOLIC BLOOD PRESSURE: 113 MMHG | BODY MASS INDEX: 41.72 KG/M2 | WEIGHT: 259.6 LBS | DIASTOLIC BLOOD PRESSURE: 79 MMHG | RESPIRATION RATE: 18 BRPM | TEMPERATURE: 98.3 F

## 2021-03-13 LAB
ANION GAP SERPL CALCULATED.3IONS-SCNC: 6 MMOL/L (ref 3–14)
APTT PPP: 59 SEC (ref 22–37)
BASOPHILS # BLD AUTO: 0 10E9/L (ref 0–0.2)
BASOPHILS NFR BLD AUTO: 0.4 %
BUN SERPL-MCNC: 16 MG/DL (ref 7–30)
CALCIUM SERPL-MCNC: 8.8 MG/DL (ref 8.5–10.1)
CHLORIDE SERPL-SCNC: 108 MMOL/L (ref 94–109)
CO2 SERPL-SCNC: 24 MMOL/L (ref 20–32)
CREAT SERPL-MCNC: 0.98 MG/DL (ref 0.52–1.04)
DIFFERENTIAL METHOD BLD: NORMAL
EOSINOPHIL # BLD AUTO: 0.2 10E9/L (ref 0–0.7)
EOSINOPHIL NFR BLD AUTO: 2.7 %
ERYTHROCYTE [DISTWIDTH] IN BLOOD BY AUTOMATED COUNT: 12.9 % (ref 10–15)
GFR SERPL CREATININE-BSD FRML MDRD: 68 ML/MIN/{1.73_M2}
GLUCOSE SERPL-MCNC: 88 MG/DL (ref 70–99)
HCT VFR BLD AUTO: 38 % (ref 35–47)
HGB BLD-MCNC: 12.5 G/DL (ref 11.7–15.7)
IMM GRANULOCYTES # BLD: 0 10E9/L (ref 0–0.4)
IMM GRANULOCYTES NFR BLD: 0.2 %
LYMPHOCYTES # BLD AUTO: 0.8 10E9/L (ref 0.8–5.3)
LYMPHOCYTES NFR BLD AUTO: 15.2 %
MAGNESIUM SERPL-MCNC: 2.6 MG/DL (ref 1.6–2.3)
MCH RBC QN AUTO: 30.6 PG (ref 26.5–33)
MCHC RBC AUTO-ENTMCNC: 32.9 G/DL (ref 31.5–36.5)
MCV RBC AUTO: 93 FL (ref 78–100)
MONOCYTES # BLD AUTO: 0.5 10E9/L (ref 0–1.3)
MONOCYTES NFR BLD AUTO: 8.8 %
NEUTROPHILS # BLD AUTO: 4 10E9/L (ref 1.6–8.3)
NEUTROPHILS NFR BLD AUTO: 72.7 %
NRBC # BLD AUTO: 0 10*3/UL
NRBC BLD AUTO-RTO: 0 /100
PLATELET # BLD AUTO: 200 10E9/L (ref 150–450)
POTASSIUM SERPL-SCNC: 4.2 MMOL/L (ref 3.4–5.3)
RBC # BLD AUTO: 4.08 10E12/L (ref 3.8–5.2)
SODIUM SERPL-SCNC: 139 MMOL/L (ref 133–144)
WBC # BLD AUTO: 5.5 10E9/L (ref 4–11)

## 2021-03-13 PROCEDURE — 85730 THROMBOPLASTIN TIME PARTIAL: CPT | Performed by: PHYSICIAN ASSISTANT

## 2021-03-13 PROCEDURE — 99207 PR APP CREDIT; MD BILLING SHARED VISIT: CPT | Performed by: PHYSICIAN ASSISTANT

## 2021-03-13 PROCEDURE — 80048 BASIC METABOLIC PNL TOTAL CA: CPT | Performed by: PHYSICIAN ASSISTANT

## 2021-03-13 PROCEDURE — 85027 COMPLETE CBC AUTOMATED: CPT | Performed by: PHYSICIAN ASSISTANT

## 2021-03-13 PROCEDURE — 36415 COLL VENOUS BLD VENIPUNCTURE: CPT | Performed by: PHYSICIAN ASSISTANT

## 2021-03-13 PROCEDURE — 250N000013 HC RX MED GY IP 250 OP 250 PS 637: Performed by: PHYSICIAN ASSISTANT

## 2021-03-13 PROCEDURE — 250N000013 HC RX MED GY IP 250 OP 250 PS 637: Performed by: INTERNAL MEDICINE

## 2021-03-13 PROCEDURE — 85025 COMPLETE CBC W/AUTO DIFF WBC: CPT | Performed by: PHYSICIAN ASSISTANT

## 2021-03-13 PROCEDURE — 250N000011 HC RX IP 250 OP 636: Performed by: INTERNAL MEDICINE

## 2021-03-13 PROCEDURE — 83735 ASSAY OF MAGNESIUM: CPT | Performed by: PHYSICIAN ASSISTANT

## 2021-03-13 PROCEDURE — 99238 HOSP IP/OBS DSCHRG MGMT 30/<: CPT | Performed by: INTERNAL MEDICINE

## 2021-03-13 RX ORDER — FONDAPARINUX SODIUM 10 MG/.8ML
10 INJECTION SUBCUTANEOUS EVERY 24 HOURS
Qty: 2.4 ML | Refills: 0 | Status: SHIPPED | OUTPATIENT
Start: 2021-03-13 | End: 2021-05-03

## 2021-03-13 RX ADMIN — SERTRALINE HYDROCHLORIDE 200 MG: 100 TABLET ORAL at 08:18

## 2021-03-13 RX ADMIN — TORSEMIDE 80 MG: 20 TABLET ORAL at 08:21

## 2021-03-13 RX ADMIN — HYDROXYCHLOROQUINE SULFATE 200 MG: 200 TABLET, FILM COATED ORAL at 08:18

## 2021-03-13 RX ADMIN — FONDAPARINUX SODIUM 10 MG: 10 INJECTION, SOLUTION SUBCUTANEOUS at 08:19

## 2021-03-13 RX ADMIN — SPIRONOLACTONE 25 MG: 25 TABLET ORAL at 08:18

## 2021-03-13 RX ADMIN — MAGNESIUM OXIDE 400 MG: 400 TABLET ORAL at 08:18

## 2021-03-13 ASSESSMENT — ACTIVITIES OF DAILY LIVING (ADL)
ADLS_ACUITY_SCORE: 12

## 2021-03-13 ASSESSMENT — MIFFLIN-ST. JEOR: SCORE: 1824.29

## 2021-03-13 NOTE — DISCHARGE SUMMARY
[address] [address]  p: [phone]    Discharge Summary: Cardiology Service    Yuridia Barksdale MRN# [id number]   YOB: 1970 Age: 50 year old       Admission Date: 03/10/2021  Discharge Date: 03/13/2021      Discharge Diagnoses:  1. History of DVT c/b PE   2. 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 SLE with lupu antiphospholipid ab syndrome   4. History of HIT  5. DAVIS  6. Obesity     Pertinent Procedures:  1 Balloon Pulmonary angioplasty     Consults:  NA    Imaging with results:  RHC/ BPA 3/12/2021    Right sided filling pressures are mildly elevated.    Mild elevated pulmonary hypertension.    Left sided filling pressures are normal.    Reduced cardiac output level.    Hemodynamic data has been modified in Epic per physician review.     Mild pulmonary hypertension, WHO IV.  Successful balloon pulmonary angioplasty of the A3 segment.    Brief HPI:  Yuridia Barksdale is a pleasant 50 y.o female with past medical history significant for pulmonary HTN 2/2 CTEPH, SLE, antiphospholipid antibody syndrome, DAVIS, and obesity who presented for outpatient balloon pulmonary angioplasty on 3/12/2021 of the left lower lobe. She was admitted on 3/10/21 for bridging with bivalrudin due to her history of hypercoagulability and HIT.     Hospital Course by Diagnosis:  # Pulmonary hypertension, WHO type 3, functional class 2   # History of DVT and PE c/b CTEPH  # SLE with lupus antiphospholipid antibody syndrome  # History of HIT  # Obstructive sleep apnea   # Obesity  Patient admitted for planned balloon pulmonary angioplasty of the left lower lobe on 3/12/2021. Due to hypercoagulability, she was admitted for bridging with bivalrudin. Procedure completed without immediate complication. Patient resumed on fondaparinux post procedure, and will be seen in her INR clinic 3/15/2021  - resume PTA warfarin  - resume fondaparinux 10mg q24h until therapeutic.  Patient has 3 remaining doses at home. 3 Additional doses sent to pharmacy in the event she needs additional bridging  - follow up with Dr. Paniagua     #hypokalemia - resolved  Patient hypokalemic on day of admission. Resolved with replacement. Likely 2/2 torsemide. Recently was discontinued on PO potassium as her spironolactone had been increased. Will have her get a BMP 3/18 to see if she needs ongoing potassium replacement.      Condition on discharge  Temp:  [97.2  F (36.2  C)-98.8  F (37.1  C)] 98.3  F (36.8  C)  Pulse:  [65-73] 73  Resp:  [14-18] 18  BP: (103-125)/(64-87) 113/79  SpO2:  [94 %-98 %] 96 %  General: Alert, interactive, NAD  Eyes: sclera anicteric, EOMI  Neck: JVP not appreciably elevated  Cardiovascular: regular rate and rhythm, normal S1 and S2, no murmurs, gallops, or rubs  Resp: clear to auscultation bilaterally, no rales, wheezes, or rhonchi  GI: Soft, nontender, nondistended. +BS.  No HSM or masses, no rebound or guarding.  Extremities: no edema, no cyanosis or clubbing, dorsalis pedis and posterior tibialis pulses 2+ bilaterally  Skin: Warm and dry, no jaundice or rash  Neuro: CN 2-12 intact, moves all extremities equally  Psych: Alert & oriented x 3    Medication Changes:    Discharge medications:   Discharge Medication List as of 3/13/2021 11:01 AM      START taking these medications    Details   fondaparinux ANTICOAGULANT (ARIXTRA) 10 MG/0.8ML injection Inject 0.8 mLs (10 mg) Subcutaneous every 24 hours, Disp-2.4 mL, R-0, E-Prescribe         CONTINUE these medications which have NOT CHANGED    Details   atorvastatin (LIPITOR) 10 MG tablet Take 1 tablet (10 mg) by mouth daily, No Print Out      clonazePAM (KLONOPIN) 1 MG tablet Take 1 tablet (1 mg) by mouth daily as needed for anxiety, No Print Out      hydroxychloroquine (PLAQUENIL) 200 MG tablet Take 200 mg by mouth 2 times daily , Historical      sertraline (ZOLOFT) 100 MG tablet Take 2 tablets (200 mg) by mouth daily, No Print Out       spironolactone (ALDACTONE) 25 MG tablet Take 1 tablet (25 mg) by mouth 2 times daily, Disp-180 tablet, R-3, E-Prescribe      torsemide (DEMADEX) 20 MG tablet Take 4 tablets (80 mg) by mouth daily, Disp-360 tablet, R-3, E-Prescribe      traZODone (DESYREL) 50 MG tablet Take 2 tablets (100 mg) by mouth At Bedtime, No Print Out      warfarin ANTICOAGULANT (COUMADIN ANTICOAGULANT) 1 MG tablet Take 5 tablets (5 mg) by mouth daily On 1/21/2021 and 1/22/2021. Have your chromogenic factor checked on 1/23/2021., Disp-30 tablet, No Print Out             Labs or imaging requiring follow-up after discharge:  [facility] 3/18/2021  INR clinic 3/15/2021    Follow-up:  Dr. Paniagua 5/3/2021    Code status:  FULL        40 minutes spent in discharge, including >50% in counseling and coordination of care, medication review and plan of care recommended on follow up. Questions were answered.   It was our pleasure to care for Yuridia Barksdale during this hospitalization. Please do not hesitate to contact me should there be questions regarding the hospital course or discharge plan.      Patient seen and discussed with Dr. Trinh Saeed PA-C  [facility] Cardiology Team

## 2021-03-13 NOTE — PLAN OF CARE
Status: Admitted 3/10 for angiomax bridging in anticipation for balloon pump of LLL. History of pulmonary HTN secondary to CTEPH, SLE, antiphospholipid antibody syndrome, DAVIS, obesity. Unable to bridge with heparin d/t history of HIT.     Neuro: A&Ox4  Cardiac: SR on the monitor  Respiratory: Lung sounds clear, RA  GI: LBM 3/13  : Adequate output  Diet: Regular  Activity: Up ad steven. Ambulated independently in hallway for 30 minutes this evening.  Pain: Denies  Skin: Primapore to right groin.   LDAs: Left forearm PIV     Infusions: Angiomax running at 0.038mg/kg/hr     Plan: Discharge later today.

## 2021-03-13 NOTE — PLAN OF CARE
Pt discharged to: home  Via: car    Prescriptions sent to patients preferred pharmacy. Appropriate LDA's removed from pt, telemetry discontinued. All belonging sent with patient and patient verifies taking all belongings with them.   Patient exhibits understanding of discharge instruction and all questions and/or concerns were answered.     Annetta Brewer RN

## 2021-03-14 ENCOUNTER — PATIENT OUTREACH (OUTPATIENT)
Dept: CARE COORDINATION | Facility: CLINIC | Age: 51
End: 2021-03-14

## 2021-03-15 NOTE — PROGRESS NOTES
[facility]: Post-Discharge Note  SITUATION                                                      Admission:    Admission Date: 03/10/21   Reason for Admission: Pulmonary hypertension  Discharge:   Discharge Date: 03/13/21  Discharge Diagnosis: Pulmonary hypertension    BACKGROUND                                                      Yuridia Barksdale is a pleasant 50 y.o female with past medical history significant for pulmonary HTN 2/2 CTEPH, SLE, antiphospholipid antibody syndrome, DAVIS, and obesity who presented for outpatient balloon pulmonary angioplasty on 3/12/2021 of the left lower lobe. She was admitted on 3/10/21 for bridging with bivalrudin due to her history of hypercoagulability and HIT    ASSESSMENT      Discharge Assessment  Patient reports symptoms are: Improved  Does the patient have all of their medications?: Yes  Does patient know what their new medications are for?: Yes  Does patient have a follow-up appointment scheduled?: Yes  Does patient have any other questions or concerns?: No    Post-op  Did the patient have surgery or a procedure: Yes  Fever: No  Chills: No  Eating & Drinking: eating and drinking without complaints/concerns  Bowel Function: normal  Urinary Status: voiding without complaint/concerns        PLAN                                                      Outpatient Plan:  [facility] 3/18/2021  INR clinic 3/15/2021     Follow-up:  Dr. Paniagua 5/3/2021    Future Appointments   Date Time Provider Department Center   5/3/2021 12:30 PM  LAB [facility]   5/3/2021  1:00 PM Arcelia Paniagua MD [facility]           Bertha Rea

## 2021-03-17 ENCOUNTER — TELEPHONE (OUTPATIENT)
Dept: CARDIOLOGY | Facility: CLINIC | Age: 51
End: 2021-03-17

## 2021-03-17 NOTE — TELEPHONE ENCOUNTER
Patient called as she was advised to have follow up labs drawn 1 week after diacharge due to low Potassium levels in the hospital.  She is going to [facility] tomorrow for Labs anyway, so she would like this drawn there as well.  Agreed with plan.  ---------------------------   patient needs BMP per discharge note. Called [facility] and received lab Fax number of [phone].    Faxed BMP order to [facility] @  [phone]. Yoly Villegas RN on 3/17/2021 at 1:31 PM

## 2021-03-18 ENCOUNTER — TELEPHONE (OUTPATIENT)
Dept: CARDIOLOGY | Facility: CLINIC | Age: 51
End: 2021-03-18

## 2021-03-18 NOTE — TELEPHONE ENCOUNTER
Patient called and stated the order for her BMP was not at [facility] when she had her lab drawn this morning.  She would now like it faxed to [facility] and she'll have it drawn tomorrow.  ---------------------  Called patient back and advised her I had faxed it, and they called me this morning asking for a return fax number, so they never put the order in until after she had already left.    I agreed to fax the order to [facility]. Patient verbalized understanding, agreed with plan and denied any further questions. Yoly Villegas RN on 3/18/2021 at 2:53 PM    Faxed order. Yoly Villegas RN on 3/18/2021 at 2:53 PM

## 2021-03-19 ENCOUNTER — TRANSFERRED RECORDS (OUTPATIENT)
Dept: HEALTH INFORMATION MANAGEMENT | Facility: CLINIC | Age: 51
End: 2021-03-19

## 2021-03-22 ENCOUNTER — TELEPHONE (OUTPATIENT)
Dept: CARDIOLOGY | Facility: CLINIC | Age: 51
End: 2021-03-22

## 2021-03-22 NOTE — TELEPHONE ENCOUNTER
Routed to Dr. Paniagua for review. Yoly Villegas RN on 3/22/2021 at 12:28 PM    Called patient and reviewed results.  Patient verbalized understanding, agreed with plan and denied any further questions. Yoly Villegas RN on 3/22/2021 at 1:47 PM

## 2021-04-30 ENCOUNTER — TRANSFERRED RECORDS (OUTPATIENT)
Dept: HEALTH INFORMATION MANAGEMENT | Facility: CLINIC | Age: 51
End: 2021-04-30

## 2021-05-03 ENCOUNTER — OFFICE VISIT (OUTPATIENT)
Dept: CARDIOLOGY | Facility: CLINIC | Age: 51
End: 2021-05-03
Attending: INTERNAL MEDICINE
Payer: COMMERCIAL

## 2021-05-03 VITALS
HEIGHT: 65 IN | DIASTOLIC BLOOD PRESSURE: 76 MMHG | OXYGEN SATURATION: 93 % | BODY MASS INDEX: 42.97 KG/M2 | WEIGHT: 257.9 LBS | SYSTOLIC BLOOD PRESSURE: 112 MMHG | HEART RATE: 72 BPM

## 2021-05-03 DIAGNOSIS — I27.24 CTEPH (CHRONIC THROMBOEMBOLIC PULMONARY HYPERTENSION) (H): ICD-10-CM

## 2021-05-03 DIAGNOSIS — R06.02 SOB (SHORTNESS OF BREATH): ICD-10-CM

## 2021-05-03 LAB
ALBUMIN SERPL-MCNC: 3.4 G/DL (ref 3.4–5)
ALP SERPL-CCNC: 144 U/L (ref 40–150)
ALT SERPL W P-5'-P-CCNC: 23 U/L (ref 0–50)
ANION GAP SERPL CALCULATED.3IONS-SCNC: 8 MMOL/L (ref 3–14)
AST SERPL W P-5'-P-CCNC: 19 U/L (ref 0–45)
BILIRUB SERPL-MCNC: 0.5 MG/DL (ref 0.2–1.3)
BUN SERPL-MCNC: 23 MG/DL (ref 7–30)
CALCIUM SERPL-MCNC: 9 MG/DL (ref 8.5–10.1)
CHLORIDE SERPL-SCNC: 106 MMOL/L (ref 94–109)
CO2 SERPL-SCNC: 28 MMOL/L (ref 20–32)
CREAT SERPL-MCNC: 1.14 MG/DL (ref 0.52–1.04)
ERYTHROCYTE [DISTWIDTH] IN BLOOD BY AUTOMATED COUNT: 12.6 % (ref 10–15)
GFR SERPL CREATININE-BSD FRML MDRD: 57 ML/MIN/{1.73_M2}
GLUCOSE SERPL-MCNC: 96 MG/DL (ref 70–99)
HCT VFR BLD AUTO: 40.8 % (ref 35–47)
HGB BLD-MCNC: 13.5 G/DL (ref 11.7–15.7)
MCH RBC QN AUTO: 30.3 PG (ref 26.5–33)
MCHC RBC AUTO-ENTMCNC: 33.1 G/DL (ref 31.5–36.5)
MCV RBC AUTO: 92 FL (ref 78–100)
NT-PROBNP SERPL-MCNC: 585 PG/ML (ref 0–125)
PLATELET # BLD AUTO: 242 10E9/L (ref 150–450)
POTASSIUM SERPL-SCNC: 3.5 MMOL/L (ref 3.4–5.3)
PROT SERPL-MCNC: 7.7 G/DL (ref 6.8–8.8)
RBC # BLD AUTO: 4.46 10E12/L (ref 3.8–5.2)
SODIUM SERPL-SCNC: 142 MMOL/L (ref 133–144)
WBC # BLD AUTO: 4.8 10E9/L (ref 4–11)

## 2021-05-03 PROCEDURE — 99215 OFFICE O/P EST HI 40 MIN: CPT | Performed by: INTERNAL MEDICINE

## 2021-05-03 PROCEDURE — 85027 COMPLETE CBC AUTOMATED: CPT | Performed by: PATHOLOGY

## 2021-05-03 PROCEDURE — 36415 COLL VENOUS BLD VENIPUNCTURE: CPT | Performed by: PATHOLOGY

## 2021-05-03 PROCEDURE — 83880 ASSAY OF NATRIURETIC PEPTIDE: CPT | Performed by: PATHOLOGY

## 2021-05-03 PROCEDURE — 80053 COMPREHEN METABOLIC PANEL: CPT | Performed by: PATHOLOGY

## 2021-05-03 PROCEDURE — G0463 HOSPITAL OUTPT CLINIC VISIT: HCPCS

## 2021-05-03 RX ORDER — HYDROXYCHLOROQUINE SULFATE 200 MG/1
200 TABLET, FILM COATED ORAL 2 TIMES DAILY
COMMUNITY
Start: 2021-05-03

## 2021-05-03 ASSESSMENT — PAIN SCALES - GENERAL: PAINLEVEL: NO PAIN (0)

## 2021-05-03 ASSESSMENT — MIFFLIN-ST. JEOR: SCORE: 1800.71

## 2021-05-03 NOTE — PROGRESS NOTES
Service Date: 2021     Ochoa Sheth MD   [facility] [address] [address]      Mario Gerber MD    [facility]      Clint Olsen MD   [facility]   [address] [place] [address]  [id number]       RE: Yuridia Barksdale   MRN: [id number]   : 1970      Dear Meryl Rondon and Zabrina:      We had the pleasure of seeing Ms. Yuridia Barksdale for followup in our Pulmonary Hypertension Clinic at the [facility].  As you know, she is a very pleasant 50-year-old female with chronic thromboembolic pulmonary hypertension who underwent pulmonary thromboendarterectomy at [facility] and more recently pulmonary balloon angioplasty of the right A8 branch and right A3 branch.  She had type 3 disease.  She is returning today for follow-up.    She is feeling better.  Her exercise capacity has improved.  She does not have shortness of breath with her usual activities.  She is able to walk outside more.  She is not using supplemental oxygen with exertion.  She is no longer exercising on her bike.  She has not had any exertional chest pain or chest pressure.  No exertional presyncope or syncope.  She has not had any worsening lower extremity swelling or abdominal distention.  Her weight has been stable.  She has not had any interim hospitalizations or ER visits.  No bleeding complications.    PAST MEDICAL HISTORY:   1.  Systemic lupus erythematosus.   2.  Lupus antiphospholipid antibody syndrome.   3.  History of deep venous thrombosis.   4.  Chronic thromboembolic pulmonary hypertension.   5.  Obesity.   6.  Obstructive sleep apnea.      MEDICATIONS:   Current Outpatient Medications   Medication Sig     atorvastatin (LIPITOR) 10 MG tablet Take 1 tablet (10 mg) by mouth daily     clonazePAM (KLONOPIN) 1 MG tablet Take 1 tablet (1 mg) by mouth daily as needed for anxiety (Patient  "taking differently: Take 1 mg by mouth At Bedtime )     hydroxychloroquine (PLAQUENIL) 200 MG tablet Take 1 tablet (200 mg) by mouth 2 times daily     sertraline (ZOLOFT) 100 MG tablet Take 2 tablets (200 mg) by mouth daily     spironolactone (ALDACTONE) 25 MG tablet Take 1 tablet (25 mg) by mouth 2 times daily     torsemide (DEMADEX) 20 MG tablet Take 4 tablets (80 mg) by mouth daily     traZODone (DESYREL) 50 MG tablet Take 2 tablets (100 mg) by mouth At Bedtime     warfarin ANTICOAGULANT (COUMADIN ANTICOAGULANT) 1 MG tablet Take 5 tablets (5 mg) by mouth daily On 1/21/2021 and 1/22/2021. Have your chromogenic factor checked on 1/23/2021. (Patient taking differently: Take 5 mg by mouth daily 5 mg on Monday and Friday, 4 mg on TuWeThSaSu. CFX goal 20-30%)     No current facility-administered medications for this visit.        REVIEW OF SYSTEMS:  A detailed 10-point review of systems was obtained as described in the History of Present Illness.  All other systems are reviewed and are negative.      PHYSICAL EXAMINATION:   /76 (BP Location: Right arm, Patient Position: Chair, Cuff Size: Adult Large)   Pulse 72   Ht 1.651 m (5' 5\")   Wt 117 kg (257 lb 14.4 oz)   LMP 04/04/2012   SpO2 93%   BMI 42.92 kg/m    She was awake, alert, oriented x3.  She was comfortable.  She was in no apparent distress.  Her neck exam revealed no jugular venous distention.  Carotids are 2+ bilaterally.  She had no pallor sounds, cyanosis or jaundice.  Cardiac auscultation revealed normal S1 and normal S2 with no murmur rub or gallop.  Auscultation of her lungs revealed equal air entry on both sides with no added sounds.  Her abdomen was soft with no was no tenderness, or no guarding.  She had no focal neurological deficit.    CBC RESULTS:   Recent Labs   Lab Test 05/03/21  1153   WBC 4.8   RBC 4.46   HGB 13.5   HCT 40.8   MCV 92   MCH 30.3   MCHC 33.1   RDW 12.6        Recent Labs   Lab Test 05/03/21  1153 03/13/21  0619 "    139   POTASSIUM 3.5 4.2   CHLORIDE 106 108   CO2 28 24   ANIONGAP 8 6   GLC 96 88   BUN 23 16   CR 1.14* 0.98   KATARINA 9.0 8.8     Liver Function Studies -   Recent Labs   Lab Test 05/03/21  1153   PROTTOTAL 7.7   ALBUMIN 3.4   BILITOTAL 0.5   ALKPHOS 144   AST 19   ALT 23       Lab Results   Component Value Date    NTBNP 585 (H) 05/03/2021     INR   Date Value Ref Range Status   03/12/2021 2.18 (H) 0.86 - 1.14 Final      Echocardiogram (11/2020)  Her right ventricle was normal in size with mildly reduced systolic function.  Her interventricular septum was not flattened.  Right atrium was moderately enlarged.  Right ventricular systolic pressure was 35 mmHg.  IVC was normal size and collapsing more than 50% with respiration.  She had mild to moderate tricuspid regurgitation.  Her left ventricle was normal in size and function.  She had no other significant valvular abnormalities.  No pericardial effusion.    RHC (03/2021)  RA 13  RV 51/13  PA 51/17 (32)  PCWP 13  PA% 58  Estimated Terry CO/CI 4.3/1.92  PVR by Terry 4.42  PVR 2.52 by TD on her last hemodynamic assessment  No TD CO done during this time.    Pulmonary angiogram (12/2020):    Mild residual CTEPH    Mild elevated pulmonary hypertension.    Right sided filling pressures are mildly elevated.    Left sided filling pressures are mildly elevated.    Normal cardiac output level.    Hemodynamic data has been modified in Epic per physician review.          ASSESSMENT AND PLAN:      Ms. Yuridia Barksdale is a 50-year-old female with chronic thromboembolic pulmonary hypertension due to hypercoagulable state from her antiphospholipid antibody syndrome.  She underwent pulmonary thromboendarterectomy at [facility] on July 4, 2020.  She has residual inoperable chronic thromboembolic pulmonary hypertension for which she has undergone 2 sessions of pulmonary balloon angioplasty in the in the right A8 and A3 segments.    She is overall doing well.  She is  functional class II.  She has no evidence of decompensated heart failure.  She is no longer requiring supplemental oxygen.    I recommended her to have a repeat echocardiogram to assess her right ventricular size and function after 2 sessions of pulmonary balloon angioplasty.  We will also repeat her 6-minute walk test to have an objective assessment of her exercise capacity and oxygen requirement after 2 sessions of pulmonary balloon angioplasty.  Her right heart catheterization at the beginning of her last pulmonary balloon angioplasty did showed mild residual inoperable chronic thromboembolic disease.  Her PVR was 4.4 by estimated Terry.  Unfortunately thermodilution was not done.  She always has a normal thermodilution cardiac output.    She is hesitant to go on a pulmonary vasodilator therapy because of 3 times a day dosing and cost.  If her echocardiogram and 6-minute walk test are reassuring, we will defer pulmonary vasodilator therapy.  If there is any concern we will start her on tadalafil 40 mg once daily for residual CHF.  She will continue on torsemide 80 mg daily.  She is also on spironolactone.  She is on Coumadin with a chromogenic factor of 20-30.  She needs to be bridged with Arixtra given her heparin allergy if she needs any procedures.    I recommend her to return to us to see us in 3 months.  We will follow up on her repeat echocardiogram and 6-minute walk test.  She will call us in the interim if there are any further worsening symptoms.  It was a pleasure meeting Ms. Yuridia huynh in our pulmonary hypertension clinic at [facility].    It was a pleasure meeting Ms. Yuridialucian huynh in her pulmonary hypertension clinic at [facility].  Thank you for involving us in her care.      Sincerely,   Arcelia Paniagua MD   [facility]  Heart Failure, Transplant, and Mechanical Circulatory Support Cardiology   Cardiovascular  Division  [facility]   [phone]

## 2021-05-03 NOTE — PATIENT INSTRUCTIONS
Medication Changes:  - No medication changes at this time. Please continue current medication regiment.    Patient Instructions:  1. Continue staying active and eat a heart healthy diet.    2. Please keep current list of medications with you at all times.    3. Remember to weigh yourself daily after voiding and before you consume any food or beverages and log the numbers.  If you have gained 2 pounds overnight or 5 pounds in a week contact us immediately for medication adjustments or further instructions.    4. **Please call us immediately if you have any syncope (fainting or passing out), chest pain, edema (swelling or weight gain), or decline in your functional status (general decline in how you are feeling).    Follow up Appointment Information:  - Echo and 6 minute walk test in the next week  - Dr. Paniagua will review the results and determine if we need to start tadalafil   - Follow up with CEDRICK Zamarripa in 3 months with labs prior    Results:  Component      Latest Ref Rng & Units 5/3/2021   Sodium      133 - 144 mmol/L 142   Potassium      3.4 - 5.3 mmol/L 3.5   Chloride      94 - 109 mmol/L 106   Carbon Dioxide      20 - 32 mmol/L 28   Anion Gap      3 - 14 mmol/L 8   Glucose      70 - 99 mg/dL 96   Urea Nitrogen      7 - 30 mg/dL 23   Creatinine      0.52 - 1.04 mg/dL 1.14 (H)   GFR Estimate      >60 mL/min/1.73:m2 57 (L)   GFR Estimate If Black      >60 mL/min/1.73:m2 66   Calcium      8.5 - 10.1 mg/dL 9.0   Bilirubin Total      0.2 - 1.3 mg/dL 0.5   Albumin      3.4 - 5.0 g/dL 3.4   Protein Total      6.8 - 8.8 g/dL 7.7   Alkaline Phosphatase      40 - 150 U/L 144   ALT      0 - 50 U/L 23   AST      0 - 45 U/L 19   WBC      4.0 - 11.0 10e9/L 4.8   RBC Count      3.8 - 5.2 10e12/L 4.46   Hemoglobin      11.7 - 15.7 g/dL 13.5   Hematocrit      35.0 - 47.0 % 40.8   MCV      78 - 100 fl 92   MCH      26.5 - 33.0 pg 30.3   MCHC      31.5 - 36.5 g/dL 33.1   RDW      10.0 - 15.0 % 12.6   Platelet Count       150 - 450 10e9/L 242   N-Terminal Pro Bnp      0 - 125 pg/mL 585 (H)     We are located on the third floor of the [facility] on the [facility].  Our address is     [address] [place] [address] [place] [address] [place] [address]    Thank you for allowing us to be a part of your care here at the [facility]    If you have questions or concerns please contact us at:    Yoly Villegas, RN, BSN, PHN  Deneen Mccann (Scheduling,Prior Auth)  Nurse Coordinator     Clinic   Pulmonary Hypertension   Pulmonary Hypertension  [facility]  (Phone)[phone]   (Phone) [phone]        (Fax) [phone]    ** Please note that you will NOT receive a reminder call regarding your scheduled testing, reminder calls are for provider appointments only.  If you are scheduled for testing within the [facility] system you may receive a call regarding pre-registration for billing purposes only.**     Support Group:  Pulmonary Hypertension Association  Https://www.phassociation.org/  **Look at the Events Tab** They even have Support Groups that you can call into    [facility] Support Group  Second Saturday of the Month from 1-3 PM   Location: [address] [place] [address]  Leader: Marika Quiroz and Amisha Vila  Phone: [phone] or [phone]  Email: [email or web address]

## 2021-05-03 NOTE — NURSING NOTE
Procedures and/or Testing: Patient given instructions regarding  Echocardiogram,  6 minute walk test, . Discussed purpose, preparation, procedure and when to expect results reported back to the patient. Patient demonstrated understanding of this information and agreed to call with further questions or concerns.  Labs: Patient was given results of the laboratory testing obtained today. Patient demonstrated understanding of this information and agreed to call with further questions or concerns.   Diet: Patient instructed regarding a heart healthy diet, including discussion of reduced fat and sodium intake. Patient demonstrated understanding of this information and agreed to call with further questions or concerns.  Return Appointment: Patient given instructions regarding scheduling next clinic visit. Patient demonstrated understanding of this information and agreed to call with further questions or concerns.  Patient stated she understood all health information given and agreed to call with further questions or concerns.    Patient did not want to complete 6MWT or echo today; escorted to Pods to schedule F/U appts. Micheline Chapa RN on 5/3/2021 at 1:43 PM

## 2021-05-03 NOTE — NURSING NOTE
Chief Complaint   Patient presents with     Follow Up      3 month PH follow up      Vitals were taken and medication reconciled.    RONI Shafer  12:19 PM

## 2021-05-03 NOTE — LETTER
5/3/2021      RE: Yuridia Barksdale  [address] [facility] [address]       Dear Colleague,    Thank you for the opportunity to participate in the care of your patient, Yuridia Barksdale, at the [facility] [place] [facility]. Please see a copy of my visit note below.    Service Date: 2021     Ochoa Sheth MD   [facility] [address] [address]      Mario Gerber MD    [facility]      Clint Olsen MD   [facility]   [address]  [address]       RE: Yuridia Barksdale   MRN: [id number]    : 1970      Dear Meryl Rondon and Zabrina:      We had the pleasure of seeing Ms. Yuridia Barksdale for followup in our Pulmonary Hypertension Clinic at the [facility].  As you know, she is a very pleasant 50-year-old female with chronic thromboembolic pulmonary hypertension who underwent pulmonary thromboendarterectomy at [facility] and more recently pulmonary balloon angioplasty of the right A8 branch and right A3 branch.  She had type 3 disease.  She is returning today for follow-up.    She is feeling better.  Her exercise capacity has improved.  She does not have shortness of breath with her usual activities.  She is able to walk outside more.  She is not using supplemental oxygen with exertion.  She is no longer exercising on her bike.  She has not had any exertional chest pain or chest pressure.  No exertional presyncope or syncope.  She has not had any worsening lower extremity swelling or abdominal distention.  Her weight has been stable.  She has not had any interim hospitalizations or ER visits.  No bleeding complications.    PAST MEDICAL HISTORY:   1.  Systemic lupus erythematosus.   2.  Lupus antiphospholipid antibody syndrome.   3.  History of deep venous thrombosis.   4.   "Chronic thromboembolic pulmonary hypertension.   5.  Obesity.   6.  Obstructive sleep apnea.      MEDICATIONS:   Current Outpatient Medications   Medication Sig     atorvastatin (LIPITOR) 10 MG tablet Take 1 tablet (10 mg) by mouth daily     clonazePAM (KLONOPIN) 1 MG tablet Take 1 tablet (1 mg) by mouth daily as needed for anxiety (Patient taking differently: Take 1 mg by mouth At Bedtime )     hydroxychloroquine (PLAQUENIL) 200 MG tablet Take 1 tablet (200 mg) by mouth 2 times daily     sertraline (ZOLOFT) 100 MG tablet Take 2 tablets (200 mg) by mouth daily     spironolactone (ALDACTONE) 25 MG tablet Take 1 tablet (25 mg) by mouth 2 times daily     torsemide (DEMADEX) 20 MG tablet Take 4 tablets (80 mg) by mouth daily     traZODone (DESYREL) 50 MG tablet Take 2 tablets (100 mg) by mouth At Bedtime     warfarin ANTICOAGULANT (COUMADIN ANTICOAGULANT) 1 MG tablet Take 5 tablets (5 mg) by mouth daily On 1/21/2021 and 1/22/2021. Have your chromogenic factor checked on 1/23/2021. (Patient taking differently: Take 5 mg by mouth daily 5 mg on Monday and Friday, 4 mg on TuWeThSaSu. CFX goal 20-30%)     No current facility-administered medications for this visit.        REVIEW OF SYSTEMS:  A detailed 10-point review of systems was obtained as described in the History of Present Illness.  All other systems are reviewed and are negative.      PHYSICAL EXAMINATION:   /76 (BP Location: Right arm, Patient Position: Chair, Cuff Size: Adult Large)   Pulse 72   Ht 1.651 m (5' 5\")   Wt 117 kg (257 lb 14.4 oz)   LMP 04/04/2012   SpO2 93%   BMI 42.92 kg/m    She was awake, alert, oriented x3.  She was comfortable.  She was in no apparent distress.  Her neck exam revealed no jugular venous distention.  Carotids are 2+ bilaterally.  She had no pallor sounds, cyanosis or jaundice.  Cardiac auscultation revealed normal S1 and normal S2 with no murmur rub or gallop.  Auscultation of her lungs revealed equal air entry on both " sides with no added sounds.  Her abdomen was soft with no was no tenderness, or no guarding.  She had no focal neurological deficit.    CBC RESULTS:   Recent Labs   Lab Test 05/03/21  1153   WBC 4.8   RBC 4.46   HGB 13.5   HCT 40.8   MCV 92   MCH 30.3   MCHC 33.1   RDW 12.6        Recent Labs   Lab Test 05/03/21  1153 03/13/21  0619    139   POTASSIUM 3.5 4.2   CHLORIDE 106 108   CO2 28 24   ANIONGAP 8 6   GLC 96 88   BUN 23 16   CR 1.14* 0.98   KATARINA 9.0 8.8     Liver Function Studies -   Recent Labs   Lab Test 05/03/21  1153   PROTTOTAL 7.7   ALBUMIN 3.4   BILITOTAL 0.5   ALKPHOS 144   AST 19   ALT 23       Lab Results   Component Value Date    NTBNP 585 (H) 05/03/2021     INR   Date Value Ref Range Status   03/12/2021 2.18 (H) 0.86 - 1.14 Final      Echocardiogram (11/2020)  Her right ventricle was normal in size with mildly reduced systolic function.  Her interventricular septum was not flattened.  Right atrium was moderately enlarged.  Right ventricular systolic pressure was 35 mmHg.  IVC was normal size and collapsing more than 50% with respiration.  She had mild to moderate tricuspid regurgitation.  Her left ventricle was normal in size and function.  She had no other significant valvular abnormalities.  No pericardial effusion.    RHC (03/2021)  RA 13  RV 51/13  PA 51/17 (32)  PCWP 13  PA% 58  Estimated Terry CO/CI 4.3/1.92  PVR by Terry 4.42  PVR 2.52 by TD on her last hemodynamic assessment  No TD CO done during this time.    Pulmonary angiogram (12/2020):    Mild residual CTEPH    Mild elevated pulmonary hypertension.    Right sided filling pressures are mildly elevated.    Left sided filling pressures are mildly elevated.    Normal cardiac output level.    Hemodynamic data has been modified in Epic per physician review.          ASSESSMENT AND PLAN:      Ms. Yuridia Barksdale is a 50-year-old female with chronic thromboembolic pulmonary hypertension due to hypercoagulable state from her  antiphospholipid antibody syndrome.  She underwent pulmonary thromboendarterectomy at [facility] on July 4, 2020.  She has residual inoperable chronic thromboembolic pulmonary hypertension for which she has undergone 2 sessions of pulmonary balloon angioplasty in the in the right A8 and A3 segments.    She is overall doing well.  She is functional class II.  She has no evidence of decompensated heart failure.  She is no longer requiring supplemental oxygen.    I recommended her to have a repeat echocardiogram to assess her right ventricular size and function after 2 sessions of pulmonary balloon angioplasty.  We will also repeat her 6-minute walk test to have an objective assessment of her exercise capacity and oxygen requirement after 2 sessions of pulmonary balloon angioplasty.  Her right heart catheterization at the beginning of her last pulmonary balloon angioplasty did showed mild residual inoperable chronic thromboembolic disease.  Her PVR was 4.4 by estimated Terry.  Unfortunately thermodilution was not done.  She always has a normal thermodilution cardiac output.    She is hesitant to go on a pulmonary vasodilator therapy because of 3 times a day dosing and cost.  If her echocardiogram and 6-minute walk test are reassuring, we will defer pulmonary vasodilator therapy.  If there is any concern we will start her on tadalafil 40 mg once daily for residual CHF.  She will continue on torsemide 80 mg daily.  She is also on spironolactone.  She is on Coumadin with a chromogenic factor of 20-30.  She needs to be bridged with Arixtra given her heparin allergy if she needs any procedures.    I recommend her to return to us to see us in 3 months.  We will follow up on her repeat echocardiogram and 6-minute walk test.  She will call us in the interim if there are any further worsening symptoms.  It was a pleasure meeting Ms. Shi people in our pulmonary hypertension clinic at [facility]  [facility].    It was a pleasure meeting Ms. Shi amina in her pulmonary hypertension clinic at [facility].  Thank you for involving us in her care.      Sincerely,   Arcelia Paniagua MD   [facility] for Pulmonary Hypertension  Heart Failure, Transplant, and Mechanical Circulatory Support Cardiology   Cardiovascular Division  [facility]   [phone]

## 2021-05-12 ENCOUNTER — MYC MEDICAL ADVICE (OUTPATIENT)
Dept: CARDIOLOGY | Facility: CLINIC | Age: 51
End: 2021-05-12

## 2021-05-12 DIAGNOSIS — E66.9 OBESITY WITH SERIOUS COMORBIDITY, UNSPECIFIED CLASSIFICATION, UNSPECIFIED OBESITY TYPE: ICD-10-CM

## 2021-05-12 DIAGNOSIS — I27.20 PULMONARY HYPERTENSION (H): Primary | ICD-10-CM

## 2021-05-13 NOTE — TELEPHONE ENCOUNTER
Faxed Dietician referral to [facility] @ [facility] @ [phone].  Yoly Villegas RN on 5/13/2021 at 9:17 AM

## 2021-05-14 ENCOUNTER — ANCILLARY PROCEDURE (OUTPATIENT)
Dept: CARDIOLOGY | Facility: CLINIC | Age: 51
End: 2021-05-14
Attending: INTERNAL MEDICINE
Payer: COMMERCIAL

## 2021-05-14 DIAGNOSIS — I27.24 CTEPH (CHRONIC THROMBOEMBOLIC PULMONARY HYPERTENSION) (H): ICD-10-CM

## 2021-05-14 DIAGNOSIS — R06.02 SOB (SHORTNESS OF BREATH): ICD-10-CM

## 2021-05-14 LAB
6 MIN WALK (FT): 1530 FT
6 MIN WALK (M): 466 M

## 2021-05-14 PROCEDURE — 93306 TTE W/DOPPLER COMPLETE: CPT | Performed by: INTERNAL MEDICINE

## 2021-05-14 PROCEDURE — 94618 PULMONARY STRESS TESTING: CPT | Performed by: INTERNAL MEDICINE

## 2021-05-17 ENCOUNTER — TELEPHONE (OUTPATIENT)
Dept: CARDIOLOGY | Facility: CLINIC | Age: 51
End: 2021-05-17

## 2021-05-17 DIAGNOSIS — I27.24 CTEPH (CHRONIC THROMBOEMBOLIC PULMONARY HYPERTENSION) (H): Primary | ICD-10-CM

## 2021-05-17 DIAGNOSIS — I27.24 CTEPH (CHRONIC THROMBOEMBOLIC PULMONARY HYPERTENSION) (H): ICD-10-CM

## 2021-05-17 RX ORDER — TADALAFIL 20 MG/1
20 TABLET ORAL DAILY
Qty: 30 TABLET | Refills: 0 | COMMUNITY
Start: 2021-05-17 | End: 2021-05-20

## 2021-05-17 NOTE — TELEPHONE ENCOUNTER
----- Message -----  From: Arcelia Paniagua MD  Sent: 5/14/2021  12:13 PM CDT  To: Deneen Mccann, Cardiology Ph Nurse-      1. She should use oxygen 2 liters/min when she exercise or over exerts  2. PA for Tadalafil 40 mg daily. Start at 20 mg once a day and in a month increase it to 40 mg daily  3. Continue rest of her medications as it is.   4. Follow up as previously planned    Please let me know if you have any questions.     Thank you    TT  ------------------------    Ordered Tadalafil    Sent staff msg to Deneen to submit PA    Sent patient roberthart message explaining Dr. Paniagua's recommendations.  Yoly Villegas RN on 5/17/2021 at 1:23 PM

## 2021-05-17 NOTE — TELEPHONE ENCOUNTER
PA Initiation    Medication: Tadalafil 20mg  Insurance Company: [employer] - Phone [phone] Fax [phone]  Pharmacy Filling the Rx: [id number] - [facility] [address] AT [facility] [address]  Start Date: 5/17/2021    *[facility] along with right heart catheterization report.

## 2021-05-20 RX ORDER — TADALAFIL 20 MG/1
20 TABLET ORAL DAILY
Qty: 30 TABLET | Refills: 1 | Status: SHIPPED | OUTPATIENT
Start: 2021-05-20 | End: 2021-06-11

## 2021-05-20 NOTE — TELEPHONE ENCOUNTER
Prior Authorization Approval    Authorization Effective Date: 4/19/2021  Authorization Expiration Date: 5/19/2022  Medication: Tadalafil 20mg - Approved  Approved Dose/Quantity: 60 tablets/30 days  Reference #: [id number]   Insurance Company: [employer] - Phone [phone] Fax [phone]  Which Pharmacy is filling the prescription (Not needed for infusion/clinic administered): [facility] #[id number] [place] [address] AT [facility] [address]  ----------------------------  Insurance: [employer]  BIN: N/A  PCN: N/A  ID#: [id number]  GRP#: N/A

## 2021-05-20 NOTE — TELEPHONE ENCOUNTER
*Additional information was requested. Completed and faxed letter of medical necessity to [facility] for review.    Deneen Mccann  Clinic   Pulmonary Hypertension  [facility]  (p) [phone]

## 2021-05-21 NOTE — TELEPHONE ENCOUNTER
"*Contacted [facility] and spoke with pharmacist, Abisai, who stated that the medication is \"non-formulary\" and the it's rejecting in their system.    *[facility] and spoke to Harrison who stated that the medication was approved and not sure why it's rejecting. Harrison was able to see the rejection and will resolve the issue with the pharmacy.    *Contacted pt to inform pt that the prior authorization was approved and not sure why the pharmacy is having issues filling the medication. Stated to pt that [facility] was contacted and per the representative, they will contact the pharmacy to resolve the issue. Stated to pt that if her co-pay is still the same then the issue was not resolved and requested pt contact the office. Pt verbalized understanding of the plan and had no further questions.    Deneen Mccann  Clinic   Pulmonary Hypertension  [facility]  (P) [phone]  "

## 2021-06-11 ENCOUNTER — TELEPHONE (OUTPATIENT)
Dept: CARDIOLOGY | Facility: CLINIC | Age: 51
End: 2021-06-11

## 2021-06-11 DIAGNOSIS — I27.24 CTEPH (CHRONIC THROMBOEMBOLIC PULMONARY HYPERTENSION) (H): ICD-10-CM

## 2021-06-11 RX ORDER — TADALAFIL 20 MG/1
20 TABLET ORAL DAILY
Qty: 30 TABLET | Refills: 1 | Status: SHIPPED | OUTPATIENT
Start: 2021-06-11 | End: 2021-08-05

## 2021-06-11 NOTE — TELEPHONE ENCOUNTER
----- Message from Yoly Villegas RN sent at 5/20/2021 10:02 AM CDT -----  Regarding: Tadalafil f/u  Call patient to see how she's tolerating Tadalafil.  If OK, send Erx for 40mg dosing to her pharmacy & verify follow up appt.    -----------------------------  LM I was calling to see how she was tolerating the Tadalafil, as goal is to increase to 40mg daily after 30 days if she's tolerating 20mg.  Left my direct dial number for call back.  Yoly Villegas RN on 6/11/2021 at 8:53 AM  -------------------------------  patient LM that she hasn't started med yet because their were problems with it's approval with insurance.      Per charting issue should be resolved, so I sent a new Erx to her pharmacy and sent patient a [employer] message alerting her.  Yoly Villegas RN on 6/11/2021 at 12:11 PM

## 2021-06-23 ENCOUNTER — TRANSFERRED RECORDS (OUTPATIENT)
Dept: HEALTH INFORMATION MANAGEMENT | Facility: CLINIC | Age: 51
End: 2021-06-23

## 2021-08-04 NOTE — PROGRESS NOTES
Date of Service: 08/05/2021      [facility] Pulmonary [facility]pertension [facility]      Primary PH cardiologist:  Dr. Paniagua       HPI:  Ms. Barksdale is a very pleasant 50 year old female with a PMhx including SLE with lupus antiphospholipid antibody syndrome and hx DVT on anticoagulation, and DAVIS on CPAP. She also has chronic thromboembolic pulmonary arterial hypertension. She was initially placed on riociguat, and then referred to [facility] for surgical evaluation. Ultimately, she underwent pulmonary thromboendarterectomy at [facility] on July 14, 2020.  She has type IV disease and her PTE was somewhat difficult to the distal nature of her disease. Her post operative course was reportedly uncomplicated, and postoperative hemodynamics showed an PA pressure of 37/12 with a mean of 21 mmHg.  Her cardiac output was 6.16 L. Echo showed mild right ventricular enlargement and mildly reduced right ventricular systolic pressure.  Her estimated PA pressure based on echocardiogram was 38 mmHg.  Her VQ scan score showed improved perfusion of the right lower lobe and left upper lobe. Her riociguat and her digoxin were discontinued at that time. Post surgically she had struggled with swelling and weight gain requiring escalation of diuretics. She then underwent BPA in January and March of this year, of the right A8 branch and right A3 branch.    Yuridia was seen last in clinic by Dr. Paniagua in early May at which time she reported feeling better and exercise capacity had improved. She was no longer using supplemental oxygen with exertion. It was recommended she undergo a repeat echocardiogram, which she had performed a few weeks later. This showed mildly dilated RV with mildly reduced function, and Dr. Paniagua then recommended addition of tadalafil. He also recommended she continue with supplemental oxygen at 2LPM with exertion and exercise, as a six minute walk showed continued desaturation with  "exertion.    Today, we are doing an in clinic visit for follow up. She tells me that overall, her breathing is a little worse. Notably, she has gained nearly 30 lbs since we saw her May. She admits she has been \"emotional eating\" a lot and not making good dietary choices. She has recently started seeing a nutritionist. She is unsure whether she is holding onto fluid, and thinks her swelling is a little worse, but not much. She denies any new chest pain or palpitations. She has occasional lightheaded spells and did have one dizzy spell awhile back, and saw a neurologist. She tells me she had an MRI done which did not show anything concerning. She started the Adcirca at 20mg daily, about 3-4 weeks ago. She denies significant headaches, GI upset, or other new side effects. She admits she hasn't been wearing her oxygen much at home, and also hasn't been checking her sats as she had misplaced her pulse oximeter.     Labs done prior to our visit were reviewed: wbc 3.9, hemoglobin 13.2, hematocrit 39.3, plt 235. Na 140, K 4.0, BUN 27, NT-proBNP 412. Nt-proBNP 412, Na 140, K 4.0, BUN 27, SCR 1.15.      CURRENT PULMONARY HYPERTENSION REGIMEN:     PAH Rx: tadalafil 20mg daily  (previously on riociguat, stopped post PTE)      Diuretics: Torsemide 80mg once daily, spironolactone 25mg BID     Oxygen: RA rest, 2.5L exertion (admits not been wearing much)     Anticoagulation: warfarin (follows chrom factor goal 20-30)--needs to be bridged with Arixtra given her heparin allergy if she needs procedures.  Indication: DVT/antiphospholipid        Assessment/Plan:     1. Chronic thromboembolic pulmonary hypertension.              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 She is now s/p pulmonary  thromboendarterectomy mid July 2020 at [facility], and BPA in January and March of this year, of the right A8 branch and right A3 branch.    --Last visit, a repeat echo showed continued mild " "RV dysfunction, and she still desaturates with exertion. She was started on tadalafil (was hesitant to go back on riociguat due to TID dosing) 20mg daily, which she has now been on for about 3 weeks. She is tolerating this well with minimal side effects. Thus, will increase to 40mg daily today.    --She has gained nearly 30 lbs since we saw her last, and she admits to recently doing a lot of \"emotional eating\" and has started seeing a nutritionist. It is difficult to tell on exam whether she is volume up, but she does not have significant pitting edema and her NT-proBNP today is stable from prior, and in fact lower, than last visit. Thus, I will keep her torsemide 80mg daily and spironolactone 25mg BID unchanged. I asked her to continue to monitor this closely, and work on calorie restriction.              --She has not reliably been using her oxygen; I encouraged her to start using at 2L with any exertion, and explained how this relates to her PH. I asked her to start monitoring her sats again as well, with goals to keep >90%.              --She is compliant with her CPAP which I encouraged her to continue.              --Continue Coumadin for anticoagulation, in the setting of CTEPH and antiphospholipid antibody syndrome. She normally follows chromogenic factor goal of 20-30. She needs to be bridged with Arixtra given her heparin allergy if she needs procedures.      Follow up plan: Will return via virtual visit in ~1 month for close follow up. I am happy to see her back sooner with any new concerns.       Orders this Visit:  Orders Placed This Encounter   Procedures     Follow-Up with Pulmonary Hypertension Clinic     No orders of the defined types were placed in this encounter.    There are no discontinued medications.        CURRENT MEDICATIONS:  Current Outpatient Medications   Medication Sig Dispense Refill     atorvastatin (LIPITOR) 10 MG tablet Take 1 tablet (10 mg) by mouth daily       clonazePAM " (KLONOPIN) 1 MG tablet Take 1 tablet (1 mg) by mouth daily as needed for anxiety (Patient taking differently: Take 1 mg by mouth At Bedtime )       hydroxychloroquine (PLAQUENIL) 200 MG tablet Take 1 tablet (200 mg) by mouth 2 times daily       sertraline (ZOLOFT) 100 MG tablet Take 2 tablets (200 mg) by mouth daily       spironolactone (ALDACTONE) 25 MG tablet Take 1 tablet (25 mg) by mouth 2 times daily 180 tablet 3     tadalafil, PAH, 20 MG TABS Take 1 tablet (20 mg) by mouth daily 30 tablet 1     torsemide (DEMADEX) 20 MG tablet Take 4 tablets (80 mg) by mouth daily 360 tablet 3     traZODone (DESYREL) 50 MG tablet Take 2 tablets (100 mg) by mouth At Bedtime       warfarin ANTICOAGULANT (COUMADIN ANTICOAGULANT) 1 MG tablet Take 5 tablets (5 mg) by mouth daily On 1/21/2021 and 1/22/2021. Have your chromogenic factor checked on 1/23/2021. (Patient taking differently: Take 5 mg by mouth daily 5 mg on Monday and Friday, 4 mg on TuWeThSaSu. CFX goal 20-30%) 30 tablet        ALLERGIES     Allergies   Allergen Reactions     Lovenox [Enoxaparin]      Heparin allergy, do not use this.  Use Arixtra instead     Heparin Other (See Comments)     thrombocytopenia     Clindamycin Rash     Levaquin Rash     Vancomycin Rash       PAST MEDICAL HISTORY:  Past Medical History:   Diagnosis Date     Acute blood loss anemia 8/2014    needed blood transfusion     DVT (deep venous thrombosis) (H)      Lupus (H)      Pneumonia 12/2013    hospitalized x8days         Review of Systems:  Cardiovascular: negative for chest pain, palpitations, orthopnea, pos LE edema  Constitutional: negative for chills, sweats, fevers   Resp: Negative for dyspnea at rest, pos dyspnea on exertion, neg cough, known chronic lung disease  HEENT: Negative for new visual changes, frequent headaches  Gastrointestinal: negative for abdominal pain, diarrhea, blood in stool, nausea, vomiting  Hematologic/lymphatic: pos for current systemic anticoagulation, hx of blood  "clots  Neurological: negative for focal weakness, LOC, seizures, syncope/presyncope. Pos occasional LH spells.      Physical Exam:  Vitals: /74 (BP Location: Right arm, Patient Position: Chair, Cuff Size: Adult Regular)   Pulse 65   Ht 1.676 m (5' 6\")   Wt 127.9 kg (282 lb)   LMP 04/04/2012   SpO2 93%   BMI 45.52 kg/m     Wt Readings from Last 4 Encounters:   08/05/21 127.9 kg (282 lb)   05/03/21 117 kg (257 lb 14.4 oz)   03/13/21 117.8 kg (259 lb 9.6 oz)   01/20/21 118.8 kg (262 lb)       GEN:  In general, this is an obese  female in no acute distress on room air.  Patient ambulatory, unaccompanied.   HEENT:  Pupils grossly equal, sclerae nonicteric.   NECK: Supple, trachea midline. Thick neck, no JVD noted while upright.  C/V:  Regular rate and rhythm, no murmur, rub or gallop. No S3 or RV heave.   RESP: Respirations are unlabored. No use of accessory muscles. Clear to auscultation bilaterally without wheezing, rales, or rhonchi.  GI: Abdomen soft, nontender, nondistended.   EXTREM: L>R trace pitting edema. No cyanosis or clubbing.  NEURO: Alert and oriented, cooperative. Gait not formally assessed. No obvious focal deficits.   SKIN: Warm and dry. Mild redness in lower legs.      Recent Lab Results:  LIVER ENZYME RESULTS:  Lab Results   Component Value Date    AST 19 05/03/2021    ALT 23 05/03/2021       CBC RESULTS:  Lab Results   Component Value Date    WBC 3.9 (L) 08/05/2021    WBC 4.8 05/03/2021    RBC 4.30 08/05/2021    RBC 4.46 05/03/2021    HGB 13.2 08/05/2021    HGB 13.5 05/03/2021    HCT 39.3 08/05/2021    HCT 40.8 05/03/2021    MCV 91 08/05/2021    MCV 92 05/03/2021    MCH 30.7 08/05/2021    MCH 30.3 05/03/2021    MCHC 33.6 08/05/2021    MCHC 33.1 05/03/2021    RDW 12.8 08/05/2021    RDW 12.6 05/03/2021     08/05/2021     05/03/2021       BMP RESULTS:  Lab Results   Component Value Date     08/05/2021     05/03/2021    POTASSIUM 4.0 08/05/2021    POTASSIUM " 3.5 05/03/2021    CHLORIDE 108 08/05/2021    CHLORIDE 106 05/03/2021    CO2 26 08/05/2021    CO2 28 05/03/2021    ANIONGAP 6 08/05/2021    ANIONGAP 8 05/03/2021    GLC 97 08/05/2021    GLC 96 05/03/2021    BUN 27 08/05/2021    BUN 23 05/03/2021    CR 1.15 (H) 08/05/2021    CR 1.14 (H) 05/03/2021    GFRESTIMATED 56 (L) 08/05/2021    GFRESTIMATED 57 (L) 05/03/2021    GFRESTBLACK 66 05/03/2021    KATARINA 9.2 08/05/2021    KATARINA 9.0 05/03/2021          Recent Labs   Lab Test 08/05/21  1033 05/03/21  1153 03/12/21  0434 11/11/20  1224   NTBNPI  --   --  473*  --    NTBNP 412* 585*  --  398*         Most recent testing:    Echo 5/14/2021  Interpretation Summary  Left ventricular function is normal.The EF is 55-60%.  Mild right ventricular dilation is present. Global right ventricular function  is mildly reduced.  Mild tricuspid insufficiency is present.  Pulmonary hypertension is present. Estimated pulmonary artery systolic  pressure is 43 mmHg.  The inferior vena cava was normal in size with preserved respiratory  variability.  No pericardial effusion is present      RHC (03/2021)  RA 13  RV 51/13  PA 51/17 (32)  PCWP 13  PA% 58  Estimated Terry CO/CI 4.3/1.92  PVR by Terry 4.42  PVR 2.52 by TD on her last hemodynamic assessment  No TD CO done during this time.    NYHA Functional Class:  Functional class 3      A total of 30 minutes was spent today performing chart and history review, gathering HPI, physical exam, pre and post visit documentation, and care coordination.      Azucena Silva PA-C  [facility]  Pager [phone]

## 2021-08-05 ENCOUNTER — LAB (OUTPATIENT)
Dept: LAB | Facility: CLINIC | Age: 51
End: 2021-08-05
Payer: COMMERCIAL

## 2021-08-05 ENCOUNTER — OFFICE VISIT (OUTPATIENT)
Dept: CARDIOLOGY | Facility: CLINIC | Age: 51
End: 2021-08-05
Attending: PHYSICIAN ASSISTANT
Payer: COMMERCIAL

## 2021-08-05 VITALS
HEART RATE: 65 BPM | BODY MASS INDEX: 45.32 KG/M2 | WEIGHT: 282 LBS | SYSTOLIC BLOOD PRESSURE: 112 MMHG | OXYGEN SATURATION: 93 % | DIASTOLIC BLOOD PRESSURE: 74 MMHG | HEIGHT: 66 IN

## 2021-08-05 DIAGNOSIS — I27.24 CTEPH (CHRONIC THROMBOEMBOLIC PULMONARY HYPERTENSION) (H): Primary | ICD-10-CM

## 2021-08-05 DIAGNOSIS — R06.02 SOB (SHORTNESS OF BREATH): ICD-10-CM

## 2021-08-05 DIAGNOSIS — I27.24 CTEPH (CHRONIC THROMBOEMBOLIC PULMONARY HYPERTENSION) (H): ICD-10-CM

## 2021-08-05 LAB
ANION GAP SERPL CALCULATED.3IONS-SCNC: 6 MMOL/L (ref 3–14)
BUN SERPL-MCNC: 27 MG/DL (ref 7–30)
CALCIUM SERPL-MCNC: 9.2 MG/DL (ref 8.5–10.1)
CHLORIDE BLD-SCNC: 108 MMOL/L (ref 94–109)
CO2 SERPL-SCNC: 26 MMOL/L (ref 20–32)
CREAT SERPL-MCNC: 1.15 MG/DL (ref 0.52–1.04)
ERYTHROCYTE [DISTWIDTH] IN BLOOD BY AUTOMATED COUNT: 12.8 % (ref 10–15)
GFR SERPL CREATININE-BSD FRML MDRD: 56 ML/MIN/1.73M2
GLUCOSE BLD-MCNC: 97 MG/DL (ref 70–99)
HCT VFR BLD AUTO: 39.3 % (ref 35–47)
HGB BLD-MCNC: 13.2 G/DL (ref 11.7–15.7)
MCH RBC QN AUTO: 30.7 PG (ref 26.5–33)
MCHC RBC AUTO-ENTMCNC: 33.6 G/DL (ref 31.5–36.5)
MCV RBC AUTO: 91 FL (ref 78–100)
NT-PROBNP SERPL-MCNC: 412 PG/ML (ref 0–125)
PLATELET # BLD AUTO: 235 10E3/UL (ref 150–450)
POTASSIUM BLD-SCNC: 4 MMOL/L (ref 3.4–5.3)
RBC # BLD AUTO: 4.3 10E6/UL (ref 3.8–5.2)
SODIUM SERPL-SCNC: 140 MMOL/L (ref 133–144)
WBC # BLD AUTO: 3.9 10E3/UL (ref 4–11)

## 2021-08-05 PROCEDURE — 36415 COLL VENOUS BLD VENIPUNCTURE: CPT | Performed by: PATHOLOGY

## 2021-08-05 PROCEDURE — 83880 ASSAY OF NATRIURETIC PEPTIDE: CPT | Performed by: PATHOLOGY

## 2021-08-05 PROCEDURE — 99214 OFFICE O/P EST MOD 30 MIN: CPT | Performed by: PHYSICIAN ASSISTANT

## 2021-08-05 PROCEDURE — G0463 HOSPITAL OUTPT CLINIC VISIT: HCPCS

## 2021-08-05 PROCEDURE — 80048 BASIC METABOLIC PNL TOTAL CA: CPT | Performed by: PATHOLOGY

## 2021-08-05 PROCEDURE — 85027 COMPLETE CBC AUTOMATED: CPT | Performed by: PATHOLOGY

## 2021-08-05 RX ORDER — TADALAFIL 20 MG/1
40 TABLET ORAL DAILY
Qty: 180 TABLET | Refills: 3 | Status: SHIPPED | OUTPATIENT
Start: 2021-08-05 | End: 2022-07-01

## 2021-08-05 RX ORDER — TADALAFIL 20 MG/1
40 TABLET ORAL DAILY
Qty: 30 TABLET | Refills: 1 | Status: SHIPPED
Start: 2021-08-05 | End: 2021-08-05

## 2021-08-05 ASSESSMENT — MIFFLIN-ST. JEOR: SCORE: 1925.89

## 2021-08-05 ASSESSMENT — PAIN SCALES - GENERAL: PAINLEVEL: NO PAIN (0)

## 2021-08-05 NOTE — PATIENT INSTRUCTIONS
Thank you for visiting the Pulmonary Hypertension Clinic today.      Today we discussed:   Please keep working with your nutritionist. At this point, labs do not suggest that a lot of this is fluid.     Please INCREASE your tadalafil to 40mg (two tablets) once daily. Keep monitoring for more swelling.    Please start using your oxygen at 2L with exertion; lower oxygen levels (<90%) can cause worsening PH.     Results:   Results for JEREMY BOATENG (MRN [id number]) as of 8/5/2021 11:09   Ref. Range 8/5/2021 10:33   Sodium Latest Ref Range: 133 - 144 mmol/L 140   Potassium Latest Ref Range: 3.4 - 5.3 mmol/L 4.0   Chloride Latest Ref Range: 94 - 109 mmol/L 108   Carbon Dioxide Latest Ref Range: 20 - 32 mmol/L 26   Urea Nitrogen Latest Ref Range: 7 - 30 mg/dL 27   Creatinine Latest Ref Range: 0.52 - 1.04 mg/dL 1.15 (H)   GFR Estimate Latest Ref Range: >60 mL/min/1.73m2 56 (L)   Calcium Latest Ref Range: 8.5 - 10.1 mg/dL 9.2   Anion Gap Latest Ref Range: 3 - 14 mmol/L 6   N-Terminal Pro Bnp Latest Ref Range: 0 - 125 pg/mL 412 (H)         Follow up Appointment Information:  Return to see Azucena ren in ~1 month. Please call sooner with any new concerns.       Additional Instructions:    1. Continue staying active and eat a heart healthy, low sodium diet.     2. Please keep current list of medications with you at all times.     3. Remember to weigh yourself daily after voiding and write it down on a log. If you have gained/lost 2 pounds overnight or 5 pounds in a week contact us for medication adjustments or further instructions.    4. Please call us immediately if you have syncope (fainting or passing out), chest pain, worsening edema (swelling or weight gain), or general worsening in how you are feeling.         ---------------------------------------------------------------------------------------------------------------    If you have questions or concerns please contact us at:        Yoly Villegas RN, BSN,  HEIKE Mccann  (Schedule,Prior Auth)  Nurse Coordinator     Clinic   Pulmonary Hypertension   Pulmonary Hypertension  [facility]             [facility]  (P)[phone]    (P) [phone]        (F) [phone]      ** Please note that you will NOT receive a reminder call regarding your scheduled testing, reminder calls are for provider appointments only.  If you are scheduled for testing within the [facility] system you may receive a call regarding pre-registration for billing purposes only.**     ------------------------------------------------------------------------------------------------------------    Interested in joining a support group?    Pulmonary Hypertension Association  Https://www.phassociation.org/  **Look at the Events Tab** They even have Support Groups that you can call into    [facility] Support Group  Second Saturday of the Month from 1-3 PM   Location: [address] [place] [address]  Leader: Marika Vila  Phone: [phone] or [phone]  Email: [email or web address]

## 2021-08-05 NOTE — LETTER
8/5/2021      RE: Yuridia Barksdale  [address] [address]       Dear Colleague,    Thank you for the opportunity to participate in the care of your patient, Yuridia Barksdale, at the [facility] [place] [facility]. Please see a copy of my visit note below.        Date of Service: 08/05/2021      [facility] Pulmonary Hypertension Clinic      Primary PH cardiologist:  Dr. Paniagua       HPI:  Ms. Barksdale is a very pleasant 50 year old female with a PMhx including SLE with lupus antiphospholipid antibody syndrome and hx DVT on anticoagulation, and DAVIS on CPAP. She also has chronic thromboembolic pulmonary arterial hypertension. She was initially placed on riociguat, and then referred to [facility] for surgical evaluation. Ultimately, she underwent pulmonary thromboendarterectomy at [facility] on July 14, 2020.  She has type IV disease and her PTE was somewhat difficult to the distal nature of her disease. Her post operative course was reportedly uncomplicated, and postoperative hemodynamics showed an PA pressure of 37/12 with a mean of 21 mmHg.  Her cardiac output was 6.16 L. Echo showed mild right ventricular enlargement and mildly reduced right ventricular systolic pressure.  Her estimated PA pressure based on echocardiogram was 38 mmHg.  Her VQ scan score showed improved perfusion of the right lower lobe and left upper lobe. Her riociguat and her digoxin were discontinued at that time. Post surgically she had struggled with swelling and weight gain requiring escalation of diuretics. She then underwent BPA in January and March of this year, of the right A8 branch and right A3 branch.    Yuridia was seen last in clinic by Dr. Paniagua in early May at which time she reported feeling better and exercise capacity had improved. She was no longer using supplemental oxygen with exertion. It was recommended she  "undergo a repeat echocardiogram, which she had performed a few weeks later. This showed mildly dilated RV with mildly reduced function, and Dr. Paniagua then recommended addition of tadalafil. He also recommended she continue with supplemental oxygen at 2LPM with exertion and exercise, as a six minute walk showed continued desaturation with exertion.    Today, we are doing an in clinic visit for follow up. She tells me that overall, her breathing is a little worse. Notably, she has gained nearly 30 lbs since we saw her May. She admits she has been \"emotional eating\" a lot and not making good dietary choices. She has recently started seeing a nutritionist. She is unsure whether she is holding onto fluid, and thinks her swelling is a little worse, but not much. She denies any new chest pain or palpitations. She has occasional lightheaded spells and did have one dizzy spell awhile back, and saw a neurologist. She tells me she had an MRI done which did not show anything concerning. She started the Adcirca at 20mg daily, about 3-4 weeks ago. She denies significant headaches, GI upset, or other new side effects. She admits she hasn't been wearing her oxygen much at home, and also hasn't been checking her sats as she had misplaced her pulse oximeter.     Labs done prior to our visit were reviewed: wbc 3.9, hemoglobin 13.2, hematocrit 39.3, plt 235. Na 140, K 4.0, BUN 27, NT-proBNP 412. Nt-proBNP 412, Na 140, K 4.0, BUN 27, SCR 1.15.      CURRENT PULMONARY HYPERTENSION REGIMEN:     PAH Rx: tadalafil 20mg daily  (previously on riociguat, stopped post PTE)      Diuretics: Torsemide 80mg once daily, spironolactone 25mg BID     Oxygen: RA rest, 2.5L exertion (admits not been wearing much)     Anticoagulation: warfarin (follows chrom factor goal 20-30)--needs to be bridged with Arixtra given her heparin allergy if she needs procedures.  Indication: DVT/antiphospholipid        Assessment/Plan:     1. Chronic thromboembolic " "pulmonary hypertension.              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 She is now s/p pulmonary  thromboendarterectomy mid July 2020 at [facility], and BPA in January and March of this year, of the right A8 branch and right A3 branch.    --Last visit, a repeat echo showed continued mild RV dysfunction, and she still desaturates with exertion. She was started on tadalafil (was hesitant to go back on riociguat due to TID dosing) 20mg daily, which she has now been on for about 3 weeks. She is tolerating this well with minimal side effects. Thus, will increase to 40mg daily today.    --She has gained nearly 30 lbs since we saw her last, and she admits to recently doing a lot of \"emotional eating\" and has started seeing a nutritionist. It is difficult to tell on exam whether she is volume up, but she does not have significant pitting edema and her NT-proBNP today is stable from prior, and in fact lower, than last visit. Thus, I will keep her torsemide 80mg daily and spironolactone 25mg BID unchanged. I asked her to continue to monitor this closely, and work on calorie restriction.              --She has not reliably been using her oxygen; I encouraged her to start using at 2L with any exertion, and explained how this relates to her PH. I asked her to start monitoring her sats again as well, with goals to keep >90%.              --She is compliant with her CPAP which I encouraged her to continue.              --Continue Coumadin for anticoagulation, in the setting of CTEPH and antiphospholipid antibody syndrome. She normally follows chromogenic factor goal of 20-30. She needs to be bridged with Arixtra given her heparin allergy if she needs procedures.      Follow up plan: Will return via virtual visit in ~1 month for close follow up. I am happy to see her back sooner with any new concerns.       Orders this Visit:  Orders Placed This Encounter   Procedures     " Follow-Up with Pulmonary Hypertension Clinic     No orders of the defined types were placed in this encounter.    There are no discontinued medications.        CURRENT MEDICATIONS:  Current Outpatient Medications   Medication Sig Dispense Refill     atorvastatin (LIPITOR) 10 MG tablet Take 1 tablet (10 mg) by mouth daily       clonazePAM (KLONOPIN) 1 MG tablet Take 1 tablet (1 mg) by mouth daily as needed for anxiety (Patient taking differently: Take 1 mg by mouth At Bedtime )       hydroxychloroquine (PLAQUENIL) 200 MG tablet Take 1 tablet (200 mg) by mouth 2 times daily       sertraline (ZOLOFT) 100 MG tablet Take 2 tablets (200 mg) by mouth daily       spironolactone (ALDACTONE) 25 MG tablet Take 1 tablet (25 mg) by mouth 2 times daily 180 tablet 3     tadalafil, PAH, 20 MG TABS Take 1 tablet (20 mg) by mouth daily 30 tablet 1     torsemide (DEMADEX) 20 MG tablet Take 4 tablets (80 mg) by mouth daily 360 tablet 3     traZODone (DESYREL) 50 MG tablet Take 2 tablets (100 mg) by mouth At Bedtime       warfarin ANTICOAGULANT (COUMADIN ANTICOAGULANT) 1 MG tablet Take 5 tablets (5 mg) by mouth daily On 1/21/2021 and 1/22/2021. Have your chromogenic factor checked on 1/23/2021. (Patient taking differently: Take 5 mg by mouth daily 5 mg on Monday and Friday, 4 mg on TuWeThSaSu. CFX goal 20-30%) 30 tablet        ALLERGIES     Allergies   Allergen Reactions     Lovenox [Enoxaparin]      Heparin allergy, do not use this.  Use Arixtra instead     Heparin Other (See Comments)     thrombocytopenia     Clindamycin Rash     Levaquin Rash     Vancomycin Rash       PAST MEDICAL HISTORY:  Past Medical History:   Diagnosis Date     Acute blood loss anemia 8/2014    needed blood transfusion     DVT (deep venous thrombosis) (H)      Lupus (H)      Pneumonia 12/2013    hospitalized x8days         Review of Systems:  Cardiovascular: negative for chest pain, palpitations, orthopnea, pos LE edema  Constitutional: negative for chills,  "sweats, fevers   Resp: Negative for dyspnea at rest, pos dyspnea on exertion, neg cough, known chronic lung disease  HEENT: Negative for new visual changes, frequent headaches  Gastrointestinal: negative for abdominal pain, diarrhea, blood in stool, nausea, vomiting  Hematologic/lymphatic: pos for current systemic anticoagulation, hx of blood clots  Neurological: negative for focal weakness, LOC, seizures, syncope/presyncope. Pos occasional LH spells.      Physical Exam:  Vitals: /74 (BP Location: Right arm, Patient Position: Chair, Cuff Size: Adult Regular)   Pulse 65   Ht 1.676 m (5' 6\")   Wt 127.9 kg (282 lb)   LMP 04/04/2012   SpO2 93%   BMI 45.52 kg/m     Wt Readings from Last 4 Encounters:   08/05/21 127.9 kg (282 lb)   05/03/21 117 kg (257 lb 14.4 oz)   03/13/21 117.8 kg (259 lb 9.6 oz)   01/20/21 118.8 kg (262 lb)       GEN:  In general, this is an obese  female in no acute distress on room air.  Patient ambulatory, unaccompanied.   HEENT:  Pupils grossly equal, sclerae nonicteric.   NECK: Supple, trachea midline. Thick neck, no JVD noted while upright.  C/V:  Regular rate and rhythm, no murmur, rub or gallop. No S3 or RV heave.   RESP: Respirations are unlabored. No use of accessory muscles. Clear to auscultation bilaterally without wheezing, rales, or rhonchi.  GI: Abdomen soft, nontender, nondistended.   EXTREM: L>R trace pitting edema. No cyanosis or clubbing.  NEURO: Alert and oriented, cooperative. Gait not formally assessed. No obvious focal deficits.   SKIN: Warm and dry. Mild redness in lower legs.      Recent Lab Results:  LIVER ENZYME RESULTS:  Lab Results   Component Value Date    AST 19 05/03/2021    ALT 23 05/03/2021       CBC RESULTS:  Lab Results   Component Value Date    WBC 3.9 (L) 08/05/2021    WBC 4.8 05/03/2021    RBC 4.30 08/05/2021    RBC 4.46 05/03/2021    HGB 13.2 08/05/2021    HGB 13.5 05/03/2021    HCT 39.3 08/05/2021    HCT 40.8 05/03/2021    MCV 91 08/05/2021 "    MCV 92 05/03/2021    MCH 30.7 08/05/2021    MCH 30.3 05/03/2021    MCHC 33.6 08/05/2021    MCHC 33.1 05/03/2021    RDW 12.8 08/05/2021    RDW 12.6 05/03/2021     08/05/2021     05/03/2021       BMP RESULTS:  Lab Results   Component Value Date     08/05/2021     05/03/2021    POTASSIUM 4.0 08/05/2021    POTASSIUM 3.5 05/03/2021    CHLORIDE 108 08/05/2021    CHLORIDE 106 05/03/2021    CO2 26 08/05/2021    CO2 28 05/03/2021    ANIONGAP 6 08/05/2021    ANIONGAP 8 05/03/2021    GLC 97 08/05/2021    GLC 96 05/03/2021    BUN 27 08/05/2021    BUN 23 05/03/2021    CR 1.15 (H) 08/05/2021    CR 1.14 (H) 05/03/2021    GFRESTIMATED 56 (L) 08/05/2021    GFRESTIMATED 57 (L) 05/03/2021    GFRESTBLACK 66 05/03/2021    KATARINA 9.2 08/05/2021    KATARINA 9.0 05/03/2021          Recent Labs   Lab Test 08/05/21  1033 05/03/21  1153 03/12/21  0434 11/11/20  1224   NTBNPI  --   --  473*  --    NTBNP 412* 585*  --  398*         Most recent testing:    Echo 5/14/2021  Interpretation Summary  Left ventricular function is normal.The EF is 55-60%.  Mild right ventricular dilation is present. Global right ventricular function  is mildly reduced.  Mild tricuspid insufficiency is present.  Pulmonary hypertension is present. Estimated pulmonary artery systolic  pressure is 43 mmHg.  The inferior vena cava was normal in size with preserved respiratory  variability.  No pericardial effusion is present      RHC (03/2021)  RA 13  RV 51/13  PA 51/17 (32)  PCWP 13  PA% 58  Estimated Terry CO/CI 4.3/1.92  PVR by Terry 4.42  PVR 2.52 by TD on her last hemodynamic assessment  No TD CO done during this time.    NYHA Functional Class:  Functional class 3      A total of 30 minutes was spent today performing chart and history review, gathering HPI, physical exam, pre and post visit documentation, and care coordination.      Azucena Silva PA-C  [facility]  Pager [phone]

## 2021-08-08 DIAGNOSIS — I27.24 CTEPH (CHRONIC THROMBOEMBOLIC PULMONARY HYPERTENSION) (H): ICD-10-CM

## 2021-08-08 RX ORDER — TADALAFIL 20 MG/1
TABLET ORAL
Qty: 30 TABLET | Status: CANCELLED | OUTPATIENT
Start: 2021-08-08

## 2021-08-11 RX ORDER — TADALAFIL 20 MG/1
TABLET ORAL
Qty: 30 TABLET | OUTPATIENT
Start: 2021-08-11

## 2021-09-05 NOTE — PROGRESS NOTES
CARDIOLOGY PH CLINIC VIDEO VISIT      Date of video visit: 09/07/21      Yuridia Barksdale is a 50 year old female who is being evaluated via a billable video visit.        I have reviewed and updated the patient's Past Medical History, Social History, Family History and Medication List.    MEDICATIONS:  Current Outpatient Medications   Medication Sig Dispense Refill     atorvastatin (LIPITOR) 10 MG tablet Take 1 tablet (10 mg) by mouth daily       clonazePAM (KLONOPIN) 1 MG tablet Take 1 tablet (1 mg) by mouth daily as needed for anxiety (Patient taking differently: Take 1 mg by mouth At Bedtime )       hydroxychloroquine (PLAQUENIL) 200 MG tablet Take 1 tablet (200 mg) by mouth 2 times daily       sertraline (ZOLOFT) 100 MG tablet Take 2 tablets (200 mg) by mouth daily       spironolactone (ALDACTONE) 25 MG tablet Take 1 tablet (25 mg) by mouth 2 times daily 180 tablet 3     tadalafil, PAH, 20 MG TABS Take 2 tablets (40 mg) by mouth daily 180 tablet 3     torsemide (DEMADEX) 20 MG tablet Take 4 tablets (80 mg) by mouth daily 360 tablet 3     traZODone (DESYREL) 50 MG tablet Take 2 tablets (100 mg) by mouth At Bedtime       warfarin ANTICOAGULANT (COUMADIN ANTICOAGULANT) 1 MG tablet Take 5 tablets (5 mg) by mouth daily On 1/21/2021 and 1/22/2021. Have your chromogenic factor checked on 1/23/2021. (Patient taking differently: Take 5 mg by mouth daily 5 mg on Monday and Friday, 4 mg on TuWeThSaSu. CFX goal 20-30%) 30 tablet        ALLERGIES  Lovenox [enoxaparin], Heparin, Clindamycin, Levaquin, and Vancomycin      Self reported vitals:  Weight: 285#  BP not reported      Brief physical exam:  General: In no acute distress, upright and calm.  Eyes: No apparent redness or discharge.   Chest: No labored breathing, no cough during exam or audible wheezing.   Neuro: No obvious focal defects or tremors.   Psych: Alert and oriented. Does not appear anxious.     The rest of a comprehensive physical examination is  deferred due to public health emergency video visit restrictions.       Primary PH cardiologist: Dr. Paniagua       HPI:  Ms. Barksdale is a very pleasant 50 year old female with a PMhx including SLE with lupus antiphospholipid antibody syndrome and hx DVT on anticoagulation, and DAVIS on CPAP. She also has chronic thromboembolic pulmonary arterial hypertension. She was initially placed on riociguat, and then referred to [facility] for surgical evaluation. Ultimately, she underwent pulmonary thromboendarterectomy at [facility] on July 14, 2020.  She has type IV disease and her PTE was somewhat difficult to the distal nature of her disease. Her post operative course was reportedly uncomplicated, and postoperative hemodynamics showed an PA pressure of 37/12 with a mean of 21 mmHg.  Her cardiac output was 6.16 L. Echo showed mild right ventricular enlargement and mildly reduced right ventricular systolic pressure.  Her estimated PA pressure based on echocardiogram was 38 mmHg.  Her VQ scan score showed improved perfusion of the right lower lobe and left upper lobe. Her riociguat and her digoxin were discontinued at that time. Post surgically she had struggled with swelling and weight gain requiring escalation of diuretics. She then underwent BPA in January and March of this year, of the right A8 branch and right A3 branch.     Yuridia was seen last in clinic by Dr. Paniagua in early May at which time she reported feeling better and exercise capacity had improved. She was no longer using supplemental oxygen with exertion. It was recommended she undergo a repeat echocardiogram, which she had performed a few weeks later. This showed mildly dilated RV with mildly reduced function, and Dr. Paniagua then recommended addition of tadalafil. He also recommended she continue with supplemental oxygen at 2LPM with exertion and exercise, as a six minute walk showed continued desaturation with exertion.    When I met  "with Yuridia in early August, she felt her breathing was actually a little worse. Notably, she has gained nearly 30 lbs since May, which she thought was due to a lot of  \"emotional eating\" a lot and not making good dietary choices. She had recently started seeing a nutritionist. She was having occasional dizzy spells but had seen a neurologist and had an MRI performed which was unrevealing. Otherwise she was doing relatively well. Thus, I increased her tadalafil dose from 20mg to 40mg daily.     Today, we are doing a virtual visit to follow up on her progress. She tells me that she is doing ok on full dose 40mg daily of tadalafil, though does have some more diarrhea at times. No other concerns. Breathing is about the same. 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 On the days she doesn't take it, she notes her legs are tighter, but says her breathing hasn't been an issue. She is also still working with a nutritionist to curb her eating habits. She continues to wear her oxygen only PRN but says her oxygen sats when she occasionally checks them, are running 92-93%. She has occasional episodes where the \"room spins\" but says this is quite infrequent.        There were no new labs done prior to our visit today. Labs from early Aug reviewed as below.      CURRENT PULMONARY HYPERTENSION REGIMEN:     PAH Rx: tadalafil 40mg daily  (previously on riociguat, was discontinued post PTE)      Diuretics: Torsemide 80mg once daily, spironolactone 25mg BID     Oxygen: RA rest, 2.5L exertion (wearing only once in a while)     Anticoagulation: warfarin (follows chrom factor goal 20-30)--needs to be bridged with Arixtra given her heparin allergy if she needs " "procedures.  Indication: DVT/antiphospholipid        Assessment/Plan:     1. Chronic thromboembolic pulmonary hypertension.              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 She is now s/p pulmonary  thromboendarterectomy mid July 2020 at [facility], and BPA in January and March of this year, of the right A8 branch and right A3 branch.               --Repeat echo in May showed continued mild RV dysfunction, with some evidence of ongoing desaturation with exertion. She was started on tadalafil (was hesitant to go back on riociguat due to TID, and last visit we increased to full 40mg daily dosing. She is doing reasonably well with this currently.              --She has gained 30 lbs over the last few months, and admits to doing a lot of \"emotional eating\" and has started seeing a nutritionist. 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 She tells me this job will be ending soon, so she plans to resume her usual torsemide 80mg daily, and continue spironolactone 25mg BID unchanged. I asked her to continue to monitor this closely, and work on calorie restriction.              --She has not reliably been using her oxygen; I encouraged her to use 2L with any exertion, though she relays sats on her checks are usually >90%. Will plan repeat SST this fall.               --She is compliant with her CPAP which I encouraged her to continue.              --Continue Coumadin for anticoagulation, in the setting of CTEPH and antiphospholipid antibody syndrome. She normally follows chromogenic factor goal of 20-30. She needs to be bridged with Arixtra given her heparin allergy if she needs procedures.       Follow up plan: I will meet with her again virtually in ~2 months, or sooner if " needed. Tentative plan to then have her return a few months later with repeat echo and six minute walk to see Dr. Paniagua.       Testing/labs:    Most recent labs:   *Results for JEREMY BOATENG (MRN [id number]) as of 9/5/2021 15:45   Ref. Range 8/5/2021 10:33   Sodium Latest Ref Range: 133 - 144 mmol/L 140   Potassium Latest Ref Range: 3.4 - 5.3 mmol/L 4.0   Chloride Latest Ref Range: 94 - 109 mmol/L 108   Carbon Dioxide Latest Ref Range: 20 - 32 mmol/L 26   Urea Nitrogen Latest Ref Range: 7 - 30 mg/dL 27   Creatinine Latest Ref Range: 0.52 - 1.04 mg/dL 1.15 (H)   GFR Estimate Latest Ref Range: >60 mL/min/1.73m2 56 (L)   Calcium Latest Ref Range: 8.5 - 10.1 mg/dL 9.2   Anion Gap Latest Ref Range: 3 - 14 mmol/L 6   N-Terminal Pro Bnp Latest Ref Range: 0 - 125 pg/mL 412 (H)   Glucose Latest Ref Range: 70 - 99 mg/dL 97   WBC Latest Ref Range: 4.0 - 11.0 10e3/uL 3.9 (L)   Hemoglobin Latest Ref Range: 11.7 - 15.7 g/dL 13.2   Hematocrit Latest Ref Range: 35.0 - 47.0 % 39.3   Platelet Count Latest Ref Range: 150 - 450 10e3/uL 235         Other recent pertinent testing:  Echo 5/14/2021  Interpretation Summary  Left ventricular function is normal.The EF is 55-60%.  Mild right ventricular dilation is present. Global right ventricular function  is mildly reduced.  Mild tricuspid insufficiency is present.  Pulmonary hypertension is present. Estimated pulmonary artery systolic  pressure is 43 mmHg.  The inferior vena cava was normal in size with preserved respiratory  variability.  No pericardial effusion is present        RHC (03/2021)  RA 13  RV 51/13  PA 51/17 (32)  PCWP 13  PA% 58  Estimated Terry CO/CI 4.3/1.92  PVR by Terry 4.42  PVR 2.52 by TD on her last hemodynamic assessment  No TD CO done during this time.         NYHA Functional Class:  Functional class 2-3    1--No limitation of activity  2--Slight limitation, ordinary activities may cause symptoms  3--Marked limitation, less than ordinary activities cause  symptoms  4--Severe limitation, minimal activity causes symptoms, or symptoms at rest      Video-Visit Details    Type of service:  Video Visit    Video Start Time: 0905  Video End Time: 0915    An additional 15 minutes was spent today performing chart and history review, pre and post visit documentation, and care coordination.      Originating Location (pt. Location): Home    Distant Location (provider location):  [facility]    Platform used for Video Visit: Kaitlyn Silva PA-C  [facility]  Pager [phone]

## 2021-09-07 ENCOUNTER — VIRTUAL VISIT (OUTPATIENT)
Dept: CARDIOLOGY | Facility: CLINIC | Age: 51
End: 2021-09-07
Attending: PHYSICIAN ASSISTANT
Payer: COMMERCIAL

## 2021-09-07 DIAGNOSIS — I27.24 CTEPH (CHRONIC THROMBOEMBOLIC PULMONARY HYPERTENSION) (H): Primary | ICD-10-CM

## 2021-09-07 DIAGNOSIS — R06.09 DYSPNEA ON EXERTION: ICD-10-CM

## 2021-09-07 PROCEDURE — 99213 OFFICE O/P EST LOW 20 MIN: CPT | Mod: GT | Performed by: PHYSICIAN ASSISTANT

## 2021-09-07 NOTE — LETTER
9/7/2021      RE: Yuridia Barksdale  [address] [facility] [address]       Dear Colleague,    Thank you for the opportunity to participate in the care of your patient, Yuridia Barksdale, at the [facility] [place] [facility]. Please see a copy of my visit note below.          CARDIOLOGY PH CLINIC VIDEO VISIT      Date of video visit: 09/07/21      Yuridia Barksdale is a 50 year old female who is being evaluated via a billable video visit.        I have reviewed and updated the patient's Past Medical History, Social History, Family History and Medication List.    MEDICATIONS:  Current Outpatient Medications   Medication Sig Dispense Refill     atorvastatin (LIPITOR) 10 MG tablet Take 1 tablet (10 mg) by mouth daily       clonazePAM (KLONOPIN) 1 MG tablet Take 1 tablet (1 mg) by mouth daily as needed for anxiety (Patient taking differently: Take 1 mg by mouth At Bedtime )       hydroxychloroquine (PLAQUENIL) 200 MG tablet Take 1 tablet (200 mg) by mouth 2 times daily       sertraline (ZOLOFT) 100 MG tablet Take 2 tablets (200 mg) by mouth daily       spironolactone (ALDACTONE) 25 MG tablet Take 1 tablet (25 mg) by mouth 2 times daily 180 tablet 3     tadalafil, PAH, 20 MG TABS Take 2 tablets (40 mg) by mouth daily 180 tablet 3     torsemide (DEMADEX) 20 MG tablet Take 4 tablets (80 mg) by mouth daily 360 tablet 3     traZODone (DESYREL) 50 MG tablet Take 2 tablets (100 mg) by mouth At Bedtime       warfarin ANTICOAGULANT (COUMADIN ANTICOAGULANT) 1 MG tablet Take 5 tablets (5 mg) by mouth daily On 1/21/2021 and 1/22/2021. Have your chromogenic factor checked on 1/23/2021. (Patient taking differently: Take 5 mg by mouth daily 5 mg on Monday and Friday, 4 mg on TuWeThSaSu. CFX goal 20-30%) 30 tablet        ALLERGIES  Lovenox [enoxaparin], Heparin, Clindamycin, Levaquin, and Vancomycin      Self reported vitals:  Weight: 285#  BP not reported      Brief  physical exam:  General: In no acute distress, upright and calm.  Eyes: No apparent redness or discharge.   Chest: No labored breathing, no cough during exam or audible wheezing.   Neuro: No obvious focal defects or tremors.   Psych: Alert and oriented. Does not appear anxious.     The rest of a comprehensive physical examination is deferred due to public [facility] emergency video visit restrictions.       Primary PH cardiologist: Dr. Paniagua       HPI:  Ms. Barksdale is a very pleasant 50 year old female with a PMhx including SLE with lupus antiphospholipid antibody syndrome and hx DVT on anticoagulation, and DAVIS on CPAP. She also has chronic thromboembolic pulmonary arterial hypertension. She was initially placed on riociguat, and then referred to [facility] for surgical evaluation. Ultimately, she underwent pulmonary thromboendarterectomy at [facility] on July 14, 2020.  She has type IV disease and her PTE was somewhat difficult to the distal nature of her disease. Her post operative course was reportedly uncomplicated, and postoperative hemodynamics showed an PA pressure of 37/12 with a mean of 21 mmHg.  Her cardiac output was 6.16 L. Echo showed mild right ventricular enlargement and mildly reduced right ventricular systolic pressure.  Her estimated PA pressure based on echocardiogram was 38 mmHg.  Her VQ scan score showed improved perfusion of the right lower lobe and left upper lobe. Her riociguat and her digoxin were discontinued at that time. Post surgically she had struggled with swelling and weight gain requiring escalation of diuretics. She then underwent BPA in January and March of this year, of the right A8 branch and right A3 branch.     Yuridia was seen last in clinic by Dr. Paniagua in early May at which time she reported feeling better and exercise capacity had improved. She was no longer using supplemental oxygen with exertion. It was recommended she undergo a repeat echocardiogram,  "which she had performed a few weeks later. This showed mildly dilated RV with mildly reduced function, and Dr. Paniagua then recommended addition of tadalafil. He also recommended she continue with supplemental oxygen at 2LPM with exertion and exercise, as a six minute walk showed continued desaturation with exertion.    When I met with Yuridia in early August, she felt her breathing was actually a little worse. Notably, she has gained nearly 30 lbs since May, which she thought was due to a lot of  \"emotional eating\" a lot and not making good dietary choices. She had recently started seeing a nutritionist. She was having occasional dizzy spells but had seen a neurologist and had an MRI performed which was unrevealing. Otherwise she was doing relatively well. Thus, I increased her tadalafil dose from 20mg to 40mg daily.     Today, we are doing a virtual visit to follow up on her progress. She tells me that she is doing ok on full dose 40mg daily of tadalafil, though does have some more diarrhea at times. No other concerns. Breathing is about the same. 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 On the days she doesn't take it, she notes her legs are tighter, but says her breathing hasn't been an issue. She is also still working with a nutritionist to curb her eating habits. She continues to wear her oxygen only PRN but says her oxygen sats when she occasionally checks them, are running 92-93%. She has occasional episodes where the \"room spins\" but says this is quite infrequent.        There were no new labs done prior to our visit today. Labs from early Aug reviewed as below.      CURRENT PULMONARY HYPERTENSION REGIMEN:     PAH Rx: tadalafil 40mg daily  (previously on " "riociguat, was discontinued post PTE)      Diuretics: Torsemide 80mg once daily, spironolactone 25mg BID     Oxygen: RA rest, 2.5L exertion (wearing only once in a while)     Anticoagulation: warfarin (follows chrom factor goal 20-30)--needs to be bridged with Arixtra given her heparin allergy if she needs procedures.  Indication: DVT/antiphospholipid        Assessment/Plan:     1. Chronic thromboembolic pulmonary hypertension.              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 She is now s/p pulmonary  thromboendarterectomy mid July 2020 at [facility], and BPA in January and March of this year, of the right A8 branch and right A3 branch.               --Repeat echo in May showed continued mild RV dysfunction, with some evidence of ongoing desaturation with exertion. She was started on tadalafil (was hesitant to go back on riociguat due to TID, and last visit we increased to full 40mg daily dosing. She is doing reasonably well with this currently.              --She has gained 30 lbs over the last few months, and admits to doing a lot of \"emotional eating\" and has started seeing a nutritionist. 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 She tells me this job will be ending soon, so she plans to resume her usual torsemide 80mg daily, and continue spironolactone 25mg BID unchanged. I asked her to continue to monitor this closely, and work on calorie restriction.              --She has not reliably been using her oxygen; I encouraged her to use 2L with any exertion, though she relays sats on her checks are usually >90%. Will plan repeat SST this fall.               --She is compliant with her CPAP which I encouraged her to continue.              --Continue Coumadin for " anticoagulation, in the setting of CTEPH and antiphospholipid antibody syndrome. She normally follows chromogenic factor goal of 20-30. She needs to be bridged with Arixtra given her heparin allergy if she needs procedures.       Follow up plan: I will meet with her again virtually in ~2 months, or sooner if needed. Tentative plan to then have her return a few months later with repeat echo and six minute walk to see Dr. Paniagua.       Testing/labs:    Most recent labs:   *Results for JEREMY BOATENG (MRN [id number]) as of 9/5/2021 15:45   Ref. Range 8/5/2021 10:33   Sodium Latest Ref Range: 133 - 144 mmol/L 140   Potassium Latest Ref Range: 3.4 - 5.3 mmol/L 4.0   Chloride Latest Ref Range: 94 - 109 mmol/L 108   Carbon Dioxide Latest Ref Range: 20 - 32 mmol/L 26   Urea Nitrogen Latest Ref Range: 7 - 30 mg/dL 27   Creatinine Latest Ref Range: 0.52 - 1.04 mg/dL 1.15 (H)   GFR Estimate Latest Ref Range: >60 mL/min/1.73m2 56 (L)   Calcium Latest Ref Range: 8.5 - 10.1 mg/dL 9.2   Anion Gap Latest Ref Range: 3 - 14 mmol/L 6   N-Terminal Pro Bnp Latest Ref Range: 0 - 125 pg/mL 412 (H)   Glucose Latest Ref Range: 70 - 99 mg/dL 97   WBC Latest Ref Range: 4.0 - 11.0 10e3/uL 3.9 (L)   Hemoglobin Latest Ref Range: 11.7 - 15.7 g/dL 13.2   Hematocrit Latest Ref Range: 35.0 - 47.0 % 39.3   Platelet Count Latest Ref Range: 150 - 450 10e3/uL 235         Other recent pertinent testing:  Echo 5/14/2021  Interpretation Summary  Left ventricular function is normal.The EF is 55-60%.  Mild right ventricular dilation is present. Global right ventricular function  is mildly reduced.  Mild tricuspid insufficiency is present.  Pulmonary hypertension is present. Estimated pulmonary artery systolic  pressure is 43 mmHg.  The inferior vena cava was normal in size with preserved respiratory  variability.  No pericardial effusion is present        RHC (03/2021)  RA 13  RV 51/13  PA 51/17 (32)  PCWP 13  PA% 58  Estimated Terry CO/CI 4.3/1.92  PVR  by Terry 4.42  PVR 2.52 by TD on her last hemodynamic assessment  No TD CO done during this time.         NYHA Functional Class:  Functional class 2-3    1--No limitation of activity  2--Slight limitation, ordinary activities may cause symptoms  3--Marked limitation, less than ordinary activities cause symptoms  4--Severe limitation, minimal activity causes symptoms, or symptoms at rest      Video-Visit Details    Type of service:  Video Visit    Video Start Time: 0905  Video End Time: 0915    An additional 15 minutes was spent today performing chart and history review, pre and post visit documentation, and care coordination.      Originating Location (pt. Location): Home    Distant Location (provider location):  [facility]    Platform used for Video Visit: Kaitlyn Silva PA-C  [facility]  Pager [phone]

## 2021-09-07 NOTE — NURSING NOTE
"Yuridia is a 50 year old who is being evaluated via a billable video visit.      How would you like to obtain your AVS? MyChart  If the video visit is dropped, the invitation should be resent by: Text to cell phone: [phone]  Will anyone else be joining your video visit? No   Vitals - Patient Reported  Weight (Patient Reported): 129.3 kg (285 lb)  Height (Patient Reported): 167.6 cm (5' 6\")  BMI (Based on Pt Reported Ht/Wt): 46  Pain Score: No Pain (0) (No SOB)      Vitals were taken and medications were reconciled.   Pierre Yanez, EMT  8:46 AM      "

## 2021-09-07 NOTE — NURSING NOTE
"No changes today, please place orders for 2 month follow up with me virtually again.   BMP/BNP near her home prior to that visit please.     Orders placed and patient marked \"ready for checkout.\" Micheline Chapa RN on 9/7/2021 at 9:32 AM    "

## 2021-09-07 NOTE — PATIENT INSTRUCTIONS
Thank you for visiting the Pulmonary Hypertension Clinic today.      Today we discussed:     No medication changes today.  When you can, be sure to resume your daily dosing of your water pills.   Keep watching your weight and swelling closely and call with concerns.      Medication Changes:   None      Follow up Appointment Information:  Return in 2 months to see Azucena via virtual visit, call sooner with concerns.  Tentative plan is to have you return to clinic a few months after that visit to see Dr Paniagua with repeat echocardiogram and walk test.       Additional Instructions:    1. Continue staying active and eat a heart healthy, low sodium diet.     2. Please keep current list of medications with you at all times.     3. Remember to weigh yourself daily after voiding and write it down on a log. If you have gained/lost 2 pounds overnight or 5 pounds in a week contact us for medication adjustments or further instructions.    4. Please call us immediately if you have syncope (fainting or passing out), chest pain, worsening edema (swelling or weight gain), or general worsening in how you are feeling.         ---------------------------------------------------------------------------------------------------------------    If you have questions or concerns please contact us at:        Yoly Villegas RN, BSN, PHN  Deneen Mccann  (Schedule,Prior Auth)  Nurse Coordinator     Clinic   Pulmonary Hypertension   Pulmonary Hypertension  [facility]             [facility]  (P)[phone]    (P) [phone]        (F) [phone]      ** Please note that you will NOT receive a reminder call regarding your scheduled testing, reminder calls are for provider appointments only.  If you are scheduled for testing within the [facility] system you may receive a call regarding pre-registration for billing purposes only.**      ------------------------------------------------------------------------------------------------------------    Interested in joining a support group?    Pulmonary Hypertension Association  [email or web address].phassociation.org/  **Look at the Events Tab** They even have Support Groups that you can call into    [facility] Support Group  Second Saturday of the Month from 1-3 PM   Location: [address] [place] [id number]  Leader: Marika Vila  Phone: [phone] or [phone]  Email: [email or web address]

## 2021-09-10 ENCOUNTER — TELEPHONE (OUTPATIENT)
Dept: CARDIOLOGY | Facility: CLINIC | Age: 51
End: 2021-09-10

## 2021-09-10 NOTE — TELEPHONE ENCOUNTER
----- Message from Micheline Chapa RN sent at 9/7/2021  9:31 AM CDT -----  Regarding: FW: vv update    ----- Message -----  From: Azucena Silva PA  Sent: 9/7/2021   9:16 AM CDT  To: Cardiology Ph Nurse-  Subject: vv update                                        Doing well, weight is up but skipping some diuretic doses as her current job has her walking in the park for several hours at a time. This will be ending soon so she will get back on her regular regimen.    No changes today, please place orders for 2 month follow up with me virtually again.   BMP/BNP near her home prior to that visit please.    Jose Zeng  ----------------------------------  Verified orders were in and faxed lab orders to [facility]. Yoly Villegas RN on 9/10/2021 at 3:56 PM

## 2021-09-25 ENCOUNTER — HEALTH MAINTENANCE LETTER (OUTPATIENT)
Age: 51
End: 2021-09-25

## 2021-11-23 DIAGNOSIS — R06.09 DOE (DYSPNEA ON EXERTION): ICD-10-CM

## 2021-11-23 DIAGNOSIS — I27.24 CTEPH (CHRONIC THROMBOEMBOLIC PULMONARY HYPERTENSION) (H): ICD-10-CM

## 2021-11-26 RX ORDER — TORSEMIDE 20 MG/1
80 TABLET ORAL DAILY
Qty: 360 TABLET | Refills: 2 | Status: SHIPPED | OUTPATIENT
Start: 2021-11-26 | End: 2022-12-20

## 2021-12-02 ENCOUNTER — TRANSFERRED RECORDS (OUTPATIENT)
Dept: HEALTH INFORMATION MANAGEMENT | Facility: CLINIC | Age: 51
End: 2021-12-02
Payer: COMMERCIAL

## 2021-12-20 NOTE — PROGRESS NOTES
Pt discharged at this time post pulmonary angiogram and right heart cath. No complaints of pain or dizziness when ambulating. Right groin site appears clean, dry, intact, and soft. Discharge teaching completed with all questions answered and copy sent with patient. Tolerates PO intake well.    (1) Other Diagnosis

## 2022-01-15 ENCOUNTER — HEALTH MAINTENANCE LETTER (OUTPATIENT)
Age: 52
End: 2022-01-15

## 2022-01-26 ASSESSMENT — ENCOUNTER SYMPTOMS
WEIGHT GAIN: 1
BACK PAIN: 0
EYE WATERING: 0
DIFFICULTY URINATING: 1
DIARRHEA: 1
INCREASED ENERGY: 0
DECREASED APPETITE: 0
STIFFNESS: 0
SPUTUM PRODUCTION: 1
NERVOUS/ANXIOUS: 1
ALTERED TEMPERATURE REGULATION: 1
EYE PAIN: 0
COUGH DISTURBING SLEEP: 0
EYE REDNESS: 1
DEPRESSION: 1
HEARTBURN: 0
SNORES LOUDLY: 1
POSTURAL DYSPNEA: 0
ABDOMINAL PAIN: 0
DOUBLE VISION: 1
JOINT SWELLING: 1
PANIC: 0
BRUISES/BLEEDS EASILY: 1
DYSURIA: 0
FEVER: 0
ARTHRALGIAS: 1
MUSCLE WEAKNESS: 0
INSOMNIA: 1
BLOATING: 1
BLOOD IN STOOL: 1
CONSTIPATION: 0
SHORTNESS OF BREATH: 1
POLYDIPSIA: 1
MYALGIAS: 1
HALLUCINATIONS: 0
VOMITING: 0
WEIGHT LOSS: 0
SWOLLEN GLANDS: 0
FLANK PAIN: 0
COUGH: 1
NAUSEA: 0
HEMOPTYSIS: 0
CHILLS: 1
EYE IRRITATION: 0
DYSPNEA ON EXERTION: 1
NIGHT SWEATS: 0
MUSCLE CRAMPS: 0
WHEEZING: 0
BOWEL INCONTINENCE: 0
NECK PAIN: 0
RECTAL PAIN: 0
FATIGUE: 1
JAUNDICE: 0
HEMATURIA: 0
DECREASED CONCENTRATION: 1

## 2022-01-27 ENCOUNTER — OFFICE VISIT (OUTPATIENT)
Dept: CARDIOLOGY | Facility: CLINIC | Age: 52
End: 2022-01-27
Attending: PHYSICIAN ASSISTANT
Payer: COMMERCIAL

## 2022-01-27 ENCOUNTER — LAB (OUTPATIENT)
Dept: LAB | Facility: CLINIC | Age: 52
End: 2022-01-27
Attending: PHYSICIAN ASSISTANT
Payer: COMMERCIAL

## 2022-01-27 VITALS
DIASTOLIC BLOOD PRESSURE: 78 MMHG | HEART RATE: 72 BPM | OXYGEN SATURATION: 98 % | BODY MASS INDEX: 46.81 KG/M2 | SYSTOLIC BLOOD PRESSURE: 114 MMHG | WEIGHT: 290 LBS

## 2022-01-27 DIAGNOSIS — R06.09 DYSPNEA ON EXERTION: ICD-10-CM

## 2022-01-27 DIAGNOSIS — I27.24 CTEPH (CHRONIC THROMBOEMBOLIC PULMONARY HYPERTENSION) (H): ICD-10-CM

## 2022-01-27 DIAGNOSIS — R06.02 SOB (SHORTNESS OF BREATH): ICD-10-CM

## 2022-01-27 DIAGNOSIS — I27.20 PULMONARY HYPERTENSION (H): ICD-10-CM

## 2022-01-27 LAB
ANION GAP SERPL CALCULATED.3IONS-SCNC: 8 MMOL/L (ref 3–14)
BUN SERPL-MCNC: 18 MG/DL (ref 7–30)
CALCIUM SERPL-MCNC: 8.8 MG/DL (ref 8.5–10.1)
CHLORIDE BLD-SCNC: 105 MMOL/L (ref 94–109)
CO2 SERPL-SCNC: 28 MMOL/L (ref 20–32)
CREAT SERPL-MCNC: 0.99 MG/DL (ref 0.52–1.04)
ERYTHROCYTE [DISTWIDTH] IN BLOOD BY AUTOMATED COUNT: 12.7 % (ref 10–15)
GFR SERPL CREATININE-BSD FRML MDRD: 70 ML/MIN/1.73M2
GLUCOSE BLD-MCNC: 96 MG/DL (ref 70–99)
HCT VFR BLD AUTO: 42.7 % (ref 35–47)
HGB BLD-MCNC: 14 G/DL (ref 11.7–15.7)
MCH RBC QN AUTO: 29.7 PG (ref 26.5–33)
MCHC RBC AUTO-ENTMCNC: 32.8 G/DL (ref 31.5–36.5)
MCV RBC AUTO: 91 FL (ref 78–100)
NT-PROBNP SERPL-MCNC: 539 PG/ML (ref 0–125)
PLATELET # BLD AUTO: 218 10E3/UL (ref 150–450)
POTASSIUM BLD-SCNC: 3.5 MMOL/L (ref 3.4–5.3)
RBC # BLD AUTO: 4.71 10E6/UL (ref 3.8–5.2)
SODIUM SERPL-SCNC: 141 MMOL/L (ref 133–144)
WBC # BLD AUTO: 4.8 10E3/UL (ref 4–11)

## 2022-01-27 PROCEDURE — 99214 OFFICE O/P EST MOD 30 MIN: CPT | Performed by: PHYSICIAN ASSISTANT

## 2022-01-27 PROCEDURE — 85027 COMPLETE CBC AUTOMATED: CPT | Performed by: PATHOLOGY

## 2022-01-27 PROCEDURE — G0463 HOSPITAL OUTPT CLINIC VISIT: HCPCS

## 2022-01-27 PROCEDURE — 36415 COLL VENOUS BLD VENIPUNCTURE: CPT | Performed by: PATHOLOGY

## 2022-01-27 PROCEDURE — 83880 ASSAY OF NATRIURETIC PEPTIDE: CPT | Performed by: PATHOLOGY

## 2022-01-27 PROCEDURE — 80048 BASIC METABOLIC PNL TOTAL CA: CPT | Performed by: PATHOLOGY

## 2022-01-27 RX ORDER — ARIPIPRAZOLE 5 MG/1
5 TABLET ORAL DAILY
COMMUNITY
Start: 2021-09-24

## 2022-01-27 RX ORDER — ACETAMINOPHEN 500 MG
1000 TABLET ORAL
COMMUNITY
Start: 2021-11-08 | End: 2023-10-30

## 2022-01-27 ASSESSMENT — PAIN SCALES - GENERAL: PAINLEVEL: NO PAIN (0)

## 2022-01-27 NOTE — NURSING NOTE
Chief Complaint   Patient presents with     Follow Up     2 month PH follow up    Vitals were taken and medications reconciled.    Mario Ellington, EMT  11:04 AM

## 2022-01-27 NOTE — LETTER
1/27/2022      RE: Yuridia Barksdale  [address] [facility] [address]       Dear Colleague,    Thank you for the opportunity to participate in the care of your patient, Yuridia Barksdale, at the [facility] [place] [facility]. Please see a copy of my visit note below.        Date of Service: 01/27/22      [facility] Pulmonary Hypertension Clinic      Primary PH cardiologist:  Dr. Paniagua     HPI:  Ms. Barksdale is a very pleasant 51 year old female with a PMhx including SLE with lupus antiphospholipid antibody syndrome and hx DVT on anticoagulation, and DAVIS on CPAP. She also has chronic thromboembolic pulmonary arterial hypertension. She was initially placed on riociguat, and then referred to [facility] for surgical evaluation. Ultimately, she underwent pulmonary thromboendarterectomy at [facility] in July 2020. She has type IV disease and her PTE was somewhat difficult to the distal nature of her disease. Her post operative course was reportedly uncomplicated, and postoperative hemodynamics showed an PA pressure of 37/12 with a mean of 21 mmHg.  Her cardiac output was 6.16 L. Echo showed mild right ventricular enlargement and mildly reduced right ventricular systolic pressure.  Her estimated PA pressure based on echocardiogram was 38 mmHg.  Her VQ scan score showed improved perfusion of the right lower lobe and left upper lobe. Her riociguat and her digoxin were discontinued at that time. Post surgically she had struggled with swelling and weight gain requiring escalation of diuretics. She then underwent BPA in January and March of this year, of the right A8 branch and right A3 branch.    Yuridia was seen back in clinic by Dr. Paniagua in May of 2021 at which time she reported feeling better and exercise capacity had improved. She then had a repeat echocardiogram, which showed mildly dilated RV with mildly reduced  "function, and Dr. Paniagua then recommended addition of tadalafil. He also recommended she continue with supplemental oxygen at 2LPM with exertion and exercise, as a six minute walk showed continued desaturation with exertion. When I met back with her last virtually in mid Sept her breathing had worsened slightly though she had gained nearly 30 lbs from what she attributed to \"emotional eating. \" She had occasional lightheaded spells and saw a neurologist, though MRI was unrevealing. At that visit I made no changes.    Today, I am meeting back with Yuridia in clinic for follow up. Unfortunately, her weight has still been up, now at 290#. She does not think she is holding onto fluid and instead thinks it is caloric. She is meeting with a nutritionist periodically and has recently started exercising daily. She joined the gym and does the treadmill, bike, or elliptical machine. She does wear her oxygen when she exercises. Overall, it sounds as if her breathing is unchanged, though she still does have brief dizzy spells. She denies presyncope/syncope.     Labs performed prior to our visit today were reviewed: Na 141, K 3.5, BUN 28, Scr 0.99, NT-proBNP 539. WBC 4.8, hemoglobin 14.0, hematocrit 42.7, plt 218.      CURRENT PULMONARY HYPERTENSION REGIMEN:     PAH Rx: tadalafil 40mg daily  (previously on riociguat, stopped post PTE)      Diuretics: Torsemide 80mg once daily, spironolactone 25mg BID     Oxygen: RA rest, 2.5L with formal exercise     Anticoagulation: warfarin (follows chrom factor goal 20-30)--needs to be bridged with Arixtra given her heparin allergy if she needs procedures.  Indication: DVT/antiphospholipid        Assessment/Plan:     1. Chronic thromboembolic pulmonary hypertension.              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 She is now s/p pulmonary  thromboendarterectomy July 2020 at [facility], and riociguat was discontinued. She then had BPA " in January and March of this year here at [facility], of the right A8 branch and right A3 branch.               --Repeat echo in May of 2021 showed continued mild RV dysfunction, with some evidence of ongoing desaturation with exertion. Tadalafil was then added at 40mg daily. Clinically, things sound to be stable from a breathing standpoint.             --Unfortunately, she has gained 30 lbs over the last few months, which sounds to be caloric. She has started seeing a nutritionist and has rejoined her gym and trying to exercise daily. NT-proBNP is mildly elevated and overall not significantly changed. Will continue torsemide 80mg daily, and spironolactone 25mg BID unchanged. I asked her to continue to monitor this closely, and also work on calorie restriction.              --She is using her oxygen with exercise daily, which I encouraged her to continue.               --She is compliant with her CPAP for known DAVIS.               --Continue Coumadin for anticoagulation, in the setting of CTEPH and antiphospholipid antibody syndrome. She normally follows chromogenic factor goal of 20-30. She needs to be bridged with Arixtra given her heparin allergy if she needs procedures.        Follow up plan: Return in 3 months with repeat echo, labs, and six minute walk to see Dr. Paniagua. Will also d/w with him whether he woud like RHC at that time as well.         Orders this Visit:  Orders Placed This Encounter   Procedures     Comprehensive Metabolic Panel     N terminal pro BNP outpatient     CBC with platelets     Follow-Up with Cardiology     Echocardiogram Complete     6 minute walk test (O2 Titration)     Orders Placed This Encounter   Medications     ARIPiprazole (ABILIFY) 5 MG tablet     acetaminophen (TYLENOL) 500 MG tablet     Sig: Take 1,000 mg by mouth     There are no discontinued medications.        CURRENT MEDICATIONS:  Current Outpatient Medications   Medication Sig Dispense Refill     acetaminophen  (TYLENOL) 500 MG tablet Take 1,000 mg by mouth       atorvastatin (LIPITOR) 10 MG tablet Take 1 tablet (10 mg) by mouth daily       clonazePAM (KLONOPIN) 1 MG tablet Take 1 tablet (1 mg) by mouth daily as needed for anxiety (Patient taking differently: Take 1 mg by mouth At Bedtime )       hydroxychloroquine (PLAQUENIL) 200 MG tablet Take 1 tablet (200 mg) by mouth 2 times daily       sertraline (ZOLOFT) 100 MG tablet Take 2 tablets (200 mg) by mouth daily       spironolactone (ALDACTONE) 25 MG tablet Take 1 tablet (25 mg) by mouth 2 times daily 180 tablet 3     tadalafil, PAH, 20 MG TABS Take 2 tablets (40 mg) by mouth daily 180 tablet 3     torsemide (DEMADEX) 20 MG tablet Take 4 tablets (80 mg) by mouth daily 360 tablet 2     traZODone (DESYREL) 50 MG tablet Take 2 tablets (100 mg) by mouth At Bedtime       warfarin ANTICOAGULANT (COUMADIN ANTICOAGULANT) 1 MG tablet Take 5 tablets (5 mg) by mouth daily On 1/21/2021 and 1/22/2021. Have your chromogenic factor checked on 1/23/2021. (Patient taking differently: Take 5 mg by mouth daily 5 mg on Monday and Friday, 4 mg on TuWeThSaSu. CFX goal 20-30%) 30 tablet      ARIPiprazole (ABILIFY) 5 MG tablet          ALLERGIES     Allergies   Allergen Reactions     Lovenox [Enoxaparin]      Heparin allergy, do not use this.  Use Arixtra instead     Heparin Other (See Comments)     thrombocytopenia     Clindamycin Rash     Levaquin Rash     Vancomycin Rash       PAST MEDICAL HISTORY:  Past Medical History:   Diagnosis Date     Acute blood loss anemia 8/2014    needed blood transfusion     DVT (deep venous thrombosis) (H)      Lupus (H)      Pneumonia 12/2013    hospitalized x8days         Review of Systems:  Cardiovascular: negative for chest pain, palpitations, orthopnea, pos LE edema, stable.  Constitutional: negative for chills, sweats, fevers   Resp: Negative for dyspnea at rest, pos dyspnea on exertion, neg cough, known chronic lung disease  HEENT: Pos some blurred/double  vision. Neg frequent headaches  Gastrointestinal: negative for abdominal pain, diarrhea, blood in stool, nausea, vomiting  Hematologic/lymphatic: pos for current systemic anticoagulation, hx of blood clots  Neurological: negative for focal weakness, LOC, seizures, syncope/presyncope. Pos occasional LH spells.      Physical Exam:  Vitals: /78 (BP Location: Right arm, Patient Position: Sitting, Cuff Size: Adult Large)   Pulse 72   Wt 131.5 kg (290 lb)   LMP 04/04/2012   SpO2 98%   BMI 46.81 kg/m     Wt Readings from Last 4 Encounters:   01/27/22 131.5 kg (290 lb)   08/05/21 127.9 kg (282 lb)   05/03/21 117 kg (257 lb 14.4 oz)   03/13/21 117.8 kg (259 lb 9.6 oz)       GEN:  In general, this is an obese  female in no acute distress on room air.  Patient ambulatory, unaccompanied.   HEENT:  Pupils grossly equal, sclerae nonicteric.   NECK: Supple, trachea midline. Thick neck, no JVD noted while upright.  C/V:  Regular rate and rhythm, no murmur, rub or gallop. No S3 or RV heave.   RESP: Respirations are unlabored. No use of accessory muscles. Clear to auscultation bilaterally without wheezing, rales, or rhonchi.  GI: Abdomen soft, nontender, nondistended.   EXTREM: Mild bilateral nonpitting LE edema. No cyanosis or clubbing.  NEURO: Alert and oriented, cooperative. Gait not formally assessed. No obvious focal deficits.   SKIN: Warm and dry.       Recent Lab Results:  LIVER ENZYME RESULTS:  Lab Results   Component Value Date    AST 19 05/03/2021    ALT 23 05/03/2021       CBC RESULTS:  Lab Results   Component Value Date    WBC 4.8 01/27/2022    WBC 4.8 05/03/2021    RBC 4.71 01/27/2022    RBC 4.46 05/03/2021    HGB 14.0 01/27/2022    HGB 13.5 05/03/2021    HCT 42.7 01/27/2022    HCT 40.8 05/03/2021    MCV 91 01/27/2022    MCV 92 05/03/2021    MCH 29.7 01/27/2022    MCH 30.3 05/03/2021    MCHC 32.8 01/27/2022    MCHC 33.1 05/03/2021    RDW 12.7 01/27/2022    RDW 12.6 05/03/2021     01/27/2022      05/03/2021       BMP RESULTS:  Lab Results   Component Value Date     01/27/2022     05/03/2021    POTASSIUM 3.5 01/27/2022    POTASSIUM 3.5 05/03/2021    CHLORIDE 105 01/27/2022    CHLORIDE 106 05/03/2021    CO2 28 01/27/2022    CO2 28 05/03/2021    ANIONGAP 8 01/27/2022    ANIONGAP 8 05/03/2021    GLC 96 01/27/2022    GLC 96 05/03/2021    BUN 18 01/27/2022    BUN 23 05/03/2021    CR 0.99 01/27/2022    CR 1.14 (H) 05/03/2021    GFRESTIMATED 70 01/27/2022    GFRESTIMATED 57 (L) 05/03/2021    GFRESTBLACK 66 05/03/2021    KATARINA 8.8 01/27/2022    KATARINA 9.0 05/03/2021          Recent Labs   Lab Test 01/27/22  1040 08/05/21  1033 05/03/21  1153 03/12/21  0434   NTBNPI  --   --   --  473*   NTBNP 539* 412* 585*  --          Most recent testing:    Echo 5/14/2021  Interpretation Summary  Left ventricular function is normal.The EF is 55-60%.  Mild right ventricular dilation is present. Global right ventricular function  is mildly reduced.  Mild tricuspid insufficiency is present.  Pulmonary hypertension is present. Estimated pulmonary artery systolic  pressure is 43 mmHg.  The inferior vena cava was normal in size with preserved respiratory  variability.  No pericardial effusion is present      RHC (03/2021)  RA 13  RV 51/13  PA 51/17 (32)  PCWP 13  PA% 58  Estimated Terry CO/CI 4.3/1.92  PVR by Terry 4.42  PVR 2.52 by TD on her last hemodynamic assessment  No TD CO done during this time.    NYHA Functional Class:  Functional class 3      30 total minutes was spent today including chart review, precharting, history and exam, post visit documentation, and reviewing studies as outlined above.       Azucena Silva PA-C  [facility]  Pager [phone]              Please do not hesitate to contact me if you have any questions/concerns.     Sincerely,     CEDRICK Zamarripa

## 2022-01-27 NOTE — PROGRESS NOTES
Date of Service: 01/27/22      [facility] Pulmonary Hypertension Clinic      Primary PH cardiologist:  Dr. Paniagua     HPI:  Ms. Barksdale is a very pleasant 51 year old female with a PMhx including SLE with lupus antiphospholipid antibody syndrome and hx DVT on anticoagulation, and DAVIS on CPAP. She also has chronic thromboembolic pulmonary arterial hypertension. She was initially placed on riociguat, and then referred to [facility] for surgical evaluation. Ultimately, she underwent pulmonary thromboendarterectomy at [facility] in July 2020. She has type IV disease and her PTE was somewhat difficult to the distal nature of her disease. Her post operative course was reportedly uncomplicated, and postoperative hemodynamics showed an PA pressure of 37/12 with a mean of 21 mmHg.  Her cardiac output was 6.16 L. Echo showed mild right ventricular enlargement and mildly reduced right ventricular systolic pressure.  Her estimated PA pressure based on echocardiogram was 38 mmHg.  Her VQ scan score showed improved perfusion of the right lower lobe and left upper lobe. Her riociguat and her digoxin were discontinued at that time. Post surgically she had struggled with swelling and weight gain requiring escalation of diuretics. She then underwent BPA in January and March of this year, of the right A8 branch and right A3 branch.    Yuridia was seen back in clinic by Dr. Paniagua in May of 2021 at which time she reported feeling better and exercise capacity had improved. She then had a repeat echocardiogram, which showed mildly dilated RV with mildly reduced function, and Dr. Paniagua then recommended addition of tadalafil. He also recommended she continue with supplemental oxygen at 2LPM with exertion and exercise, as a six minute walk showed continued desaturation with exertion. When I met back with her last virtually in mid Sept her breathing had worsened slightly though she had gained nearly  "30 lbs from what she attributed to \"emotional eating. \" She had occasional lightheaded spells and saw a neurologist, though MRI was unrevealing. At that visit I made no changes.    Today, I am meeting back with Yuridia in clinic for follow up. Unfortunately, her weight has still been up, now at 290#. She does not think she is holding onto fluid and instead thinks it is caloric. She is meeting with a nutritionist periodically and has recently started exercising daily. She joined the gym and does the treadmill, bike, or elliptical machine. She does wear her oxygen when she exercises. Overall, it sounds as if her breathing is unchanged, though she still does have brief dizzy spells. She denies presyncope/syncope.     Labs performed prior to our visit today were reviewed: Na 141, K 3.5, BUN 28, Scr 0.99, NT-proBNP 539. WBC 4.8, hemoglobin 14.0, hematocrit 42.7, plt 218.      CURRENT PULMONARY HYPERTENSION REGIMEN:     PAH Rx: tadalafil 40mg daily  (previously on riociguat, stopped post PTE)      Diuretics: Torsemide 80mg once daily, spironolactone 25mg BID     Oxygen: RA rest, 2.5L with formal exercise     Anticoagulation: warfarin (follows chrom factor goal 20-30)--needs to be bridged with Arixtra given her heparin allergy if she needs procedures.  Indication: DVT/antiphospholipid        Assessment/Plan:     1. Chronic thromboembolic pulmonary hypertension.              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 She is now s/p pulmonary  thromboendarterectomy July 2020 at [facility], and riociguat was discontinued. She then had BPA in January and March of this year here at [facility], of the right A8 branch and right A3 branch.               --Repeat echo in May of 2021 showed continued mild RV dysfunction, with some evidence of ongoing desaturation with exertion. Tadalafil was then added at 40mg daily. Clinically, things sound to be stable from a breathing " standpoint.             --Unfortunately, she has gained 30 lbs over the last few months, which sounds to be caloric. She has started seeing a nutritionist and has rejoined her gym and trying to exercise daily. NT-proBNP is mildly elevated and overall not significantly changed. Will continue torsemide 80mg daily, and spironolactone 25mg BID unchanged. I asked her to continue to monitor this closely, and also work on calorie restriction.              --She is using her oxygen with exercise daily, which I encouraged her to continue.               --She is compliant with her CPAP for known DAVIS.               --Continue Coumadin for anticoagulation, in the setting of CTEPH and antiphospholipid antibody syndrome. She normally follows chromogenic factor goal of 20-30. She needs to be bridged with Arixtra given her heparin allergy if she needs procedures.        Follow up plan: Return in 3 months with repeat echo, labs, and six minute walk to see Dr. Paniagua. Will also d/w with him whether he woud like RHC at that time as well.         Orders this Visit:  Orders Placed This Encounter   Procedures     Comprehensive Metabolic Panel     N terminal pro BNP outpatient     CBC with platelets     Follow-Up with Cardiology     Echocardiogram Complete     6 minute walk test (O2 Titration)     Orders Placed This Encounter   Medications     ARIPiprazole (ABILIFY) 5 MG tablet     acetaminophen (TYLENOL) 500 MG tablet     Sig: Take 1,000 mg by mouth     There are no discontinued medications.        CURRENT MEDICATIONS:  Current Outpatient Medications   Medication Sig Dispense Refill     acetaminophen (TYLENOL) 500 MG tablet Take 1,000 mg by mouth       atorvastatin (LIPITOR) 10 MG tablet Take 1 tablet (10 mg) by mouth daily       clonazePAM (KLONOPIN) 1 MG tablet Take 1 tablet (1 mg) by mouth daily as needed for anxiety (Patient taking differently: Take 1 mg by mouth At Bedtime )       hydroxychloroquine (PLAQUENIL) 200 MG tablet Take  1 tablet (200 mg) by mouth 2 times daily       sertraline (ZOLOFT) 100 MG tablet Take 2 tablets (200 mg) by mouth daily       spironolactone (ALDACTONE) 25 MG tablet Take 1 tablet (25 mg) by mouth 2 times daily 180 tablet 3     tadalafil, PAH, 20 MG TABS Take 2 tablets (40 mg) by mouth daily 180 tablet 3     torsemide (DEMADEX) 20 MG tablet Take 4 tablets (80 mg) by mouth daily 360 tablet 2     traZODone (DESYREL) 50 MG tablet Take 2 tablets (100 mg) by mouth At Bedtime       warfarin ANTICOAGULANT (COUMADIN ANTICOAGULANT) 1 MG tablet Take 5 tablets (5 mg) by mouth daily On 1/21/2021 and 1/22/2021. Have your chromogenic factor checked on 1/23/2021. (Patient taking differently: Take 5 mg by mouth daily 5 mg on Monday and Friday, 4 mg on TuWeThSaSu. CFX goal 20-30%) 30 tablet      ARIPiprazole (ABILIFY) 5 MG tablet          ALLERGIES     Allergies   Allergen Reactions     Lovenox [Enoxaparin]      Heparin allergy, do not use this.  Use Arixtra instead     Heparin Other (See Comments)     thrombocytopenia     Clindamycin Rash     Levaquin Rash     Vancomycin Rash       PAST MEDICAL HISTORY:  Past Medical History:   Diagnosis Date     Acute blood loss anemia 8/2014    needed blood transfusion     DVT (deep venous thrombosis) (H)      Lupus (H)      Pneumonia 12/2013    hospitalized x8days         Review of Systems:  Cardiovascular: negative for chest pain, palpitations, orthopnea, pos LE edema, stable.  Constitutional: negative for chills, sweats, fevers   Resp: Negative for dyspnea at rest, pos dyspnea on exertion, neg cough, known chronic lung disease  HEENT: Pos some blurred/double vision. Neg frequent headaches  Gastrointestinal: negative for abdominal pain, diarrhea, blood in stool, nausea, vomiting  Hematologic/lymphatic: pos for current systemic anticoagulation, hx of blood clots  Neurological: negative for focal weakness, LOC, seizures, syncope/presyncope. Pos occasional LH spells.      Physical Exam:  Vitals:  /78 (BP Location: Right arm, Patient Position: Sitting, Cuff Size: Adult Large)   Pulse 72   Wt 131.5 kg (290 lb)   LMP 04/04/2012   SpO2 98%   BMI 46.81 kg/m     Wt Readings from Last 4 Encounters:   01/27/22 131.5 kg (290 lb)   08/05/21 127.9 kg (282 lb)   05/03/21 117 kg (257 lb 14.4 oz)   03/13/21 117.8 kg (259 lb 9.6 oz)       GEN:  In general, this is an obese  female in no acute distress on room air.  Patient ambulatory, unaccompanied.   HEENT:  Pupils grossly equal, sclerae nonicteric.   NECK: Supple, trachea midline. Thick neck, no JVD noted while upright.  C/V:  Regular rate and rhythm, no murmur, rub or gallop. No S3 or RV heave.   RESP: Respirations are unlabored. No use of accessory muscles. Clear to auscultation bilaterally without wheezing, rales, or rhonchi.  GI: Abdomen soft, nontender, nondistended.   EXTREM: Mild bilateral nonpitting LE edema. No cyanosis or clubbing.  NEURO: Alert and oriented, cooperative. Gait not formally assessed. No obvious focal deficits.   SKIN: Warm and dry.       Recent Lab Results:  LIVER ENZYME RESULTS:  Lab Results   Component Value Date    AST 19 05/03/2021    ALT 23 05/03/2021       CBC RESULTS:  Lab Results   Component Value Date    WBC 4.8 01/27/2022    WBC 4.8 05/03/2021    RBC 4.71 01/27/2022    RBC 4.46 05/03/2021    HGB 14.0 01/27/2022    HGB 13.5 05/03/2021    HCT 42.7 01/27/2022    HCT 40.8 05/03/2021    MCV 91 01/27/2022    MCV 92 05/03/2021    MCH 29.7 01/27/2022    MCH 30.3 05/03/2021    MCHC 32.8 01/27/2022    MCHC 33.1 05/03/2021    RDW 12.7 01/27/2022    RDW 12.6 05/03/2021     01/27/2022     05/03/2021       BMP RESULTS:  Lab Results   Component Value Date     01/27/2022     05/03/2021    POTASSIUM 3.5 01/27/2022    POTASSIUM 3.5 05/03/2021    CHLORIDE 105 01/27/2022    CHLORIDE 106 05/03/2021    CO2 28 01/27/2022    CO2 28 05/03/2021    ANIONGAP 8 01/27/2022    ANIONGAP 8 05/03/2021    GLC 96 01/27/2022     GLC 96 05/03/2021    BUN 18 01/27/2022    BUN 23 05/03/2021    CR 0.99 01/27/2022    CR 1.14 (H) 05/03/2021    GFRESTIMATED 70 01/27/2022    GFRESTIMATED 57 (L) 05/03/2021    GFRESTBLACK 66 05/03/2021    KATARINA 8.8 01/27/2022    KATARINA 9.0 05/03/2021          Recent Labs   Lab Test 01/27/22  1040 08/05/21  1033 05/03/21  1153 03/12/21  0434   NTBNPI  --   --   --  473*   NTBNP 539* 412* 585*  --          Most recent testing:    Echo 5/14/2021  Interpretation Summary  Left ventricular function is normal.The EF is 55-60%.  Mild right ventricular dilation is present. Global right ventricular function  is mildly reduced.  Mild tricuspid insufficiency is present.  Pulmonary hypertension is present. Estimated pulmonary artery systolic  pressure is 43 mmHg.  The inferior vena cava was normal in size with preserved respiratory  variability.  No pericardial effusion is present      RHC (03/2021)  RA 13  RV 51/13  PA 51/17 (32)  PCWP 13  PA% 58  Estimated Terry CO/CI 4.3/1.92  PVR by Terry 4.42  PVR 2.52 by TD on her last hemodynamic assessment  No TD CO done during this time.    NYHA Functional Class:  Functional class 3      30 total minutes was spent today including chart review, precharting, history and exam, post visit documentation, and reviewing studies as outlined above.       Azucena Silva PA-C  [facility]  Pager [phone]

## 2022-01-27 NOTE — PATIENT INSTRUCTIONS
Thank you for visiting the Pulmonary Hypertension Clinic today.      Today we discussed:   No medications changes today.      Follow up Appointment Information:  Return in 3 months with Dr Paniagua with echo, walk, and labs. We may also repeat a heart catheterization at that time.       Additional Instructions:    1. Continue staying active and eat a heart healthy, low sodium diet.     2. Please keep current list of medications with you at all times.     3. Remember to weigh yourself daily after voiding and write it down on a log. If you have gained/lost 2 pounds overnight or 5 pounds in a week contact us for medication adjustments or further instructions.    4. Please call us immediately if you have syncope (fainting or passing out), chest pain, worsening edema (swelling or weight gain), or general worsening in how you are feeling.         ---------------------------------------------------------------------------------------------------------------    If you have questions or concerns please contact us at:        Yoly Villegas RN, BSN, PHN  Deneen Mccann  (Schedule,Prior Auth)  Nurse Coordinator     Clinic   Pulmonary Hypertension   Pulmonary Hypertension  [facility]             [facility]  [phone])[phone]    (P) [phone]        (F) [phone]      ** Please note that you will NOT receive a reminder call regarding your scheduled testing, reminder calls are for provider appointments only.  If you are scheduled for testing within the [employer] system you may receive a call regarding pre-registration for billing purposes only.**     ------------------------------------------------------------------------------------------------------------    Interested in joining a support group?    Pulmonary Hypertension Association  [email or web address].phassociation.org/  **Look at the Events Tab** They even have Support Groups that you can call into    [place] -  [facility] PH Support Group  Second Saturday of the Month from 1-3 PM   Location: [address] [place] [address]  Leader: Marika Quiroz and Amisha Vila  Phone: [phone] or [phone]  Email: [email or web address]

## 2022-02-03 ENCOUNTER — TRANSFERRED RECORDS (OUTPATIENT)
Dept: HEALTH INFORMATION MANAGEMENT | Facility: CLINIC | Age: 52
End: 2022-02-03
Payer: COMMERCIAL

## 2022-02-21 DIAGNOSIS — I27.24 CTEPH (CHRONIC THROMBOEMBOLIC PULMONARY HYPERTENSION) (H): ICD-10-CM

## 2022-02-21 DIAGNOSIS — R06.09 DOE (DYSPNEA ON EXERTION): ICD-10-CM

## 2022-02-23 RX ORDER — SPIRONOLACTONE 25 MG/1
25 TABLET ORAL 2 TIMES DAILY
Qty: 180 TABLET | Refills: 3 | Status: SHIPPED | OUTPATIENT
Start: 2022-02-23 | End: 2023-03-20

## 2022-02-23 NOTE — TELEPHONE ENCOUNTER
Last Clinic Visit: 1/27/2022  [facility]

## 2022-03-12 ENCOUNTER — HEALTH MAINTENANCE LETTER (OUTPATIENT)
Age: 52
End: 2022-03-12

## 2022-04-23 NOTE — PROGRESS NOTES
Service Date: 2022     Ochoa Sheth MD   [facility] [address] [place] [address]      Mario Gerber MD    [facility]      Clint Olsen MD   [facility]   [address] [place] [address]  [address]       RE: Yuridia Barksdale   MRN: [id number]   : 1970      Dear Meryl Rondon and Zabrina:      We had the pleasure of seeing Ms. Yuridia Barksdale for followup in our Pulmonary Hypertension Clinic at the [facility].  As you know, she is a very pleasant 51 year old female with obesity, DAVIS and chronic thromboembolic pulmonary hypertension who underwent pulmonary thromboendarterectomy at [facility] in 2020.  She had type 3 disease. Her Adempas was then stopped. Her repeat V/Q and RHC showed mild residual CTEPH and she underwent pulmonary balloon angioplasty of the right A8 branch and right A3 branch in January and 2021. Her repeat TTE showed mild RV dilation and dysfunction, and she was started on tadalafil. She also takes torsemide 80 mg daily. She last saw Azucena in January and had caloric weight gain but otherwise was stable. She is returning today for follow-up.    She is feeling stable today. She is functional class II, although this may be in part related to her increasing weight. She denies chest pain or pressure, presyncope, syncope or worsening swelling. She has gained weight which is caloric, related to stress from not having a job or housing. 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 She continues to take her tadalafil, torsemide, and warfarin.    PAST MEDICAL HISTORY:   1.  Systemic lupus erythematosus.   2.  Lupus antiphospholipid antibody syndrome.   3.  History of deep venous thrombosis.   4.  Chronic thromboembolic pulmonary hypertension.   5.   Obesity.   6.  Obstructive sleep apnea, REM-related, severe.      MEDICATIONS:   Current Outpatient Medications   Medication Sig     acetaminophen (TYLENOL) 500 MG tablet Take 1,000 mg by mouth     ARIPiprazole (ABILIFY) 5 MG tablet 10 mg daily     atorvastatin (LIPITOR) 10 MG tablet Take 1 tablet (10 mg) by mouth daily     clonazePAM (KLONOPIN) 1 MG tablet Take 1 tablet (1 mg) by mouth daily as needed for anxiety (Patient taking differently: Take 2 mg by mouth nightly as needed)     hydroxychloroquine (PLAQUENIL) 200 MG tablet Take 1 tablet (200 mg) by mouth 2 times daily     sertraline (ZOLOFT) 100 MG tablet Take 2 tablets (200 mg) by mouth daily     spironolactone (ALDACTONE) 25 MG tablet Take 1 tablet (25 mg) by mouth 2 times daily     tadalafil, PAH, 20 MG TABS Take 2 tablets (40 mg) by mouth daily     torsemide (DEMADEX) 20 MG tablet Take 4 tablets (80 mg) by mouth daily     traZODone (DESYREL) 50 MG tablet Take 2 tablets (100 mg) by mouth At Bedtime     warfarin ANTICOAGULANT (COUMADIN ANTICOAGULANT) 1 MG tablet Take 5 tablets (5 mg) by mouth daily On 1/21/2021 and 1/22/2021. Have your chromogenic factor checked on 1/23/2021. (Patient taking differently: Take 5 mg by mouth daily 5 mg on Monday and Friday, 4 mg on TuWeThSaSu. CFX goal 20-30%)     No current facility-administered medications for this visit.       REVIEW OF SYSTEMS:  A detailed 10-point review of systems was obtained as described in the History of Present Illness.  All other systems are reviewed and are negative.      PHYSICAL EXAMINATION:   /80 (BP Location: Right arm, Patient Position: Chair, Cuff Size: Adult Regular)   Pulse 70   Wt 137.4 kg (303 lb)   LMP 04/04/2012   SpO2 93%   BMI 48.91 kg/m    She was awake, alert, oriented x3.  She was comfortable.  She was in no apparent distress.  Normocephalic atraumatic. Wearing a mask.  Her neck exam revealed no jugular venous distention.  Carotids are 2+ bilaterally.  She had no pallor  sounds, cyanosis or jaundice.  Cardiac auscultation revealed normal S1 and normal S2 with no murmur rub or gallop.  Auscultation of her lungs revealed equal air entry on both sides with no added sounds.  Her abdomen was soft with no was no tenderness, or no guarding.  She had no focal neurological deficit.      Lab Results   Component Value Date    NTBNP 682 (H) 04/25/2022    NTBNP 585 (H) 05/03/2021     INR   Date Value Ref Range Status   03/12/2021 2.18 (H) 0.86 - 1.14 Final      Echocardiogram (5/14/21)  Left ventricular function is normal.The EF is 55-60%.  Mild right ventricular dilation is present. Global right ventricular function  is mildly reduced.  Mild tricuspid insufficiency is present.  Pulmonary hypertension is present. Estimated pulmonary artery systolic  pressure is 43 mmHg.  The inferior vena cava was normal in size with preserved respiratory  variability.  No pericardial effusion is present.    Echocardiogram 4/25/22  Left ventricular function is normal.The ejection fraction is 60-65%.  Paradoxical septal motion consistent with right ventricular pressure overload  is present.  Mild to moderate right ventricular dilation is present. The RV function is  mildly reduced (TAPSE 1.5 cm, RV S' 8.2 cm/s, and RVFAC of 35%).  No significant valvular abnormalities.  The estimated PA systolic pressure is 29 mmHg.  The RA pressure is < 3 mmHg.  No pericardial effusion is present.  This study was compared with the study from 05/14/2021. The RV size and  function are similar but the PA systolic pressure has declined.    RHC (03/2021)  RA 13  RV 51/13  PA 51/17 (32)  PCWP 13  PA% 58  Estimated Terry CO/CI 4.3/1.92  PVR by Terry 4.42  PVR 2.52 by TD on her last hemodynamic assessment  No TD CO done during this time.    Pulmonary angiogram (12/2020):    Mild residual CTEPH    Mild elevated pulmonary hypertension.    Right sided filling pressures are mildly elevated.    Left sided filling pressures are mildly  elevated.    Normal cardiac output level.    Hemodynamic data has been modified in Epic per physician review.     6MWT 5/2021: walk distance is normal at 466 meters with signficant desaturation and hypoxia, 96% to jd 84%  6MWT 4/25/22: walk distance is normal at 475 meters with significant desaturation but no hypoxia, 98% to jd 92%     ASSESSMENT AND PLAN:      Ms. Yuridia Barkdsale is a 51 year old female with chronic thromboembolic pulmonary hypertension due to hypercoagulable state from her antiphospholipid antibody syndrome.  She underwent pulmonary thromboendarterectomy at [facility] on July 4, 2020.  She has residual inoperable chronic thromboembolic pulmonary hypertension for which she has undergone 2 sessions of pulmonary balloon angioplasty in the in the right A8 and A3 segments in January and March 2021.    She is doing well. She is functional class II. She has no evidence of decompensated heart failure. Her 6MWT shows improvement in her desaturation. Her NT proBNP is stable. Her TTE shows stable mild RV dilation and mild RV dysfunction. She has not had a RHC since just prior to her last BPA in March 2021, which had shown mild residual disease with PVR 4.4 by Terry and normal CO by thermodilution.    Given her overall stability, we will continue her on her regimen of tadalafil 40 mg (prefers to avoid Adempas due to TID dosing and cost). She will also continue Torsemide 80 mg and warfarin. She needs to be bridged with Arixtra given her heparin allergy if she needs any procedures.    She will see Azucena in 3 months with 6MWT and labs. She will call us in the interim if there are any further worsening symptoms.  It was a pleasure meeting Ms. Shi amina in our pulmonary hypertension clinic at [facility].    It was a pleasure meeting Ms. Shi amina in her pulmonary hypertension clinic at [facility].  Thank you for involving us in her  care.      Sincerely,     Meredith Quintana MD  Pulmonary & Critical Care Fellow    I examined the patient and agree with the assessment and plan of Dr. Quintana    Total time today was 42 minutes reviewing notes, imaging, labs, patient visit, orders and documentation         Arcelia Paniagua MD   [facility] for Pulmonary Hypertension  Heart Failure, Transplant, and Mechanical Circulatory Support Cardiology   Cardiovascular Division  [facility]   [phone]

## 2022-04-25 ENCOUNTER — ANCILLARY PROCEDURE (OUTPATIENT)
Dept: CARDIOLOGY | Facility: CLINIC | Age: 52
End: 2022-04-25
Attending: PHYSICIAN ASSISTANT
Payer: COMMERCIAL

## 2022-04-25 ENCOUNTER — LAB (OUTPATIENT)
Dept: LAB | Facility: CLINIC | Age: 52
End: 2022-04-25
Attending: INTERNAL MEDICINE
Payer: COMMERCIAL

## 2022-04-25 VITALS
BODY MASS INDEX: 48.91 KG/M2 | WEIGHT: 293 LBS | OXYGEN SATURATION: 93 % | SYSTOLIC BLOOD PRESSURE: 127 MMHG | HEART RATE: 70 BPM | DIASTOLIC BLOOD PRESSURE: 80 MMHG

## 2022-04-25 DIAGNOSIS — R06.09 DYSPNEA ON EXERTION: ICD-10-CM

## 2022-04-25 DIAGNOSIS — I27.24 CTEPH (CHRONIC THROMBOEMBOLIC PULMONARY HYPERTENSION) (H): ICD-10-CM

## 2022-04-25 LAB
6 MIN WALK (FT): 1560 FT
6 MIN WALK (M): 475 M
ALBUMIN SERPL-MCNC: 3.5 G/DL (ref 3.4–5)
ALP SERPL-CCNC: 141 U/L (ref 40–150)
ALT SERPL W P-5'-P-CCNC: 19 U/L (ref 0–50)
ANION GAP SERPL CALCULATED.3IONS-SCNC: 10 MMOL/L (ref 3–14)
AST SERPL W P-5'-P-CCNC: 22 U/L (ref 0–45)
BILIRUB SERPL-MCNC: 0.4 MG/DL (ref 0.2–1.3)
BUN SERPL-MCNC: 19 MG/DL (ref 7–30)
CALCIUM SERPL-MCNC: 9 MG/DL (ref 8.5–10.1)
CHLORIDE BLD-SCNC: 103 MMOL/L (ref 94–109)
CO2 SERPL-SCNC: 28 MMOL/L (ref 20–32)
CREAT SERPL-MCNC: 1.01 MG/DL (ref 0.52–1.04)
ERYTHROCYTE [DISTWIDTH] IN BLOOD BY AUTOMATED COUNT: 12.9 % (ref 10–15)
GFR SERPL CREATININE-BSD FRML MDRD: 68 ML/MIN/1.73M2
GLUCOSE BLD-MCNC: 92 MG/DL (ref 70–99)
HCT VFR BLD AUTO: 42.5 % (ref 35–47)
HGB BLD-MCNC: 13.9 G/DL (ref 11.7–15.7)
LVEF ECHO: NORMAL
MCH RBC QN AUTO: 29.6 PG (ref 26.5–33)
MCHC RBC AUTO-ENTMCNC: 32.7 G/DL (ref 31.5–36.5)
MCV RBC AUTO: 90 FL (ref 78–100)
NT-PROBNP SERPL-MCNC: 682 PG/ML (ref 0–125)
PLATELET # BLD AUTO: 237 10E3/UL (ref 150–450)
POTASSIUM BLD-SCNC: 3.4 MMOL/L (ref 3.4–5.3)
PROT SERPL-MCNC: 7.8 G/DL (ref 6.8–8.8)
RBC # BLD AUTO: 4.7 10E6/UL (ref 3.8–5.2)
SODIUM SERPL-SCNC: 141 MMOL/L (ref 133–144)
WBC # BLD AUTO: 5.1 10E3/UL (ref 4–11)

## 2022-04-25 PROCEDURE — 93306 TTE W/DOPPLER COMPLETE: CPT | Performed by: STUDENT IN AN ORGANIZED HEALTH CARE EDUCATION/TRAINING PROGRAM

## 2022-04-25 PROCEDURE — 36415 COLL VENOUS BLD VENIPUNCTURE: CPT | Performed by: PATHOLOGY

## 2022-04-25 PROCEDURE — 99207 PR STATISTIC IV PUSH SINGLE INITIAL SUBSTANCE: CPT | Performed by: STUDENT IN AN ORGANIZED HEALTH CARE EDUCATION/TRAINING PROGRAM

## 2022-04-25 PROCEDURE — 85027 COMPLETE CBC AUTOMATED: CPT | Performed by: PATHOLOGY

## 2022-04-25 PROCEDURE — G0463 HOSPITAL OUTPT CLINIC VISIT: HCPCS

## 2022-04-25 PROCEDURE — 80053 COMPREHEN METABOLIC PANEL: CPT | Performed by: PATHOLOGY

## 2022-04-25 PROCEDURE — 94618 PULMONARY STRESS TESTING: CPT | Performed by: INTERNAL MEDICINE

## 2022-04-25 PROCEDURE — 99215 OFFICE O/P EST HI 40 MIN: CPT | Mod: 25 | Performed by: INTERNAL MEDICINE

## 2022-04-25 PROCEDURE — 83880 ASSAY OF NATRIURETIC PEPTIDE: CPT | Performed by: PATHOLOGY

## 2022-04-25 RX ADMIN — Medication 5 ML: at 11:36

## 2022-04-25 ASSESSMENT — PAIN SCALES - GENERAL: PAINLEVEL: NO PAIN (0)

## 2022-04-25 NOTE — PATIENT INSTRUCTIONS
Medication Changes:  No changes    Plan:   1: Schedule 3 month follow-up with Azucena with testing prior: 6 minute walk test, labs    Patient Instructions:  1. Continue staying active and eat a heart healthy diet.    2. Please keep current list of medications with you at all times.    3. Remember to weigh yourself daily after voiding and before you consume any food or beverages and log the numbers.  If you have gained 2 pounds overnight or 5 pounds in a week contact us immediately for medication adjustments or further instructions.    4. **Please call us immediately if you have any syncope (fainting or passing out), chest pain, edema (swelling or weight gain), or decline in your functional status (general decline in how you are feeling).    5. Patients on Remodulin (treprostinil) or Veletri (epoprostenol): Please make sure that you have your backup pump and supplies with you at all times, your mixing instructions, and contact information for your specialty pharmacy.      Results:  Component      Latest Ref Rng & Units 4/25/2022   Sodium      133 - 144 mmol/L 141   Potassium      3.4 - 5.3 mmol/L 3.4   Chloride      94 - 109 mmol/L 103   Carbon Dioxide      20 - 32 mmol/L 28   Anion Gap      3 - 14 mmol/L 10   Urea Nitrogen      7 - 30 mg/dL 19   Creatinine      0.52 - 1.04 mg/dL 1.01   Calcium      8.5 - 10.1 mg/dL 9.0   Glucose      70 - 99 mg/dL 92   Alkaline Phosphatase      40 - 150 U/L 141   AST      0 - 45 U/L 22   ALT      0 - 50 U/L 19   Protein Total      6.8 - 8.8 g/dL 7.8   Albumin      3.4 - 5.0 g/dL 3.5   Bilirubin Total      0.2 - 1.3 mg/dL 0.4   GFR Estimate      >60 mL/min/1.73m2 68   WBC      4.0 - 11.0 10e3/uL 5.1   RBC Count      3.80 - 5.20 10e6/uL 4.70   Hemoglobin      11.7 - 15.7 g/dL 13.9   Hematocrit      35.0 - 47.0 % 42.5   MCV      78 - 100 fL 90   MCH      26.5 - 33.0 pg 29.6   MCHC      31.5 - 36.5 g/dL 32.7   RDW      10.0 - 15.0 % 12.9   Platelet Count      150 - 450 10e3/uL 237    N-Terminal Pro Bnp      0 - 125 pg/mL 682 (H)     We are located on the third floor of the [facility] on the [facility].  Our address is     [address] [place] [address] on 3rd Floor   [place] [phone]    Thank you for allowing us to be a part of your care here at the [facility]    If you have questions or concerns please contact us at:    Yoly Villegas RN, BSN, PHN  Patricia Miller (Scheduling,Prior Auth)  Nurse Coordinator     Clinic   Pulmonary Hypertension   Pulmonary Hypertension  [facility]  (Phone)[phone]   (Phone) [phone]        (Fax) [phone]    ** Please note that you will NOT receive a reminder call regarding your scheduled testing, reminder calls are for provider appointments only.  If you are scheduled for testing within the [employer] system you may receive a call regarding pre-registration for billing purposes only.**     Support Group:  Pulmonary Hypertension Association  [email or web address].phassociation.org/  **Look at the Events Tab** They even have Support Groups that you can call into    [facility] Support Group  Second Saturday of the Month from 1-3 PM   Location: [address] [place] [address]  Leader: Marika Quiroz  Phone: [phone]  Email: [email or web address]

## 2022-04-25 NOTE — NURSING NOTE
Chief Complaint   Patient presents with     Follow Up     Return PH- 3 month PH follow up w/Labs, Echo & 6mwt prior     Vitals were taken and medications reconciled.    Derek Diaz, EMT  1:27 PM

## 2022-04-25 NOTE — LETTER
2022      RE: Yuridia Barksdale  [address]  [place] [address]       Dear Colleague,    Thank you for the opportunity to participate in the care of your patient, Yuridia Barksdale, at the [facility] [place] at [facility]. Please see a copy of my visit note below.    Service Date: 2022     Ochoa Sheth MD   [facility] [address] [place] [address]      Mario Gerber MD    [facility]      Clint Olsen MD   [facility]   [address] [place] [address]  [address]       RE: Yuridia Barksdale   MRN: [id number]   : 1970      Dear Meryl Rondon and Zabrina:      We had the pleasure of seeing Ms. Yuridia Barksdale for followup in our Pulmonary Hypertension Clinic at the [facility].  As you know, she is a very pleasant 51 year old female with obesity, DAVIS and chronic thromboembolic pulmonary hypertension who underwent pulmonary thromboendarterectomy at [facility] in 2020.  She had type 3 disease. Her Adempas was then stopped. Her repeat V/Q and RHC showed mild residual CTEPH and she underwent pulmonary balloon angioplasty of the right A8 branch and right A3 branch in January and 2021. Her repeat TTE showed mild RV dilation and dysfunction, and she was started on tadalafil. She also takes torsemide 80 mg daily. She last saw Azucena in January and had caloric weight gain but otherwise was stable. She is returning today for follow-up.    She is feeling stable today. She is functional class II, although this may be in part related to her increasing weight. She denies chest pain or pressure, presyncope, syncope or worsening swelling. She has gained weight which is caloric, related to stress from not having a job or housing. 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 She continues to take her tadalafil, torsemide, and warfarin.    PAST MEDICAL HISTORY:   1.  Systemic lupus erythematosus.   2.  Lupus antiphospholipid antibody syndrome.   3.  History of deep venous thrombosis.   4.  Chronic thromboembolic pulmonary hypertension.   5.  Obesity.   6.  Obstructive sleep apnea, REM-related, severe.      MEDICATIONS:   Current Outpatient Medications   Medication Sig     acetaminophen (TYLENOL) 500 MG tablet Take 1,000 mg by mouth     ARIPiprazole (ABILIFY) 5 MG tablet 10 mg daily     atorvastatin (LIPITOR) 10 MG tablet Take 1 tablet (10 mg) by mouth daily     clonazePAM (KLONOPIN) 1 MG tablet Take 1 tablet (1 mg) by mouth daily as needed for anxiety (Patient taking differently: Take 2 mg by mouth nightly as needed)     hydroxychloroquine (PLAQUENIL) 200 MG tablet Take 1 tablet (200 mg) by mouth 2 times daily     sertraline (ZOLOFT) 100 MG tablet Take 2 tablets (200 mg) by mouth daily     spironolactone (ALDACTONE) 25 MG tablet Take 1 tablet (25 mg) by mouth 2 times daily     tadalafil, PAH, 20 MG TABS Take 2 tablets (40 mg) by mouth daily     torsemide (DEMADEX) 20 MG tablet Take 4 tablets (80 mg) by mouth daily     traZODone (DESYREL) 50 MG tablet Take 2 tablets (100 mg) by mouth At Bedtime     warfarin ANTICOAGULANT (COUMADIN ANTICOAGULANT) 1 MG tablet Take 5 tablets (5 mg) by mouth daily On 1/21/2021 and 1/22/2021. Have your chromogenic factor checked on 1/23/2021. (Patient taking differently: Take 5 mg by mouth daily 5 mg on Monday and Friday, 4 mg on TuWeThSaSu. CFX goal 20-30%)     No current facility-administered medications for this visit.       REVIEW OF SYSTEMS:  A detailed 10-point review of systems was obtained as described in the History of Present Illness.  All other systems are reviewed and are negative.      PHYSICAL EXAMINATION:   /80 (BP Location: Right arm, Patient Position: Chair, Cuff Size:  Adult Regular)   Pulse 70   Wt 137.4 kg (303 lb)   LMP 04/04/2012   SpO2 93%   BMI 48.91 kg/m    She was awake, alert, oriented x3.  She was comfortable.  She was in no apparent distress.  Normocephalic atraumatic. Wearing a mask.  Her neck exam revealed no jugular venous distention.  Carotids are 2+ bilaterally.  She had no pallor sounds, cyanosis or jaundice.  Cardiac auscultation revealed normal S1 and normal S2 with no murmur rub or gallop.  Auscultation of her lungs revealed equal air entry on both sides with no added sounds.  Her abdomen was soft with no was no tenderness, or no guarding.  She had no focal neurological deficit.      Lab Results   Component Value Date    NTBNP 682 (H) 04/25/2022    NTBNP 585 (H) 05/03/2021     INR   Date Value Ref Range Status   03/12/2021 2.18 (H) 0.86 - 1.14 Final      Echocardiogram (5/14/21)  Left ventricular function is normal.The EF is 55-60%.  Mild right ventricular dilation is present. Global right ventricular function  is mildly reduced.  Mild tricuspid insufficiency is present.  Pulmonary hypertension is present. Estimated pulmonary artery systolic  pressure is 43 mmHg.  The inferior vena cava was normal in size with preserved respiratory  variability.  No pericardial effusion is present.    Echocardiogram 4/25/22  Left ventricular function is normal.The ejection fraction is 60-65%.  Paradoxical septal motion consistent with right ventricular pressure overload  is present.  Mild to moderate right ventricular dilation is present. The RV function is  mildly reduced (TAPSE 1.5 cm, RV S' 8.2 cm/s, and RVFAC of 35%).  No significant valvular abnormalities.  The estimated PA systolic pressure is 29 mmHg.  The RA pressure is < 3 mmHg.  No pericardial effusion is present.  This study was compared with the study from 05/14/2021. The RV size and  function are similar but the PA systolic pressure has declined.    RHC (03/2021)  RA 13  RV 51/13  PA 51/17 (32)  PCWP 13  PA%  58  Estimated Terry CO/CI 4.3/1.92  PVR by Terry 4.42  PVR 2.52 by TD on her last hemodynamic assessment  No TD CO done during this time.    Pulmonary angiogram (12/2020):    Mild residual CTEPH    Mild elevated pulmonary hypertension.    Right sided filling pressures are mildly elevated.    Left sided filling pressures are mildly elevated.    Normal cardiac output level.    Hemodynamic data has been modified in Epic per physician review.     6MWT 5/2021: walk distance is normal at 466 meters with signficant desaturation and hypoxia, 96% to jd 84%  6MWT 4/25/22: walk distance is normal at 475 meters with significant desaturation but no hypoxia, 98% to jd 92%     ASSESSMENT AND PLAN:      Ms. Yuridia Barksdale is a 51 year old female with chronic thromboembolic pulmonary hypertension due to hypercoagulable state from her antiphospholipid antibody syndrome.  She underwent pulmonary thromboendarterectomy at [facility] on July 4, 2020.  She has residual inoperable chronic thromboembolic pulmonary hypertension for which she has undergone 2 sessions of pulmonary balloon angioplasty in the in the right A8 and A3 segments in January and March 2021.    She is doing well. She is functional class II. She has no evidence of decompensated heart failure. Her 6MWT shows improvement in her desaturation. Her NT proBNP is stable. Her TTE shows stable mild RV dilation and mild RV dysfunction. She has not had a RHC since just prior to her last BPA in March 2021, which had shown mild residual disease with PVR 4.4 by Terry and normal CO by thermodilution.    Given her overall stability, we will continue her on her regimen of tadalafil 40 mg (prefers to avoid Adempas due to TID dosing and cost). She will also continue Torsemide 80 mg and warfarin. She needs to be bridged with Arixtra given her heparin allergy if she needs any procedures.    She will see Azucena in 3 months with 6MWT and labs. She will call us in the interim if  there are any further worsening symptoms.  It was a pleasure meeting Ms. Yuridia huynh in our pulmonary hypertension clinic at [facility].    It was a pleasure meeting Ms. Yuridia huynh in her pulmonary hypertension clinic at [facility].  Thank you for involving us in her care.    Sincerely,   Meredith Quintana MD  Pulmonary & Critical Care Fellow    I examined the patient and agree with the assessment and plan of Dr. Quintana    Total time today was 42 minutes reviewing notes, imaging, labs, patient visit, orders and documentation           Please do not hesitate to contact me if you have any questions/concerns.     Sincerely,     Arcelia Paniagua MD

## 2022-05-02 LAB — FIO2-PRE: 21 %

## 2022-07-01 ENCOUNTER — TELEPHONE (OUTPATIENT)
Dept: CARDIOLOGY | Facility: CLINIC | Age: 52
End: 2022-07-01

## 2022-07-01 DIAGNOSIS — I27.24 CTEPH (CHRONIC THROMBOEMBOLIC PULMONARY HYPERTENSION) (H): ICD-10-CM

## 2022-07-01 RX ORDER — TADALAFIL 20 MG/1
40 TABLET ORAL DAILY
Qty: 15 TABLET | Refills: 0 | Status: SHIPPED | OUTPATIENT
Start: 2022-07-01 | End: 2022-07-05

## 2022-07-01 NOTE — TELEPHONE ENCOUNTER
[facility] Call Center    Phone Message    May a detailed message be left on voicemail: no     Reason for Call: Medication Question or concern regarding medication   Prescription Clarification  Name of Medication: tadalafil, PAH, 20 MG TABS  Prescribing Provider: Chris Paniagua MD    Pharmacy: [id number] [place], [place] - [address] AT [facility] [address]   What on the order needs clarification?   Pt has had a change in insurance providers and this Rx is no longer cost effective. Her new charge is over $2000 monthly, and she's not able to afford the cost. Is their an affordable option or a savings program so she can stay on this Rx?        Action Taken: Message routed to:  [facility] Cardiology    Travel Screening: Not Applicable           -------------------------------------    Called patient and she changed insurance effective June 1st 2022. She just finished updating her Insurance info in our system.  Advised her we need to submit a new Prior Auth using her new insurance, but being that it is late on a Friday holiday weekend, we won't do it until Tuesday when we're back in the office.    patient only has 2 tabs left and she is out of town.  Good Rx shows she can get a 2 week supply for $24 where she is located.  Sent patient coupon from [email or web address] via text and routed Erx to [facility] in her area for 2 week supply.  Asked that patient call me if she is close to running out of this supply and hasn't heard from the pharmacy about a refill.  Patient verbalized understanding, agreed with plan and denied any further questions. Yoly Villegas RN on 7/1/2022 at 3:42 PM         Sent staff msg to Patricia asking her to submit new PA.  Yoly Villegas RN on 7/1/2022 at 3:44 PM

## 2022-07-05 ENCOUNTER — TELEPHONE (OUTPATIENT)
Dept: CARDIOLOGY | Facility: CLINIC | Age: 52
End: 2022-07-05

## 2022-07-05 DIAGNOSIS — I27.24 CTEPH (CHRONIC THROMBOEMBOLIC PULMONARY HYPERTENSION) (H): ICD-10-CM

## 2022-07-05 RX ORDER — TADALAFIL 20 MG/1
40 TABLET ORAL DAILY
Qty: 180 TABLET | Refills: 3 | Status: SHIPPED | OUTPATIENT
Start: 2022-07-05 | End: 2022-07-26

## 2022-07-05 NOTE — TELEPHONE ENCOUNTER
Prior Authorization Approval    Authorization Effective Date: 6/5/2022  Authorization Expiration Date: 7/5/2023  Medication: PA tadalafil Approved   Approved Dose/Quantity: 60/30  Reference #:   [id number]  Insurance Company: [employer] - Phone [phone] Fax [phone]  Expected CoPay:       CoPay Card Available:      Foundation Assistance Needed:    Which Pharmacy is filling the prescription (Not needed for infusion/clinic administered):         Patricia Miller  Clinic    Pulmonary Hypertension   [facility]  (p) [phone]

## 2022-07-05 NOTE — TELEPHONE ENCOUNTER
PA Initiation    Medication: PA tadalafil ( Pending )   Insurance Company: [employer] - Phone [phone] Fax [phone]  Pharmacy Filling the Rx:  Local Pharmacy   Filling Pharmacy Phone:    Filling Pharmacy Fax:    Start Date: 7/5/2022        Patricia Miller  Clinic    Pulmonary Hypertension   [facility]  (p) [phone]

## 2022-07-13 ENCOUNTER — TELEPHONE (OUTPATIENT)
Dept: CARDIOLOGY | Facility: CLINIC | Age: 52
End: 2022-07-13

## 2022-07-13 NOTE — TELEPHONE ENCOUNTER
[facility] Call Center    Phone Message    May a detailed message be left on voicemail: yes     Reason for Call: Other:       Pt would like a call back to know the status of her medication for tadalafil as the pharmacy stated they are waiting for the Dr's pre approval as pt insurance changed and they need a new approval. Please reach out to Josephine to approve and pt would like a call back to know when this is taken care of so she can get her medication, Any further questions please reach out to pt to discuss    Action Taken: Message routed to:  [facility] Cardio    Travel Screening: Not Applicable           -----------------------------------  Called [facility] to see if they could process a claim for patient's med.  No, still telling them they need PA.  They tried processing for only 30day supply vs 90day and it still wouldn't go through.  Asked which insurance they were using and it is [facility].  Advised them we used [employer] for approval.  Agreed to fax approved PA to pharmacy after verifying Fax number.  -----------------------------------  Called patient and discussed possible issue and that I was faxing the PA to the pharmacy.  She verbalized understanding and advised she would follow up with the pharmacy. Yoly Villegas RN on 7/13/2022 at 12:43 PM    Fax sent. Yoly Villegas RN on 7/13/2022 at 12:43 PM

## 2022-07-26 DIAGNOSIS — I27.24 CTEPH (CHRONIC THROMBOEMBOLIC PULMONARY HYPERTENSION) (H): ICD-10-CM

## 2022-07-26 RX ORDER — TADALAFIL 20 MG/1
40 TABLET ORAL DAILY
Qty: 180 TABLET | Refills: 3 | Status: SHIPPED | OUTPATIENT
Start: 2022-07-26 | End: 2022-08-16

## 2022-07-26 NOTE — TELEPHONE ENCOUNTER
[facility] Call Center    Phone Message    May a detailed message be left on voicemail: yes     Reason for Call: Medication Refill Request    Has the patient contacted the pharmacy for the refill? Yes   Name of medication being requested: tadalafil    Provider who prescribed the medication: Javon  Pharmacy:    [id number] - [place], [place] - [address] AT [facility] OF [address]  Date medication is needed: [id number]       [facility] they are wanting a quantity authorization and that can be called in at [phone]      Action Taken: Other: routed to refill    Travel Screening: Not Applicable

## 2022-07-26 NOTE — TELEPHONE ENCOUNTER
tadalafil, PAH, 20 MG TABS    Needing a prior Authorization for this med.  Please send.   Thank you       Candida Rodríguez RN  Central Triage Red Flags/Med Refills

## 2022-08-02 ENCOUNTER — OFFICE VISIT (OUTPATIENT)
Dept: CARDIOLOGY | Facility: CLINIC | Age: 52
End: 2022-08-02
Attending: PHYSICIAN ASSISTANT
Payer: COMMERCIAL

## 2022-08-02 ENCOUNTER — LAB (OUTPATIENT)
Dept: LAB | Facility: CLINIC | Age: 52
End: 2022-08-02
Payer: COMMERCIAL

## 2022-08-02 VITALS
OXYGEN SATURATION: 93 % | HEART RATE: 73 BPM | SYSTOLIC BLOOD PRESSURE: 148 MMHG | DIASTOLIC BLOOD PRESSURE: 84 MMHG | WEIGHT: 293 LBS | BODY MASS INDEX: 48.58 KG/M2

## 2022-08-02 DIAGNOSIS — I27.24 CTEPH (CHRONIC THROMBOEMBOLIC PULMONARY HYPERTENSION) (H): ICD-10-CM

## 2022-08-02 DIAGNOSIS — R06.09 DYSPNEA ON EXERTION: ICD-10-CM

## 2022-08-02 DIAGNOSIS — E87.6 HYPOKALEMIA: Primary | ICD-10-CM

## 2022-08-02 LAB
6 MIN WALK (FT): 1420 FT
6 MIN WALK (FT): 1420 FT
6 MIN WALK (M): 433 M
6 MIN WALK (M): 433 M
ALBUMIN SERPL-MCNC: 3.5 G/DL (ref 3.4–5)
ALP SERPL-CCNC: 139 U/L (ref 40–150)
ALT SERPL W P-5'-P-CCNC: 22 U/L (ref 0–50)
ANION GAP SERPL CALCULATED.3IONS-SCNC: 4 MMOL/L (ref 3–14)
AST SERPL W P-5'-P-CCNC: 22 U/L (ref 0–45)
BILIRUB SERPL-MCNC: 0.3 MG/DL (ref 0.2–1.3)
BUN SERPL-MCNC: 26 MG/DL (ref 7–30)
CALCIUM SERPL-MCNC: 8.7 MG/DL (ref 8.5–10.1)
CHLORIDE BLD-SCNC: 107 MMOL/L (ref 94–109)
CO2 SERPL-SCNC: 23 MMOL/L (ref 20–32)
CREAT SERPL-MCNC: 1.1 MG/DL (ref 0.52–1.04)
ERYTHROCYTE [DISTWIDTH] IN BLOOD BY AUTOMATED COUNT: 12.8 % (ref 10–15)
GFR SERPL CREATININE-BSD FRML MDRD: 61 ML/MIN/1.73M2
GLUCOSE BLD-MCNC: 110 MG/DL (ref 70–99)
HCT VFR BLD AUTO: 43.8 % (ref 35–47)
HGB BLD-MCNC: 14.7 G/DL (ref 11.7–15.7)
MCH RBC QN AUTO: 30.2 PG (ref 26.5–33)
MCHC RBC AUTO-ENTMCNC: 33.6 G/DL (ref 31.5–36.5)
MCV RBC AUTO: 90 FL (ref 78–100)
NT-PROBNP SERPL-MCNC: 137 PG/ML (ref 0–450)
PLATELET # BLD AUTO: 218 10E3/UL (ref 150–450)
POTASSIUM BLD-SCNC: 3.1 MMOL/L (ref 3.4–5.3)
PROT SERPL-MCNC: 8.2 G/DL (ref 6.8–8.8)
RBC # BLD AUTO: 4.87 10E6/UL (ref 3.8–5.2)
SODIUM SERPL-SCNC: 134 MMOL/L (ref 133–144)
WBC # BLD AUTO: 5.2 10E3/UL (ref 4–11)

## 2022-08-02 PROCEDURE — 80053 COMPREHEN METABOLIC PANEL: CPT | Performed by: PATHOLOGY

## 2022-08-02 PROCEDURE — 94618 PULMONARY STRESS TESTING: CPT | Performed by: INTERNAL MEDICINE

## 2022-08-02 PROCEDURE — G0463 HOSPITAL OUTPT CLINIC VISIT: HCPCS

## 2022-08-02 PROCEDURE — 85027 COMPLETE CBC AUTOMATED: CPT | Performed by: PATHOLOGY

## 2022-08-02 PROCEDURE — 36415 COLL VENOUS BLD VENIPUNCTURE: CPT | Performed by: PATHOLOGY

## 2022-08-02 PROCEDURE — 83880 ASSAY OF NATRIURETIC PEPTIDE: CPT | Performed by: PATHOLOGY

## 2022-08-02 PROCEDURE — 99214 OFFICE O/P EST MOD 30 MIN: CPT | Performed by: PHYSICIAN ASSISTANT

## 2022-08-02 RX ORDER — POTASSIUM CHLORIDE 1500 MG/1
20 TABLET, EXTENDED RELEASE ORAL DAILY
Qty: 90 TABLET | Refills: 3 | Status: SHIPPED | OUTPATIENT
Start: 2022-08-02 | End: 2022-08-02

## 2022-08-02 RX ORDER — POTASSIUM CHLORIDE 750 MG/1
20 TABLET, EXTENDED RELEASE ORAL DAILY
Qty: 180 TABLET | Refills: 3 | Status: SHIPPED | OUTPATIENT
Start: 2022-08-02 | End: 2023-09-25

## 2022-08-02 NOTE — PROGRESS NOTES
Date of Service: 08/02/22      [facility] Pulmonary Hypertension [facility]      Primary PH cardiologist:  Dr. Paniagua     HPI:  Ms. Barksdale is a very pleasant 51 year old female with a PMhx including SLE with lupus antiphospholipid antibody syndrome and hx DVT on anticoagulation, and ADVIS on CPAP. She also has chronic thromboembolic pulmonary arterial hypertension. She was initially placed on riociguat, and then referred to [facility] for surgical evaluation. Ultimately, she underwent pulmonary thromboendarterectomy at [facility] in July 2020. She has type IV disease and her PTE was somewhat difficult to the distal nature of her disease. Her post operative course was reportedly uncomplicated, and postoperative hemodynamics showed improvement.  Her VQ scan showed improved perfusion of the right lower lobe and left upper lobe, and her riociguat and digoxin were discontinued at that time. Post surgically she had struggled with swelling and weight gain requiring escalation of diuretics. She then underwent BPA in January and March of 2021 of right A8 branch and right A3 branch.    In May of 2021, her exercise capacity was improving, but repeat echo showed mildly dilated RV with mildly reduced function, and Dr. Paniagua then recommended addition of tadalafil. Unfortunately, since that time, she has struggled with weight gain that is felt mostly to be caloric. When I met with her in January 2022, she had just joined a gym and was also going to meet with a nutritionist. Most recently, she was seen by Dr. Paniagua in April, repeat echo showed stable mild RV dilation and mild RV dysfunction. No changes were made at that time.    Today, I am seeing Yuridia back in clinic for follow up. She tells me that overall she is feeling ok, with no worsening in her breathing. However, on her six minute walk today, she again required 2L to maintain sats >90% while last time in April, she was able to  maintain similar sats without supplemental oxygen. Her walk distance has declined slightly as well, and was 1420 ft today (was 1560 in April).  Unfortunately, she has also not lost any weight and remains above 300 lbs. She tells me she is no longer going to the gym but does walk 30 minutes outside every day. She denies any new CP, and has what sounds like rare palpitations. She had an episode of dizziness recently but attributes this to dehydration. She denies syncope/presyncope. She does not think she is having any worsening edema. Her BP was elevated today as well, which is unusual for her. Of note, she had an interruption in her tadalafil for about a month due to insurance change. She has now been back on it for a little less than a week.     Labs performed prior to our visit today were also reviewed: Na 134, K 3.1, BUN 26, SCR 1.10, ALT 22, AST 22, NT-proBNP 137. WBC 5.2, hemoglobin 14.7, hematocrit 43.8, plt 218.      CURRENT PULMONARY HYPERTENSION REGIMEN:     PAH Rx: tadalafil 40mg daily  (previously on riociguat, stopped post PTE)      Diuretics: Torsemide 80mg once daily, spironolactone 25mg BID     Oxygen: RA rest, 2.5L with formal exercise     Anticoagulation: warfarin (follows chrom factor goal 20-30)--needs to be bridged with Arixtra given her heparin allergy if she needs procedures.  Indication: DVT/antiphospholipid    Rheum: Dr. Graf        Assessment/Plan:     1. Chronic thromboembolic pulmonary hypertension.              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 She is s/p pulmonary thromboendarterectomy July 2020 at [facility], and riociguat was discontinued. She then had BPA in January and March of this year here at [facility], of the right A8 branch and right A3 branch.               --She has still had some mild RV dysfunction, so tadalafil was added at 40mg daily last summer.             --Unfortunately, she has gained nearly 50 pounds over  the last year, which is caloric. I encouraged her to continue following with a nutritionist, and stressed the importance of regular exercise to try to get some of her weight back off.    --From a volume standpoint, she does not appear hypervolemic, and in fact, her NT-proBNP is the best it has been in some time. For now, keep torsemide and spironolactone as is. However, as she is hypokalemic today, will add potassium 20meq once daily. Recheck BMP next week.              --She had been using her oxygen only PRN recently; however, on her 6MWT today she required 2L with exertion once again. I asked her to start using this again with exercise. This may be a function of being temporarily out of her tadalafil and weight gain, which we discussed.               --She is compliant with her CPAP for known DAVIS.               --Continue Coumadin for anticoagulation, in the setting of CTEPH and antiphospholipid antibody syndrome. She normally follows chromogenic factor goal of 20-30. She needs to be bridged with Arixtra given her heparin allergy if she needs procedures.       Follow up plan: Return in 8 weeks to see me in clinic--will repeat 6mwt and check and echocardiogram and labs at that visit. I asked her to call sooner with any new concerns.         Orders this Visit:  Orders Placed This Encounter   Procedures     Comprehensive metabolic panel     CBC with platelets     N terminal pro BNP outpatient     Basic metabolic panel     Follow-Up with Cardiology     Echocardiogram Complete     6 minute walk test     Orders Placed This Encounter   Medications     potassium chloride ER (KLOR-CON M) 20 MEQ CR tablet     Sig: Take 1 tablet (20 mEq) by mouth daily     Dispense:  90 tablet     Refill:  3     There are no discontinued medications.        CURRENT MEDICATIONS:  Current Outpatient Medications   Medication Sig Dispense Refill     acetaminophen (TYLENOL) 500 MG tablet Take 1,000 mg by mouth       ARIPiprazole (ABILIFY) 5 MG  tablet 10 mg daily       atorvastatin (LIPITOR) 10 MG tablet Take 1 tablet (10 mg) by mouth daily       clonazePAM (KLONOPIN) 1 MG tablet Take 1 tablet (1 mg) by mouth daily as needed for anxiety (Patient taking differently: Take 2 mg by mouth nightly as needed)       hydroxychloroquine (PLAQUENIL) 200 MG tablet Take 1 tablet (200 mg) by mouth 2 times daily       potassium chloride ER (KLOR-CON M) 20 MEQ CR tablet Take 1 tablet (20 mEq) by mouth daily 90 tablet 3     sertraline (ZOLOFT) 100 MG tablet Take 2 tablets (200 mg) by mouth daily       spironolactone (ALDACTONE) 25 MG tablet Take 1 tablet (25 mg) by mouth 2 times daily 180 tablet 3     tadalafil, PAH, 20 MG TABS Take 2 tablets (40 mg) by mouth daily 180 tablet 3     torsemide (DEMADEX) 20 MG tablet Take 4 tablets (80 mg) by mouth daily 360 tablet 2     traZODone (DESYREL) 50 MG tablet Take 2 tablets (100 mg) by mouth At Bedtime       warfarin ANTICOAGULANT (COUMADIN ANTICOAGULANT) 1 MG tablet Take 5 tablets (5 mg) by mouth daily On 1/21/2021 and 1/22/2021. Have your chromogenic factor checked on 1/23/2021. (Patient taking differently: Take 5 mg by mouth daily 5 mg on Monday and Friday, 4 mg on TuWeThSaSu. CFX goal 20-30%) 30 tablet        ALLERGIES     Allergies   Allergen Reactions     Lovenox [Enoxaparin]      Heparin allergy, do not use this.  Use Arixtra instead     Heparin Other (See Comments)     thrombocytopenia     Clindamycin Rash     Levaquin Rash     Vancomycin Rash       PAST MEDICAL HISTORY:  Past Medical History:   Diagnosis Date     Acute blood loss anemia 8/2014    needed blood transfusion     DVT (deep venous thrombosis) (H)      Lupus (H)      Pneumonia 12/2013    hospitalized x8days         Review of Systems:  Cardiovascular: negative for chest pain, palpitations, orthopnea, pos LE edema, stable.  Constitutional: negative for chills, sweats, fevers   Resp: Negative for dyspnea at rest, pos dyspnea on exertion, neg cough, known chronic  lung disease  HEENT:  Neg frequent headaches  Gastrointestinal: negative for abdominal pain, diarrhea, blood in stool, nausea, vomiting  Hematologic/lymphatic: pos for current systemic anticoagulation, hx of blood clots  Neurological: negative for focal weakness, LOC, seizures, syncope/presyncope. Pos occasional LH spells.      Physical Exam:  Vitals: BP (!) 148/84   Pulse 73   Wt 136.5 kg (301 lb)   LMP 04/04/2012   SpO2 93%   BMI 48.58 kg/m     Wt Readings from Last 4 Encounters:   08/02/22 136.5 kg (301 lb)   04/25/22 137.4 kg (303 lb)   01/27/22 131.5 kg (290 lb)   08/05/21 127.9 kg (282 lb)       GEN:  In general, this is an obese  female in no acute distress on room air.  Patient ambulatory, unaccompanied.   HEENT:  Pupils grossly equal, sclerae nonicteric.   NECK: Supple, trachea midline. Thick neck, no JVD noted while upright.  C/V:  Regular rate and rhythm, no murmur, rub or gallop. No S3 or RV heave.   RESP: Respirations are unlabored. No use of accessory muscles. Clear to auscultation bilaterally without wheezing, rales, or rhonchi.  GI: Abdomen soft, nontender, nondistended.   EXTREM: Mild bilateral nonpitting LE edema. No cyanosis or clubbing.  NEURO: Alert and oriented, cooperative. Gait not formally assessed. No obvious focal deficits.   SKIN: Warm and dry.       Recent Lab Results:  LIVER ENZYME RESULTS:  Lab Results   Component Value Date    AST 22 08/02/2022    AST 19 05/03/2021    ALT 22 08/02/2022    ALT 23 05/03/2021       CBC RESULTS:  Lab Results   Component Value Date    WBC 5.2 08/02/2022    WBC 4.8 05/03/2021    RBC 4.87 08/02/2022    RBC 4.46 05/03/2021    HGB 14.7 08/02/2022    HGB 13.5 05/03/2021    HCT 43.8 08/02/2022    HCT 40.8 05/03/2021    MCV 90 08/02/2022    MCV 92 05/03/2021    MCH 30.2 08/02/2022    MCH 30.3 05/03/2021    MCHC 33.6 08/02/2022    MCHC 33.1 05/03/2021    RDW 12.8 08/02/2022    RDW 12.6 05/03/2021     08/02/2022     05/03/2021       BMP  RESULTS:  Lab Results   Component Value Date     08/02/2022     05/03/2021    POTASSIUM 3.1 (L) 08/02/2022    POTASSIUM 3.5 05/03/2021    CHLORIDE 107 08/02/2022    CHLORIDE 106 05/03/2021    CO2 23 08/02/2022    CO2 28 05/03/2021    ANIONGAP 4 08/02/2022    ANIONGAP 8 05/03/2021     (H) 08/02/2022    GLC 96 05/03/2021    BUN 26 08/02/2022    BUN 23 05/03/2021    CR 1.10 (H) 08/02/2022    CR 1.14 (H) 05/03/2021    GFRESTIMATED 61 08/02/2022    GFRESTIMATED 57 (L) 05/03/2021    GFRESTBLACK 66 05/03/2021    KATARINA 8.7 08/02/2022    KATARINA 9.0 05/03/2021          Recent Labs   Lab Test 08/02/22  1020 04/25/22  1226 01/27/22  1040 05/03/21  1153 03/12/21  0434   NTBNPI  --   --   --   --  473*   NTBNP 137 682* 539*   < >  --     < > = values in this interval not displayed.         Most recent testing:    Echo (4/2022)  Interpretation Summary  Left ventricular function is normal.The ejection fraction is 60-65%.  Paradoxical septal motion consistent with right ventricular pressure overload  is present.  Mild to moderate right ventricular dilation is present. The RV function is  mildly reduced (TAPSE 1.5 cm, RV S' 8.2 cm/s, and RVFAC of 35%).  No significant valvular abnormalities.  The estimated PA systolic pressure is 29 mmHg.  The RA pressure is < 3 mmHg.  No pericardial effusion is present.  This study was compared with the study from 05/14/2021. The RV size and  function are similar but the PA systolic pressure has declined.      RHC (03/2021)  RA 13  RV 51/13  PA 51/17 (32)  PCWP 13  PA% 58  Estimated Terry CO/CI 4.3/1.92  PVR by Terry 4.42  PVR 2.52 by TD on her last hemodynamic assessment  No TD CO done during this time.    NYHA Functional Class:  Functional class 3    35 total minutes was spent today including chart review, precharting, history and exam, post visit documentation, and reviewing studies as outlined above.       Azucena Silva PA-C  [facility]  Pager [phone]

## 2022-08-02 NOTE — PATIENT INSTRUCTIONS
Today we discussed:  START potassium 20meq once daily.   Recheck labs in ~1 week near your home.  Start using oxygen at 2L with exertion again, and monitor your oxygen levels with exercise if you can.    We will have you return in ~8 weeks to clinic with repeat echocardiogram and walk test.     Please call sooner with any new concerns.     Patient Instructions:  1. Continue staying active and eat a heart healthy diet.    2. Please keep current list of medications with you at all times.    3. Remember to weigh yourself daily after voiding and before you consume any food or beverages and log the numbers.  If you have gained 2 pounds overnight or 5 pounds in a week contact us immediately for medication adjustments or further instructions.    4. **Please call us immediately if you have any syncope (fainting or passing out), chest pain, edema (swelling or weight gain), or decline in your functional status (general decline in how you are feeling).    5. Patients on Remodulin (treprostinil) or Veletri (epoprostenol): Please make sure that you have your backup pump and supplies with you at all times, your mixing instructions, and contact information for your specialty pharmacy.    Follow up Appointment Information:      Results:  Component      Latest Ref Rng & Units 8/2/2022   Sodium      133 - 144 mmol/L 134   Potassium      3.4 - 5.3 mmol/L 3.1 (L)   Chloride      94 - 109 mmol/L 107   Carbon Dioxide      20 - 32 mmol/L 23   Anion Gap      3 - 14 mmol/L 4   Urea Nitrogen      7 - 30 mg/dL 26   Creatinine      0.52 - 1.04 mg/dL 1.10 (H)   Calcium      8.5 - 10.1 mg/dL 8.7   Glucose      70 - 99 mg/dL 110 (H)   Alkaline Phosphatase      40 - 150 U/L 139   AST      0 - 45 U/L 22   ALT      0 - 50 U/L 22   Protein Total      6.8 - 8.8 g/dL 8.2   Albumin      3.4 - 5.0 g/dL 3.5   Bilirubin Total      0.2 - 1.3 mg/dL 0.3   GFR Estimate      >60 mL/min/1.73m2 61   WBC      4.0 - 11.0 10e3/uL 5.2   RBC Count      3.80 - 5.20  10e6/uL 4.87   Hemoglobin      11.7 - 15.7 g/dL 14.7   Hematocrit      35.0 - 47.0 % 43.8   MCV      78 - 100 fL 90   MCH      26.5 - 33.0 pg 30.2   MCHC      31.5 - 36.5 g/dL 33.6   RDW      10.0 - 15.0 % 12.8   Platelet Count      150 - 450 10e3/uL 218   N-Terminal Pro Bnp      0 - 450 pg/mL 137     We are located on the third floor of the [facility] on the [facility].  Our address is     [address] [place] [address] on [address] [place] [address]    Thank you for allowing us to be a part of your care here at the [facility]    If you have questions or concerns please contact us at:    Yoly Villegas, RN, BSN, PHN  Patricia Miller (Scheduling,Prior Auth)  Nurse Coordinator     Clinic   Pulmonary Hypertension   Pulmonary Hypertension  [facility]  (Phone)[phone]   (Phone) [phone]        (Fax) [phone]    ** Please note that you will NOT receive a reminder call regarding your scheduled testing, reminder calls are for provider appointments only.  If you are scheduled for testing within the [email or web address] system you may receive a call regarding pre-registration for billing purposes only.**     Support Group:  Pulmonary Hypertension Association  Https://www.phassociation.org/  **Look at the Events Tab** They even have Support Groups that you can call into    [facility] PH Support Group  Second Saturday of the Month from 1-3 PM   Location: [address] [place] [address]  Leader: Marika Quiroz  Phone: [phone]  Email: [email or web address]

## 2022-08-02 NOTE — NURSING NOTE
"Chief Complaint   Patient presents with     RECHECK      6 month PH follow up w/Labs & 6mwt prior.        Initial BP (!) 150/91 (BP Location: Right arm, Patient Position: Chair, Cuff Size: Adult Regular)   Pulse 79   Wt 136.5 kg (301 lb)   LMP 04/04/2012   SpO2 93%   BMI 48.58 kg/m   Estimated body mass index is 48.58 kg/m  as calculated from the following:    Height as of 8/5/21: 1.676 m (5' 6\").    Weight as of this encounter: 136.5 kg (301 lb)..  BP completed using cuff size: regular/forearm    BRENDON Erickson  "

## 2022-08-02 NOTE — LETTER
8/2/2022      RE: Yuridia Barksdale  [address] [facility] [address]       Dear Colleague,    Thank you for the opportunity to participate in the care of your patient, Yuridia Barksdale, at the [facility] [place] at [facility]. Please see a copy of my visit note below.        Date of Service: 08/02/22      [facility]      Primary PH cardiologist:  Dr. Paniagua     HPI:  Ms. Barksdale is a very pleasant 51 year old female with a PMhx including SLE with lupus antiphospholipid antibody syndrome and hx DVT on anticoagulation, and DAVIS on CPAP. She also has chronic thromboembolic pulmonary arterial hypertension. She was initially placed on riociguat, and then referred to [facility] for surgical evaluation. Ultimately, she underwent pulmonary thromboendarterectomy at [facility] in July 2020. She has type IV disease and her PTE was somewhat difficult to the distal nature of her disease. Her post operative course was reportedly uncomplicated, and postoperative hemodynamics showed improvement.  Her VQ scan showed improved perfusion of the right lower lobe and left upper lobe, and her riociguat and digoxin were discontinued at that time. Post surgically she had struggled with swelling and weight gain requiring escalation of diuretics. She then underwent BPA in January and March of 2021 of right A8 branch and right A3 branch.    In May of 2021, her exercise capacity was improving, but repeat echo showed mildly dilated RV with mildly reduced function, and Dr. Paniagua then recommended addition of tadalafil. Unfortunately, since that time, she has struggled with weight gain that is felt mostly to be caloric. When I met with her in January 2022, she had just joined a gym and was also going to meet with a nutritionist. Most recently, she was seen by Dr. Paniagua in April, repeat echo showed stable mild  RV dilation and mild RV dysfunction. No changes were made at that time.    Today, I am seeing Yuridia back in clinic for follow up. She tells me that overall she is feeling ok, with no worsening in her breathing. However, on her six minute walk today, she again required 2L to maintain sats >90% while last time in April, she was able to maintain similar sats without supplemental oxygen. Her walk distance has declined slightly as well, and was 1420 ft today (was 1560 in April).  Unfortunately, she has also not lost any weight and remains above 300 lbs. She tells me she is no longer going to the gym but does walk 30 minutes outside every day. She denies any new CP, and has what sounds like rare palpitations. She had an episode of dizziness recently but attributes this to dehydration. She denies syncope/presyncope. She does not think she is having any worsening edema. Her BP was elevated today as well, which is unusual for her. Of note, she had an interruption in her tadalafil for about a month due to insurance change. She has now been back on it for a little less than a week.     Labs performed prior to our visit today were also reviewed: Na 134, K 3.1, BUN 26, SCR 1.10, ALT 22, AST 22, NT-proBNP 137. WBC 5.2, hemoglobin 14.7, hematocrit 43.8, plt 218.      CURRENT PULMONARY HYPERTENSION REGIMEN:     PAH Rx: tadalafil 40mg daily  (previously on riociguat, stopped post PTE)      Diuretics: Torsemide 80mg once daily, spironolactone 25mg BID     Oxygen: RA rest, 2.5L with formal exercise     Anticoagulation: warfarin (follows chrom factor goal 20-30)--needs to be bridged with Arixtra given her heparin allergy if she needs procedures.  Indication: DVT/antiphospholipid    Rheum: Dr. Graf        Assessment/Plan:     1. Chronic thromboembolic pulmonary hypertension.              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 She is s/p pulmonary thromboendarterectomy July  2020 at [facility], and riociguat was discontinued. She then had BPA in January and March of this year here at [facility], of the right A8 branch and right A3 branch.               --She has still had some mild RV dysfunction, so tadalafil was added at 40mg daily last summer.             --Unfortunately, she has gained nearly 50 pounds over the last year, which is caloric. I encouraged her to continue following with a nutritionist, and stressed the importance of regular exercise to try to get some of her weight back off.    --From a volume standpoint, she does not appear hypervolemic, and in fact, her NT-proBNP is the best it has been in some time. For now, keep torsemide and spironolactone as is. However, as she is hypokalemic today, will add potassium 20meq once daily. Recheck BMP next week.              --She had been using her oxygen only PRN recently; however, on her 6MWT today she required 2L with exertion once again. I asked her to start using this again with exercise. This may be a function of being temporarily out of her tadalafil and weight gain, which we discussed.               --She is compliant with her CPAP for known DAVIS.               --Continue Coumadin for anticoagulation, in the setting of CTEPH and antiphospholipid antibody syndrome. She normally follows chromogenic factor goal of 20-30. She needs to be bridged with Arixtra given her heparin allergy if she needs procedures.       Follow up plan: Return in 8 weeks to see me in clinic--will repeat 6mwt and check and echocardiogram and labs at that visit. I asked her to call sooner with any new concerns.         Orders this Visit:  Orders Placed This Encounter   Procedures     Comprehensive metabolic panel     CBC with platelets     N terminal pro BNP outpatient     Basic metabolic panel     Follow-Up with Cardiology     Echocardiogram Complete     6 minute walk test     Orders Placed This Encounter   Medications     potassium chloride ER (KLOR-CON M) 20  MEQ CR tablet     Sig: Take 1 tablet (20 mEq) by mouth daily     Dispense:  90 tablet     Refill:  3     There are no discontinued medications.        CURRENT MEDICATIONS:  Current Outpatient Medications   Medication Sig Dispense Refill     acetaminophen (TYLENOL) 500 MG tablet Take 1,000 mg by mouth       ARIPiprazole (ABILIFY) 5 MG tablet 10 mg daily       atorvastatin (LIPITOR) 10 MG tablet Take 1 tablet (10 mg) by mouth daily       clonazePAM (KLONOPIN) 1 MG tablet Take 1 tablet (1 mg) by mouth daily as needed for anxiety (Patient taking differently: Take 2 mg by mouth nightly as needed)       hydroxychloroquine (PLAQUENIL) 200 MG tablet Take 1 tablet (200 mg) by mouth 2 times daily       potassium chloride ER (KLOR-CON M) 20 MEQ CR tablet Take 1 tablet (20 mEq) by mouth daily 90 tablet 3     sertraline (ZOLOFT) 100 MG tablet Take 2 tablets (200 mg) by mouth daily       spironolactone (ALDACTONE) 25 MG tablet Take 1 tablet (25 mg) by mouth 2 times daily 180 tablet 3     tadalafil, PAH, 20 MG TABS Take 2 tablets (40 mg) by mouth daily 180 tablet 3     torsemide (DEMADEX) 20 MG tablet Take 4 tablets (80 mg) by mouth daily 360 tablet 2     traZODone (DESYREL) 50 MG tablet Take 2 tablets (100 mg) by mouth At Bedtime       warfarin ANTICOAGULANT (COUMADIN ANTICOAGULANT) 1 MG tablet Take 5 tablets (5 mg) by mouth daily On 1/21/2021 and 1/22/2021. Have your chromogenic factor checked on 1/23/2021. (Patient taking differently: Take 5 mg by mouth daily 5 mg on Monday and Friday, 4 mg on TuWeThSaSu. CFX goal 20-30%) 30 tablet        ALLERGIES     Allergies   Allergen Reactions     Lovenox [Enoxaparin]      Heparin allergy, do not use this.  Use Arixtra instead     Heparin Other (See Comments)     thrombocytopenia     Clindamycin Rash     Levaquin Rash     Vancomycin Rash       PAST MEDICAL HISTORY:  Past Medical History:   Diagnosis Date     Acute blood loss anemia 8/2014    needed blood transfusion     DVT (deep venous  thrombosis) (H)      Lupus (H)      Pneumonia 12/2013    hospitalized x8days         Review of Systems:  Cardiovascular: negative for chest pain, palpitations, orthopnea, pos LE edema, stable.  Constitutional: negative for chills, sweats, fevers   Resp: Negative for dyspnea at rest, pos dyspnea on exertion, neg cough, known chronic lung disease  HEENT:  Neg frequent headaches  Gastrointestinal: negative for abdominal pain, diarrhea, blood in stool, nausea, vomiting  Hematologic/lymphatic: pos for current systemic anticoagulation, hx of blood clots  Neurological: negative for focal weakness, LOC, seizures, syncope/presyncope. Pos occasional LH spells.      Physical Exam:  Vitals: BP (!) 148/84   Pulse 73   Wt 136.5 kg (301 lb)   LMP 04/04/2012   SpO2 93%   BMI 48.58 kg/m     Wt Readings from Last 4 Encounters:   08/02/22 136.5 kg (301 lb)   04/25/22 137.4 kg (303 lb)   01/27/22 131.5 kg (290 lb)   08/05/21 127.9 kg (282 lb)       GEN:  In general, this is an obese  female in no acute distress on room air.  Patient ambulatory, unaccompanied.   HEENT:  Pupils grossly equal, sclerae nonicteric.   NECK: Supple, trachea midline. Thick neck, no JVD noted while upright.  C/V:  Regular rate and rhythm, no murmur, rub or gallop. No S3 or RV heave.   RESP: Respirations are unlabored. No use of accessory muscles. Clear to auscultation bilaterally without wheezing, rales, or rhonchi.  GI: Abdomen soft, nontender, nondistended.   EXTREM: Mild bilateral nonpitting LE edema. No cyanosis or clubbing.  NEURO: Alert and oriented, cooperative. Gait not formally assessed. No obvious focal deficits.   SKIN: Warm and dry.       Recent Lab Results:  LIVER ENZYME RESULTS:  Lab Results   Component Value Date    AST 22 08/02/2022    AST 19 05/03/2021    ALT 22 08/02/2022    ALT 23 05/03/2021       CBC RESULTS:  Lab Results   Component Value Date    WBC 5.2 08/02/2022    WBC 4.8 05/03/2021    RBC 4.87 08/02/2022    RBC 4.46  05/03/2021    HGB 14.7 08/02/2022    HGB 13.5 05/03/2021    HCT 43.8 08/02/2022    HCT 40.8 05/03/2021    MCV 90 08/02/2022    MCV 92 05/03/2021    MCH 30.2 08/02/2022    MCH 30.3 05/03/2021    MCHC 33.6 08/02/2022    MCHC 33.1 05/03/2021    RDW 12.8 08/02/2022    RDW 12.6 05/03/2021     08/02/2022     05/03/2021       BMP RESULTS:  Lab Results   Component Value Date     08/02/2022     05/03/2021    POTASSIUM 3.1 (L) 08/02/2022    POTASSIUM 3.5 05/03/2021    CHLORIDE 107 08/02/2022    CHLORIDE 106 05/03/2021    CO2 23 08/02/2022    CO2 28 05/03/2021    ANIONGAP 4 08/02/2022    ANIONGAP 8 05/03/2021     (H) 08/02/2022    GLC 96 05/03/2021    BUN 26 08/02/2022    BUN 23 05/03/2021    CR 1.10 (H) 08/02/2022    CR 1.14 (H) 05/03/2021    GFRESTIMATED 61 08/02/2022    GFRESTIMATED 57 (L) 05/03/2021    GFRESTBLACK 66 05/03/2021    KATARINA 8.7 08/02/2022    KATARINA 9.0 05/03/2021          Recent Labs   Lab Test 08/02/22  1020 04/25/22  1226 01/27/22  1040 05/03/21  1153 03/12/21  0434   NTBNPI  --   --   --   --  473*   NTBNP 137 682* 539*   < >  --     < > = values in this interval not displayed.         Most recent testing:    Echo (4/2022)  Interpretation Summary  Left ventricular function is normal.The ejection fraction is 60-65%.  Paradoxical septal motion consistent with right ventricular pressure overload  is present.  Mild to moderate right ventricular dilation is present. The RV function is  mildly reduced (TAPSE 1.5 cm, RV S' 8.2 cm/s, and RVFAC of 35%).  No significant valvular abnormalities.  The estimated PA systolic pressure is 29 mmHg.  The RA pressure is < 3 mmHg.  No pericardial effusion is present.  This study was compared with the study from 05/14/2021. The RV size and  function are similar but the PA systolic pressure has declined.      RHC (03/2021)  RA 13  RV 51/13  PA 51/17 (32)  PCWP 13  PA% 58  Estimated Terry CO/CI 4.3/1.92  PVR by Terry 4.42  PVR 2.52 by TD on her last  hemodynamic assessment  No TD CO done during this time.    NYHA Functional Class:  Functional class 3    35 total minutes was spent today including chart review, precharting, history and exam, post visit documentation, and reviewing studies as outlined above.       Azucena Silva PA-C  [facility] Heart  Pager [phone]

## 2022-08-02 NOTE — NURSING NOTE
Procedures and/or Testing: Patient given instructions regarding  Echocardiogram,  6 minute walk test, . Discussed purpose, preparation, procedure and when to expect results reported back to the patient. Patient demonstrated understanding of this information and agreed to call with further questions or concerns.  New Medication: Patient was educated regarding newly prescribed medication, including discussion of  the indication, administration, side effects, and when to report to MD or RN. Patient demonstrated understanding of this information and agreed to call with further questions or concerns.  Labs: Patient was given results of the laboratory testing obtained today. Patient demonstrated understanding of this information and agreed to call with further questions or concerns.   Diet: Patient instructed regarding a heart healthy diet, including discussion of reduced fat and sodium intake. Patient demonstrated understanding of this information and agreed to call with further questions or concerns.  Return Appointment: Patient given instructions regarding scheduling next clinic visit. Patient demonstrated understanding of this information and agreed to call with further questions or concerns.  Patient stated she understood all health information given and agreed to call with further questions or concerns.    Patient escorted to Pods to schedule F/U appt. Micheline Chapa RN on 8/2/2022 at 11:55 AM    Lab orders for BMP recheck faxed to [facility] at (f) [phone]. Micheline Chapa RN on 8/2/2022 at 11:58 AM

## 2022-08-08 ENCOUNTER — TELEPHONE (OUTPATIENT)
Dept: CARDIOLOGY | Facility: CLINIC | Age: 52
End: 2022-08-08

## 2022-08-08 NOTE — TELEPHONE ENCOUNTER
Prior Authorization Approval    Authorization Effective Date: 8/8/2022  Authorization Expiration Date: 8/8/2023  Medication: PA tadalafil approved   Approved Dose/Quantity: 60/30  Reference #:     Insurance Company: [employer] ([facility]) - Phone [phone] Fax [phone]  Expected CoPay:       CoPay Card Available:      Foundation Assistance Needed:    Which Pharmacy is filling the prescription (Not needed for infusion/clinic administered): [id number] [address] AT [facility] [address]  Clinic    Pulmonary Hypertension   [facility]  (p) [phone]

## 2022-08-08 NOTE — TELEPHONE ENCOUNTER
PA Initiation    Medication: PA tadalafil   Insurance Company: [employer] ([facility]) - Phone [phone] Fax [phone]  Pharmacy Filling the Rx: [id number] [place] [id number] AT [facility] [address]  Filling Pharmacy Phone:    Filling Pharmacy Fax:    Start Date: 8/8/2022                Patricia [place]  Clinic    Pulmonary Hypertension   [facility]  (p) [phone]

## 2022-08-08 NOTE — TELEPHONE ENCOUNTER
[facility] Call Center    Phone Message    May a detailed message be left on voicemail: yes     Reason for Call: Other: Pt would like a call back asap as she was told a prior auth was being sent to her insurance which is united health but they have not recieved anything regarding medication and she would like a call back to discuss     Action Taken: Message routed to:  [facility] ([facility]): Cardio    Travel Screening: Not Applicable  -------------------------------  Called patient and she said [facility] is telling her they haven't received any PA for her Tadalafil.  Advised her we have an approved PA from July using [employer] insurance, is that wrong?  Yes, she had an insurance change July 1st, so the correct insurance info probably wasn't in the system at the time that was completed. Verified we had correct insurance info in our system now.  Advised her I would alert Patricia we needed a new PA.  Patient verbalized understanding, agreed with plan and denied any further questions. Yoly Villegas RN on 8/8/2022 at 3:13 PM

## 2022-08-09 DIAGNOSIS — I27.24 CTEPH (CHRONIC THROMBOEMBOLIC PULMONARY HYPERTENSION) (H): ICD-10-CM

## 2022-08-09 NOTE — TELEPHONE ENCOUNTER
Health Call Center    Phone Message    May a detailed message be left on voicemail: yes     Reason for Call: Medication Refill Request    Has the patient contacted the pharmacy for the refill? Yes / [facility]; [facility] looks like [facility] was sent [facility]. Patient has changed to [facility].      Name of medication being requested:   tadalafil, PAH, 20 MG TABS    Provider who prescribed the medication: Dr Paniagua    [facility] [phone] (P)    Date medication is needed: Patient is out     Action Taken: Other: Cardiology    Travel Screening: Not Applicable

## 2022-08-10 ENCOUNTER — TRANSFERRED RECORDS (OUTPATIENT)
Dept: HEALTH INFORMATION MANAGEMENT | Facility: CLINIC | Age: 52
End: 2022-08-10

## 2022-08-10 LAB
ALT SERPL-CCNC: 15 IU/L (ref 5–35)
AST SERPL-CCNC: 23 U/L (ref 5–34)
CREATININE (EXTERNAL): 1 MG/DL (ref 0.5–1.3)
PHQ9 SCORE: 15

## 2022-08-10 RX ORDER — TADALAFIL 20 MG/1
40 TABLET ORAL DAILY
Qty: 180 TABLET | Refills: 3 | OUTPATIENT
Start: 2022-08-10

## 2022-08-11 ENCOUNTER — TELEPHONE (OUTPATIENT)
Dept: CARDIOLOGY | Facility: CLINIC | Age: 52
End: 2022-08-11

## 2022-08-11 DIAGNOSIS — I27.24 CTEPH (CHRONIC THROMBOEMBOLIC PULMONARY HYPERTENSION) (H): ICD-10-CM

## 2022-08-11 NOTE — TELEPHONE ENCOUNTER
Prior Authorization Retail Medication Request    Medication/Dose: Tadalafil 20MG TABLETS  ICD code (if different than what is on RX):    Previously Tried and Failed:    Rationale:      Insurance Name: [employer]   Insurance ID : [id number]:      Pharmacy Information (if different than what is on RX)  Name:  [facility]  Phone:  [phone] fax [phone]

## 2022-08-16 RX ORDER — TADALAFIL 20 MG/1
40 TABLET ORAL DAILY
Qty: 180 TABLET | Refills: 3 | Status: SHIPPED | OUTPATIENT
Start: 2022-08-16 | End: 2022-09-16

## 2022-08-16 NOTE — TELEPHONE ENCOUNTER
[facility] Call Center    Phone Message    May a detailed message be left on voicemail: yes     Reason for Call: Other: Pt called in regards to prev message, pt has been out of tadalafil for a week now and wanting to know if the PA was approved. Pt is requesting this be sent to [facility], pt only had phone # [phone]. Please review and call pt back to discuss. Thank you!     Action Taken: Other: Cardiology    Travel Screening: Not Applicable           -------------------------------  Sent new Erx to [facility]. Yoly Villegas RN on 8/16/2022 at 11:54 AM

## 2022-08-17 ENCOUNTER — TELEPHONE (OUTPATIENT)
Dept: CARDIOLOGY | Facility: CLINIC | Age: 52
End: 2022-08-17

## 2022-08-17 NOTE — TELEPHONE ENCOUNTER
patient LM her Tadalafil will cost her $200 per month through [employer].  -----------------------------------  I looked at [employer] and a 30 day supply (60 tabs) will cost her ~$28-35 per month.  patient stated she agreed to pay the $200this once so the shipment is supposed to arrive tomorrow, but she will use the GoodRx coupon moving forward.  Asked her to just send me a [employer] message with the name of the pharmacy she wants me to send Rx too when she's ready. Patient verbalized understanding, agreed with plan and denied any further questions. Yoly Villegas RN on 8/17/2022 at 3:36 PM       Statement Selected

## 2022-09-16 ENCOUNTER — TELEPHONE (OUTPATIENT)
Dept: CARDIOLOGY | Facility: CLINIC | Age: 52
End: 2022-09-16

## 2022-09-16 DIAGNOSIS — I27.24 CTEPH (CHRONIC THROMBOEMBOLIC PULMONARY HYPERTENSION) (H): ICD-10-CM

## 2022-09-16 RX ORDER — TADALAFIL 20 MG/1
40 TABLET ORAL DAILY
Qty: 60 TABLET | Refills: 11 | Status: SHIPPED | OUTPATIENT
Start: 2022-09-16 | End: 2023-01-30

## 2022-09-17 NOTE — TELEPHONE ENCOUNTER
patient LM asking for refill of Tadalafil be sent to [facility], as she will be using a [employer] coupon. Yoly Villegas RN on 9/16/2022 at 5:13 PM    Refill sent.

## 2022-09-21 ENCOUNTER — TELEPHONE (OUTPATIENT)
Dept: CARDIOLOGY | Facility: CLINIC | Age: 52
End: 2022-09-21

## 2022-10-19 ENCOUNTER — TELEPHONE (OUTPATIENT)
Dept: CARDIOLOGY | Facility: CLINIC | Age: 52
End: 2022-10-19

## 2022-10-19 NOTE — TELEPHONE ENCOUNTER
"[facility] Call Center    Phone Message    May a detailed message be left on voicemail: yes     Reason for Call: Other: Yuridia calling to report that [facility] told her they were trying to get in touch with Dr. Paniagua to [facility] an [facility]xygen [facility] that the order has  on their end.  [facility]'s number is [phone].  She would appreciate it if the clinic would reach out to them to discuss this so she can reorder.  Thank you!     Action Taken: Message routed to:  Other: Cardiology    Travel Screening: Not Applicable    ----------------------------------  called [employer] and explained above message.  I was transferred to [place] from Authorization team.  They said they had faxed us a medical necessity form to be signed, but we haven't received it.  Asked him to fax it again to our office fax number and I would get it signed & returned.  Yoly Villegas RN on 10/21/2022 at 10:33 AM    Sent patient [employer] message with update.    ----------------------------------  Called [employer] back, as I have not received a copy of the medical necessity form for patient.  They took patient info & my info including fax # and said they would have a rep fax it \"right away\".  Yoly Villegas RN on 10/27/2022 at 4:43 PM  --------------------------------------  Called [employer] and spoke with same agent as yesterday.  She did give the message to an agent, but they were having \"system issues\".   I asked that they fax form again after confirming fax number. Yoly Villegas RN on 10/28/2022 at 3:27 PM        -------------------------------------  Received form from [employer] yesterday 22.  Had MD sign it and faxed it back same day. Yoly Villegas RN on 2022 at 10:00 AM                                                 "

## 2022-11-01 NOTE — TELEPHONE ENCOUNTER
11/1/2022 4:41 PM -   Called pt [phone] (home)  - no answer, LVM.  lrr    [10/26/22 115 pm -lrr:  pt's new ins is HP, she does not have card, will send info via [employer] if she can access info online or will wait for card]     Janey HUTCHINSON CMA  Clinical   Cardiology/Pulmonary Hypertension   [facility]  PH: [phone]

## 2022-11-28 ENCOUNTER — TELEPHONE (OUTPATIENT)
Dept: CARDIOLOGY | Facility: CLINIC | Age: 52
End: 2022-11-28

## 2022-11-28 NOTE — TELEPHONE ENCOUNTER
----- Message from Patricia Miller sent at 11/28/2022 10:39 AM CST -----  Sorry    ----- Message -----  From: Keshia Moser  Sent: 11/28/2022  10:16 AM CST  To: Patricia Miller    No patient attached  ----- Message -----  From: Patricia Miller  Sent: 11/28/2022   8:38 AM CST  To: Clinic Coordinators[facility]    Labs, 6 min walk, Echo and shaista first available please    Thank you     Patricia

## 2022-12-20 DIAGNOSIS — I27.24 CTEPH (CHRONIC THROMBOEMBOLIC PULMONARY HYPERTENSION) (H): ICD-10-CM

## 2022-12-20 DIAGNOSIS — R06.09 DOE (DYSPNEA ON EXERTION): ICD-10-CM

## 2022-12-20 RX ORDER — TORSEMIDE 20 MG/1
80 TABLET ORAL DAILY
Qty: 360 TABLET | Refills: 1 | Status: SHIPPED | OUTPATIENT
Start: 2022-12-20 | End: 2023-01-30

## 2022-12-20 NOTE — TELEPHONE ENCOUNTER
"  torsemide (DEMADEX) 20 MG tablet    Last Written Prescription Date:  11/26/21  Last Fill Quantity: 360,   # refills: 2  Last Office Visit : 8/2/22  Future Office visit:  None> 6 months( per 11/30/22 note, sorting insurance issues/ new job)    Routing refill request to provider for review/approval because:     08/02/22 (!) 148/84   04/25/22 127/80   01/27/22 114/78      08/02/22  1020   POTASSIUM 3.1*    Reviewed at clinic 8/2/22, no recheck noted, Care Everywhere reviewed: \"From a volume standpoint, she does not appear hypervolemic, and in fact, her NT-proBNP is the best it has been in some time. For now, keep torsemide and spironolactone as is. However, as she is hypokalemic today, will add potassium 20meq once daily. Recheck BMP next week\"         "

## 2022-12-20 NOTE — TELEPHONE ENCOUNTER
Medication Refill double check:    Last office visit was on 8/2/22 with Azucena Silva PA-C.    Follow up was recommended for 8 weeks. (Overdue)    Any additional encounters with changes to requested med? no    Authorizing provider is: Dr. Paniagua    Limited refill was approved.     Additional orders/notes: sent msg to Patricia asking her to schedule patient fopr follow up with [facility] w/full testing.      Yoly Villegas RN on 12/20/2022 at 5:42 PM

## 2022-12-20 NOTE — TELEPHONE ENCOUNTER
Health Call Center    Phone Message    May a detailed message be left on voicemail: yes     Reason for Call: Medication Refill Request    Has the patient contacted the pharmacy for the refill? Yes   Name of medication being requested: torsemide (DEMADEX) 20 MG tablet  Provider who prescribed the medication: Jeannette  Pharmacy: [facility] - [address], [place] [address]  Date medication is needed: 12/20/22      Action Taken: Message routed to:  Other: Cardiology    Travel Screening: Not Applicable     Thank you!  [facility]

## 2022-12-29 ENCOUNTER — TELEPHONE (OUTPATIENT)
Dept: CARDIOLOGY | Facility: CLINIC | Age: 52
End: 2022-12-29

## 2022-12-29 NOTE — TELEPHONE ENCOUNTER
"----- Message from Keshia Moser sent at 12/29/2022  7:30 AM CST -----  Regarding: FW: overdue appt    ----- Message -----  From: Yoly Villegas RN  Sent: 12/27/2022   1:44 PM CST  To: Keshia Moser  Subject: RE: overdue appt                                 I just don't want her to get lost to follow up.  Can we at least call and ask if she figured out her insurance stuff?    ThanksYoly  ----- Message -----  From: Keshia Moser  Sent: 12/22/2022   9:08 AM CST  To: Yoly Villegas RN, Patricia Miller  Subject: RE: overdue appt                                 See my note: \"11/30 talked with pt, needs to sort out insurance issues and will call to schedule after that NH\"    Do you want me to try again?  ----- Message -----  From: Patricia Miller  Sent: 12/21/2022   7:59 AM CST  To: [facility]  Subject: FW: overdue appt                                   ----- Message -----  From: Yoly Villegas RN  Sent: 12/20/2022   5:46 PM CST  To: Patricia Miller  Subject: overdue appt                                     Olu Gomez,    Patient is overdue for follow up  Please help her schedule next available with Azucena Silva PA-C  With labs, 6mwt & Echo.    Thanks,  Yoly            "

## 2023-01-12 NOTE — TELEPHONE ENCOUNTER
1/12 3rd attempt LVM and sent letter for patient to call and schedule Labs, 6MWT, Echo, and follow-up with Azucena

## 2023-01-26 ENCOUNTER — TELEPHONE (OUTPATIENT)
Dept: CARDIOLOGY | Facility: CLINIC | Age: 53
End: 2023-01-26
Payer: COMMERCIAL

## 2023-01-27 NOTE — TELEPHONE ENCOUNTER
"1/26/2023 6:35 PM -   Staff email from Yoly Villegas RN : \"[phone]  Yuridia Barksdale - just alerted me that her insurance no longer covers [place] and only wants her to see providers in the [facility].  We need to appeal this to try and get Dr. Paniagua billed as in-network due to the [facility] the closest accredited PAH facility to the pt.\"    Janey HUTCHINSON CMA  Clinical   Cardiology/Pulmonary Hypertension   [facility]  PH: [phone]     "

## 2023-03-16 DIAGNOSIS — I27.24 CTEPH (CHRONIC THROMBOEMBOLIC PULMONARY HYPERTENSION) (H): ICD-10-CM

## 2023-03-16 DIAGNOSIS — R06.09 DOE (DYSPNEA ON EXERTION): ICD-10-CM

## 2023-03-20 RX ORDER — SPIRONOLACTONE 25 MG/1
25 TABLET ORAL 2 TIMES DAILY
Qty: 180 TABLET | Refills: 1 | Status: SHIPPED | OUTPATIENT
Start: 2023-03-20 | End: 2023-10-27

## 2023-03-20 NOTE — TELEPHONE ENCOUNTER
spironolactone (ALDACTONE) 25 MG tablet      Last Written Prescription Date:  2/23/2022  Last Fill Quantity: 180,   # refills: 3  Last Office Visit : 8/2/2022  [facility]  Future Office visit:  none    Routing refill request to provider for review/approval because:  Failed medication protocol: abnormal lab  - Creatinine (H)  - Potassium (L)    Creatinine   Date Value Ref Range Status   08/02/2022 1.10 (H) 0.52 - 1.04 mg/dL Final   05/03/2021 1.14 (H) 0.52 - 1.04 mg/dL Final     Potassium   Date Value Ref Range Status   08/02/2022 3.1 (L) 3.4 - 5.3 mmol/L Final   05/03/2021 3.5 3.4 - 5.3 mmol/L Final

## 2023-04-22 ENCOUNTER — HEALTH MAINTENANCE LETTER (OUTPATIENT)
Age: 53
End: 2023-04-22

## 2023-05-31 ENCOUNTER — TELEPHONE (OUTPATIENT)
Dept: CARDIOLOGY | Facility: CLINIC | Age: 53
End: 2023-05-31
Payer: COMMERCIAL

## 2023-05-31 NOTE — TELEPHONE ENCOUNTER
Patient will be getting a new job June 6th.  She will have new insurance as of July 1st so she believes she can come back and see us then.  She has been taking her meds & verifies she still has refills available.  We agreed I would check to see if she has an appt in July, if not I'll give her a call to see if her insurance will cover us.  Advised her we will need to complete all the testing once she comes back.  Patient verbalized understanding, agreed with plan and denied any further questions. Yoly Villegas RN on 5/31/2023 at 11:43 AM    Sent myself a reminder for July,

## 2023-06-21 ENCOUNTER — TELEPHONE (OUTPATIENT)
Dept: CARDIOLOGY | Facility: CLINIC | Age: 53
End: 2023-06-21
Payer: COMMERCIAL

## 2023-06-21 DIAGNOSIS — R06.09 DOE (DYSPNEA ON EXERTION): Primary | ICD-10-CM

## 2023-06-21 DIAGNOSIS — I27.24 CTEPH (CHRONIC THROMBOEMBOLIC PULMONARY HYPERTENSION) (H): ICD-10-CM

## 2023-06-21 RX ORDER — LIDOCAINE 40 MG/G
CREAM TOPICAL
Status: CANCELLED | OUTPATIENT
Start: 2023-06-21

## 2023-06-27 ENCOUNTER — TELEPHONE (OUTPATIENT)
Dept: CARDIOLOGY | Facility: CLINIC | Age: 53
End: 2023-06-27
Payer: COMMERCIAL

## 2023-06-27 NOTE — TELEPHONE ENCOUNTER
Cath Lab Case Request/Order    Location: [address] [address] [facility] Waiting Room    Procedure: Right Heart Cath    Procedure Date: 8/4/23    Patient Arrival Time: 730    Procedure Time: 2nd case     Ordering Provider: Azucena Silva    Performing Cardiologist: Dr. Arcelia Paniagua    Inpatient Bed Needed: No    Post-  Procedure GLORY appointment scheduled (1 - 2 weeks): N/A, RHC with Arcelia       Communicated Patient Instructions:  NURSE WILL GO OVER ALL PREP INSTRUCTIONS    Appointment was scheduled: Over the phone    Patient expressed understanding of above instructions and denied further questions at this time.    Patricia Miller

## 2023-07-31 ENCOUNTER — TELEPHONE (OUTPATIENT)
Dept: CARDIOLOGY | Facility: CLINIC | Age: 53
End: 2023-07-31
Payer: COMMERCIAL

## 2023-07-31 DIAGNOSIS — I27.24 CTEPH (CHRONIC THROMBOEMBOLIC PULMONARY HYPERTENSION) (H): ICD-10-CM

## 2023-07-31 DIAGNOSIS — R06.09 DOE (DYSPNEA ON EXERTION): ICD-10-CM

## 2023-07-31 NOTE — TELEPHONE ENCOUNTER
7/31/2023  @ 6:02 PM -  tadalafil 20 mg (60/30) - renewal - new ins eff: 7/1/23 - rcvd ins information/copy of card via [employer] on 7/31/23.      PA Initiation  Medication: tadalafil 20 mg (60/30) - renewal - new ins eff: 7/1/23   Insurance Company: [employer] - Phone [phone] Fax [phone]  Pharmacy Filling the Rx:    Filling Pharmacy Phone:    Filling Pharmacy Fax:    Start Date: 7/31/2023  via CMM, key ,  [id number]/ [facility] dated : 3/12/21; Dx Code: i27.0.       ----------------------------  Insurance: [employer]/ (not :  MED IMPACT (NON MEDICARE)  BIN: [id number]  PCN: [id number]  OR ASPROD1  GRP#: [id number]  OR [id number]  5ID#: [id number]         Janey HUTCHINSON CMA- Prior Auths  Cardiology/Pulmonary Hypertension

## 2023-07-31 NOTE — TELEPHONE ENCOUNTER
----- Message from Yoly Villegas RN sent at 6/21/2023 11:05 AM CDT -----  Regarding: New Insurance  Hi Janey,    I know I already sent you a heads up on this, but I just wanted to update you.    I spoke with patient today and asked her to not only update her insurance info in the system once she gets her new card effective July1st, but also send us a picture of it attached to a mychart.    Thanks,  yoly

## 2023-08-01 ENCOUNTER — TELEPHONE (OUTPATIENT)
Dept: CARDIOLOGY | Facility: CLINIC | Age: 53
End: 2023-08-01
Payer: COMMERCIAL

## 2023-08-01 RX ORDER — TORSEMIDE 20 MG/1
80 TABLET ORAL DAILY
Qty: 360 TABLET | Refills: 1 | Status: SHIPPED | OUTPATIENT
Start: 2023-08-01 | End: 2023-08-16

## 2023-08-01 NOTE — TELEPHONE ENCOUNTER
----- Message from Yoly Villegas RN sent at 8/1/2023  9:36 AM CDT -----  Regarding: Pre-Procedure Education  Call/message patient with pore-procedure Education for 8/4  -----------------------------------  Sent mychart message to patient with pre-procedure Education including half dose Warfarin for 2 days prior per Dr. Arcelia LAM. Yoly Villegas RN on 8/1/2023 at 1:39 PM

## 2023-08-01 NOTE — TELEPHONE ENCOUNTER
8/1/2023  @ 11:02 AM -  tadalafil 20 mg (6030)  approved -     Prior Authorization Approval    Medication: TADALAFIL (PAH) 20 MG PO TABS  Authorization Effective Date: 7/2/2023  Authorization Expiration Date: 7/31/2024  Approved Dose/Quantity: 60/30  Reference #: CASE#:  [id number]   Insurance Company: [employer] - Phone [phone] Fax [phone]  Expected CoPay:       CoPay Card Available:      Financial Assistance Needed: *  Which Pharmacy is filling the prescription: [id number] - [place], [place] - [address] [place] [address] [address]  FAX: [phone]   Pharmacy Notified:    Patient Notified: Yes    Updated Snapshot, added to PA Calendar;  @1106 AM -  [facility] for pt to call back with preferred pharmacy, she has multiple on file.           Janey HUTCHINSON CMA- Prior Auths  Cardiology/Pulmonary Hypertension

## 2023-08-04 ENCOUNTER — HOSPITAL ENCOUNTER (OUTPATIENT)
Dept: CARDIOLOGY | Facility: CLINIC | Age: 53
Discharge: HOME | End: 2023-08-04
Attending: INTERNAL MEDICINE | Admitting: INTERNAL MEDICINE
Payer: COMMERCIAL

## 2023-08-04 ENCOUNTER — APPOINTMENT (OUTPATIENT)
Dept: LAB | Facility: CLINIC | Age: 53
End: 2023-08-04
Attending: INTERNAL MEDICINE
Payer: COMMERCIAL

## 2023-08-04 ENCOUNTER — HOSPITAL ENCOUNTER (OUTPATIENT)
Facility: CLINIC | Age: 53
Discharge: HOME | End: 2023-08-04
Attending: INTERNAL MEDICINE | Admitting: INTERNAL MEDICINE
Payer: COMMERCIAL

## 2023-08-04 ENCOUNTER — APPOINTMENT (OUTPATIENT)
Dept: MEDSURG UNIT | Facility: CLINIC | Age: 53
End: 2023-08-04
Attending: INTERNAL MEDICINE
Payer: COMMERCIAL

## 2023-08-04 VITALS
DIASTOLIC BLOOD PRESSURE: 90 MMHG | HEIGHT: 66 IN | BODY MASS INDEX: 47.09 KG/M2 | SYSTOLIC BLOOD PRESSURE: 150 MMHG | OXYGEN SATURATION: 96 % | HEART RATE: 60 BPM | TEMPERATURE: 97.5 F | WEIGHT: 293 LBS | RESPIRATION RATE: 16 BRPM

## 2023-08-04 DIAGNOSIS — I27.24 CTEPH (CHRONIC THROMBOEMBOLIC PULMONARY HYPERTENSION) (H): ICD-10-CM

## 2023-08-04 DIAGNOSIS — R06.09 DOE (DYSPNEA ON EXERTION): ICD-10-CM

## 2023-08-04 LAB
6 MIN WALK (FT): 1360 FT
6 MIN WALK (M): 415 M
ALBUMIN SERPL BCG-MCNC: 3.8 G/DL (ref 3.5–5.2)
ALP SERPL-CCNC: 114 U/L (ref 35–104)
ALT SERPL W P-5'-P-CCNC: 12 U/L (ref 0–50)
ANION GAP SERPL CALCULATED.3IONS-SCNC: 9 MMOL/L (ref 7–15)
AST SERPL W P-5'-P-CCNC: 22 U/L (ref 0–45)
BASOPHILS # BLD AUTO: 0 10E3/UL (ref 0–0.2)
BASOPHILS NFR BLD AUTO: 0 %
BILIRUB SERPL-MCNC: 0.6 MG/DL
BUN SERPL-MCNC: 16 MG/DL (ref 6–20)
CALCIUM SERPL-MCNC: 9.1 MG/DL (ref 8.6–10)
CHLORIDE SERPL-SCNC: 109 MMOL/L (ref 98–107)
CREAT SERPL-MCNC: 1.08 MG/DL (ref 0.51–0.95)
DEPRECATED HCO3 PLAS-SCNC: 21 MMOL/L (ref 22–29)
EOSINOPHIL # BLD AUTO: 0.1 10E3/UL (ref 0–0.7)
EOSINOPHIL NFR BLD AUTO: 2 %
ERYTHROCYTE [DISTWIDTH] IN BLOOD BY AUTOMATED COUNT: 13 % (ref 10–15)
FACT X ACT/NOR PPP CHRO: 28 % (ref 70–130)
GFR SERPL CREATININE-BSD FRML MDRD: 62 ML/MIN/1.73M2
GLUCOSE SERPL-MCNC: 91 MG/DL (ref 70–99)
HCG INTACT+B SERPL-ACNC: 2 MIU/ML
HCT VFR BLD AUTO: 43.8 % (ref 35–47)
HGB BLD-MCNC: 13.8 G/DL (ref 11.7–15.7)
IMM GRANULOCYTES # BLD: 0 10E3/UL
IMM GRANULOCYTES NFR BLD: 0 %
INR PPP: 2.62 (ref 0.85–1.15)
LVEF ECHO: NORMAL
LYMPHOCYTES # BLD AUTO: 0.8 10E3/UL (ref 0.8–5.3)
LYMPHOCYTES NFR BLD AUTO: 17 %
MCH RBC QN AUTO: 29.7 PG (ref 26.5–33)
MCHC RBC AUTO-ENTMCNC: 31.5 G/DL (ref 31.5–36.5)
MCV RBC AUTO: 94 FL (ref 78–100)
MONOCYTES # BLD AUTO: 0.3 10E3/UL (ref 0–1.3)
MONOCYTES NFR BLD AUTO: 7 %
NEUTROPHILS # BLD AUTO: 3.4 10E3/UL (ref 1.6–8.3)
NEUTROPHILS NFR BLD AUTO: 74 %
NRBC # BLD AUTO: 0 10E3/UL
NRBC BLD AUTO-RTO: 0 /100
NT-PROBNP SERPL-MCNC: 824 PG/ML (ref 0–900)
PLATELET # BLD AUTO: 198 10E3/UL (ref 150–450)
POTASSIUM SERPL-SCNC: 4.4 MMOL/L (ref 3.4–5.3)
PROT SERPL-MCNC: 7.1 G/DL (ref 6.4–8.3)
RBC # BLD AUTO: 4.65 10E6/UL (ref 3.8–5.2)
SODIUM SERPL-SCNC: 139 MMOL/L (ref 136–145)
WBC # BLD AUTO: 4.6 10E3/UL (ref 4–11)

## 2023-08-04 PROCEDURE — 85610 PROTHROMBIN TIME: CPT | Performed by: INTERNAL MEDICINE

## 2023-08-04 PROCEDURE — 93306 TTE W/DOPPLER COMPLETE: CPT

## 2023-08-04 PROCEDURE — 93306 TTE W/DOPPLER COMPLETE: CPT | Mod: 26 | Performed by: INTERNAL MEDICINE

## 2023-08-04 PROCEDURE — 80053 COMPREHEN METABOLIC PANEL: CPT | Performed by: INTERNAL MEDICINE

## 2023-08-04 PROCEDURE — 83880 ASSAY OF NATRIURETIC PEPTIDE: CPT | Performed by: INTERNAL MEDICINE

## 2023-08-04 PROCEDURE — 999N000132 HC STATISTIC PP CARE STAGE 1

## 2023-08-04 PROCEDURE — 85260 CLOT FACTOR X STUART-POWER: CPT | Performed by: INTERNAL MEDICINE

## 2023-08-04 PROCEDURE — 93451 RIGHT HEART CATH: CPT | Mod: 26 | Performed by: INTERNAL MEDICINE

## 2023-08-04 PROCEDURE — 272N000001 HC OR GENERAL SUPPLY STERILE: Performed by: INTERNAL MEDICINE

## 2023-08-04 PROCEDURE — 84702 CHORIONIC GONADOTROPIN TEST: CPT | Performed by: INTERNAL MEDICINE

## 2023-08-04 PROCEDURE — C1751 CATH, INF, PER/CENT/MIDLINE: HCPCS | Performed by: INTERNAL MEDICINE

## 2023-08-04 PROCEDURE — 36415 COLL VENOUS BLD VENIPUNCTURE: CPT | Performed by: INTERNAL MEDICINE

## 2023-08-04 PROCEDURE — 255N000002 HC RX 255 OP 636: Performed by: INTERNAL MEDICINE

## 2023-08-04 PROCEDURE — 85025 COMPLETE CBC W/AUTO DIFF WBC: CPT | Performed by: INTERNAL MEDICINE

## 2023-08-04 PROCEDURE — 93451 RIGHT HEART CATH: CPT | Performed by: INTERNAL MEDICINE

## 2023-08-04 PROCEDURE — 94618 PULMONARY STRESS TESTING: CPT | Performed by: INTERNAL MEDICINE

## 2023-08-04 PROCEDURE — 250N000009 HC RX 250: Performed by: INTERNAL MEDICINE

## 2023-08-04 PROCEDURE — C1894 INTRO/SHEATH, NON-LASER: HCPCS | Performed by: INTERNAL MEDICINE

## 2023-08-04 PROCEDURE — 999N000142 HC STATISTIC PROCEDURE PREP ONLY

## 2023-08-04 RX ORDER — LIDOCAINE 40 MG/G
CREAM TOPICAL
Status: COMPLETED | OUTPATIENT
Start: 2023-08-04 | End: 2023-08-04

## 2023-08-04 RX ADMIN — LIDOCAINE: 40 CREAM TOPICAL at 09:59

## 2023-08-04 ASSESSMENT — ACTIVITIES OF DAILY LIVING (ADL)
ADLS_ACUITY_SCORE: 35

## 2023-08-04 NOTE — PROGRESS NOTES
Arrived to [address] for RHC procedure in CCL. AFVSS. Denies pain. Labs resulting. Consent done. Pt stable.

## 2023-08-04 NOTE — PROGRESS NOTES
Pt given discharge instructions, verbalizes understanding. VSS, denies pain. R internal jugular site is CDI, soft, no hematoma. Pt tolerated food and drink. Pt discharged to home at this time.

## 2023-08-04 NOTE — DISCHARGE INSTRUCTIONS
[facility]                        Interventional Cardiology  Discharge Instructions   Post Right Heart Cath and/or Heart Biopsy      AFTER YOU GO HOME:  DO drink plenty of fluids  DO resume your regular diet and medications unless otherwise instructed by your Primary Physician  Do Not scrub the procedure site vigorously  No lotion or powder to the puncture site for 3 days    CALL YOUR PRIMARY PHYSICIAN IF: You may resume all normal activity.  Monitor neck site for bleeding, swelling, or voice changes. If you notice bleeding or swelling immediately apply pressure to the site and call number below to speak with Cardiology Fellow.  If you experience any changes in your breathing you should call your doctor immediately or come to the closest Emergency Department.  Do not drive yourself.    ADDITIONAL INSTRUCTIONS: Medications: You are to resume all home medications including anticoagulation therapy unless otherwise advised by your primary cardiologist or nurse coordinator.    Follow Up: Per your primary cardiology team    If you have any questions or concerns regarding your procedure site please call [phone] at anytime and ask for Cardiology Fellow on call.  They are available 24 hours a day.  You may also contact the Cardiology Clinic after hours number at [phone].                                                       Telephone Numbers [phone] Monday-Friday 8:00 am to 4:30 pm    [phone] After 4:30 pm Monday-Friday, Weekends & Holidays  Ask for Interventional Cardiologist on call. Someone is on call 24 hours/day   [facility] toll free number [phone] Monday-Friday 8:00 am to 4:30 pm   [facility] Emergency Dept [phone]

## 2023-08-04 NOTE — PROGRESS NOTES
Pt returned to 2A following RHC. R internal jugular site is covered with primapore, CDI, flat, no hematoma. VSS, denies pain. Pt offered food and drink.   Dr. Paniagua at bedside.

## 2023-08-05 NOTE — PROGRESS NOTES
Service Date: 2023     Ochoa Sheth MD   [facility] [address] [address]      Mario Gerber MD    [facility]      Clint Olsen MD   [facility]   [address] [place] [address]  [id number]       RE: Yuridia Barksdale   MRN: [id number]   : 1970      Dear Meryl Rondon and Zabrina:      We had the pleasure of seeing Ms. Yuridia Barksdale for followup in our Pulmonary Hypertension Clinic at the [facility].  As you know, she is a very pleasant 52 year old female with obesity, DAVIS and chronic thromboembolic pulmonary hypertension who underwent pulmonary thromboendarterectomy at [facility] in 2020.  She had type 3 disease. Her Adempas was then stopped. Her repeat V/Q and RHC showed mild residual CTEPH and she underwent pulmonary balloon angioplasty of the right A8 branch and right A3 branch in January and 2021. Her repeat TTE showed mild RV dilation and dysfunction, and she was started on tadalafil. She also takes torsemide 80 mg daily.  She returns today for follow-up.    We last saw her in clinic in 2022.  She could not follow-up with us due to her insurance.  She was followed by cardiology at [place].  She has a new job now.  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  Through her new medical insurance, she is able to reestablish care with us.    Overall, she states that she has been doing reasonably well in the last 1 year.  She has not had any change in her exertional shortness of breath.  She is still able to do most of her activities of daily living.  She gets winded if she were to climb stairs or overexert.  I would currently characterize her as functional class II.  She walks outside on a regular basis.  She does not need supplemental oxygen.  She denies having exertional chest pain or chest pressure.  No  exertional presyncope or syncope.  She has not had any worsening lower extremity swelling on torsemide.  Her left leg is always slightly more swollen than her right leg.  She has not had any interim hospitalization or ER visit.  Her anticoagulation is closely monitored by the [facility] using chromogenic factor instead of INR.  Her chromogenic factor goal is 20-30.    PAST MEDICAL HISTORY:   1.  Systemic lupus erythematosus.   2.  Lupus antiphospholipid antibody syndrome.   3.  History of deep venous thrombosis.   4.  Chronic thromboembolic pulmonary hypertension.   5.  Obesity.   6.  Obstructive sleep apnea, REM-related, severe.      MEDICATIONS:   No current facility-administered medications for this encounter.     Current Outpatient Medications   Medication Sig     acetaminophen (TYLENOL) 500 MG tablet Take 1,000 mg by mouth     ARIPiprazole (ABILIFY) 5 MG tablet 10 mg daily     potassium chloride ER (KLOR-CON M) 10 MEQ CR tablet Take 2 tablets (20 mEq) by mouth daily     sertraline (ZOLOFT) 100 MG tablet Take 2 tablets (200 mg) by mouth daily     tadalafil, PAH, 20 MG TABS Take 2 tablets (40 mg) by mouth daily     torsemide (DEMADEX) 20 MG tablet Take 4 tablets (80 mg) by mouth daily     traZODone (DESYREL) 50 MG tablet Take 2 tablets (100 mg) by mouth At Bedtime     warfarin ANTICOAGULANT (COUMADIN ANTICOAGULANT) 1 MG tablet Take 5 tablets (5 mg) by mouth daily On 1/21/2021 and 1/22/2021. Have your chromogenic factor checked on 1/23/2021. (Patient taking differently: Take 5 mg by mouth daily 5 mg on Monday and Friday, 4 mg on TuWeThSaSu. CFX goal 20-30%)     atorvastatin (LIPITOR) 10 MG tablet Take 1 tablet (10 mg) by mouth daily     clonazePAM (KLONOPIN) 1 MG tablet Take 1 tablet (1 mg) by mouth daily as needed for anxiety (Patient taking differently: Take 2 mg by mouth nightly as needed)     hydroxychloroquine (PLAQUENIL) 200 MG tablet Take 1 tablet (200 mg) by mouth 2 times daily     spironolactone  "(ALDACTONE) 25 MG tablet Take 1 tablet (25 mg) by mouth 2 times daily       REVIEW OF SYSTEMS:  A detailed 10-point review of systems was obtained as described in the History of Present Illness.  All other systems are reviewed and are negative.      PHYSICAL EXAMINATION:   BP (!) 150/90   Pulse 60   Temp 97.5  F (36.4  C) (Oral)   Resp 16   Ht 1.676 m (5' 6\")   Wt 141.2 kg (311 lb 3.2 oz)   LMP 04/04/2012   SpO2 96%   BMI 50.23 kg/m    She was awake, alert, oriented x3.  She was comfortable.  She was in no apparent distress.  Normocephalic atraumatic.   Her neck exam revealed no jugular venous distention.  Carotids are 2+ bilaterally.  She had no pallor sounds, cyanosis or jaundice.  Cardiac auscultation revealed normal S1 and normal S2 with no murmur rub or gallop.  Auscultation of her lungs revealed equal air entry on both sides with no added sounds.  Her abdomen was soft with no was no tenderness, or no guarding.  She had no focal neurological deficit.  Her extremities reveal no edema.    Recent Results (from the past 168 hour(s))   6 minute walk test    Collection Time: 08/04/23 12:00 AM   Result Value Ref Range    6 min walk (FT) 1,360 1,063 ft    6 Min Walk (M) 415 324 m   Echocardiogram Complete    Collection Time: 08/04/23  9:07 AM   Result Value Ref Range    LVEF  55-60%    INR    Collection Time: 08/04/23  9:21 AM   Result Value Ref Range    INR 2.62 (H) 0.85 - 1.15   HCG quantitative pregnancy    Collection Time: 08/04/23  9:21 AM   Result Value Ref Range    hCG Quantitative 2 <5 mIU/mL   Comprehensive metabolic panel    Collection Time: 08/04/23  9:21 AM   Result Value Ref Range    Sodium 139 136 - 145 mmol/L    Potassium 4.4 3.4 - 5.3 mmol/L    Chloride 109 (H) 98 - 107 mmol/L    Carbon Dioxide (CO2) 21 (L) 22 - 29 mmol/L    Anion Gap 9 7 - 15 mmol/L    Urea Nitrogen 16.0 6.0 - 20.0 mg/dL    Creatinine 1.08 (H) 0.51 - 0.95 mg/dL    Calcium 9.1 8.6 - 10.0 mg/dL    Glucose 91 70 - 99 mg/dL    " Alkaline Phosphatase 114 (H) 35 - 104 U/L    AST 22 0 - 45 U/L    ALT 12 0 - 50 U/L    Protein Total 7.1 6.4 - 8.3 g/dL    Albumin 3.8 3.5 - 5.2 g/dL    Bilirubin Total 0.6 <=1.2 mg/dL    GFR Estimate 62 >60 mL/min/1.73m2   NT probnp inpatient and ED    Collection Time: 08/04/23  9:21 AM   Result Value Ref Range    N terminal Pro BNP Inpatient 824 0 - 900 pg/mL   CBC with platelets and differential    Collection Time: 08/04/23  9:21 AM   Result Value Ref Range    WBC Count 4.6 4.0 - 11.0 10e3/uL    RBC Count 4.65 3.80 - 5.20 10e6/uL    Hemoglobin 13.8 11.7 - 15.7 g/dL    Hematocrit 43.8 35.0 - 47.0 %    MCV 94 78 - 100 fL    MCH 29.7 26.5 - 33.0 pg    MCHC 31.5 31.5 - 36.5 g/dL    RDW 13.0 10.0 - 15.0 %    Platelet Count 198 150 - 450 10e3/uL    % Neutrophils 74 %    % Lymphocytes 17 %    % Monocytes 7 %    % Eosinophils 2 %    % Basophils 0 %    % Immature Granulocytes 0 %    NRBCs per 100 WBC 0 <1 /100    Absolute Neutrophils 3.4 1.6 - 8.3 10e3/uL    Absolute Lymphocytes 0.8 0.8 - 5.3 10e3/uL    Absolute Monocytes 0.3 0.0 - 1.3 10e3/uL    Absolute Eosinophils 0.1 0.0 - 0.7 10e3/uL    Absolute Basophils 0.0 0.0 - 0.2 10e3/uL    Absolute Immature Granulocytes 0.0 <=0.4 10e3/uL    Absolute NRBCs 0.0 10e3/uL   Factor 10 chromogenic    Collection Time: 08/04/23  9:21 AM   Result Value Ref Range    Factor 10 Chromogenic 28 (L) 70 - 130 %   General PFT Lab (Please always keep checked)    Collection Time: 08/04/23 12:49 PM   Result Value Ref Range    FIO2-Pre 21.00 %        Echocardiogram (08/2023)  Left ventricular function is normal.The ejection fraction is 55-60%.  Mild right ventricular dilation is present. Global right ventricular function  is mildly reduced.  Mild tricuspid and mitral insufficiency present.  The right ventricular systolic pressure is approximated at 33 mmHg. Estimated  mean right atrial pressure is normal.  No pericardial effusion is present.    Echocardiogram 4/25/22  Left ventricular function is  normal.The ejection fraction is 60-65%.  Paradoxical septal motion consistent with right ventricular pressure overload  is present.  Mild to moderate right ventricular dilation is present. The RV function is  mildly reduced (TAPSE 1.5 cm, RV S' 8.2 cm/s, and RVFAC of 35%).  No significant valvular abnormalities.  The estimated PA systolic pressure is 29 mmHg.  The RA pressure is < 3 mmHg.  No pericardial effusion is present.  This study was compared with the study from 05/14/2021. The RV size and  function are similar but the PA systolic pressure has declined.    RHC (08/2023)  RA 6   RV 60/10   PA 59/22/31  Wedge 9  PA sat 69%  Terry cardiac output 5.2, Terry cardiac index 2.19  Thermodilution cardiac output 6.3.  Thermodilution cardiac index 2.6  PVR 4.18    ASSESSMENT AND PLAN:      Ms. Yuridia Barksdale is a 52 year old female with chronic thromboembolic pulmonary hypertension due to hypercoagulable state from her antiphospholipid antibody syndrome.  She underwent pulmonary thromboendarterectomy at [facility] on July 4, 2020.  She has residual inoperable chronic thromboembolic pulmonary hypertension for which she has undergone 2 sessions of pulmonary balloon angioplasty in the in the right A8 and A3 segments in January and March 2021.    She is doing well. She is functional class II. She has no evidence of decompensated heart failure. Her 6MWT shows no desaturation. Her NT proBNP is normal.  Her TTE shows stable mild RV dilation and mild RV dysfunction.  Her repeat right heart catheterization today shows stable mildly elevated PA pressure and PVR with preserved cardiac output.  Her right-sided filling pressures are normal.    I discussed the option of switching her to riociguat instead of tadalafil or adding macitentan to her tadalafil given her residual mild pulmonary hypertension with mild RV dysfunction.  We will await her decision.  For now we will continue her on tadalafil 40 mg daily.  She will also  continue Torsemide 80 mg and warfarin.  She is being followed closely by the [place] anticoagulation clinic with a chromogenic factor.  She needs to be bridged with Arixtra given her heparin allergy if she needs any procedures.    I recommend her to return to see us in 6 months with labs and walk test.  She will call us in the interim if there are any further worsening symptoms.  It was a pleasure meeting MsAudrey Yuridia people in our pulmonary hypertension clinic at [facility].    It was a pleasure meeting Ms. Shi people in her pulmonary hypertension clinic at [facility].  Thank you for involving us in her care.    Total time today was 40 minutes reviewing notes, imaging, labs, patient visit, orders and documentation       Sincerely,     Arcelia Paniagua MD   Center for Pulmonary Hypertension  Heart Failure, Transplant, and Mechanical Circulatory Support Cardiology   Cardiovascular Division  [facility]   [phone]      Addendum:    Will switch her from Adcirca to Adempas due to persistent mild residual CTEPH    1. PA for Adempas.     2. Once approved, will stop adcirca for 48 hours, and then start Adempas at 2.5 mg tid.     3. RTC in 6 months with las and 6MWT      Sincerely,  Arcelia Paniagua MD   Center for Pulmonary Hypertension  Heart Failure, Transplant, and Mechanical Circulatory Support Cardiology   Cardiovascular Division  [facility]   [phone]

## 2023-08-07 ENCOUNTER — TELEPHONE (OUTPATIENT)
Dept: CARDIOLOGY | Facility: CLINIC | Age: 53
End: 2023-08-07
Payer: COMMERCIAL

## 2023-08-08 NOTE — TELEPHONE ENCOUNTER
8/7/2023  @ 7:11 PM -  Adempas 0.5 mg - new start      Arcelia Paniagua MD  P Cardiology Ph Nurse-  Cc: Janey Eid CMA  I would like to switch her from Adcirca to Adempas.    1. PA for Adempas.    2. Once approved, stop adcirca for 48 hours, and then start Adempas at 2.5 mg tid. No need to slowly uptitrate as she was on full dose Adempas before and tolerated it well.    3. RTC in 6 months with las and 6MWT    Please let me know if you have questions.    Thank you    TT    Janey HUTCHINSON CMA- Prior Auths  Cardiology/Pulmonary Hypertension

## 2023-08-08 NOTE — TELEPHONE ENCOUNTER
8/14/2023  @ 12:15 PM -  Rcvd signed enrollment forms and faxed to [employer]emp[facility] @ [phone] :       -      Janey HUTCHINSON CMA- Prior Auths  Cardiology/Pulmonary Hypertension       8/11/2023  @ 11:21 AM -  **8/11/23 @ 1120 am - pending return of enrollment forms and REMS form patient via [employer].  **    1122 am - Called pt @ [phone] (home)  - she will print sign and scan back via [employer] over the weekend.    Janey HUTCHINSON CMA- Prior Auths  Cardiology/Pulmonary Hypertension       8/10/2023  @ 8:49 AM -  signed enrollment not rcvd as of today, resent via [employer].     Janey HUTCHINSON CMA- Prior Auths  Cardiology/Pulmonary Hypertension       8/7/2023  @ 7:11 PM -  Adempas 0.5 mg - enrollment - *PENDING - sent to pt via [employer] for signature on 8/8/23.    Janey HUTCHINSON CMA- Prior Auths  Cardiology/Pulmonary Hypertension

## 2023-08-08 NOTE — TELEPHONE ENCOUNTER
8/7/2023  @ 7:12 PM -  Adempas 0.5 mg - new start- titrating up     PA Initiation  Medication: Adempas 0.5 mg - new start   Insurance Company: [employer] - Phone [phone] Fax [phone]  Pharmacy Filling the Rx:    Filling Pharmacy Phone:    Filling Pharmacy Fax:    Start Date: 8/7/2023  via [facility], key [id number], w/ RHC dated : 8/4/23; Dx Code: I27.24 (CTEPH)    ----------------------------  Insurance: [employer]/ (not :      MED IMPACT (NON MEDICARE)  BIN: [id number]  PCN: [id number]  OR ASPROD1  GRP#: [id number]  OR [id number]  ID#: [id number]     Janey HUTCHINSON CMA- Prior Auths  Cardiology/Pulmonary Hypertension

## 2023-08-09 LAB — FIO2-PRE: 21 %

## 2023-08-10 NOTE — TELEPHONE ENCOUNTER
8/10/2023  @ 8:43 AM -  Faxed Request for Additional Information to [email or web address] @ [phone] :         Janey HUTCHINSON CMA- Prior Auths  Cardiology/Pulmonary Hypertension

## 2023-08-11 NOTE — TELEPHONE ENCOUNTER
9/25/2023  @ 10:49 AM -  per [facility] portal: SHIPPED:   9/19/23    Janey HUTCHINSON CMA- Prior Auths  Cardiology/Pulmonary Hypertension       8/11/2023  @ 11:12 AM -  Adempas 0.5 mg - new start - titrating up - APPROVED -  Approved. Adempas is approved at up to the FDA-approved maximum dose of 2.5 mg three times daily. Adempas is limited to three tablets per day for the following strengths: 0.5 mg, 1 mg, 1.5 mg, 2 mg, and 2.5 mg. Our records indicate this is a specialty medication and the member's plan may require this be filled at a preferred pharmacy. Preferred Pharmacy(s) include: * [facility] - [phone] (fax [phone])    Prior Authorization Approval    Medication: ADEMPAS 0.5 MG PO TABS  Authorization Effective Date: 7/8/2023  Authorization Expiration Date: 8/8/2024  Approved Dose/Quantity: 90/30  Reference #: Case ID:# [id number]   Insurance Company: [employer] - Phone [phone] Fax [phone]  Expected CoPay:       CoPay Card Available:      Financial Assistance Needed: * [pt stated she has met deductible for the year 2023]  Which Pharmacy is filling the prescription: [facility] - [place], [place] - [address]  Pharmacy Notified:    Patient Notified: Yes    Updated Snapshot, added to PA Calendar;    **8/11/23 @ 1120 am - pending return of enrollment forms and REMS form patient via [employer].  **  1122 am - Called pt @ [phone] (home)  - she will print sign and scan back via [employer] over the weekend.          Janey HUTCHINSON CMA- Prior Auths  Cardiology/Pulmonary Hypertension

## 2023-08-16 DIAGNOSIS — I27.24 CTEPH (CHRONIC THROMBOEMBOLIC PULMONARY HYPERTENSION) (H): ICD-10-CM

## 2023-08-16 DIAGNOSIS — R06.09 DOE (DYSPNEA ON EXERTION): ICD-10-CM

## 2023-08-16 RX ORDER — TORSEMIDE 20 MG/1
80 TABLET ORAL DAILY
Qty: 360 TABLET | Refills: 3 | Status: SHIPPED | OUTPATIENT
Start: 2023-08-16 | End: 2023-09-25

## 2023-08-16 NOTE — PROGRESS NOTES
Patient called & asked for a refill on her TOrsemide.    Medication Refill double check:    Last 2A visit was on 8/4/23 with .    Follow up was recommended for 6 months.    Any additional encounters with changes to requested med? no    Authorizing provider is: Dr. Paniagua    Refill was approved.     Additional orders/notes:       Yoly Villegas RN on 8/16/2023 at 9:57 AM

## 2023-09-06 ENCOUNTER — TELEPHONE (OUTPATIENT)
Dept: CARDIOLOGY | Facility: CLINIC | Age: 53
End: 2023-09-06
Payer: COMMERCIAL

## 2023-09-25 ENCOUNTER — TELEPHONE (OUTPATIENT)
Dept: CARDIOLOGY | Facility: CLINIC | Age: 53
End: 2023-09-25
Payer: COMMERCIAL

## 2023-09-25 DIAGNOSIS — I27.24 CTEPH (CHRONIC THROMBOEMBOLIC PULMONARY HYPERTENSION) (H): ICD-10-CM

## 2023-09-25 DIAGNOSIS — R06.09 DOE (DYSPNEA ON EXERTION): ICD-10-CM

## 2023-09-25 RX ORDER — TORSEMIDE 20 MG/1
TABLET ORAL
Qty: 360 TABLET | Refills: 3 | COMMUNITY
Start: 2023-09-25 | End: 2023-10-05

## 2023-09-25 NOTE — TELEPHONE ENCOUNTER
Called pt and she said that she has increased in weight 8 lbs over the past week.  Dr Paniagua recommended that pt take 80mg in the morning and 40mg in the afternoon of torsemide.  She verbalized understanding and Yoly will follow-up with her in 4 days to check how its helping.    Msg sent to yoly James RN on 9/25/2023 at 5:38 PM

## 2023-09-28 ENCOUNTER — TRANSFERRED RECORDS (OUTPATIENT)
Dept: HEALTH INFORMATION MANAGEMENT | Facility: CLINIC | Age: 53
End: 2023-09-28

## 2023-09-29 ENCOUNTER — TELEPHONE (OUTPATIENT)
Dept: CARDIOLOGY | Facility: CLINIC | Age: 53
End: 2023-09-29
Payer: COMMERCIAL

## 2023-09-29 NOTE — TELEPHONE ENCOUNTER
Patient LM her weight is stable since adding 2 more tabs of water pill in the afternoon (Torsemide 80mg/40mg), but she hasn't lost any of the 8 lbs she gained when she started the Adempas.    --------------------------  LM for patient I received her message and wanted to confirm what dose of Adempas she is on (1.0mg?), but am going to speak with Dr. Paniagua for recommendations then send her a [employer] message. Yoly Villegas RN on 9/29/2023 at 4:45 PM    Torsemide 80mg BID until weight  Labs next week

## 2023-10-03 ENCOUNTER — LAB (OUTPATIENT)
Dept: LAB | Facility: CLINIC | Age: 53
End: 2023-10-03
Payer: COMMERCIAL

## 2023-10-03 DIAGNOSIS — E87.6 HYPOKALEMIA: ICD-10-CM

## 2023-10-03 DIAGNOSIS — I27.24 CTEPH (CHRONIC THROMBOEMBOLIC PULMONARY HYPERTENSION) (H): ICD-10-CM

## 2023-10-03 DIAGNOSIS — R06.09 DYSPNEA ON EXERTION: ICD-10-CM

## 2023-10-03 LAB
ANION GAP SERPL CALCULATED.3IONS-SCNC: 13 MMOL/L (ref 7–15)
BUN SERPL-MCNC: 21.9 MG/DL (ref 6–20)
CALCIUM SERPL-MCNC: 8.8 MG/DL (ref 8.6–10)
CHLORIDE SERPL-SCNC: 101 MMOL/L (ref 98–107)
CREAT SERPL-MCNC: 1.21 MG/DL (ref 0.51–0.95)
DEPRECATED HCO3 PLAS-SCNC: 25 MMOL/L (ref 22–29)
EGFRCR SERPLBLD CKD-EPI 2021: 54 ML/MIN/1.73M2
ERYTHROCYTE [DISTWIDTH] IN BLOOD BY AUTOMATED COUNT: 12.5 % (ref 10–15)
GLUCOSE SERPL-MCNC: 96 MG/DL (ref 70–99)
HCT VFR BLD AUTO: 41.3 % (ref 35–47)
HGB BLD-MCNC: 14 G/DL (ref 11.7–15.7)
MCH RBC QN AUTO: 30.9 PG (ref 26.5–33)
MCHC RBC AUTO-ENTMCNC: 33.9 G/DL (ref 31.5–36.5)
MCV RBC AUTO: 91 FL (ref 78–100)
NT-PROBNP SERPL-MCNC: 1034 PG/ML (ref 0–900)
PLATELET # BLD AUTO: 242 10E3/UL (ref 150–450)
POTASSIUM SERPL-SCNC: 3.4 MMOL/L (ref 3.4–5.3)
RBC # BLD AUTO: 4.53 10E6/UL (ref 3.8–5.2)
SODIUM SERPL-SCNC: 139 MMOL/L (ref 135–145)
WBC # BLD AUTO: 5.4 10E3/UL (ref 4–11)

## 2023-10-03 PROCEDURE — 36415 COLL VENOUS BLD VENIPUNCTURE: CPT | Performed by: PATHOLOGY

## 2023-10-03 PROCEDURE — 80048 BASIC METABOLIC PNL TOTAL CA: CPT | Performed by: PATHOLOGY

## 2023-10-03 PROCEDURE — 85027 COMPLETE CBC AUTOMATED: CPT | Performed by: PATHOLOGY

## 2023-10-03 PROCEDURE — 83880 ASSAY OF NATRIURETIC PEPTIDE: CPT | Performed by: PATHOLOGY

## 2023-10-04 ASSESSMENT — ENCOUNTER SYMPTOMS
DECREASED APPETITE: 0
FATIGUE: 1
CHILLS: 0
ORTHOPNEA: 0
HYPOTENSION: 0
HYPERTENSION: 0
BRUISES/BLEEDS EASILY: 1
DECREASED CONCENTRATION: 1
DIFFICULTY URINATING: 1
DYSPNEA ON EXERTION: 1
COUGH DISTURBING SLEEP: 0
LEG PAIN: 0
DYSURIA: 0
FEVER: 0
POLYDIPSIA: 0
POLYPHAGIA: 0
INSOMNIA: 0
NIGHT SWEATS: 1
WHEEZING: 0
HEMATURIA: 0
ALTERED TEMPERATURE REGULATION: 0
WEIGHT LOSS: 0
PALPITATIONS: 0
HEMOPTYSIS: 0
NERVOUS/ANXIOUS: 1
PANIC: 0
WEIGHT GAIN: 1
FLANK PAIN: 0
SLEEP DISTURBANCES DUE TO BREATHING: 0
POSTURAL DYSPNEA: 0
INCREASED ENERGY: 1
DEPRESSION: 1
EXERCISE INTOLERANCE: 1
HALLUCINATIONS: 0
SNORES LOUDLY: 1
SPUTUM PRODUCTION: 0
SWOLLEN GLANDS: 0
LIGHT-HEADEDNESS: 0
COUGH: 0
SHORTNESS OF BREATH: 1
SYNCOPE: 0

## 2023-10-05 ENCOUNTER — VIRTUAL VISIT (OUTPATIENT)
Dept: CARDIOLOGY | Facility: CLINIC | Age: 53
End: 2023-10-05
Attending: PHYSICIAN ASSISTANT
Payer: COMMERCIAL

## 2023-10-05 VITALS — BODY MASS INDEX: 47.09 KG/M2 | WEIGHT: 293 LBS | HEIGHT: 66 IN

## 2023-10-05 DIAGNOSIS — R06.09 DOE (DYSPNEA ON EXERTION): ICD-10-CM

## 2023-10-05 DIAGNOSIS — R06.02 SOB (SHORTNESS OF BREATH): ICD-10-CM

## 2023-10-05 DIAGNOSIS — I27.24 CTEPH (CHRONIC THROMBOEMBOLIC PULMONARY HYPERTENSION) (H): Primary | ICD-10-CM

## 2023-10-05 PROCEDURE — 99213 OFFICE O/P EST LOW 20 MIN: CPT | Mod: 93 | Performed by: PHYSICIAN ASSISTANT

## 2023-10-05 RX ORDER — TORSEMIDE 20 MG/1
80 TABLET ORAL 2 TIMES DAILY
Qty: 240 TABLET | Refills: 11 | Status: SHIPPED | OUTPATIENT
Start: 2023-10-05 | End: 2023-10-23 | Stop reason: ALTCHOICE

## 2023-10-05 ASSESSMENT — PAIN SCALES - GENERAL: PAINLEVEL: NO PAIN (0)

## 2023-10-05 ASSESSMENT — PATIENT HEALTH QUESTIONNAIRE - PHQ9: SUM OF ALL RESPONSES TO PHQ QUESTIONS 1-9: 14

## 2023-10-05 NOTE — PROGRESS NOTES
"Ht 1.676 m (5' 6\")   Wt 141.7 kg (312 lb 6.4 oz)   LMP 04/04/2012   BMI 50.42 kg/m          11/5/2020     8:57 AM 2/1/2021     7:57 AM 9/7/2021     8:42 AM   Vitals - Patient Reported   Height (Patient Reported)  16' 7.213\" 5' 6\"   Weight (Patient Reported) 254 lb 262 lb 285 lb   BMI (Based on Pt Reported Ht/Wt)  4.64 kg/m2 46 kg/m2             CARDIOLOGY PH CLINIC TELEPHONE VISIT    Date of telephone visit: 10/05/23    Yuridia Barksdale is a 52 year old female who is being evaluated via a billable telephone visit.        MEDICATIONS:  Current Outpatient Medications   Medication Sig Dispense Refill    acetaminophen (TYLENOL) 500 MG tablet Take 1,000 mg by mouth      ARIPiprazole (ABILIFY) 5 MG tablet 10 mg daily      atorvastatin (LIPITOR) 10 MG tablet Take 1 tablet (10 mg) by mouth daily      clonazePAM (KLONOPIN) 1 MG tablet Take 1 tablet (1 mg) by mouth daily as needed for anxiety (Patient taking differently: Take 2 mg by mouth nightly as needed)      hydroxychloroquine (PLAQUENIL) 200 MG tablet Take 1 tablet (200 mg) by mouth 2 times daily      sertraline (ZOLOFT) 100 MG tablet Take 2 tablets (200 mg) by mouth daily      spironolactone (ALDACTONE) 25 MG tablet Take 1 tablet (25 mg) by mouth 2 times daily 180 tablet 1    tadalafil, PAH, 20 MG TABS Take 2 tablets (40 mg) by mouth daily 60 tablet 8    torsemide (DEMADEX) 20 MG tablet 80mg in the morning and 40 mg afternoon 360 tablet 3    traZODone (DESYREL) 50 MG tablet Take 2 tablets (100 mg) by mouth At Bedtime      warfarin ANTICOAGULANT (COUMADIN ANTICOAGULANT) 1 MG tablet Take 5 tablets (5 mg) by mouth daily On 1/21/2021 and 1/22/2021. Have your chromogenic factor checked on 1/23/2021. (Patient taking differently: Take 5 mg by mouth daily 5 mg on Monday and Friday, 4 mg on TuWeThSaSu. CFX goal 20-30%) 30 tablet        Primary  cardiologist:  Dr. Paniagua      HPI:  Ms. Barksdale is a very pleasant 52 year old female with a PMhx including SLE with lupus " antiphospholipid antibody syndrome and hx DVT on anticoagulation, and DAVIS on CPAP. She also has chronic thromboembolic pulmonary arterial hypertension. She was initially placed on riociguat, and then referred to [facility] for surgical evaluation. Ultimately, she underwent pulmonary thromboendarterectomy at [facility] in July 2020. She has type IV disease and her PTE was somewhat difficult to the distal nature of her disease. Her post operative course was uncomplicated, and postoperative hemodynamics showed improvement along with improved perfusion of the right lower lobe and left upper lobe via VQ scan, and her riociguat and digoxin were discontinued at that time. Post surgically she had struggled with swelling and weight gain requiring escalation of diuretics. She then underwent BPA in January and March of 2021 of right A8 branch and right A3 branch. Ultimately, due to some ongoing mild RV dysfunction, she was started back on monotherapy with tadalafil.     I met with Yuridia last fall and she was doing ok though still required low dose oxygen with ambulation. She had also been gaining some weight felt likely to be caloric. Unfortunately, due to a lapse in insurance we did not see her for several months. However, she returned in Aug of this year to re-establish with us and get repeat hemodynamic testing. At that time, she was walking outside daily and feeling well, not requiring her oxygen, confirmed via repeat 6MWT. Echo showed stable mild RV dilation and RV dysfunction. RHC showed stable mildly elevated PA pressure and PVR with preserved cardiac output. Based on this, it was recommended that her tadalafil be changed back to riociguat for further titration.    Today, I am meeting with Yuridia in clinic. She stopped her tadalafil and washed out for 48 hours before starting Adempas. She was started on 0.5mg TID and says she did ok initially but when she went up to 1.0mg dose had significant weight  gain. Baseline is 310-311# and she went up as high as 320#. Her torsemide was increased to 80/40mg and then further to 80/80mg dosing. With this, she has now come back down to 312# at home with some improvement in swelling. She thinks her hand edema is better but legs still not yet quite back to baseline. Breathing is overall worse than prior to the transition as well. No new CP, palpitations, or dizziness/presyncope.    The patient did not have any new labs performed for our visit today, but most recent available labs were reviewed as below.         CURRENT PULMONARY HYPERTENSION REGIMEN:     PAH Rx: Adempas 1.0mg TID  (Changed from tadalafil due to residual RV dysfunction)    Diuretics: Torsemide 80mg BID, spironolactone 25mg BID     Oxygen: Has 02 to use PRN only, not been using     Anticoagulation: warfarin (follows chrom factor goal 20-30)--needs to be bridged with Arixtra given her heparin allergy if she needs procedures.  Indication: DVT/antiphospholipid     Rheum: Dr. Graf        Assessment/Plan:     1. Chronic thromboembolic pulmonary hypertension.              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 She is s/p pulmonary thromboendarterectomy July 2020 at [facility], and riociguat was discontinued. She then had BPA in January and March of 2021 here at [facility], of the right A8 branch and right A3 branch.               --She has still had some mild RV dysfunction via hemodynamic testing in August, so it was recommended that her tadalafil be transitioned to Adempas. With this, she is feeling worse with fluid retention. However, upon d/w Dr. Paniagua, it sounds as if perhaps we are under treating her as she washed out the tadalafil x 48 hours, then we started on lowest dose of Adempas. As she tolerated in the past, will try to increase to 2.5mg TID starting tomorrow and follow up early next week. Via COMPERA, she is intermediate-high risk.    --For now,  will stay on torsemide 80mg BID, until her weight comes back to baseline 310#, then return to 80/40mg. NT-proBNP quite a bit from prior, as is her Scr. Repeat labs in 1 week as well.              --In regard to oxygen, can continue PRN use for now. Plan repeat formal 6MWT when she returns to clinic in person.              --She is compliant with her CPAP for known DAVIS.               --Continue Coumadin for anticoagulation, in the setting of CTEPH and antiphospholipid antibody syndrome. She follows locally with a chromogenic factor goal of 20-30. She needs to be bridged with Arixtra given her heparin allergy if she needs procedures.    Follow up plan: Labs in 1 week, then see me back virtually in 2-3 weeks for close follow up. I asked the patient to call sooner with any new concerns.         Testing/labs:      Most recent labs:      Latest Reference Range & Units 10/03/23 15:17   Sodium 135 - 145 mmol/L 139   Potassium 3.4 - 5.3 mmol/L 3.4   Chloride 98 - 107 mmol/L 101   Carbon Dioxide (CO2) 22 - 29 mmol/L 25   Urea Nitrogen 6.0 - 20.0 mg/dL 21.9 (H)   Creatinine 0.51 - 0.95 mg/dL 1.21 (H)   GFR Estimate >60 mL/min/1.73m2 54 (L)   Calcium 8.6 - 10.0 mg/dL 8.8   Anion Gap 7 - 15 mmol/L 13   Glucose 70 - 99 mg/dL 96   N-Terminal Pro Bnp 0 - 900 pg/mL 1,034 (H)   WBC 4.0 - 11.0 10e3/uL 5.4   Hemoglobin 11.7 - 15.7 g/dL 14.0   Hematocrit 35.0 - 47.0 % 41.3   Platelet Count 150 - 450 10e3/uL 242   RBC Count 3.80 - 5.20 10e6/uL 4.53   MCV 78 - 100 fL 91   MCH 26.5 - 33.0 pg 30.9   MCHC 31.5 - 36.5 g/dL 33.9   RDW 10.0 - 15.0 % 12.5   (H): Data is abnormally high  (L): Data is abnormally low      Other recent pertinent testing:    [facility] 8/2023  Hemodynamics    RA 6   RV 60/10   PA 59/22/31  Wedge 9  PA sat 69%  Terry cardiac output 5.2, Terry cardiac index 2.19  Thermodilution cardiac output 6.3.  Thermodilution cardiac index 2.6  PVR 4.18 Right sided filling pressures are normal. Left sided filling pressures are normal. Mild  elevated pulmonary hypertension. Normal cardiac output level.     Echo 8/2023  Interpretation Summary  Left ventricular function is normal.The ejection fraction is 55-60%.  Mild right ventricular dilation is present. Global right ventricular function  is mildly reduced.  Mild tricuspid and mitral insufficiency present.  The right ventricular systolic pressure is approximated at 33 mmHg. Estimated  mean right atrial pressure is normal.  No pericardial effusion is present.    NYHA Functional Class:  3          I have reviewed the note as documented above.  This accurately captures the substance of my conversation with the patient.      Phone call contact time  Call Started at 1433  Call Ended at 1444    An additional 20 minutes was spent today performing chart and history review, pre and post visit documentation, review of recent testing, patient education, and care coordination.      Azucena Silva PA-C  [facility]  Pager [phone]

## 2023-10-05 NOTE — LETTER
"10/5/2023      RE: Yuridia Barksdale  [address] [address] [address]       Dear Colleague,    Thank you for the opportunity to participate in the care of your patient, Yuridia Barksdale, at the [facility] [place] [facility]. Please see a copy of my visit note below.    Ht 1.676 m (5' 6\")   Wt 141.7 kg (312 lb 6.4 oz)   LMP 04/04/2012   BMI 50.42 kg/m          11/5/2020     8:57 AM 2/1/2021     7:57 AM 9/7/2021     8:42 AM   Vitals - Patient Reported   Height (Patient Reported)  16' 7.213\" 5' 6\"   Weight (Patient Reported) 254 lb 262 lb 285 lb   BMI (Based on Pt Reported Ht/Wt)  4.64 kg/m2 46 kg/m2             CARDIOLOGY PH CLINIC TELEPHONE VISIT    Date of telephone visit: 10/05/23    Yuridia Barksdale is a 52 year old female who is being evaluated via a billable telephone visit.        MEDICATIONS:  Current Outpatient Medications   Medication Sig Dispense Refill    acetaminophen (TYLENOL) 500 MG tablet Take 1,000 mg by mouth      ARIPiprazole (ABILIFY) 5 MG tablet 10 mg daily      atorvastatin (LIPITOR) 10 MG tablet Take 1 tablet (10 mg) by mouth daily      clonazePAM (KLONOPIN) 1 MG tablet Take 1 tablet (1 mg) by mouth daily as needed for anxiety (Patient taking differently: Take 2 mg by mouth nightly as needed)      hydroxychloroquine (PLAQUENIL) 200 MG tablet Take 1 tablet (200 mg) by mouth 2 times daily      sertraline (ZOLOFT) 100 MG tablet Take 2 tablets (200 mg) by mouth daily      spironolactone (ALDACTONE) 25 MG tablet Take 1 tablet (25 mg) by mouth 2 times daily 180 tablet 1    tadalafil, PAH, 20 MG TABS Take 2 tablets (40 mg) by mouth daily 60 tablet 8    torsemide (DEMADEX) 20 MG tablet 80mg in the morning and 40 mg afternoon 360 tablet 3    traZODone (DESYREL) 50 MG tablet Take 2 tablets (100 mg) by mouth At Bedtime      warfarin ANTICOAGULANT (COUMADIN ANTICOAGULANT) 1 MG tablet Take 5 tablets (5 mg) by mouth daily On " 1/21/2021 and 1/22/2021. Have your chromogenic factor checked on 1/23/2021. (Patient taking differently: Take 5 mg by mouth daily 5 mg on Monday and Friday, 4 mg on TuWeThSaSu. CFX goal 20-30%) 30 tablet        Primary PH cardiologist:  Dr. Paniagua      HPI:  Ms. Barksdale is a very pleasant 52 year old female with a PMhx including SLE with lupus antiphospholipid antibody syndrome and hx DVT on anticoagulation, and DAVIS on CPAP. She also has chronic thromboembolic pulmonary arterial hypertension. She was initially placed on riociguat, and then referred to [facility] for surgical evaluation. Ultimately, she underwent pulmonary thromboendarterectomy at [facility] in July 2020. She has type IV disease and her PTE was somewhat difficult to the distal nature of her disease. Her post operative course was uncomplicated, and postoperative hemodynamics showed improvement along with improved perfusion of the right lower lobe and left upper lobe via VQ scan, and her riociguat and digoxin were discontinued at that time. Post surgically she had struggled with swelling and weight gain requiring escalation of diuretics. She then underwent BPA in January and March of 2021 of right A8 branch and right A3 branch. Ultimately, due to some ongoing mild RV dysfunction, she was started back on monotherapy with tadalafil.     I met with Yuridia last fall and she was doing ok though still required low dose oxygen with ambulation. She had also been gaining some weight felt likely to be caloric. Unfortunately, due to a lapse in insurance we did not see her for several months. However, she returned in Aug of this year to re-establish with us and get repeat hemodynamic testing. At that time, she was walking outside daily and feeling well, not requiring her oxygen, confirmed via repeat 6MWT. Echo showed stable mild RV dilation and RV dysfunction. RHC showed stable mildly elevated PA pressure and PVR with preserved cardiac output.  Based on this, it was recommended that her tadalafil be changed back to riociguat for further titration.    Today, I am meeting with Yuridia in clinic. She stopped her tadalafil and washed out for 48 hours before starting Adempas. She was started on 0.5mg TID and says she did ok initially but when she went up to 1.0mg dose had significant weight gain. Baseline is 310-311# and she went up as high as 320#. Her torsemide was increased to 80/40mg and then further to 80/80mg dosing. With this, she has now come back down to 312# at home with some improvement in swelling. She thinks her hand edema is better but legs still not yet quite back to baseline. Breathing is overall worse than prior to the transition as well. No new CP, palpitations, or dizziness/presyncope.    The patient did not have any new labs performed for our visit today, but most recent available labs were reviewed as below.         CURRENT PULMONARY HYPERTENSION REGIMEN:     PAH Rx: Adempas 1.0mg TID  (Changed from tadalafil due to residual RV dysfunction)    Diuretics: Torsemide 80mg BID, spironolactone 25mg BID     Oxygen: Has 02 to use PRN only, not been using     Anticoagulation: warfarin (follows chrom factor goal 20-30)--needs to be bridged with Arixtra given her heparin allergy if she needs procedures.  Indication: DVT/antiphospholipid     Rheum: Dr. Graf        Assessment/Plan:     1. Chronic thromboembolic pulmonary hypertension.              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 She is s/p pulmonary thromboendarterectomy July 2020 at [facility], and riociguat was discontinued. She then had BPA in January and March of 2021 here at [facility], of the right A8 branch and right A3 branch.               --She has still had some mild RV dysfunction via hemodynamic testing in August, so it was recommended that her tadalafil be transitioned to Adempas. With this, she is feeling worse with fluid  retention. However, upon d/w Dr. Paniagua, it sounds as if perhaps we are under treating her as she washed out the tadalafil x 48 hours, then we started on lowest dose of Adempas. As she tolerated in the past, will try to increase to 2.5mg TID starting tomorrow and follow up early next week. Via COMPERA, she is intermediate-high risk.    --For now, will stay on torsemide 80mg BID, until her weight comes back to baseline 310#, then return to 80/40mg. NT-proBNP quite a bit from prior, as is her Scr. Repeat labs in 1 week as well.              --In regard to oxygen, can continue PRN use for now. Plan repeat formal 6MWT when she returns to clinic in person.              --She is compliant with her CPAP for known DAVIS.               --Continue Coumadin for anticoagulation, in the setting of CTEPH and antiphospholipid antibody syndrome. She follows locally with a chromogenic factor goal of 20-30. She needs to be bridged with Arixtra given her heparin allergy if she needs procedures.    Follow up plan: Labs in 1 week, then see me back virtually in 2-3 weeks for close follow up. I asked the patient to call sooner with any new concerns.         Testing/labs:      Most recent labs:      Latest Reference Range & Units 10/03/23 15:17   Sodium 135 - 145 mmol/L 139   Potassium 3.4 - 5.3 mmol/L 3.4   Chloride 98 - 107 mmol/L 101   Carbon Dioxide (CO2) 22 - 29 mmol/L 25   Urea Nitrogen 6.0 - 20.0 mg/dL 21.9 (H)   Creatinine 0.51 - 0.95 mg/dL 1.21 (H)   GFR Estimate >60 mL/min/1.73m2 54 (L)   Calcium 8.6 - 10.0 mg/dL 8.8   Anion Gap 7 - 15 mmol/L 13   Glucose 70 - 99 mg/dL 96   N-Terminal Pro Bnp 0 - 900 pg/mL 1,034 (H)   WBC 4.0 - 11.0 10e3/uL 5.4   Hemoglobin 11.7 - 15.7 g/dL 14.0   Hematocrit 35.0 - 47.0 % 41.3   Platelet Count 150 - 450 10e3/uL 242   RBC Count 3.80 - 5.20 10e6/uL 4.53   MCV 78 - 100 fL 91   MCH 26.5 - 33.0 pg 30.9   MCHC 31.5 - 36.5 g/dL 33.9   RDW 10.0 - 15.0 % 12.5   (H): Data is abnormally high  (L): Data is  abnormally low      Other recent pertinent testing:    [facility] 8/2023  Hemodynamics    RA 6   RV 60/10   PA 59/22/31  Wedge 9  PA sat 69%  Terry cardiac output 5.2, Terry cardiac index 2.19  Thermodilution cardiac output 6.3.  Thermodilution cardiac index 2.6  PVR 4.18 Right sided filling pressures are normal. Left sided filling pressures are normal. Mild elevated pulmonary hypertension. Normal cardiac output level.     Echo 8/2023  Interpretation Summary  Left ventricular function is normal.The ejection fraction is 55-60%.  Mild right ventricular dilation is present. Global right ventricular function  is mildly reduced.  Mild tricuspid and mitral insufficiency present.  The right ventricular systolic pressure is approximated at 33 mmHg. Estimated  mean right atrial pressure is normal.  No pericardial effusion is present.    NYHA Functional Class:  3          I have reviewed the note as documented above.  This accurately captures the substance of my conversation with the patient.      Phone call contact time  Call Started at 1433  Call Ended at 1444    An additional 20 minutes was spent today performing chart and history review, pre and post visit documentation, review of recent testing, patient education, and care coordination.        Please do not hesitate to contact me if you have any questions/concerns.     Sincerely,     CEDRICK Zamarripa

## 2023-10-05 NOTE — NURSING NOTE
Is the patient currently in the state of [place]? YES    Visit mode:TELEPHONE    If the visit is dropped, the patient can be reconnected by: TELEPHONE VISIT: Phone number:   Telephone Information:   Mobile [phone]       Will anyone else be joining the visit? NO  (If patient encounters technical issues they should call [phone] :[phone])    How would you like to obtain your AVS? MyChart    Are changes needed to the allergy or medication list? No    Reason for visit: RECHECK (No other vitals to report today/)    Depression Response    Patient completed the PHQ-9 assessment for depression and scored >9? Yes  Question 9 on the PHQ-9 was positive for suicidality? No  Does patient have current mental health provider? Yes    Is this a virtual visit? Yes   Does patient have suicidal ideation (positive question 9)? No - offer to place Mental Health Referral.  Patient declined referral/not needed    I personally notified the following: visit provider    RONI CortezF

## 2023-10-05 NOTE — PATIENT INSTRUCTIONS
Thank you for visiting the Pulmonary Hypertension Clinic today.      Medication Changes:   -Increase Adempas to 2.5mg three times a day  -Keep Torsemide at 80mg twice daily until your weight reaches 310lbs, then decrease back to 80mg in AM & 40mg in PM.    Additional information/recommendations:  -Follow up with RN in 1 week via phone with labs prior  -Follow up with Azucena Silva PA-C via video in 3 weeks with labs prior.    Results:   Component      Latest Ref Rng 10/3/2023  3:17 PM   Sodium      135 - 145 mmol/L 139    Potassium      3.4 - 5.3 mmol/L 3.4    Chloride      98 - 107 mmol/L 101    Carbon Dioxide (CO2)      22 - 29 mmol/L 25    Anion Gap      7 - 15 mmol/L 13    Urea Nitrogen      6.0 - 20.0 mg/dL 21.9 (H)    Creatinine      0.51 - 0.95 mg/dL 1.21 (H)    GFR Estimate      >60 mL/min/1.73m2 54 (L)    Calcium      8.6 - 10.0 mg/dL 8.8    Glucose      70 - 99 mg/dL 96    WBC      4.0 - 11.0 10e3/uL 5.4    RBC Count      3.80 - 5.20 10e6/uL 4.53    Hemoglobin      11.7 - 15.7 g/dL 14.0    Hematocrit      35.0 - 47.0 % 41.3    MCV      78 - 100 fL 91    MCH      26.5 - 33.0 pg 30.9    MCHC      31.5 - 36.5 g/dL 33.9    RDW      10.0 - 15.0 % 12.5    Platelet Count      150 - 450 10e3/uL 242    N-Terminal Pro Bnp      0 - 900 pg/mL 1,034 (H)       Legend:  (H) High  (L) Low    Additional Instructions:    1. Continue staying active and eat a heart healthy, low sodium diet.     2. Please keep current list of medications with you at all times.     3. Remember to weigh yourself daily after voiding and write it down on a log. If you have gained/lost 2 pounds overnight or 5 pounds in a week contact us for medication adjustments or further instructions.    4. Please call us immediately if you have syncope (fainting or passing out), chest pain, worsening edema (swelling or weight gain), or general worsening in how you are feeling.          ---------------------------------------------------------------------------------------------------------------    If you have questions or concerns please contact us at:        Yoly Villegas, RN, BSN, PHN  Nurse Coordinator   Pulmonary Hypertension  [facility]               (P)[phone]         Janey Carroll, OZZY Miller   (Prior Authorizations)    ()  Clinic   Clinic   Pulmonary Hypertension   Pulmonary Hypertension  [facility]  (P)[phone]    (P) [phone]  (F) [phone]    ** Please note that you will NOT receive a reminder call regarding your scheduled testing, reminder calls are for provider appointments only.  If you are scheduled for testing within the "[employer] system you may receive a call regarding pre-registration for billing purposes only.**     ------------------------------------------------------------------------------------------------------------    Interested in joining a support group?    Pulmonary Hypertension Association  [email or web address].phassociation.org/  **Look at the Events Tab** They even have Support Groups that you can call into    [facility] Support Group  Second Saturday of the Month from 1-3 PM   Location: [address] [place] [id number] (Currently Virtual)  Leader: Marika Quiroz  Phone: [phone]  Email: [email or web address]   Website: [email or web address]

## 2023-10-05 NOTE — NURSING NOTE
Plan as patient understands:  -Increase Adempas to 2.5mg three times a day  -Keep Torsemide at 80mg twice daily until your weight reaches 310lbs, then decrease back to 80mg in AM & 40mg in PM.  -Follow up with RN in 1 week via phone with labs prior  -Follow up with Azucena Silva PA-C via video in 3 weeks with labs prior.  ========================  Reviewed Med list  Completed AVS  Follow-up orders placed  Discussed plan with patient via phone.  Sent in new Erx for Torsemide & full dose Adempas.  Faxed lab orders to [facility].  Patient verbalized understanding, agreed with plan and denied any further questions. Yoly Villegas RN on 10/5/2023 at 3:19 PM

## 2023-10-10 ENCOUNTER — TELEPHONE (OUTPATIENT)
Dept: CARDIOLOGY | Facility: CLINIC | Age: 53
End: 2023-10-10
Payer: COMMERCIAL

## 2023-10-17 ENCOUNTER — TELEPHONE (OUTPATIENT)
Dept: CARDIOLOGY | Facility: CLINIC | Age: 53
End: 2023-10-17
Payer: COMMERCIAL

## 2023-10-17 DIAGNOSIS — I27.24 CTEPH (CHRONIC THROMBOEMBOLIC PULMONARY HYPERTENSION) (H): Primary | ICD-10-CM

## 2023-10-17 NOTE — TELEPHONE ENCOUNTER
----- Message from Yoly Villegas RN sent at 10/5/2023  3:18 PM CDT -----  Regarding: weight & swelling  Patient increased Adempas from 1.0mg to 2.5mg 10/5 as well as Torsemide up to 80mg BID.  How is her weight & SOB?  Did she get labs drawn?    Today is 312.6lbs which is very close to baseline weight of 310lbs.  She confirmed she is still at 80mg Torsemide BID and swelling is better other than her left leg which has always been an issue.  She is planning on having labs drawn tomorrow 10/19/23.    She started full dose Adempas 2.5mg on Friday 10/6/23.  The first week on full dose was fine, but this last week she has been struggling with AM nausea after taking first dose. She is eating breakfast before taking Adempas, but it doesn't really help.  Advised her I would ask provider about an antiemetic and call her back. Patient verbalized understanding, agreed with plan and denied any further questions. Yoly Villegas RN on 10/18/2023 at 10:58 AM  ---------------------------------  Reviewed above with with Azucena Silva PA-C who prescribed Zofran 4mg Q8hrs for nausea, Qty: 10 tabs, no refills.    Called patient and updated her to Rx sent to her preferred pharmacy. Patient verbalized understanding, agreed with plan and denied any further questions. Yoly Villegas RN on 10/18/2023 at 11:04 AM

## 2023-10-18 RX ORDER — ONDANSETRON 4 MG/1
4 TABLET, FILM COATED ORAL EVERY 8 HOURS PRN
Qty: 10 TABLET | Refills: 0 | Status: SHIPPED | OUTPATIENT
Start: 2023-10-18 | End: 2024-04-16

## 2023-10-22 NOTE — PROGRESS NOTES
CARDIOLOGY PH CLINIC TELEPHONE VISIT    Date of telephone visit: 10/23/23      Yuridia Barksdale is a 52 year old female who is being evaluated via a billable telephone visit.     Wt 316lb  /84  3 days ago  P 72    Cardio edema left leg  Resp sob exertion      MEDICATIONS:  Current Outpatient Medications   Medication Sig Dispense Refill    ARIPiprazole (ABILIFY) 5 MG tablet 10 mg daily      atorvastatin (LIPITOR) 10 MG tablet Take 1 tablet (10 mg) by mouth daily      bumetanide (BUMEX) 1 MG tablet Take 4 tablets (4 mg) by mouth 2 times daily 120 tablet 1    clonazePAM (KLONOPIN) 1 MG tablet Take 1 tablet (1 mg) by mouth daily as needed for anxiety (Patient taking differently: Take 2 mg by mouth nightly as needed)      hydroxychloroquine (PLAQUENIL) 200 MG tablet Take 1 tablet (200 mg) by mouth 2 times daily      ondansetron (ZOFRAN) 4 MG tablet Take 1 tablet (4 mg) by mouth every 8 hours as needed for nausea 10 tablet 0    potassium chloride ER (KLOR-CON M) 10 MEQ CR tablet Take 2 tablets (20 mEq) by mouth 2 times daily 120 tablet 1    riociguat (ADEMPAS) 2.5 MG tablet Take 1 tablet (2.5 mg) by mouth 3 times daily (with meals) 90 tablet 11    sertraline (ZOLOFT) 100 MG tablet Take 2 tablets (200 mg) by mouth daily      spironolactone (ALDACTONE) 25 MG tablet Take 1 tablet (25 mg) by mouth 2 times daily 180 tablet 1    traZODone (DESYREL) 50 MG tablet Take 2 tablets (100 mg) by mouth At Bedtime      warfarin ANTICOAGULANT (COUMADIN ANTICOAGULANT) 1 MG tablet Take 5 tablets (5 mg) by mouth daily On 1/21/2021 and 1/22/2021. Have your chromogenic factor checked on 1/23/2021. (Patient taking differently: Take 5 mg by mouth daily 5 mg on Monday and Friday, 4 mg on TuWeThSaSu. CFX goal 20-30%) 30 tablet     acetaminophen (TYLENOL) 500 MG tablet Take 1,000 mg by mouth (Patient not taking: Reported on 10/5/2023)         Primary PH cardiologist:  Dr. Paniagua      HPI:  Ms. Barksdale is a very pleasant 52 year old  female with a PMhx including SLE with lupus antiphospholipid antibody syndrome and hx DVT on anticoagulation, and DAVIS on CPAP. She also has chronic thromboembolic pulmonary arterial hypertension. She was initially placed on riociguat, and then referred to [facility] for surgical evaluation. Ultimately, she underwent pulmonary thromboendarterectomy at [facility] in July 2020. She has type IV disease and her PTE was somewhat difficult to the distal nature of her disease. Her post operative course was uncomplicated, and postoperative hemodynamics showed improvement along with improved perfusion of the right lower lobe and left upper lobe via VQ scan, and her riociguat and digoxin were discontinued at that time. Post surgically she had struggled with swelling and weight gain requiring escalation of diuretics. She then underwent BPA in January and March of 2021 of right A8 branch and right A3 branch. Ultimately, due to some ongoing mild RV dysfunction, she was started back on monotherapy with tadalafil.     I met with Yuridia last fall and she was doing ok though still required low dose oxygen with ambulation. She had also been gaining some weight felt likely to be caloric. Unfortunately, due to a lapse in insurance we did not see her for several months. However, she returned in Aug of this year to re-establish with us and get repeat hemodynamic testing. At that time, she was walking outside daily and feeling well, not requiring her oxygen, confirmed via repeat 6MWT. Echo showed stable mild RV dilation and RV dysfunction. RHC showed stable mildly elevated PA pressure and PVR with preserved cardiac output. Based on this, it was recommended that her tadalafil be changed back to riociguat for further titration.    When I met with Yuridia garcia in early October, she had stopped her tadalafil and washed out for 48 hours before starting Adempas. She was started on 0.5mg TID and says she did ok initially but when  she went up to 1.0mg dose had significant weight gain. Baseline is 310-311# and she went up as high as 320#. Her torsemide was increased to 80/40mg and then further to 80/80mg dosing. With this, she came back down to 312# at home with some improvement in swelling. Upon d/w Dr. Paniagua, it was felt perhaps we were under treating after tadalafil washout so we asked her to increased Adempas right to 2.5mg TID dose. Additionally, I asked her to stay on 80mg BID torsemide dosing.     Today, we are meeting back virtually to follow up. She had significant nausea with the dose increased so we called her in a short course of Zofran which was helping. However, she still doesn't feel as well as when she was on tadalafil. She feels more SAHU and still having moderate edema. Weight has gone up a bit further. No new dizziness/presyncope.     The patient did not have any new labs performed for our visit today, but most recent available labs were reviewed as below.         CURRENT PULMONARY HYPERTENSION REGIMEN:     PAH Rx: Adempas 2.5mg TID  (Changed from tadalafil due to residual RV dysfunction)    Diuretics: Torsemide 80mg BID, spironolactone 25mg BID     Oxygen: Has 02 PRN     Anticoagulation: warfarin (follows chrom factor goal 20-30)--needs to be bridged with Arixtra given her heparin allergy if she needs procedures.  Indication: DVT/antiphospholipid     Rheum: Dr. Graf        Assessment/Plan:     1. Chronic thromboembolic pulmonary hypertension.              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 She was initially placed on Adempas and is s/p pulmonary thromboendarterectomy July 2020 at [facility]; Adempas was discontinued post surgery. For residual disease, she then had BPA in January and March of 2021 here at [facility], of the right A8 branch and right A3 branch. Due to residual RV dysfunction she was then started on tadalafil (initially hesitant to resume Adempas  due to cost and TID dosing).   --Via hemodynamic testing in August, RV dysfunction persisted so it was recommended she transition back to Adempas. When we met a few weeks ago, she felt worse with more fluid retention. It was felt perhaps we were undertreating and we asked her to go straight to 2.5mg TID Adempas dose. She is still not feeling better on higher dose, with more weight gain and SAHU than prior. Via COMPERA, she is intermediate-high risk.    --In an effort to keep her on Adempas if possible, will change her torsemide to bumex 4mg BID today to see if we can achieve better diuresis. She remains on spironolactone 25mg BID but is still hypokalemic. Add K 20meq BID. Repeat labs in 1 week.              --In regard to oxygen, can continue PRN use for now. Plan repeat formal 6MWT when she returns to clinic in person.              --She is compliant with her CPAP for known DAVIS.               --Continue Coumadin for anticoagulation, in the setting of CTEPH and antiphospholipid antibody syndrome. She follows locally with a chromogenic factor goal of 20-30. She needs to be bridged with Arixtra given her heparin allergy if she needs procedures.    Follow up plan: Return in 1 week to see me virtually with labs prior. I asked the patient to call sooner with any new concerns.       Testing/labs:      Most recent labs:             Other recent pertinent testing:    C 8/2023  Hemodynamics    RA 6   RV 60/10   PA 59/22/31  Wedge 9  PA sat 69%  Terry cardiac output 5.2, Terry cardiac index 2.19  Thermodilution cardiac output 6.3.  Thermodilution cardiac index 2.6  PVR 4.18 Right sided filling pressures are normal. Left sided filling pressures are normal. Mild elevated pulmonary hypertension. Normal cardiac output level.     Echo 8/2023  Interpretation Summary  Left ventricular function is normal.The ejection fraction is 55-60%.  Mild right ventricular dilation is present. Global right ventricular function  is mildly  reduced.  Mild tricuspid and mitral insufficiency present.  The right ventricular systolic pressure is approximated at 33 mmHg. Estimated  mean right atrial pressure is normal.  No pericardial effusion is present.    NYHA Functional Class:  3          I have reviewed the note as documented above.  This accurately captures the substance of my conversation with the patient.      Phone call contact time  Call Started at 0834  Call Ended at 0850    An additional 20 minutes was spent today performing chart and history review, pre and post visit documentation, review of recent testing, patient education, and care coordination.      Azucena Silva PA-C  [facility]  Pager [phone]

## 2023-10-23 ENCOUNTER — VIRTUAL VISIT (OUTPATIENT)
Dept: CARDIOLOGY | Facility: CLINIC | Age: 53
End: 2023-10-23
Payer: COMMERCIAL

## 2023-10-23 DIAGNOSIS — E87.6 HYPOKALEMIA: ICD-10-CM

## 2023-10-23 DIAGNOSIS — R06.09 DYSPNEA ON EXERTION: Primary | ICD-10-CM

## 2023-10-23 DIAGNOSIS — I27.24 CTEPH (CHRONIC THROMBOEMBOLIC PULMONARY HYPERTENSION) (H): ICD-10-CM

## 2023-10-23 PROCEDURE — 99214 OFFICE O/P EST MOD 30 MIN: CPT | Mod: 93 | Performed by: PHYSICIAN ASSISTANT

## 2023-10-23 RX ORDER — BUMETANIDE 1 MG/1
4 TABLET ORAL 2 TIMES DAILY
Qty: 120 TABLET | Refills: 1 | Status: SHIPPED | OUTPATIENT
Start: 2023-10-23 | End: 2023-10-30

## 2023-10-23 RX ORDER — POTASSIUM CHLORIDE 750 MG/1
20 TABLET, EXTENDED RELEASE ORAL 2 TIMES DAILY
Qty: 120 TABLET | Refills: 1 | Status: SHIPPED | OUTPATIENT
Start: 2023-10-23 | End: 2023-10-30

## 2023-10-23 NOTE — PATIENT INSTRUCTIONS
Thank you for visiting the Pulmonary Hypertension Clinic today.      Medication Changes:   STOP the torsemide.  START Bumex; you will take 4 tablets twice a day.  You will also START potassium; you will take 2 tablets twice a day    Additional information/recommendations:  Keep weighing at home, call with an upward trend.    Follow up Appointment Information:        Additional Instructions:    1. Continue staying active and eat a heart healthy, low sodium diet.     2. Please keep current list of medications with you at all times.     3. Remember to weigh yourself daily after voiding and write it down on a log. If you have gained/lost 2 pounds overnight or 5 pounds in a week contact us for medication adjustments or further instructions.    4. Please call us immediately if you have syncope (fainting or passing out), chest pain, worsening edema (swelling or weight gain), or general worsening in how you are feeling.         ---------------------------------------------------------------------------------------------------------------    If you have questions or concerns please contact us at:        Yoly Villegas, RN, BSN, PHN  Nurse Coordinator   Pulmonary Hypertension  [facility]               (P)[phone]         Janey Carroll, [facility]    Patricia Miller   (Prior Authorizations)    ()  Clinic   Clinic   Pulmonary Hypertension   Pulmonary Hypertension  [facility]  (P)[phone]    (P) [phone]  (F) [phone]    ** Please note that you will NOT receive a reminder call regarding your scheduled testing, reminder calls are for provider appointments only.  If you are scheduled for testing within the [employer] system you may receive a call regarding pre-registration for billing purposes only.**      ------------------------------------------------------------------------------------------------------------

## 2023-10-25 DIAGNOSIS — R06.09 DOE (DYSPNEA ON EXERTION): ICD-10-CM

## 2023-10-25 DIAGNOSIS — I27.24 CTEPH (CHRONIC THROMBOEMBOLIC PULMONARY HYPERTENSION) (H): Primary | ICD-10-CM

## 2023-10-27 RX ORDER — SPIRONOLACTONE 25 MG/1
25 TABLET ORAL 2 TIMES DAILY
Qty: 180 TABLET | Refills: 3 | Status: SHIPPED | OUTPATIENT
Start: 2023-10-27

## 2023-10-27 NOTE — TELEPHONE ENCOUNTER
spironolactone (ALDACTONE) 25 MG tablet 180 tablet 1 3/20/2023       Last Office Visit: 10/23/23  Future Office visit:   10/30/23      Diuretics (Including Combos) Protocol Dmlide83/25/2023 01:06 PM   Protocol Details Blood pressure under 140/90 in past 12 months    Normal serum creatinine on file in past 12 month        Creatinine   Date Value Ref Range Status   10/03/2023 1.21 (H) 0.51 - 0.95 mg/dL Final   05/03/2021 1.14 (H) 0.52 - 1.04 mg/dL Final       Routing refill request to provider for review/approval because:  Abnormal creatinine    Belen Lopes RN

## 2023-10-27 NOTE — TELEPHONE ENCOUNTER
Medication Refill double check:    Last virtual visit was on 10/23/23 with Azucena Silva PA-C.    Follow up was recommended for 1 week.    Any additional encounters with changes to requested med? no    Authorizing provider is: Dr. Arcelia Caceres was approved.     Additional orders/notes:       Yoly Villegas RN on 10/27/2023 at 12:46 PM

## 2023-10-30 ENCOUNTER — TELEPHONE (OUTPATIENT)
Dept: CARDIOLOGY | Facility: CLINIC | Age: 53
End: 2023-10-30

## 2023-10-30 ENCOUNTER — VIRTUAL VISIT (OUTPATIENT)
Dept: CARDIOLOGY | Facility: CLINIC | Age: 53
End: 2023-10-30
Payer: COMMERCIAL

## 2023-10-30 VITALS — WEIGHT: 293 LBS | BODY MASS INDEX: 47.09 KG/M2 | HEIGHT: 66 IN

## 2023-10-30 DIAGNOSIS — E87.6 HYPOKALEMIA: ICD-10-CM

## 2023-10-30 DIAGNOSIS — R06.09 DYSPNEA ON EXERTION: ICD-10-CM

## 2023-10-30 DIAGNOSIS — I27.24 CTEPH (CHRONIC THROMBOEMBOLIC PULMONARY HYPERTENSION) (H): ICD-10-CM

## 2023-10-30 DIAGNOSIS — R06.09 DYSPNEA ON EXERTION: Primary | ICD-10-CM

## 2023-10-30 PROCEDURE — 99213 OFFICE O/P EST LOW 20 MIN: CPT | Mod: VID | Performed by: PHYSICIAN ASSISTANT

## 2023-10-30 RX ORDER — BUMETANIDE 1 MG/1
TABLET ORAL
Start: 2023-10-30 | End: 2023-11-03

## 2023-10-30 RX ORDER — POTASSIUM CHLORIDE 750 MG/1
40 TABLET, EXTENDED RELEASE ORAL 2 TIMES DAILY
Qty: 120 TABLET | Refills: 1 | Status: SHIPPED | OUTPATIENT
Start: 2023-10-30 | End: 2024-04-16

## 2023-10-30 NOTE — PROGRESS NOTES
CARDIOLOGY PH CLINIC VIDEO VISIT    Date of virtual visit: 10/30/23      Yuridia Barksdale is a 52 year old female who is being evaluated via a billable virtual visit.     Pt self reported vitals:    Wt 315 lb  BP not reported      MEDICATIONS:  Current Outpatient Medications   Medication Sig Dispense Refill    acetaminophen (TYLENOL) 500 MG tablet Take 1,000 mg by mouth (Patient not taking: Reported on 10/5/2023)      ARIPiprazole (ABILIFY) 5 MG tablet 10 mg daily      atorvastatin (LIPITOR) 10 MG tablet Take 1 tablet (10 mg) by mouth daily      bumetanide (BUMEX) 1 MG tablet Take 4 tablets (4 mg) by mouth 2 times daily 120 tablet 1    clonazePAM (KLONOPIN) 1 MG tablet Take 1 tablet (1 mg) by mouth daily as needed for anxiety (Patient taking differently: Take 2 mg by mouth nightly as needed)      hydroxychloroquine (PLAQUENIL) 200 MG tablet Take 1 tablet (200 mg) by mouth 2 times daily      ondansetron (ZOFRAN) 4 MG tablet Take 1 tablet (4 mg) by mouth every 8 hours as needed for nausea 10 tablet 0    potassium chloride ER (KLOR-CON M) 10 MEQ CR tablet Take 2 tablets (20 mEq) by mouth 2 times daily 120 tablet 1    riociguat (ADEMPAS) 2.5 MG tablet Take 1 tablet (2.5 mg) by mouth 3 times daily (with meals) 90 tablet 11    sertraline (ZOLOFT) 100 MG tablet Take 2 tablets (200 mg) by mouth daily      spironolactone (ALDACTONE) 25 MG tablet Take 1 tablet (25 mg) by mouth 2 times daily 180 tablet 3    traZODone (DESYREL) 50 MG tablet Take 2 tablets (100 mg) by mouth At Bedtime      warfarin ANTICOAGULANT (COUMADIN ANTICOAGULANT) 1 MG tablet Take 5 tablets (5 mg) by mouth daily On 1/21/2021 and 1/22/2021. Have your chromogenic factor checked on 1/23/2021. (Patient taking differently: Take 5 mg by mouth daily 5 mg on Monday and Friday, 4 mg on TuWeThSaSu. CFX goal 20-30%) 30 tablet        Primary PH cardiologist:  Dr. Paniagua      HPI:  Ms. Barksdale is a very pleasant 52 year old female with a PMhx including SLE  with lupus antiphospholipid antibody syndrome and hx DVT on anticoagulation, and DAVIS on CPAP. She also has chronic thromboembolic pulmonary arterial hypertension. She was initially placed on riociguat, and then referred to [facility] for surgical evaluation. Ultimately, she underwent pulmonary thromboendarterectomy at [facility] in July 2020. She has type IV disease and her PTE was somewhat difficult to the distal nature of her disease. Her post operative course was uncomplicated, and postoperative hemodynamics showed improvement along with improved perfusion of the right lower lobe and left upper lobe via VQ scan, and her riociguat and digoxin were discontinued at that time. Post surgically she had struggled with swelling and weight gain requiring escalation of diuretics. She then underwent BPA in January and March of 2021 of right A8 branch and right A3 branch. Ultimately, due to some ongoing mild RV dysfunction, she was started back on monotherapy with tadalafil.     I met with Yuridia last fall and she was doing ok though still required low dose oxygen with ambulation. She had also been gaining some weight felt likely to be caloric. Unfortunately, due to a lapse in insurance we did not see her for several months. However, she returned in Aug of this year to re-establish with us and get repeat hemodynamic testing. At that time, she was walking outside daily and feeling well, not requiring her oxygen, confirmed via repeat 6MWT. Echo showed stable mild RV dilation and RV dysfunction. RHC showed stable mildly elevated PA pressure and PVR with preserved cardiac output. Based on this, it was recommended that her tadalafil be changed back to riociguat for further titration.    When I met with Yuridia garcia in early October, she had stopped her tadalafil and washed out for 48 hours before starting Adempas. She was started on 0.5mg TID and says she did ok initially but when she went up to 1.0mg dose had  significant weight gain. Baseline is 310-311# and she went up as high as 320#. Her torsemide was increased to 80/40mg and then further to 80/80mg dosing. With this, she came back down to 312# at home with some improvement in swelling. Upon d/w Dr. Paniagua, it was felt perhaps we were under treating after tadalafil washout so we asked her to increased Adempas right to 2.5mg TID dose. Additionally, I asked her to stay on 80mg BID torsemide. However, on follow up last week, weight had gone up a bit further and she was having more nausea. In additional, breathing wasn't as good as while on tadalafil. In an effort to try to keep her on Adempas, I changed her torsemide to bumex 4mg daily.    Today, we are meeting back virtually to follow up. Unfortunately, not much has changed with the change in diuretic. She is not urinating much more than usual and weight is only down 1 lb. She does think, however, that swelling is slightly better and now only has some mostly in the left ankle. SAHU persists, and she noted it even while walking to her mailbox today.     Labs drawn earlier today locally were reviewed as below.       CURRENT PULMONARY HYPERTENSION REGIMEN:     PAH Rx: Adempas 2.5mg TID  (Changed from tadalafil due to residual RV dysfunction)    Diuretics: Bumex 4mg BID, spironolactone 25mg BID     Oxygen: Has 02 PRN     Anticoagulation: warfarin (follows chrom factor goal 20-30)--needs to be bridged with Arixtra given her heparin allergy if she needs procedures.  Indication: DVT/antiphospholipid     Rheum: Dr. Graf        Assessment/Plan:     1. Chronic thromboembolic pulmonary hypertension.              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 She was initially placed on Adempas and is s/p pulmonary thromboendarterectomy July 2020 at [facility]; Adempas was discontinued post surgery. For residual disease, she then had BPA in January and March of 2021 here at [facility], of the right A8 branch and right A3 branch. Due to residual RV dysfunction she was then started on tadalafil (initially hesitant to resume Adempas due to cost and TID dosing).   --Via hemodynamic testing in August, RV dysfunction persisted so it was recommended she transition back to Adempas. She has had a little trouble with this, with some weight gain and fluid retention, but we then quickly escalated her back to full dose 2.5mg TID which she seems to be doing ok with now. Via COMPERA, score is 3 and she is intermediate-high risk.    --In an effort to keep her on Adempas if possible, I am working on volume removal she has not made much improvement with change to bumex. Labs today show stable renal function. Thus, will increase bumex to 6mg AM, 4mg PM. With this change I will also increase her K further to 40meq BID. She is already on spironolactone 25mg BID as well.              --In regard to oxygen, can continue PRN use for now. Plan repeat formal 6MWT when she returns to clinic in person.              --She is compliant with her CPAP for known DAVIS.               --Continue Coumadin for anticoagulation, in the setting of CTEPH and antiphospholipid antibody syndrome. She follows locally with a chromogenic factor goal of 20-30. She needs to be bridged with Arixtra given her heparin allergy if she needs procedures.    Follow up plan: Return in 2 weeks to see me virtually, and labs locally that AM once again.  I asked the patient to call sooner with any new concerns.       Testing/labs:      Most recent labs:             Other recent pertinent testing:    [facility] 8/2023  Hemodynamics    RA 6   RV 60/10   PA 59/22/31  Wedge 9  PA sat 69%  Terry cardiac output 5.2, Terry cardiac index 2.19  Thermodilution cardiac output 6.3.  Thermodilution cardiac index 2.6  PVR 4.18 Right sided filling pressures are normal. Left sided filling pressures are normal. Mild elevated pulmonary hypertension. Normal cardiac output level.      Echo 8/2023  Interpretation Summary  Left ventricular function is normal.The ejection fraction is 55-60%.  Mild right ventricular dilation is present. Global right ventricular function  is mildly reduced.  Mild tricuspid and mitral insufficiency present.  The right ventricular systolic pressure is approximated at 33 mmHg. Estimated  mean right atrial pressure is normal.  No pericardial effusion is present.    NYHA Functional Class:  3        Review Of Systems  Respiratory: Positive for SAHU, sleep apnea uses CPAP  Cardiovascular: Positive for edema    I have reviewed the note as documented above.  This accurately captures the substance of my conversation with the patient.      Video-Visit Details    Type of service:  Video Visit    Video Start Time: 1528  Video End Time: 1539    An additional 15 minutes was spent today performing chart and history review, pre and post visit documentation, patient education, and care coordination.      Originating Location (pt. Location): Home    Distant Location (provider location):  Off-site    Platform used for Video Visit: Jennifer Silva PA-C  [facility]  Pager [phone]

## 2023-10-30 NOTE — PATIENT INSTRUCTIONS
You were seen today in the Pulmonary Hypertension Clinic at the [facility].     Cardiology Provider you saw during your visit:    Azucena Silva PA-C    Medication Changes:  INCREASE bumex to 6mg AM, 40mg PM. (6 tabs, 4 tabs)  INCREASE potassium to 40meq twice daily (4 tabs, 4 tabs)    Recommendations:   Return Mon Nov 13  Labs in the AM locally, car Zeng video visit 3:30PM      Please call us immediately if you have any syncope (fainting or passing out), chest pain, edema (swelling or weight gain), or decline in your functional status (general decline in how you are feeling).    If you have emergent concerns after hours or on the weekend, please call our on-call Cardiologist at [phone], option 4. For emergencies call [phone].     Thank you for allowing us to be a part of your care here at the [facility]    If you have questions or concerns please contact us at:    Yoly Villegas RN (P: [phone])    Nurse Coordinator       Pulmonary Hypertension     [facility]         OZZY Guillen   (Prior Authorizations)    ()  Clinic   Clinic   Pulmonary Hypertension   Pulmonary Hypertension  [facility]  [facility]  (P)[phone]    (P) [phone]  (F) [phone]

## 2023-10-31 NOTE — TELEPHONE ENCOUNTER
Azucena Silva PA sent to [place], Yoly, RN  Caller: Unspecified (Yesterday,  3:15 PM)  Thank you.  Just got off the visit with her.    I'm going UP on bumex to 6mg am, 4mg pm.  Also going UP on potassium to 40meq BID.    Please call in orders for a BMP again on Mon Nov 13, she will go in the AM to have them drawn.    Then put her back in with me MON NOV 13 VIDEO VISIT 3:30 pm slot.    Thanks!!  ----------------------------------  Verfieid all med changes had been made & secured telephone visit. Yoly Villegas RN on 10/31/2023 at 11:23 AM

## 2023-11-02 ENCOUNTER — TELEPHONE (OUTPATIENT)
Dept: CARDIOLOGY | Facility: CLINIC | Age: 53
End: 2023-11-02
Payer: COMMERCIAL

## 2023-11-02 DIAGNOSIS — E87.6 HYPOKALEMIA: ICD-10-CM

## 2023-11-02 DIAGNOSIS — R06.09 DYSPNEA ON EXERTION: ICD-10-CM

## 2023-11-02 NOTE — TELEPHONE ENCOUNTER
Health Call Center    Phone Message    May a detailed message be left on voicemail: yes     Reason for Call: Medication Refill Request    Has the patient contacted the pharmacy for the refill? Yes   Name of medication being requested: bumetanide (BUMEX) 1 MG tablet   Provider who prescribed the medication: Azucena Silva  Pharmacy:    [facility] - [address]    Date medication is needed: 11/2   The patient will be traveling tomorrow morning and needs to get this today     Action Taken: Other: Cardiology    Travel Screening: Not Applicable    Thank you!  [facility]             --------------------------------  Refill sent. Yoly Villegas RN on 11/3/2023 at 8:00 AM

## 2023-11-03 RX ORDER — BUMETANIDE 1 MG/1
TABLET ORAL
Qty: 300 TABLET | Refills: 11 | Status: SHIPPED | OUTPATIENT
Start: 2023-11-03 | End: 2024-01-19

## 2023-11-13 ENCOUNTER — VIRTUAL VISIT (OUTPATIENT)
Dept: CARDIOLOGY | Facility: CLINIC | Age: 53
End: 2023-11-13
Payer: COMMERCIAL

## 2023-11-13 DIAGNOSIS — R06.09 DYSPNEA ON EXERTION: ICD-10-CM

## 2023-11-13 DIAGNOSIS — I27.24 CTEPH (CHRONIC THROMBOEMBOLIC PULMONARY HYPERTENSION) (H): Primary | ICD-10-CM

## 2023-11-13 PROCEDURE — 99213 OFFICE O/P EST LOW 20 MIN: CPT | Mod: VID | Performed by: PHYSICIAN ASSISTANT

## 2023-11-13 RX ORDER — LIDOCAINE 40 MG/G
CREAM TOPICAL
Status: CANCELLED | OUTPATIENT
Start: 2023-11-13

## 2023-11-13 RX ORDER — SODIUM CHLORIDE 9 MG/ML
1000 INJECTION, SOLUTION INTRAVENOUS CONTINUOUS
Status: CANCELLED | OUTPATIENT
Start: 2023-11-13

## 2023-11-13 RX ORDER — POTASSIUM CHLORIDE 1500 MG/1
20 TABLET, EXTENDED RELEASE ORAL
Status: CANCELLED | OUTPATIENT
Start: 2023-11-13

## 2023-11-13 NOTE — PATIENT INSTRUCTIONS
You were seen today in the Pulmonary Hypertension Clinic at the [facility].     Cardiology Provider you saw during your visit:    Azucena Silva PA-C    Medication Changes:  None    Results:   N/A    Recommendations:   Repeat testing of RHC, Echo, 6mwt w/labs prior    Follow-up:   TBD after testing    Please call us immediately if you have any syncope (fainting or passing out), chest pain, edema (swelling or weight gain), or decline in your functional status (general decline in how you are feeling).    If you have emergent concerns after hours or on the weekend, please call our on-call Cardiologist at [phone], option 4. For emergencies call [phone].     Thank you for allowing us to be a part of your care here at the [facility]    If you have questions or concerns please contact us at:    Yoly Villegas RN (P: [phone])    Nurse Coordinator       Pulmonary Hypertension     [facility]         OZZY Brown   (Prior Auths & Pt Assistance)   ()  Clinic   Clinic   Pulmonary Hypertension   Pulmonary Hypertension  [facility]  [facility]  (P)[phone]    (P) [phone]  (F) [phone]

## 2023-11-13 NOTE — PROGRESS NOTES
CARDIOLOGY PH CLINIC VIDEO VISIT    Date of virtual visit: 11/13/23      Yuridia Barksdale is a 52 year old female who is being evaluated via a billable virtual visit.     Pt self reported vitals:    Wt 313.4lbs  BP: 126/54  HR: 78      MEDICATIONS:  Current Outpatient Medications   Medication Sig Dispense Refill    ARIPiprazole (ABILIFY) 5 MG tablet 5 mg daily      atorvastatin (LIPITOR) 10 MG tablet Take 1 tablet (10 mg) by mouth daily      bumetanide (BUMEX) 1 MG tablet Take 6 tablets (6 mg) by mouth every morning AND 4 tablets (4 mg) every evening. 300 tablet 11    clonazePAM (KLONOPIN) 1 MG tablet Take 1 tablet (1 mg) by mouth daily as needed for anxiety (Patient taking differently: Take 2 mg by mouth nightly as needed)      hydroxychloroquine (PLAQUENIL) 200 MG tablet Take 1 tablet (200 mg) by mouth 2 times daily      ondansetron (ZOFRAN) 4 MG tablet Take 1 tablet (4 mg) by mouth every 8 hours as needed for nausea 10 tablet 0    potassium chloride ER (KLOR-CON M) 10 MEQ CR tablet Take 4 tablets (40 mEq) by mouth 2 times daily 120 tablet 1    riociguat (ADEMPAS) 2.5 MG tablet Take 1 tablet (2.5 mg) by mouth 3 times daily (with meals) 90 tablet 11    sertraline (ZOLOFT) 100 MG tablet Take 2 tablets (200 mg) by mouth daily      spironolactone (ALDACTONE) 25 MG tablet Take 1 tablet (25 mg) by mouth 2 times daily 180 tablet 3    traZODone (DESYREL) 50 MG tablet Take 2 tablets (100 mg) by mouth At Bedtime      warfarin ANTICOAGULANT (COUMADIN ANTICOAGULANT) 1 MG tablet Take 5 tablets (5 mg) by mouth daily On 1/21/2021 and 1/22/2021. Have your chromogenic factor checked on 1/23/2021. (Patient taking differently: Take 4 mg by mouth daily  CFX goal 20-30%) 30 tablet        Primary PH cardiologist:  Dr. Paniagua      HPI:  Ms. Barksdale is a very pleasant 52 year old female with a PMhx including SLE with lupus antiphospholipid antibody syndrome and hx DVT on anticoagulation, and DAVIS on CPAP. She also has chronic  thromboembolic pulmonary arterial hypertension. She was initially placed on riociguat, and then referred to [facility] for surgical evaluation. Ultimately, she underwent pulmonary thromboendarterectomy at [facility] in July 2020. She has type IV disease and her PTE was somewhat difficult to the distal nature of her disease. Her post operative course was uncomplicated, and postoperative hemodynamics showed improvement along with improved perfusion of the right lower lobe and left upper lobe via VQ scan, and her riociguat and digoxin were discontinued at that time. Post surgically she had struggled with swelling and weight gain requiring escalation of diuretics. She then underwent BPA in January and March of 2021 of right A8 branch and right A3 branch. Ultimately, due to some ongoing mild RV dysfunction, she was started back on monotherapy with tadalafil.     I met with Yuridia last fall and she was doing ok though still required low dose oxygen with ambulation. She had also been gaining some weight felt likely to be caloric. Unfortunately, due to a lapse in insurance we did not see her for several months. However, she returned in Aug of this year to re-establish with us and get repeat hemodynamic testing. At that time, she was walking outside daily and feeling well, not requiring her oxygen, confirmed via repeat 6MWT. Echo showed stable mild RV dilation and RV dysfunction. RHC showed stable mildly elevated PA pressure and PVR with preserved cardiac output. Based on this, it was recommended that her tadalafil be changed back to riociguat for further titration.    When I met with Yuridia garcia in early October, she had stopped her tadalafil and washed out for 48 hours before starting Adempas. She was started on 0.5mg TID and says she did ok initially but when she went up to 1.0mg dose had significant weight gain. Baseline is 310-311# and she went up as high as 320#. Her torsemide was increased to 80/40mg  and then further to 80/80mg dosing. With this, she came back down to 312# at home with some improvement in swelling. Upon d/w Dr. Paniagua, it was felt perhaps we were under treating after tadalafil washout so we asked her to increased Adempas right to 2.5mg TID dose. Additionally, I asked her to stay on 80mg BID torsemide. However, on follow up last week, weight had gone up a bit further and she was having more nausea. In additional, breathing wasn't as good as while on tadalafil. In an effort to try to keep her on Adempas, I changed her torsemide to bumex 4mg daily.    Last visit, she was still feeling that she was having more swelling and there wasn't much improvement. SAHU persisted more than usual as well. That visit, I increased her bumex to 6mg AM, 4mg PM.    Today, we are meeting back virtually to follow up. She tells me she's feeling a bit better. Weight is down 2-3 lbs and her breathing seems improved. She no longer has edema s well.     Labs drawn earlier today locally were reviewed as below.       CURRENT PULMONARY HYPERTENSION REGIMEN:     PAH Rx: Adempas 2.5mg TID  (Changed from tadalafil due to residual RV dysfunction)    Diuretics: Bumex 6mg Am, 4mg PM, spironolactone 25mg BID     Oxygen: Has 02 PRN (hasn't been using recently)     Anticoagulation: warfarin (follows chrom factor goal 20-30)--needs to be bridged with Arixtra given her heparin allergy if she needs procedures.  Indication: DVT/antiphospholipid     Rheum: Dr. Graf        Assessment/Plan:     1. Chronic thromboembolic pulmonary hypertension.              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 She was initially placed on Adempas and is s/p pulmonary thromboendarterectomy July 2020 at [facility]; Adempas was discontinued post surgery. For residual disease, she then had BPA in January and March of 2021 here at [facility], of the right A8 branch and right A3 branch. Due to residual RV  dysfunction she was then started on tadalafil (initially hesitant to resume Adempas due to cost and TID dosing).   --Via hemodynamic testing in August, RV dysfunction persisted so it was recommended she transition back to Adempas. She has had a little trouble with this, with some weight gain and fluid retention, but we then quickly escalated her back to full dose 2.5mg TID which she seems to be doing ok with now. Her COMPERA score is 3 and she is intermediate-high risk.    --In an effort to keep her on Adempas if possible, I have been escalating her diuretics. She now sounds to be improved on Bumex 6mg Am/4mg PM. K is now WNL as well on current supplementation.  She is already on spironolactone 25mg BID as well. SCr up a bit, but this is not far from her baseline and has been fluctuating a bit. As she's feeling better, I kept diuretics as is.             --In regard to oxygen, can continue PRN use for now. Plan repeat formal 6MWT when she returns to clinic.              --She is compliant with her CPAP for known DAVIS.               --Continue Coumadin for anticoagulation, in the setting of CTEPH and antiphospholipid antibody syndrome. She follows locally with a chromogenic factor goal of 20-30. She needs to be bridged with Arixtra given her heparin allergy if she needs procedures.    Follow up plan: Return in the next few weeks with repeat RHC, echocardiogram, and 6MWT to see Dr. Paniagua. I asked the patient to call sooner with any new concerns.       Testing/labs:      Most recent labs:           Other recent pertinent testing:    RHC 8/2023  Hemodynamics    RA 6   RV 60/10   PA 59/22/31  Wedge 9  PA sat 69%  Terry cardiac output 5.2, Terry cardiac index 2.19  Thermodilution cardiac output 6.3.  Thermodilution cardiac index 2.6  PVR 4.18 Right sided filling pressures are normal. Left sided filling pressures are normal. Mild elevated pulmonary hypertension. Normal cardiac output level.     Echo 8/2023  Interpretation  Summary  Left ventricular function is normal.The ejection fraction is 55-60%.  Mild right ventricular dilation is present. Global right ventricular function  is mildly reduced.  Mild tricuspid and mitral insufficiency present.  The right ventricular systolic pressure is approximated at 33 mmHg. Estimated  mean right atrial pressure is normal.  No pericardial effusion is present.    NYHA Functional Class:  3      Review Of Systems  Respiratory: Positive for SAHU, sleep apnea uses CPAP  Cardiovascular: Positive for edema    I have reviewed the note as documented above.  This accurately captures the substance of my conversation with the patient.      Video-Visit Details    Type of service:  Video Visit    Video Start Time: 1536  Video End Time: 1543    An additional 15 minutes was spent today performing chart and history review, pre and post visit documentation, patient education, and care coordination.      Originating Location (pt. Location): Home    Distant Location (provider location):  On-site    Platform used for Video Visit: Jennifer Silva PA-C  [facility]  Pager [phone]

## 2023-11-16 ENCOUNTER — TELEPHONE (OUTPATIENT)
Dept: CARDIOLOGY | Facility: CLINIC | Age: 53
End: 2023-11-16
Payer: COMMERCIAL

## 2023-11-16 NOTE — TELEPHONE ENCOUNTER
11/13/23 Labs from Monday visit forwarded to Azucena Silva PA-C.  Aidee Jackson RN 11/16/23 11:23 AM

## 2023-11-17 ENCOUNTER — TELEPHONE (OUTPATIENT)
Dept: CARDIOLOGY | Facility: CLINIC | Age: 53
End: 2023-11-17
Payer: COMMERCIAL

## 2023-11-17 DIAGNOSIS — I27.24 CTEPH (CHRONIC THROMBOEMBOLIC PULMONARY HYPERTENSION) (H): Primary | ICD-10-CM

## 2023-11-17 NOTE — TELEPHONE ENCOUNTER
Cath Lab Case Request/Order    Location: [address] [address] [facility] Waiting Room    Procedure: Right Heart Cath (RHC)    Procedure Date: 1/3    Patient Arrival Time: 7  Patient will getting Echo and walk at the clinic the night before     Procedure Time: 0830 (pending emergency)    Ordering Provider: Dr. Arcelia Paniagua    Performing Cardiologist: Dr. Arcelia Paniagua    Inpatient Bed Needed: No    Post-  Procedure GLORY appointment scheduled (1 - 2 weeks): N/A, [facility]      Communicated Patient Instructions:    NURSE WILL GO OVER ALL PRE PROCEDURE INSTRUCTIONS TO PATIENT     Appointment was scheduled: Over the phone    Patient expressed understanding of above instructions and denied further questions at this time.    Patricia Miller

## 2023-12-27 ENCOUNTER — MYC MEDICAL ADVICE (OUTPATIENT)
Dept: CARDIOLOGY | Facility: CLINIC | Age: 53
End: 2023-12-27
Payer: COMMERCIAL

## 2023-12-27 RX ORDER — FONDAPARINUX SODIUM 10 MG/.8ML
10 INJECTION SUBCUTANEOUS EVERY 24 HOURS
Qty: 5.6 ML | Refills: 1 | Status: SHIPPED | OUTPATIENT
Start: 2023-12-27 | End: 2024-04-16

## 2023-12-27 NOTE — TELEPHONE ENCOUNTER
Sent mychart instructions to patient & verified Arixtra bridging instructions with Dr. Panigaua. Yoly Villegas RN on 12/27/2023 at 11:57 AM

## 2023-12-29 ENCOUNTER — TELEPHONE (OUTPATIENT)
Dept: CARDIOLOGY | Facility: CLINIC | Age: 53
End: 2023-12-29
Payer: COMMERCIAL

## 2023-12-29 NOTE — TELEPHONE ENCOUNTER
Patient called to say her pharmacy did not receive the medication today, so we will have to reschedule all testing.  Agreed with plan and sent msg to Patricia. Yoly Villegas RN on 12/29/2023 at 3:45 PM

## 2023-12-29 NOTE — TELEPHONE ENCOUNTER
Patient called to say that her pharmcy has not receive the Arixtra Sub Q yet and they don't know if it's going to arrive before they close today.  Other pharmacies I checked are;    [facility], [place], - Does not have.  [facility], [place] - Does not have  [facility] [place] - [[place] - Does not have   [facility], [clinic location] - Does not have  [facility], [place] - does not have  [facility] [place] - Does not have  [facility] [place] - Doesn't have, nor can they obtain.  [facility] - no, nor do any of their locations have in stock.  [facility] - Doesn't have    Discussed with Dr. Paniagua who advised we re-schedule procedure, as it's too high risk to not use it, and it is an elective procedure.  Verbalized understanding. Yoly Villegas RN on 12/29/2023 at 3:29 PM    Discussed Dr. Paniagua's recommendations with patient with whom we agreed that if she was able to obtain the med from her pharmacy by the end of today we will plan for RHC as planned next week.  If she does not receive med by the end of today, she will either leave me a VM, or send me a Raptt message so I know we have to reschedule.  Patient will plan on picking up med from pharmacy whenever it arrives regardless as she will need it eventually. Patient verbalized understanding, agreed with plan and denied any further questions. Yoly Villegas RN on 12/29/2023 at 3:36 PM

## 2024-01-03 ENCOUNTER — TELEPHONE (OUTPATIENT)
Dept: CARDIOLOGY | Facility: CLINIC | Age: 54
End: 2024-01-03
Payer: COMMERCIAL

## 2024-01-03 NOTE — TELEPHONE ENCOUNTER
Cath Lab Case Request/Order    Location: [address] [address] [facility] Waiting Room    Procedure: Right Heart Cath (RHC)    Procedure Date:  1/17    Patient Arrival Time:  730   ( patient is coming the day before testing and labs at the [facility])     Procedure Time: 0830 (pending emergency)    Ordering Provider: Dr. Arcelia Paniagua    Performing Cardiologist: Dr. Arcelia Paniagua    Inpatient Bed Needed: No    Post-  Procedure GLORY appointment scheduled (1 - 2 weeks): N/A, [facility]      Communicated Patient Instructions:   NURSE WILL GO OVER ALL PRE PROCEDURE INSTRUCTIONS TO PATIENT     Appointment was scheduled: Over the phone    Patient expressed understanding of above instructions and denied further questions at this time.    Patricia Miller

## 2024-01-08 NOTE — TELEPHONE ENCOUNTER
Sent Snyppit message with pre-procedure instructions including bridging details. Yoly Villegas RN on 1/8/2024 at 11:22 AM

## 2024-01-09 ENCOUNTER — TRANSFERRED RECORDS (OUTPATIENT)
Dept: HEALTH INFORMATION MANAGEMENT | Facility: CLINIC | Age: 54
End: 2024-01-09
Payer: COMMERCIAL

## 2024-01-16 ENCOUNTER — LAB (OUTPATIENT)
Dept: LAB | Facility: CLINIC | Age: 54
End: 2024-01-16
Payer: COMMERCIAL

## 2024-01-16 ENCOUNTER — OFFICE VISIT (OUTPATIENT)
Dept: PULMONOLOGY | Facility: CLINIC | Age: 54
End: 2024-01-16
Attending: PHYSICIAN ASSISTANT
Payer: COMMERCIAL

## 2024-01-16 ENCOUNTER — ANCILLARY PROCEDURE (OUTPATIENT)
Dept: CARDIOLOGY | Facility: CLINIC | Age: 54
End: 2024-01-16
Attending: PHYSICIAN ASSISTANT
Payer: COMMERCIAL

## 2024-01-16 DIAGNOSIS — R06.02 SOB (SHORTNESS OF BREATH): ICD-10-CM

## 2024-01-16 DIAGNOSIS — I27.20 PULMONARY HYPERTENSION (H): ICD-10-CM

## 2024-01-16 DIAGNOSIS — I27.24 CTEPH (CHRONIC THROMBOEMBOLIC PULMONARY HYPERTENSION) (H): ICD-10-CM

## 2024-01-16 DIAGNOSIS — R06.09 DYSPNEA ON EXERTION: ICD-10-CM

## 2024-01-16 DIAGNOSIS — I27.20 PULMONARY HYPERTENSION (H): Primary | ICD-10-CM

## 2024-01-16 LAB
6 MIN WALK (FT): 1460 FT
6 MIN WALK (M): 445 M
ALBUMIN SERPL BCG-MCNC: 4 G/DL (ref 3.5–5.2)
ALP SERPL-CCNC: 121 U/L (ref 40–150)
ALT SERPL W P-5'-P-CCNC: 17 U/L (ref 0–50)
ANION GAP SERPL CALCULATED.3IONS-SCNC: 12 MMOL/L (ref 7–15)
AST SERPL W P-5'-P-CCNC: 25 U/L (ref 0–45)
BILIRUB SERPL-MCNC: 0.5 MG/DL
BUN SERPL-MCNC: 16.8 MG/DL (ref 6–20)
CALCIUM SERPL-MCNC: 9.6 MG/DL (ref 8.6–10)
CHLORIDE SERPL-SCNC: 101 MMOL/L (ref 98–107)
CREAT SERPL-MCNC: 1.2 MG/DL (ref 0.51–0.95)
DEPRECATED HCO3 PLAS-SCNC: 25 MMOL/L (ref 22–29)
EGFRCR SERPLBLD CKD-EPI 2021: 55 ML/MIN/1.73M2
ERYTHROCYTE [DISTWIDTH] IN BLOOD BY AUTOMATED COUNT: 12.6 % (ref 10–15)
GLUCOSE SERPL-MCNC: 95 MG/DL (ref 70–99)
HCT VFR BLD AUTO: 40.5 % (ref 35–47)
HGB BLD-MCNC: 13.9 G/DL (ref 11.7–15.7)
LVEF ECHO: NORMAL
MCH RBC QN AUTO: 30.3 PG (ref 26.5–33)
MCHC RBC AUTO-ENTMCNC: 34.3 G/DL (ref 31.5–36.5)
MCV RBC AUTO: 88 FL (ref 78–100)
NT-PROBNP SERPL-MCNC: 631 PG/ML (ref 0–900)
PLATELET # BLD AUTO: 257 10E3/UL (ref 150–450)
POTASSIUM SERPL-SCNC: 3.5 MMOL/L (ref 3.4–5.3)
PROT SERPL-MCNC: 7.5 G/DL (ref 6.4–8.3)
RBC # BLD AUTO: 4.58 10E6/UL (ref 3.8–5.2)
SODIUM SERPL-SCNC: 138 MMOL/L (ref 135–145)
WBC # BLD AUTO: 5.6 10E3/UL (ref 4–11)

## 2024-01-16 PROCEDURE — 80053 COMPREHEN METABOLIC PANEL: CPT | Performed by: PATHOLOGY

## 2024-01-16 PROCEDURE — 83880 ASSAY OF NATRIURETIC PEPTIDE: CPT | Performed by: PATHOLOGY

## 2024-01-16 PROCEDURE — 94618 PULMONARY STRESS TESTING: CPT | Performed by: INTERNAL MEDICINE

## 2024-01-16 PROCEDURE — 85027 COMPLETE CBC AUTOMATED: CPT | Performed by: PATHOLOGY

## 2024-01-16 PROCEDURE — 36415 COLL VENOUS BLD VENIPUNCTURE: CPT | Performed by: PATHOLOGY

## 2024-01-16 PROCEDURE — 93306 TTE W/DOPPLER COMPLETE: CPT | Performed by: INTERNAL MEDICINE

## 2024-01-16 NOTE — PROGRESS NOTES
Yuridia Barksdale comes into clinic today at the request of Azucena PHILIP Ordering Provider for 6 minute walk        Jeffy Patterson, RRT

## 2024-01-17 ENCOUNTER — HOSPITAL ENCOUNTER (OUTPATIENT)
Facility: CLINIC | Age: 54
Discharge: HOME | End: 2024-01-17
Attending: INTERNAL MEDICINE | Admitting: INTERNAL MEDICINE
Payer: COMMERCIAL

## 2024-01-17 ENCOUNTER — APPOINTMENT (OUTPATIENT)
Dept: MEDSURG UNIT | Facility: CLINIC | Age: 54
End: 2024-01-17
Attending: INTERNAL MEDICINE
Payer: COMMERCIAL

## 2024-01-17 VITALS
RESPIRATION RATE: 16 BRPM | DIASTOLIC BLOOD PRESSURE: 78 MMHG | TEMPERATURE: 97.7 F | SYSTOLIC BLOOD PRESSURE: 138 MMHG | BODY MASS INDEX: 50.42 KG/M2 | OXYGEN SATURATION: 94 % | WEIGHT: 293 LBS | HEART RATE: 74 BPM

## 2024-01-17 DIAGNOSIS — R06.09 DYSPNEA ON EXERTION: ICD-10-CM

## 2024-01-17 DIAGNOSIS — I27.24 CTEPH (CHRONIC THROMBOEMBOLIC PULMONARY HYPERTENSION) (H): ICD-10-CM

## 2024-01-17 LAB
ALBUMIN SERPL BCG-MCNC: 3.6 G/DL (ref 3.5–5.2)
ALP SERPL-CCNC: 124 U/L (ref 40–150)
ALT SERPL W P-5'-P-CCNC: 16 U/L (ref 0–50)
ANION GAP SERPL CALCULATED.3IONS-SCNC: 12 MMOL/L (ref 7–15)
AST SERPL W P-5'-P-CCNC: 22 U/L (ref 0–45)
BILIRUB DIRECT SERPL-MCNC: <0.2 MG/DL (ref 0–0.3)
BILIRUB SERPL-MCNC: 0.4 MG/DL
BUN SERPL-MCNC: 30.8 MG/DL (ref 6–20)
CALCIUM SERPL-MCNC: 9.8 MG/DL (ref 8.6–10)
CHLORIDE SERPL-SCNC: 106 MMOL/L (ref 98–107)
CREAT SERPL-MCNC: 1.35 MG/DL (ref 0.51–0.95)
DEPRECATED HCO3 PLAS-SCNC: 23 MMOL/L (ref 22–29)
EGFRCR SERPLBLD CKD-EPI 2021: 47 ML/MIN/1.73M2
FIO2-PRE: 21 %
GLUCOSE SERPL-MCNC: 93 MG/DL (ref 70–99)
HGB BLD-MCNC: 12.6 G/DL (ref 11.7–15.7)
HOLD SPECIMEN: NORMAL
INR BLD: 1.9 (ref 2–3)
OXYHGB MFR BLDV: 62 % (ref 92–100)
POTASSIUM SERPL-SCNC: 3.9 MMOL/L (ref 3.4–5.3)
PROT SERPL-MCNC: 6.8 G/DL (ref 6.4–8.3)
SODIUM SERPL-SCNC: 141 MMOL/L (ref 135–145)

## 2024-01-17 PROCEDURE — 82810 BLOOD GASES O2 SAT ONLY: CPT

## 2024-01-17 PROCEDURE — 85018 HEMOGLOBIN: CPT

## 2024-01-17 PROCEDURE — 82040 ASSAY OF SERUM ALBUMIN: CPT | Performed by: PHYSICIAN ASSISTANT

## 2024-01-17 PROCEDURE — 85610 PROTHROMBIN TIME: CPT

## 2024-01-17 PROCEDURE — 99215 OFFICE O/P EST HI 40 MIN: CPT | Mod: 25 | Performed by: INTERNAL MEDICINE

## 2024-01-17 PROCEDURE — 999N000142 HC STATISTIC PROCEDURE PREP ONLY

## 2024-01-17 PROCEDURE — 272N000001 HC OR GENERAL SUPPLY STERILE: Performed by: INTERNAL MEDICINE

## 2024-01-17 PROCEDURE — C1751 CATH, INF, PER/CENT/MIDLINE: HCPCS | Performed by: INTERNAL MEDICINE

## 2024-01-17 PROCEDURE — 999N000132 HC STATISTIC PP CARE STAGE 1

## 2024-01-17 PROCEDURE — C1894 INTRO/SHEATH, NON-LASER: HCPCS | Performed by: INTERNAL MEDICINE

## 2024-01-17 PROCEDURE — 36415 COLL VENOUS BLD VENIPUNCTURE: CPT | Performed by: PHYSICIAN ASSISTANT

## 2024-01-17 PROCEDURE — 250N000009 HC RX 250: Performed by: PHYSICIAN ASSISTANT

## 2024-01-17 PROCEDURE — 250N000009 HC RX 250: Performed by: INTERNAL MEDICINE

## 2024-01-17 PROCEDURE — 93451 RIGHT HEART CATH: CPT | Performed by: INTERNAL MEDICINE

## 2024-01-17 RX ORDER — LIDOCAINE 40 MG/G
CREAM TOPICAL
Status: COMPLETED | OUTPATIENT
Start: 2024-01-17 | End: 2024-01-17

## 2024-01-17 RX ORDER — POTASSIUM CHLORIDE 750 MG/1
20 TABLET, EXTENDED RELEASE ORAL
Status: DISCONTINUED | OUTPATIENT
Start: 2024-01-17 | End: 2024-01-17 | Stop reason: HOSPADM

## 2024-01-17 RX ORDER — SODIUM CHLORIDE 9 MG/ML
1000 INJECTION, SOLUTION INTRAVENOUS CONTINUOUS
Status: DISCONTINUED | OUTPATIENT
Start: 2024-01-17 | End: 2024-01-17 | Stop reason: HOSPADM

## 2024-01-17 RX ADMIN — LIDOCAINE: 40 CREAM TOPICAL at 08:19

## 2024-01-17 ASSESSMENT — ACTIVITIES OF DAILY LIVING (ADL): ADLS_ACUITY_SCORE: 35

## 2024-01-17 NOTE — PROGRESS NOTES
Service Date: 2023     Ochoa Sheth MD   [facility] [address] [address]      Mario Gerber MD    [facility]      Clint Olsen MD   [facility]   [address] [place] [address]  [id number]       RE: Yuridia Barksdale   MRN: [id number]   : 1970      Dear Meryl Rondon and Zabrina:      We had the pleasure of seeing Ms. Yuridia Barksdale for followup in our Pulmonary Hypertension Clinic at the [facility].  As you know, she is a very pleasant 53 year old female with obesity, DAVIS and chronic thromboembolic pulmonary hypertension who underwent pulmonary thromboendarterectomy at [facility] in 2020.  She had type 3 disease. Her Adempas was then stopped. Her repeat V/Q and RHC showed mild residual CTEPH and she underwent pulmonary balloon angioplasty of the right A8 branch and right A3 branch in January and 2021. Her repeat TTE showed mild RV dilation and dysfunction, and she was started on tadalafil. She also takes torsemide 80 mg daily.  She returns today for follow-up. During her last visit in 2023, she was switched from tadalafil to riociguat for persistent CTEPH.       Overall, she states that she has been doing reasonably well in the last 3 months. She has not noticed a significant improvement in her exertional shortness of breath after switching to Riociguat. She had fluid retention initially when she switched to riociguat but this is under control. She is able to do most of her activities of daily living.  She gets winded if she were to climb stairs or overexert.  I would currently characterize her as functional class II.  She does not need supplemental oxygen.  She denies having exertional chest pain or chest pressure.  No exertional presyncope or syncope.  Her left leg is always slightly more swollen than her right leg.  She has not had  any interim hospitalization or ER visit.  Her anticoagulation is closely monitored by the [facility] using chromogenic factor instead of INR.  Her chromogenic factor goal is 20-30.    PAST MEDICAL HISTORY:   1.  Systemic lupus erythematosus.   2.  Lupus antiphospholipid antibody syndrome.   3.  History of deep venous thrombosis.   4.  Chronic thromboembolic pulmonary hypertension.   5.  Obesity.   6.  Obstructive sleep apnea, REM-related, severe.      MEDICATIONS:   No current facility-administered medications for this encounter.     Current Outpatient Medications   Medication Sig    ARIPiprazole (ABILIFY) 5 MG tablet 5 mg daily    atorvastatin (LIPITOR) 10 MG tablet Take 1 tablet (10 mg) by mouth daily    bumetanide (BUMEX) 1 MG tablet Take 6 tablets (6 mg) by mouth every morning AND 4 tablets (4 mg) every evening.    clonazePAM (KLONOPIN) 1 MG tablet Take 1 tablet (1 mg) by mouth daily as needed for anxiety (Patient taking differently: Take 2 mg by mouth nightly as needed)    fondaparinux ANTICOAGULANT (ARIXTRA ANTICOAGULANT) 10 MG/0.8ML injection Inject 0.8 mLs (10 mg) Subcutaneous every 24 hours    hydroxychloroquine (PLAQUENIL) 200 MG tablet Take 1 tablet (200 mg) by mouth 2 times daily    ondansetron (ZOFRAN) 4 MG tablet Take 1 tablet (4 mg) by mouth every 8 hours as needed for nausea    potassium chloride ER (KLOR-CON M) 10 MEQ CR tablet Take 4 tablets (40 mEq) by mouth 2 times daily    riociguat (ADEMPAS) 2.5 MG tablet Take 1 tablet (2.5 mg) by mouth 3 times daily (with meals)    sertraline (ZOLOFT) 100 MG tablet Take 2 tablets (200 mg) by mouth daily    spironolactone (ALDACTONE) 25 MG tablet Take 1 tablet (25 mg) by mouth 2 times daily    traZODone (DESYREL) 50 MG tablet Take 2 tablets (100 mg) by mouth At Bedtime    warfarin ANTICOAGULANT (COUMADIN ANTICOAGULANT) 1 MG tablet Take 5 tablets (5 mg) by mouth daily On 1/21/2021 and 1/22/2021. Have your chromogenic factor checked on 1/23/2021. (Patient  taking differently: Take 4 mg by mouth daily  CFX goal 20-30%)         REVIEW OF SYSTEMS:  A detailed 10-point review of systems was obtained as described in the History of Present Illness.  All other systems are reviewed and are negative.      PHYSICAL EXAMINATION:   BP (!) 132/96 (BP Location: Right arm)   Pulse 72   Temp 98.1  F (36.7  C) (Oral)   Resp 14   Wt 141.7 kg (312 lb 6.3 oz)   LMP 04/04/2012   SpO2 94%   BMI 50.42 kg/m    She was awake, alert, oriented x3.  She was comfortable.  She was in no apparent distress.  Normocephalic atraumatic.   Her neck exam revealed no jugular venous distention.  Carotids are 2+ bilaterally.  She had no pallor sounds, cyanosis or jaundice.  Cardiac auscultation revealed normal S1 and normal S2 with no murmur rub or gallop.  Auscultation of her lungs revealed equal air entry on both sides with no added sounds.  Her abdomen was soft with no was no tenderness, or no guarding.  She had no focal neurological deficit.  Her extremities reveal no edema.    Recent Results (from the past 168 hour(s))   6 minute walk test    Collection Time: 01/16/24 12:00 AM   Result Value Ref Range    6 min walk (FT) 1,460 1,062 ft    6 Min Walk (M) 445 324 m   General PFT Lab (Please always keep checked)    Collection Time: 01/16/24  3:58 PM   Result Value Ref Range    FIO2-Pre 21.00 %   CBC with platelets    Collection Time: 01/16/24  4:29 PM   Result Value Ref Range    WBC Count 5.6 4.0 - 11.0 10e3/uL    RBC Count 4.58 3.80 - 5.20 10e6/uL    Hemoglobin 13.9 11.7 - 15.7 g/dL    Hematocrit 40.5 35.0 - 47.0 %    MCV 88 78 - 100 fL    MCH 30.3 26.5 - 33.0 pg    MCHC 34.3 31.5 - 36.5 g/dL    RDW 12.6 10.0 - 15.0 %    Platelet Count 257 150 - 450 10e3/uL   Comprehensive metabolic panel    Collection Time: 01/16/24  4:29 PM   Result Value Ref Range    Sodium 138 135 - 145 mmol/L    Potassium 3.5 3.4 - 5.3 mmol/L    Carbon Dioxide (CO2) 25 22 - 29 mmol/L    Anion Gap 12 7 - 15 mmol/L    Urea  Nitrogen 16.8 6.0 - 20.0 mg/dL    Creatinine 1.20 (H) 0.51 - 0.95 mg/dL    GFR Estimate 55 (L) >60 mL/min/1.73m2    Calcium 9.6 8.6 - 10.0 mg/dL    Chloride 101 98 - 107 mmol/L    Glucose 95 70 - 99 mg/dL    Alkaline Phosphatase 121 40 - 150 U/L    AST 25 0 - 45 U/L    ALT 17 0 - 50 U/L    Protein Total 7.5 6.4 - 8.3 g/dL    Albumin 4.0 3.5 - 5.2 g/dL    Bilirubin Total 0.5 <=1.2 mg/dL   N terminal pro BNP outpatient    Collection Time: 01/16/24  4:29 PM   Result Value Ref Range    N Terminal Pro BNP Outpatient 631 0 - 900 pg/mL   iStat INR, POCT    Collection Time: 01/17/24  8:47 AM   Result Value Ref Range    INR POCT 1.9 (L) 2.0 - 3.0        Echocardiogram (01/2024)  Global and regional left ventricular function is normal with an EF of 55-60%.  Mild right ventricular dilation is present. Global right ventricular function is mildly reduced.  The peak velocity of the tricuspid regurgitant jet is not obtainable. Pulmonary artery systolic pressure cannot be  assessed.  IVC diameter <2.1 cm collapsing >50% with sniff suggests a normal RA pressure of 3 mmHg.  No pericardial effusion is present.  This study was compared with the study from 8/4/23: No significant changes noted.    RHC (01/2024)  RA 7  RV 44/8  PA 44/15 (23)  Wedge 15  PA sat 62.4%  Terry cardiac output 9, Terry cardiac index 3.7  Thermodilution cardiac output 6.7  Thermodilution cardiac index 2.8  PVR 0.9 TERAN by     ASSESSMENT AND PLAN:      Ms. Yuridia Barksdale is a 53 year old female with chronic thromboembolic pulmonary hypertension due to hypercoagulable state from her antiphospholipid antibody syndrome.  She underwent pulmonary thromboendarterectomy at [facility] on July 4, 2020.  She has residual inoperable chronic thromboembolic pulmonary hypertension for which she has undergone 2 sessions of pulmonary balloon angioplasty in the in the right A8 and A3 segments in January and March 2021.  He is currently on riociguat 2.5 mg 3 times a  day.    I am delighted to say that she is responding beautifully to riociguat.  Her PVR has completely normalized.  Her mean PA pressure also improved significantly.  Her cardiac output is back in the normal range.  She does have high normal sided filling pressure in the setting of high normal cardiac output.  Her echocardiogram shows only mild right ventricular dilatation and dysfunction.  6-minute walk distance is stable.  She is desaturating but not significant enough to require supplemental oxygen.  Lowest oxygen saturation with maximal exertion was 89%.  Her NT proBNP is within normal limits.    I have recommended her to continue riociguat 2.5 mg 3 times a day.  She has high normal filling pressure.  Her echocardiogram showed normal IVC size.  Her BUN/creatinine are on the higher side.   She is also on spironolactone.  Her BUN and creatinine are slightly on the higher side.   I have recommended her to decrease her Bumex to 5 milligrams in the morning and 4 mg in the evening for a week and then to 4 mg twice daily..    She will continue on Coumadin with an chromogenic factor goal of 20-40.  She is being followed closely by the [place] anticoagulation clinic with a chromogenic factor.  She needs to be bridged with Arixtra given her heparin allergy if she needs any procedures.    I have recommended her to restart her Coumadin and Arixtra.  She needs to be on Arixtra until her chromogenic factor is therapeutic.    I recommend her to return to see us in 6 months with labs and walk test.  She will call us in the interim if there are any further worsening symptoms.  It was a pleasure meeting MsAudrey Yuridia people in our pulmonary hypertension clinic at [facility].    It was a pleasure meeting Ms. Shi people in her pulmonary hypertension clinic at [facility].  Thank you for involving us in her care.    Total time today was 40 minutes reviewing notes, imaging, labs,  patient visit, orders and documentation       Sincerely,     Arcelia Paniagua MD   [facility] Pul[facility] Hypertension  Heart Failure, Transplant, and Mechanical Circulatory Support Cardiology   Cardiovascular Division  [facility]   [phone]

## 2024-01-17 NOTE — PROGRESS NOTES
Pt arrived to 2A from home for Right heart cath. VSS. Denies pain. Consent obtained  . Lab resulted. H&P current. Allergies reviewed with pt. Appropriately NPO.  Prep completed. Pt wwill be transporting self  home

## 2024-01-17 NOTE — DISCHARGE INSTRUCTIONS
[facility]                        Interventional Cardiology  Discharge Instructions   Post Right Heart Cath and/or Heart Biopsy      AFTER YOU GO HOME:  DO drink plenty of fluids  DO resume your regular diet and medications unless otherwise instructed by your Primary Physician  Do Not scrub the procedure site vigorously  No lotion or powder to the puncture site for 3 days    CALL YOUR PRIMARY PHYSICIAN IF: You may resume all normal activity.  Monitor neck site for bleeding, swelling, or voice changes. If you notice bleeding or swelling immediately apply pressure to the site and call number below to speak with Cardiology Fellow.  If you experience any changes in your breathing you should call your doctor immediately or come to the closest Emergency Department.  Do not drive yourself.    ADDITIONAL INSTRUCTIONS: Medications: You are to resume all home medications including anticoagulation therapy unless otherwise advised by your primary cardiologist or nurse coordinator.    Follow Up: Per your primary cardiology team    If you have any questions or concerns regarding your procedure site please call [phone] at anytime and ask for Cardiology Fellow on call.  They are available 24 hours a day.  You may also contact the Cardiology Clinic after hours number at [phone].                                                       Telephone Numbers [phone] Monday-Friday 8:00 am to 4:30 pm    [phone] After 4:30 pm Monday-Friday, Weekends & Holidays  Ask for Interventional Cardiologist on call. Someone is on call 24 hours/day   [facility] toll free number [phone] Monday-Friday 8:00 am to 4:30 pm   [facility] Emergency Dept [phone]

## 2024-01-17 NOTE — PROGRESS NOTES
Discharge paperwork reviewed with patient, pt stated understanding.  Pt  will transport self home

## 2024-01-17 NOTE — PROGRESS NOTES
Patient arrived to room 9 with Cath lab  RN s/p right heart cath  . VSS. Denies pain. Pt alert and oriented x4. Right internal jugular  site CDI.

## 2024-01-19 ENCOUNTER — TELEPHONE (OUTPATIENT)
Dept: CARDIOLOGY | Facility: CLINIC | Age: 54
End: 2024-01-19
Payer: COMMERCIAL

## 2024-01-19 DIAGNOSIS — E87.6 HYPOKALEMIA: ICD-10-CM

## 2024-01-19 DIAGNOSIS — R06.09 DYSPNEA ON EXERTION: ICD-10-CM

## 2024-01-19 DIAGNOSIS — R06.02 SOB (SHORTNESS OF BREATH): Primary | ICD-10-CM

## 2024-01-19 RX ORDER — BUMETANIDE 1 MG/1
TABLET ORAL
Qty: 300 TABLET | Refills: 11 | Status: SHIPPED | OUTPATIENT
Start: 2024-01-19 | End: 2024-04-16

## 2024-01-19 NOTE — TELEPHONE ENCOUNTER
----- Message -----  From: Arcelia Paniagua MD  Sent: 1/17/2024   9:57 AM CST  To: Cardiology Ph Nurse-    Team,    I saw Yuridia in the Cath Lab today.  Her repeat testing on riociguat looks much better.  Her PA pressure and PVR have dropped significantly.  Her cardiac output is also improved.  All good news.    The only thing is she is slightly on the dry side due to high dose of Bumex    Plan:  1.  Restart Coumadin and Arixtra today continue Arixtra until her pulmonary factor is therapeutic  2.  Decrease Bumex to 5 mg in the morning and 4 mg for a week and then go to 4 mg twice daily  3.  Continue rest of the medication as it is  4.  Return to see us in 3 months with labs and 6-minute walk test    Thank you    CT  ----------------------------------------------------------  Changed Bumex Rx and ordered follow up.  Yoly Villegas RN on 1/19/2024 at 8:30 AM

## 2024-01-23 ENCOUNTER — TELEPHONE (OUTPATIENT)
Dept: CARDIOLOGY | Facility: CLINIC | Age: 54
End: 2024-01-23
Payer: COMMERCIAL

## 2024-01-23 NOTE — TELEPHONE ENCOUNTER
Patient Contacted for the patient to call back and schedule the following:    Appointment type: RPP  Provider: NIURKA  Return date: 04/16/24  Specialty phone number: [phone] OPT1   Additional appointment(s) needed: 6MWT, LABS  Additonal Notes: N/A

## 2024-02-10 ENCOUNTER — HEALTH MAINTENANCE LETTER (OUTPATIENT)
Age: 54
End: 2024-02-10

## 2024-04-09 ENCOUNTER — TRANSFERRED RECORDS (OUTPATIENT)
Dept: HEALTH INFORMATION MANAGEMENT | Facility: CLINIC | Age: 54
End: 2024-04-09

## 2024-04-16 ENCOUNTER — LAB (OUTPATIENT)
Dept: LAB | Facility: CLINIC | Age: 54
End: 2024-04-16
Attending: PHYSICIAN ASSISTANT
Payer: COMMERCIAL

## 2024-04-16 ENCOUNTER — OFFICE VISIT (OUTPATIENT)
Dept: PULMONOLOGY | Facility: CLINIC | Age: 54
End: 2024-04-16
Attending: PHYSICIAN ASSISTANT
Payer: COMMERCIAL

## 2024-04-16 ENCOUNTER — OFFICE VISIT (OUTPATIENT)
Dept: CARDIOLOGY | Facility: CLINIC | Age: 54
End: 2024-04-16
Attending: PHYSICIAN ASSISTANT
Payer: COMMERCIAL

## 2024-04-16 VITALS
DIASTOLIC BLOOD PRESSURE: 73 MMHG | BODY MASS INDEX: 49.47 KG/M2 | WEIGHT: 293 LBS | OXYGEN SATURATION: 90 % | SYSTOLIC BLOOD PRESSURE: 113 MMHG | HEART RATE: 74 BPM

## 2024-04-16 DIAGNOSIS — E87.6 HYPOKALEMIA: ICD-10-CM

## 2024-04-16 DIAGNOSIS — R06.02 SOB (SHORTNESS OF BREATH): ICD-10-CM

## 2024-04-16 DIAGNOSIS — R06.09 DYSPNEA ON EXERTION: ICD-10-CM

## 2024-04-16 DIAGNOSIS — I27.20 PULMONARY HYPERTENSION (H): Primary | ICD-10-CM

## 2024-04-16 DIAGNOSIS — R06.02 SOB (SHORTNESS OF BREATH): Primary | ICD-10-CM

## 2024-04-16 LAB
6 MIN WALK (FT): 1410 FT
6 MIN WALK (M): 430 M
ALBUMIN SERPL BCG-MCNC: 4 G/DL (ref 3.5–5.2)
ALP SERPL-CCNC: 142 U/L (ref 40–150)
ALT SERPL W P-5'-P-CCNC: 15 U/L (ref 0–50)
ANION GAP SERPL CALCULATED.3IONS-SCNC: 12 MMOL/L (ref 7–15)
AST SERPL W P-5'-P-CCNC: 23 U/L (ref 0–45)
BILIRUB SERPL-MCNC: 0.5 MG/DL
BUN SERPL-MCNC: 17.9 MG/DL (ref 6–20)
CALCIUM SERPL-MCNC: 9.4 MG/DL (ref 8.6–10)
CHLORIDE SERPL-SCNC: 105 MMOL/L (ref 98–107)
CREAT SERPL-MCNC: 1.18 MG/DL (ref 0.51–0.95)
DEPRECATED HCO3 PLAS-SCNC: 23 MMOL/L (ref 22–29)
EGFRCR SERPLBLD CKD-EPI 2021: 56 ML/MIN/1.73M2
ERYTHROCYTE [DISTWIDTH] IN BLOOD BY AUTOMATED COUNT: 12.9 % (ref 10–15)
GLUCOSE SERPL-MCNC: 101 MG/DL (ref 70–99)
HCT VFR BLD AUTO: 41.8 % (ref 35–47)
HGB BLD-MCNC: 14 G/DL (ref 11.7–15.7)
MCH RBC QN AUTO: 30.2 PG (ref 26.5–33)
MCHC RBC AUTO-ENTMCNC: 33.5 G/DL (ref 31.5–36.5)
MCV RBC AUTO: 90 FL (ref 78–100)
NT-PROBNP SERPL-MCNC: 485 PG/ML (ref 0–900)
PLATELET # BLD AUTO: 231 10E3/UL (ref 150–450)
POTASSIUM SERPL-SCNC: 4.1 MMOL/L (ref 3.4–5.3)
PROT SERPL-MCNC: 7.9 G/DL (ref 6.4–8.3)
RBC # BLD AUTO: 4.63 10E6/UL (ref 3.8–5.2)
SODIUM SERPL-SCNC: 140 MMOL/L (ref 135–145)
WBC # BLD AUTO: 4.7 10E3/UL (ref 4–11)

## 2024-04-16 PROCEDURE — 99213 OFFICE O/P EST LOW 20 MIN: CPT | Performed by: PHYSICIAN ASSISTANT

## 2024-04-16 PROCEDURE — 36415 COLL VENOUS BLD VENIPUNCTURE: CPT | Performed by: PATHOLOGY

## 2024-04-16 PROCEDURE — 80053 COMPREHEN METABOLIC PANEL: CPT | Performed by: PATHOLOGY

## 2024-04-16 PROCEDURE — 94618 PULMONARY STRESS TESTING: CPT | Performed by: INTERNAL MEDICINE

## 2024-04-16 PROCEDURE — 83880 ASSAY OF NATRIURETIC PEPTIDE: CPT | Performed by: PATHOLOGY

## 2024-04-16 PROCEDURE — 99214 OFFICE O/P EST MOD 30 MIN: CPT | Performed by: PHYSICIAN ASSISTANT

## 2024-04-16 PROCEDURE — 85027 COMPLETE CBC AUTOMATED: CPT | Performed by: PATHOLOGY

## 2024-04-16 RX ORDER — BUMETANIDE 1 MG/1
4 TABLET ORAL 2 TIMES DAILY
COMMUNITY
Start: 2024-04-16 | End: 2024-09-20

## 2024-04-16 ASSESSMENT — PAIN SCALES - GENERAL: PAINLEVEL: NO PAIN (0)

## 2024-04-16 NOTE — PROGRESS NOTES
"Opt[employer] [facility] Metrics    Blood Pressure   {BP < 140/90:5368558::\"BP < 140/90 Yes\"}    On Aspirin  {On Aspirin Yes/No:6873437::\"Yes\"}    On Statin  {On Statin Yes/No:2514438::\"Yes\"}    Tobacco use  {Tobacco use Yes/No:0281931::\"No\"}    "

## 2024-04-16 NOTE — PATIENT INSTRUCTIONS
You were seen today in the Pulmonary Hypertension Clinic at the [facility].     Cardiology Provider you saw during your visit:    CEDRICK Zamarripa    Medication Changes:  - None    Results:   Component      Latest Ref Rng 4/16/2024  12:20 PM   Sodium      135 - 145 mmol/L 140    Potassium      3.4 - 5.3 mmol/L 4.1    Carbon Dioxide (CO2)      22 - 29 mmol/L 23    Anion Gap      7 - 15 mmol/L 12    Urea Nitrogen      6.0 - 20.0 mg/dL 17.9    Creatinine      0.51 - 0.95 mg/dL 1.18 (H)    GFR Estimate      >60 mL/min/1.73m2 56 (L)    Calcium      8.6 - 10.0 mg/dL 9.4    Chloride      98 - 107 mmol/L 105    Glucose      70 - 99 mg/dL 101 (H)    Alkaline Phosphatase      40 - 150 U/L 142    AST      0 - 45 U/L 23    ALT      0 - 50 U/L 15    Protein Total      6.4 - 8.3 g/dL 7.9    Albumin      3.5 - 5.2 g/dL 4.0    Bilirubin Total      <=1.2 mg/dL 0.5    WBC      4.0 - 11.0 10e3/uL 4.7    RBC Count      3.80 - 5.20 10e6/uL 4.63    Hemoglobin      11.7 - 15.7 g/dL 14.0    Hematocrit      35.0 - 47.0 % 41.8    MCV      78 - 100 fL 90    MCH      26.5 - 33.0 pg 30.2    MCHC      31.5 - 36.5 g/dL 33.5    RDW      10.0 - 15.0 % 12.9    Platelet Count      150 - 450 10e3/uL 231    N-Terminal Pro Bnp      0 - 900 pg/mL 485       Legend:  (H) High  (L) Low    Follow-up:   - 3 month virtual follow up with labs locally    Please call us immediately if you have any syncope (fainting or passing out), chest pain, edema (swelling or weight gain), or decline in your functional status (general decline in how you are feeling).    If you have emergent concerns after hours or on the weekend, please call our on-call Cardiologist at [phone], option [phone]. For emergencies call [phone].     Thank you for allowing us to be a part of your care here at the [facility]    If you have questions or concerns please contact us at:    Yoly Villegas RN (P: [phone])    Nurse Coordinator       Pulmonary  Hypertension     [facility]         OZZY Brown   (Prior Auths & Pt Assistance)   ()  Clinic   Clinic   Pulmonary Hypertension   Pulmonary Hypertension  [facility]  (P)[phone]    (P) [phone]  (F) [phone]

## 2024-04-16 NOTE — PROGRESS NOTES
Date of Visit:  04/16/24      [facility] Pulmonary Hypertension Clinic      Primary PH cardiologist:  Dr. Paniagua      HPI:  Ms. Barksdale is a very pleasant 53 year old female with a PMhx including SLE with lupus antiphospholipid antibody syndrome and hx DVT on anticoagulation, and DAVIS on CPAP. She also has chronic thromboembolic pulmonary arterial hypertension. She was initially placed on riociguat, and then referred to [facility] for surgical evaluation. Ultimately, she underwent pulmonary thromboendarterectomy at [facility] in July 2020. She has type IV disease and her PTE was somewhat difficult to the distal nature of her disease. Her post operative course was uncomplicated, and postoperative hemodynamics showed improvement along with improved perfusion of the right lower lobe and left upper lobe via VQ scan, and her riociguat and digoxin were discontinued at that time. Post surgically she had struggled with swelling and weight gain requiring escalation of diuretics. She then underwent BPA in January and March of 2021 of right A8 branch and right A3 branch. Ultimately, due to some ongoing mild RV dysfunction, she was started back on monotherapy with tadalafil.      Unfortunately, due to a lapse in insurance we did not see her for several months. However, she returned in Aug of 2023 year to re-establish with us and get repeat hemodynamic testing. At that time, she was walking outside daily and feeling well, not requiring her oxygen, confirmed via repeat 6MWT. Echo showed stable mild RV dilation and RV dysfunction. RHC showed stable mildly elevated PA pressure and PVR with preserved cardiac output. Based on this, it was recommended that her tadalafil be changed back to riociguat for further titration. At our most recent virtual visit in Nov 2023, she was on Adempas 2.5mg TID and weight sounded to be stable, I then referred her back for repeat hemodynamic testing.    She returned  BEDSIDE PROCEDURE  Indwelling Catheter PROCEDURE NOTE    Pre-operative Diagnosis: Urinary retention, BPH, prostatic abscess sepsis        Post-operative Diagnosis: Urinary retention, BPH, prostatic abscess, sepsis and phimosis    INDICATION   Lino Simpson  Consultation requested to perform Johnson intervention required.           BALDOMERO Ramachandran        Attending Attestation: Not Applicable       in Jan of this year at which time repeat testing showed significant improvement with normalization of her PVR and cardiac output. Echo showed mild RV dilation and dysfunction. At that time, no changes were made.    Today, I'm seeing Yuridia back in clinic. Overall, she continues to do well. Her breathing has not worsened, and she denies any new chest discomfort, palpitations, or dizziness. She does still periodically have some LE edema but this sounds stable as well.    She had a 6MWT performed today which we reviewed. She ambulated 430m and did desaturate to 88-89%, which was similar to prior.     Labs performed prior to our visit today were reviewed as below.         CURRENT PULMONARY HYPERTENSION REGIMEN:     PAH Rx: Adempas 2.5mg TID  (Changed from tadalafil due to residual RV dysfunction)     Diuretics: Bumex 4mg BID, spironolactone 25mg BID     Oxygen: Has 02 PRN (not currently using)      Anticoagulation: warfarin (follows chrom factor goal 20-30)--needs to be bridged with Arixtra given her heparin allergy if she needs procedures.  Indication: DVT/antiphospholipid    Immunosuppression: Yes   Rheum: Dr. Graf        Assessment/Plan:     1. Chronic thromboembolic pulmonary hypertension.              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 She was initially placed on Adempas and is s/p pulmonary thromboendarterectomy July 2020 at [facility]; Adempas was discontinued post surgery. For residual disease, she then had BPA in January and March of 2021 here at [facility], of the right A8 branch and right A3 branch. Due to residual RV dysfunction she is now back on Adempas. Via repeat hemodynamic testing in January, her PVR and CO have normalized.  COMPERA risk score is low-intermediate at 2.    --From a volume standpoint, she appears relatively euvolemic today. Continue bumex 4mg BID as is. NT-proBNP is not elevated and renal function preserved. She remains on  spironolactone 25mg BID as well. Notably, she self discontinued her potassium as she had difficulty swallowing them; fortunately K is WNL today so will continue to monitor off supplementation.             --Via 6MWT she continues to mildly desaturate to 88-89%, though will continue to monitor this. She has supplemental oxygen to use PRN.              --She is compliant with her CPAP for known DAVIS.               --Continue Coumadin for anticoagulation, in the setting of CTEPH and antiphospholipid antibody syndrome. She follows locally with a chromogenic factor goal of 20-30. She needs to be bridged with Arixtra given her heparin allergy if she needs procedures.   --We discussed recommendations for her to become more active now that the weather is warming up, and work on some weight loss.       Follow up plan: Return in 3 months to see me virtually with local labs prior.  We are happy to see the patient back sooner with any new concerns.       Orders this Visit:  Orders Placed This Encounter   Procedures    Comprehensive metabolic panel    CBC with platelets    N terminal pro BNP outpatient     Orders Placed This Encounter   Medications    bumetanide (BUMEX) 1 MG tablet     Sig: Take 4 tablets (4 mg) by mouth 2 times daily     Dose decrease     Medications Discontinued During This Encounter   Medication Reason    fondaparinux ANTICOAGULANT (ARIXTRA ANTICOAGULANT) 10 MG/0.8ML injection Therapy completed (No AVS)    ondansetron (ZOFRAN) 4 MG tablet Therapy completed (No AVS)    potassium chloride ER (KLOR-CON M) 10 MEQ CR tablet Stopped by Patient (No AVS)    bumetanide (BUMEX) 1 MG tablet          Review of Systems:  POS ROS ARE BOLDED, all other negative.    Cardiovascular: Chest pain, palpitations, orthopnea, LE edema  Resp: Dyspnea on exertion, cough, known chronic lung disease  Hematologic/lymphatic: Current systemic anticoagulation, hx of blood clots, new bleeding concerns.  Neurological: Dizziness,  syncope/presyncope     Physical Exam:  Vitals: /73 (BP Location: Right arm, Patient Position: Sitting, Cuff Size: Adult Large)   Pulse 74   Wt 139 kg (306 lb 8 oz)   LMP 04/04/2012   SpO2 90%   BMI 49.47 kg/m     Wt Readings from Last 4 Encounters:   04/16/24 139 kg (306 lb 8 oz)   01/17/24 141.7 kg (312 lb 6.3 oz)   10/30/23 142.9 kg (315 lb)   10/05/23 141.7 kg (312 lb 6.4 oz)       GEN:  In general, this is an overweight  female in no acute distress on RA.  Patient ambulatory, unaccompanied.   NECK: Supple, No JVD with patient upright.  C/V:  Regular rate and rhythm, no murmur, rub or gallop. No S3 or RV heave.   RESP: Respirations are unlabored. No use of accessory muscles. Clear to auscultation bilaterally without wheezing, rales, or rhonchi.  EXTREM: Trace longstanding LE edema.   NEURO: Alert and oriented, cooperative. Gait not assessed.      Recent Lab Results:    LIVER ENZYME RESULTS:  Lab Results   Component Value Date    AST 23 04/16/2024    AST 19 05/03/2021    ALT 15 04/16/2024    ALT 23 05/03/2021       CBC RESULTS:  Lab Results   Component Value Date    WBC 4.7 04/16/2024    WBC 4.8 05/03/2021    RBC 4.63 04/16/2024    RBC 4.46 05/03/2021    HGB 14.0 04/16/2024    HGB 13.5 05/03/2021    HCT 41.8 04/16/2024    HCT 40.8 05/03/2021    MCV 90 04/16/2024    MCV 92 05/03/2021    MCH 30.2 04/16/2024    MCH 30.3 05/03/2021    MCHC 33.5 04/16/2024    MCHC 33.1 05/03/2021    RDW 12.9 04/16/2024    RDW 12.6 05/03/2021     04/16/2024     05/03/2021       BMP RESULTS:  Lab Results   Component Value Date     04/16/2024     05/03/2021    POTASSIUM 4.1 04/16/2024    POTASSIUM 3.1 (L) 08/02/2022    POTASSIUM 3.5 05/03/2021    CHLORIDE 105 04/16/2024    CHLORIDE 103 10/30/2023    CHLORIDE 106 05/03/2021    CO2 23 04/16/2024    CO2 23 08/02/2022    CO2 28 05/03/2021    ANIONGAP 12 04/16/2024    ANIONGAP 4 08/02/2022    ANIONGAP 8 05/03/2021     (H) 04/16/2024    GLC  110 (H) 08/02/2022    GLC 96 05/03/2021    BUN 17.9 04/16/2024    BUN 26 08/02/2022    BUN 23 05/03/2021    CR 1.18 (H) 04/16/2024    CR 1.14 (H) 05/03/2021    GFRESTIMATED 56 (L) 04/16/2024    GFRESTIMATED 57 (L) 05/03/2021    GFRESTBLACK 66 05/03/2021    KATARINA 9.4 04/16/2024    KATARINA 9.0 05/03/2021        Recent Labs   Lab Test 04/16/24  1220 01/16/24  1629 10/03/23  1517 08/04/23  0921 05/03/21  1153 03/12/21  0434   NTBNPI  --   --   --  824  --  473*   NTBNP 485 631 1,034*  --    < >  --     < > = values in this interval not displayed.         Most recent pertinent testing:    Echocardiogram (01/2024)  Global and regional left ventricular function is normal with an EF of 55-60%.  Mild right ventricular dilation is present. Global right ventricular function is mildly reduced.  The peak velocity of the tricuspid regurgitant jet is not obtainable. Pulmonary artery systolic pressure cannot be  assessed.  IVC diameter <2.1 cm collapsing >50% with sniff suggests a normal RA pressure of 3 mmHg.  No pericardial effusion is present.  This study was compared with the study from 8/4/23: No significant changes noted.     RHC (01/2024)  RA 7  RV 44/8  PA 44/15 (23)  Wedge 15  PA sat 62.4%  Terry cardiac output 9, Terry cardiac index 3.7  Thermodilution cardiac output 6.7  Thermodilution cardiac index 2.8  PVR 0.9 TERAN by      NYHA Functional Class:  2-3      A total of 30 minutes was spent today performing chart and history review, gathering HPI, physical exam, patient education, pre and post visit documentation, and care coordination.      Azucena Silva PA-C  [facility]  Pager [phone]

## 2024-04-16 NOTE — LETTER
4/16/2024      RE: Yuridia Barksdale  [address] [address] [address]       Dear Colleague,    Thank you for the opportunity to participate in the care of your patient, Yuridia Barksdale, at the [facility] [place] at [facility]. Please see a copy of my visit note below.        Date of Visit:  04/16/24      [facility] Pulmonary Hypertension Clinic      Primary PH cardiologist:  Dr. Paniagua      HPI:  Ms. Barksdale is a very pleasant 53 year old female with a PMhx including SLE with lupus antiphospholipid antibody syndrome and hx DVT on anticoagulation, and DAVIS on CPAP. She also has chronic thromboembolic pulmonary arterial hypertension. She was initially placed on riociguat, and then referred to [facility] for surgical evaluation. Ultimately, she underwent pulmonary thromboendarterectomy at [facility] in July 2020. She has type IV disease and her PTE was somewhat difficult to the distal nature of her disease. Her post operative course was uncomplicated, and postoperative hemodynamics showed improvement along with improved perfusion of the right lower lobe and left upper lobe via VQ scan, and her riociguat and digoxin were discontinued at that time. Post surgically she had struggled with swelling and weight gain requiring escalation of diuretics. She then underwent BPA in January and March of 2021 of right A8 branch and right A3 branch. Ultimately, due to some ongoing mild RV dysfunction, she was started back on monotherapy with tadalafil.      Unfortunately, due to a lapse in insurance we did not see her for several months. However, she returned in Aug of 2023 year to re-establish with us and get repeat hemodynamic testing. At that time, she was walking outside daily and feeling well, not requiring her oxygen, confirmed via repeat 6MWT. Echo showed stable mild RV dilation and RV dysfunction. RHC showed stable  mildly elevated PA pressure and PVR with preserved cardiac output. Based on this, it was recommended that her tadalafil be changed back to riociguat for further titration. At our most recent virtual visit in Nov 2023, she was on Adempas 2.5mg TID and weight sounded to be stable, I then referred her back for repeat hemodynamic testing.    She returned in Jan of this year at which time repeat testing showed significant improvement with normalization of her PVR and cardiac output. Echo showed mild RV dilation and dysfunction. At that time, no changes were made.    Today, I'm seeing Yuridia back in clinic. Overall, she continues to do well. Her breathing has not worsened, and she denies any new chest discomfort, palpitations, or dizziness. She does still periodically have some LE edema but this sounds stable as well.    She had a 6MWT performed today which we reviewed. She ambulated 430m and did desaturate to 88-89%, which was similar to prior.     Labs performed prior to our visit today were reviewed as below.         CURRENT PULMONARY HYPERTENSION REGIMEN:     PAH Rx: Adempas 2.5mg TID  (Changed from tadalafil due to residual RV dysfunction)     Diuretics: Bumex 4mg BID, spironolactone 25mg BID     Oxygen: Has 02 PRN (not currently using)      Anticoagulation: warfarin (follows chrom factor goal 20-30)--needs to be bridged with Arixtra given her heparin allergy if she needs procedures.  Indication: DVT/antiphospholipid    Immunosuppression: Yes   Rheum: Dr. Graf        Assessment/Plan:     1. Chronic thromboembolic pulmonary hypertension.              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 She was initially placed on Adempas and is s/p pulmonary thromboendarterectomy July 2020 at [facility]; Adempas was discontinued post surgery. For residual disease, she then had BPA in January and March of 2021 here at [facility], of the right A8 branch and right A3 branch. Due  to residual RV dysfunction she is now back on Adempas. Via repeat hemodynamic testing in January, her PVR and CO have normalized.  COMPERA risk score is low-intermediate at 2.    --From a volume standpoint, she appears relatively euvolemic today. Continue bumex 4mg BID as is. NT-proBNP is not elevated and renal function preserved. She remains on spironolactone 25mg BID as well. Notably, she self discontinued her potassium as she had difficulty swallowing them; fortunately K is WNL today so will continue to monitor off supplementation.             --Via 6MWT she continues to mildly desaturate to 88-89%, though will continue to monitor this. She has supplemental oxygen to use PRN.              --She is compliant with her CPAP for known DAVIS.               --Continue Coumadin for anticoagulation, in the setting of CTEPH and antiphospholipid antibody syndrome. She follows locally with a chromogenic factor goal of 20-30. She needs to be bridged with Arixtra given her heparin allergy if she needs procedures.   --We discussed recommendations for her to become more active now that the weather is warming up, and work on some weight loss.       Follow up plan: Return in 3 months to see me virtually with local labs prior.  We are happy to see the patient back sooner with any new concerns.       Orders this Visit:  Orders Placed This Encounter   Procedures     Comprehensive metabolic panel     CBC with platelets     N terminal pro BNP outpatient     Orders Placed This Encounter   Medications     bumetanide (BUMEX) 1 MG tablet     Sig: Take 4 tablets (4 mg) by mouth 2 times daily     Dose decrease     Medications Discontinued During This Encounter   Medication Reason     fondaparinux ANTICOAGULANT (ARIXTRA ANTICOAGULANT) 10 MG/0.8ML injection Therapy completed (No AVS)     ondansetron (ZOFRAN) 4 MG tablet Therapy completed (No AVS)     potassium chloride ER (KLOR-CON M) 10 MEQ CR tablet Stopped by Patient (No AVS)     bumetanide  (BUMEX) 1 MG tablet          Review of Systems:  POS ROS ARE BOLDED, all other negative.    Cardiovascular: Chest pain, palpitations, orthopnea, LE edema  Resp: Dyspnea on exertion, cough, known chronic lung disease  Hematologic/lymphatic: Current systemic anticoagulation, hx of blood clots, new bleeding concerns.  Neurological: Dizziness, syncope/presyncope     Physical Exam:  Vitals: /73 (BP Location: Right arm, Patient Position: Sitting, Cuff Size: Adult Large)   Pulse 74   Wt 139 kg (306 lb 8 oz)   LMP 04/04/2012   SpO2 90%   BMI 49.47 kg/m     Wt Readings from Last 4 Encounters:   04/16/24 139 kg (306 lb 8 oz)   01/17/24 141.7 kg (312 lb 6.3 oz)   10/30/23 142.9 kg (315 lb)   10/05/23 141.7 kg (312 lb 6.4 oz)       GEN:  In general, this is an overweight  female in no acute distress on RA.  Patient ambulatory, unaccompanied.   NECK: Supple, No JVD with patient upright.  C/V:  Regular rate and rhythm, no murmur, rub or gallop. No S3 or RV heave.   RESP: Respirations are unlabored. No use of accessory muscles. Clear to auscultation bilaterally without wheezing, rales, or rhonchi.  EXTREM: Trace longstanding LE edema.   NEURO: Alert and oriented, cooperative. Gait not assessed.      Recent Lab Results:    LIVER ENZYME RESULTS:  Lab Results   Component Value Date    AST 23 04/16/2024    AST 19 05/03/2021    ALT 15 04/16/2024    ALT 23 05/03/2021       CBC RESULTS:  Lab Results   Component Value Date    WBC 4.7 04/16/2024    WBC 4.8 05/03/2021    RBC 4.63 04/16/2024    RBC 4.46 05/03/2021    HGB 14.0 04/16/2024    HGB 13.5 05/03/2021    HCT 41.8 04/16/2024    HCT 40.8 05/03/2021    MCV 90 04/16/2024    MCV 92 05/03/2021    MCH 30.2 04/16/2024    MCH 30.3 05/03/2021    MCHC 33.5 04/16/2024    MCHC 33.1 05/03/2021    RDW 12.9 04/16/2024    RDW 12.6 05/03/2021     04/16/2024     05/03/2021       BMP RESULTS:  Lab Results   Component Value Date     04/16/2024     05/03/2021     POTASSIUM 4.1 04/16/2024    POTASSIUM 3.1 (L) 08/02/2022    POTASSIUM 3.5 05/03/2021    CHLORIDE 105 04/16/2024    CHLORIDE 103 10/30/2023    CHLORIDE 106 05/03/2021    CO2 23 04/16/2024    CO2 23 08/02/2022    CO2 28 05/03/2021    ANIONGAP 12 04/16/2024    ANIONGAP 4 08/02/2022    ANIONGAP 8 05/03/2021     (H) 04/16/2024     (H) 08/02/2022    GLC 96 05/03/2021    BUN 17.9 04/16/2024    BUN 26 08/02/2022    BUN 23 05/03/2021    CR 1.18 (H) 04/16/2024    CR 1.14 (H) 05/03/2021    GFRESTIMATED 56 (L) 04/16/2024    GFRESTIMATED 57 (L) 05/03/2021    GFRESTBLACK 66 05/03/2021    KATARINA 9.4 04/16/2024    KATARINA 9.0 05/03/2021        Recent Labs   Lab Test 04/16/24  1220 01/16/24  1629 10/03/23  1517 08/04/23  0921 05/03/21  1153 03/12/21  0434   NTBNPI  --   --   --  824  --  473*   NTBNP 485 631 1,034*  --    < >  --     < > = values in this interval not displayed.         Most recent pertinent testing:    Echocardiogram (01/2024)  Global and regional left ventricular function is normal with an EF of 55-60%.  Mild right ventricular dilation is present. Global right ventricular function is mildly reduced.  The peak velocity of the tricuspid regurgitant jet is not obtainable. Pulmonary artery systolic pressure cannot be  assessed.  IVC diameter <2.1 cm collapsing >50% with sniff suggests a normal RA pressure of 3 mmHg.  No pericardial effusion is present.  This study was compared with the study from 8/4/23: No significant changes noted.     RHC (01/2024)  RA 7  RV 44/8  PA 44/15 (23)  Wedge 15  PA sat 62.4%  Terry cardiac output 9, Terry cardiac index 3.7  Thermodilution cardiac output 6.7  Thermodilution cardiac index 2.8  PVR 0.9 TERAN by      NYHA Functional Class:  2-3      A total of 30 minutes was spent today performing chart and history review, gathering HPI, physical exam, patient education, pre and post visit documentation, and care coordination.      Azucena Silva PA-C  [facility]  Pager [phone]  [phone]

## 2024-04-16 NOTE — NURSING NOTE
Follow Up Plan:  - VV follow up with Azucena in 3 months  - Labs locally prior    Lab orders faxed to [facility] at (f) [phone]. Micheline Chapa RN on 4/16/2024 at 1:43 PM

## 2024-04-17 LAB — FIO2-PRE: 21 %

## 2024-06-04 ENCOUNTER — MYC MEDICAL ADVICE (OUTPATIENT)
Dept: CARDIOLOGY | Facility: CLINIC | Age: 54
End: 2024-06-04
Payer: COMMERCIAL

## 2024-06-04 DIAGNOSIS — R06.02 SOB (SHORTNESS OF BREATH): ICD-10-CM

## 2024-06-04 DIAGNOSIS — I27.20 PULMONARY HYPERTENSION (H): Primary | ICD-10-CM

## 2024-06-05 NOTE — TELEPHONE ENCOUNTER
Attempted to loco Azucena Silva PA-C, but she was unavailable & Dr. Paniagua is on vacation.  Will try again later. Yoly Villegas RN on 6/5/2024 at 4:13 PM    Date: 6/6/2024    Time of Call: 9:26 AM     Diagnosis:  Hypervoemia     [ TORB ] Ordering provider: CEDRICK Zamarripa  Order: Increase Bumex to 5 mg BID for three days     Order received by: Eliceo Chapa RN     Follow-up/additional notes: Recheck labs locally on Monday    Lab orders for BMP recheck faxed to [facility] at (f) [phone]. Micheline Chapa RN on 6/6/2024 at 9:28 AM

## 2024-07-16 ENCOUNTER — VIRTUAL VISIT (OUTPATIENT)
Dept: CARDIOLOGY | Facility: CLINIC | Age: 54
End: 2024-07-16
Attending: PHYSICIAN ASSISTANT
Payer: COMMERCIAL

## 2024-07-16 VITALS
SYSTOLIC BLOOD PRESSURE: 129 MMHG | HEIGHT: 66 IN | DIASTOLIC BLOOD PRESSURE: 60 MMHG | WEIGHT: 293 LBS | BODY MASS INDEX: 47.09 KG/M2

## 2024-07-16 DIAGNOSIS — K76.0 FATTY LIVER: ICD-10-CM

## 2024-07-16 DIAGNOSIS — R06.02 SOB (SHORTNESS OF BREATH): Primary | ICD-10-CM

## 2024-07-16 DIAGNOSIS — I27.20 PULMONARY HYPERTENSION (H): ICD-10-CM

## 2024-07-16 PROCEDURE — 99214 OFFICE O/P EST MOD 30 MIN: CPT | Mod: 95 | Performed by: PHYSICIAN ASSISTANT

## 2024-07-16 RX ORDER — LIDOCAINE 40 MG/G
CREAM TOPICAL
OUTPATIENT
Start: 2024-07-16

## 2024-07-16 ASSESSMENT — PAIN SCALES - GENERAL: PAINLEVEL: NO PAIN (0)

## 2024-07-16 NOTE — PATIENT INSTRUCTIONS
You were seen today in the Pulmonary Hypertension Clinic at the [facility].     Cardiology Provider you saw during your visit:    CEDRICK Zamarripa    Recommendations:   Liver ultrasound locally    Follow-up:   Schedule in January for echocardiogram, walk test and then Right heart catheterization with Dr. Paniagua    Please call us immediately if you have any syncope (fainting or passing out), chest pain, edema (swelling or weight gain), or decline in your functional status (general decline in how you are feeling).    If you have emergent concerns after hours or on the weekend, please call our on-call Cardiologist at [phone], option [phone]. For emergencies call [phone].     Thank you for allowing us to be a part of your care here at the [facility]    If you have questions or concerns please contact us at:    Yoly Villegas RN (P: [phone])    Nurse Coordinator       Pulmonary Hypertension     [facility]         OZZY Brown   (Prior Auths & Pt Assistance)   ()  Clinic   Clinic   Pulmonary Hypertension   Pulmonary Hypertension  [facility]  (P)[phone]    (P) [phone]  (F) [phone]

## 2024-07-16 NOTE — NURSING NOTE
Current patient location: [address] [place] [id number]    Is the patient currently in the state of [place]? YES    Visit mode:VIDEO    If the visit is dropped, the patient can be reconnected by: VIDEO VISIT: Text to cell phone:   Telephone Information:   Mobile [phone]       Will anyone else be joining the visit? NO  (If patient encounters technical issues they should call [phone]    How would you like to obtain your AVS? MyChart    Are changes needed to the allergy or medication list? No    Patient denies any changes since echeck-in completion and states all information entered during echeck-in remains accurate.    Are refills needed on medications prescribed by this physician? NO    Reason for visit: NA [place] MA VVF

## 2024-07-16 NOTE — LETTER
7/16/2024      RE: Yuridia Barksdale  [address] [address] [address]       Dear Colleague,    Thank you for the opportunity to participate in the care of your patient, Yuridia Barksdale, at the [facility] [place] at [facility]. Please see a copy of my visit note below.    Virtual Visit Details    Type of service:  Video Visit   Distant Location (provider location):  On-site  Platform used for Video Visit: [facility]          CARDIOLOGY PH CLINIC VIDEO VISIT    Date of video visit: 07/16/24      Yuridia Barksdale is a 53 year old female who is being evaluated via a billable video visit.        Self reported vitals:  Weight: 301#  /60      MEDICATIONS:  Current Outpatient Medications   Medication Sig Dispense Refill     ARIPiprazole (ABILIFY) 5 MG tablet 5 mg daily       atorvastatin (LIPITOR) 10 MG tablet Take 1 tablet (10 mg) by mouth daily       bumetanide (BUMEX) 1 MG tablet Take 4 tablets (4 mg) by mouth 2 times daily       clonazePAM (KLONOPIN) 1 MG tablet Take 1 tablet (1 mg) by mouth daily as needed for anxiety (Patient taking differently: Take 2 mg by mouth nightly as needed)       hydroxychloroquine (PLAQUENIL) 200 MG tablet Take 1 tablet (200 mg) by mouth 2 times daily       riociguat (ADEMPAS) 2.5 MG tablet Take 1 tablet (2.5 mg) by mouth 3 times daily (with meals) 90 tablet 11     sertraline (ZOLOFT) 100 MG tablet Take 2 tablets (200 mg) by mouth daily       spironolactone (ALDACTONE) 25 MG tablet Take 1 tablet (25 mg) by mouth 2 times daily 180 tablet 3     traZODone (DESYREL) 50 MG tablet Take 2 tablets (100 mg) by mouth At Bedtime       warfarin ANTICOAGULANT (COUMADIN ANTICOAGULANT) 1 MG tablet Take 5 tablets (5 mg) by mouth daily On 1/21/2021 and 1/22/2021. Have your chromogenic factor checked on 1/23/2021. (Patient taking differently: Take 4 mg by mouth daily  CFX goal 20-30%) 30 tablet          Primary  cardiologist:   Dr. Paniagua        HPI:  Ms. Barksdale is a very pleasant 53 year old female with a PMhx including SLE with lupus antiphospholipid antibody syndrome and hx DVT on anticoagulation, and DAVIS on CPAP. She also has chronic thromboembolic pulmonary arterial hypertension. She was initially placed on riociguat, and then referred to [facility] for surgical evaluation. Ultimately, she underwent pulmonary thromboendarterectomy at [facility] in July 2020. She has type IV disease and her PTE was somewhat difficult to the distal nature of her disease. Her post operative course was uncomplicated, and postoperative hemodynamics showed improvement along with improved perfusion of the right lower lobe and left upper lobe via VQ scan, and her riociguat and digoxin were discontinued at that time. Post surgically she had struggled with swelling and weight gain requiring escalation of diuretics. She then underwent BPA in January and March of 2021 of right A8 branch and right A3 branch. Ultimately, due to some ongoing mild RV dysfunction, she was started back on monotherapy with tadalafil.      Unfortunately, due to a lapse in insurance we did not see her for several months. However, she returned in Aug of 2023 year to re-establish with us and get repeat hemodynamic testing. At that time, she was walking outside daily and feeling well, not requiring her oxygen, confirmed via repeat 6MWT. Echo showed stable mild RV dilation and RV dysfunction. RHC showed stable mildly elevated PA pressure and PVR with preserved cardiac output. Based on this, it was recommended that her tadalafil be changed back to riociguat for further titration. At our most recent virtual visit in Nov 2023, she was on Adempas 2.5mg TID and weight sounded to be stable, I then referred her back for repeat hemodynamic testing. This was done in Jan 2024 and showed significant improvement with normalization of her PVR and cardiac output. Echo showed mild RV  dilation and dysfunction. At that time, no changes were made. I saw her back most recently in April at which time she was doing well and I made no changes.     Today, I'm seeing Yuridia back virtually to follow up. Since she has here last she has had a few instances of some increased swelling, mostly in her left leg. This has resulted in a weeping wound. She was seen in  and just advised to wrap her legs and use compression socks. We transiently increased her bumex to 5mg BID for a few days with good result. Currently, weight is back down, edema improved, and wound has healed. She tells me that her breathing overall has been pretty good and she puts in an effort to walk daily.     Most recent labs performed prior to our visit today were reviewed as below.          CURRENT PULMONARY HYPERTENSION REGIMEN:     PAH Rx: Adempas 2.5mg TID  (Changed from tadalafil due to residual RV dysfunction)     Diuretics: Bumex 4mg BID, spironolactone 25mg BID     Oxygen: None      Anticoagulation: warfarin (follows chrom factor goal 20-30)--needs to be bridged with Arixtra given her heparin allergy if she needs procedures.  Indication: DVT/antiphospholipid     Immunosuppression: Yes   Rheum: Dr. Graf        Assessment/Plan:     1. Chronic thromboembolic pulmonary hypertension.              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 She was initially placed on Adempas and is s/p pulmonary thromboendarterectomy July 2020 at [facility]; Adempas was discontinued post surgery. For residual disease, she then had BPA in January and March of 2021 here at [facility], of the right A8 branch and right A3 branch. Due to residual RV dysfunction she is now back on Adempas. Via repeat hemodynamic testing in January 2024, her PVR and CO have normalized.  COMPERA risk score is low-intermediate at 2.               --As today is a virtual visit I cannot formal assess her volume status but she reports  a few recent instances of some more edema mostly in her left leg. Currently, however, she is back to her baseline and edema improved. For now, will keep bumex 4mg BID as is, and I told her she may go up to 5mg BID transiently if she develops edema once again.    --She has a mildly elevated alk phos and upon discussion she does not recall ever having a liver ultrasound, so to be thorough will request this locally.             --Via last 6MWT she continues to mildly desaturate to 88-89%, though will continue to monitor this. She no longer has supplemental oxygen at home.               --She is compliant with her CPAP for known DAVIS.               --Continue Coumadin for anticoagulation, in the setting of CTEPH and antiphospholipid antibody syndrome. She follows locally with a chromogenic factor goal of 20-30. She needs to be bridged with Arixtra given her heparin allergy if she needs procedures.               --We discussed recommendations for her to continue to put efforts toward walking and weight loss.     Follow up plan: Return in Jan 2025 with repeat RHC, echocardiogram, and 6mWT with Dr. Paniagua. I asked the patient to call sooner with any new concerns.       Testing/labs:    Most recent labs:    Latest Reference Range & Units 07/12/24 08:21   Sodium (External) 135 - 145 meq/L 139   Potassium (External) 3.5 - 5.1 meq/L 4.1   Chloride (External) 98 - 107 meq/L 108 (H)   CO2 (External) 22 - 30 meq/L 22   Urea Nitrogen (External) 7 - 17 mg/dL 27 (H)   Creatinine (External) 0.50 - 1.00 mg/dL 1.20 (H)   GFR Estimated (External) >=60 mL/min/1.73m2 55 (L)   Calcium (External) 8.4 - 10.2 mg/dL 9.3   Anion Gap (External) 6 - 20 meq/L 13   Albumin (External) 3.5 - 5.0 g/dL 3.8   Protein Total (External) 6.3 - 8.2 g/dL 7.2   Alk Phosphatase (External) 38 - 126 U/L 156 (H)   ALT (External) 0 - 35 U/L 24   AST (External) 15 - 46 U/L 37   Bilirubin Total (External) 0.2 - 1.3 mg/dL <0.8   BUN/Creatinine Ratio (External) 15.0  - 20.0  22.5 (H)   Glucose (External) 70 - 100 mg/dL 101 (H)   (H): Data is abnormally high  (L): Data is abnormally low      Other most recent pertinent testing:    Echocardiogram (01/2024)  Global and regional left ventricular function is normal with an EF of 55-60%.  Mild right ventricular dilation is present. Global right ventricular function is mildly reduced.  The peak velocity of the tricuspid regurgitant jet is not obtainable. Pulmonary artery systolic pressure cannot be  assessed.  IVC diameter <2.1 cm collapsing >50% with sniff suggests a normal RA pressure of 3 mmHg.  No pericardial effusion is present.  This study was compared with the study from 8/4/23: No significant changes noted.     RHC (01/2024)  RA 7  RV 44/8  PA 44/15 (23)  Wedge 15  PA sat 62.4%  Terry cardiac output 9, Terry cardiac index 3.7  Thermodilution cardiac output 6.7  Thermodilution cardiac index 2.8  PVR 0.9 TERAN by     NYHA Functional Class:  2-3    Video-Visit Details    Type of service:  Video Visit    Video Start Time: 1545  Video End Time: 1558    An additional 20 minutes was spent today performing chart and history review, pre and post visit documentation, patient education, and care coordination.      Originating Location (pt. Location): Home    Distant Location (provider location):  On-site    Platform used for Video Visit: Kaitlyn Silva PA-C  [facility]  Pager [phone]      Please do not hesitate to contact me if you have any questions/concerns.     Sincerely,     CEDRICK Zamarripa

## 2024-07-16 NOTE — PROGRESS NOTES
Virtual Visit Details    Type of service:  Video Visit   Distant Location (provider location):  On-site  Platform used for Video Visit: [facility]          CARDIOLOGY PH CLINIC VIDEO VISIT    Date of video visit: 07/16/24      Yuridia Barksdale is a 53 year old female who is being evaluated via a billable video visit.        Self reported vitals:  Weight: 301#  /60      MEDICATIONS:  Current Outpatient Medications   Medication Sig Dispense Refill    ARIPiprazole (ABILIFY) 5 MG tablet 5 mg daily      atorvastatin (LIPITOR) 10 MG tablet Take 1 tablet (10 mg) by mouth daily      bumetanide (BUMEX) 1 MG tablet Take 4 tablets (4 mg) by mouth 2 times daily      clonazePAM (KLONOPIN) 1 MG tablet Take 1 tablet (1 mg) by mouth daily as needed for anxiety (Patient taking differently: Take 2 mg by mouth nightly as needed)      hydroxychloroquine (PLAQUENIL) 200 MG tablet Take 1 tablet (200 mg) by mouth 2 times daily      riociguat (ADEMPAS) 2.5 MG tablet Take 1 tablet (2.5 mg) by mouth 3 times daily (with meals) 90 tablet 11    sertraline (ZOLOFT) 100 MG tablet Take 2 tablets (200 mg) by mouth daily      spironolactone (ALDACTONE) 25 MG tablet Take 1 tablet (25 mg) by mouth 2 times daily 180 tablet 3    traZODone (DESYREL) 50 MG tablet Take 2 tablets (100 mg) by mouth At Bedtime      warfarin ANTICOAGULANT (COUMADIN ANTICOAGULANT) 1 MG tablet Take 5 tablets (5 mg) by mouth daily On 1/21/2021 and 1/22/2021. Have your chromogenic factor checked on 1/23/2021. (Patient taking differently: Take 4 mg by mouth daily  CFX goal 20-30%) 30 tablet          Primary PH cardiologist:  Dr. Paniagua        HPI:  Ms. Barksdale is a very pleasant 53 year old female with a PMhx including SLE with lupus antiphospholipid antibody syndrome and hx DVT on anticoagulation, and DAVIS on CPAP. She also has chronic thromboembolic pulmonary arterial hypertension. She was initially placed on riociguat, and then referred to [facility] for surgical evaluation.  Ultimately, she underwent pulmonary thromboendarterectomy at [facility] in July 2020. She has type IV disease and her PTE was somewhat difficult to the distal nature of her disease. Her post operative course was uncomplicated, and postoperative hemodynamics showed improvement along with improved perfusion of the right lower lobe and left upper lobe via VQ scan, and her riociguat and digoxin were discontinued at that time. Post surgically she had struggled with swelling and weight gain requiring escalation of diuretics. She then underwent BPA in January and March of 2021 of right A8 branch and right A3 branch. Ultimately, due to some ongoing mild RV dysfunction, she was started back on monotherapy with tadalafil.      Unfortunately, due to a lapse in insurance we did not see her for several months. However, she returned in Aug of 2023 year to re-establish with us and get repeat hemodynamic testing. At that time, she was walking outside daily and feeling well, not requiring her oxygen, confirmed via repeat 6MWT. Echo showed stable mild RV dilation and RV dysfunction. RHC showed stable mildly elevated PA pressure and PVR with preserved cardiac output. Based on this, it was recommended that her tadalafil be changed back to riociguat for further titration. At our most recent virtual visit in Nov 2023, she was on Adempas 2.5mg TID and weight sounded to be stable, I then referred her back for repeat hemodynamic testing. This was done in Jan 2024 and showed significant improvement with normalization of her PVR and cardiac output. Echo showed mild RV dilation and dysfunction. At that time, no changes were made. I saw her back most recently in April at which time she was doing well and I made no changes.     Today, I'm seeing Yuridia back virtually to follow up. Since she has here last she has had a few instances of some increased swelling, mostly in her left leg. This has resulted in a weeping wound.  She was seen in  and just advised to wrap her legs and use compression socks. We transiently increased her bumex to 5mg BID for a few days with good result. Currently, weight is back down, edema improved, and wound has healed. She tells me that her breathing overall has been pretty good and she puts in an effort to walk daily.     Most recent labs performed prior to our visit today were reviewed as below.          CURRENT PULMONARY HYPERTENSION REGIMEN:     PAH Rx: Adempas 2.5mg TID  (Changed from tadalafil due to residual RV dysfunction)     Diuretics: Bumex 4mg BID, spironolactone 25mg BID     Oxygen: None      Anticoagulation: warfarin (follows chrom factor goal 20-30)--needs to be bridged with Arixtra given her heparin allergy if she needs procedures.  Indication: DVT/antiphospholipid     Immunosuppression: Yes   Rheum: Dr. Graf        Assessment/Plan:     1. Chronic thromboembolic pulmonary hypertension.              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 She was initially placed on Adempas and is s/p pulmonary thromboendarterectomy July 2020 at [facility] [place]; Adempas was discontinued post surgery. For residual disease, she then had BPA in January and March of 2021 here at [facility], of the right A8 branch and right A3 branch. Due to residual RV dysfunction she is now back on Adempas. Via repeat hemodynamic testing in January 2024, her PVR and CO have normalized.  COMPERA risk score is low-intermediate at 2.               --As today is a virtual visit I cannot formal assess her volume status but she reports a few recent instances of some more edema mostly in her left leg. Currently, however, she is back to her baseline and edema improved. For now, will keep bumex 4mg BID as is, and I told her she may go up to 5mg BID transiently if she develops edema once again.    --She has a mildly elevated alk phos and upon discussion she does not recall ever having a  liver ultrasound, so to be thorough will request this locally.             --Via last 6MWT she continues to mildly desaturate to 88-89%, though will continue to monitor this. She no longer has supplemental oxygen at home.               --She is compliant with her CPAP for known DAVIS.               --Continue Coumadin for anticoagulation, in the setting of CTEPH and antiphospholipid antibody syndrome. She follows locally with a chromogenic factor goal of 20-30. She needs to be bridged with Arixtra given her heparin allergy if she needs procedures.               --We discussed recommendations for her to continue to put efforts toward walking and weight loss.     Follow up plan: Return in Jan 2025 with repeat RHC, echocardiogram, and 6mWT with Dr. Paniagua. I asked the patient to call sooner with any new concerns.       Testing/labs:    Most recent labs:    Latest Reference Range & Units 07/12/24 08:21   Sodium (External) 135 - 145 meq/L 139   Potassium (External) 3.5 - 5.1 meq/L 4.1   Chloride (External) 98 - 107 meq/L 108 (H)   CO2 (External) 22 - 30 meq/L 22   Urea Nitrogen (External) 7 - 17 mg/dL 27 (H)   Creatinine (External) 0.50 - 1.00 mg/dL 1.20 (H)   GFR Estimated (External) >=60 mL/min/1.73m2 55 (L)   Calcium (External) 8.4 - 10.2 mg/dL 9.3   Anion Gap (External) 6 - 20 meq/L 13   Albumin (External) 3.5 - 5.0 g/dL 3.8   Protein Total (External) 6.3 - 8.2 g/dL 7.2   Alk Phosphatase (External) 38 - 126 U/L 156 (H)   ALT (External) 0 - 35 U/L 24   AST (External) 15 - 46 U/L 37   Bilirubin Total (External) 0.2 - 1.3 mg/dL <0.8   BUN/Creatinine Ratio (External) 15.0 - 20.0  22.5 (H)   Glucose (External) 70 - 100 mg/dL 101 (H)   (H): Data is abnormally high  (L): Data is abnormally low      Other most recent pertinent testing:    Echocardiogram (01/2024)  Global and regional left ventricular function is normal with an EF of 55-60%.  Mild right ventricular dilation is present. Global right ventricular function is  mildly reduced.  The peak velocity of the tricuspid regurgitant jet is not obtainable. Pulmonary artery systolic pressure cannot be  assessed.  IVC diameter <2.1 cm collapsing >50% with sniff suggests a normal RA pressure of 3 mmHg.  No pericardial effusion is present.  This study was compared with the study from 8/4/23: No significant changes noted.     RHC (01/2024)  RA 7  RV 44/8  PA 44/15 (23)  Wedge 15  PA sat 62.4%  Terry cardiac output 9, Terry cardiac index 3.7  Thermodilution cardiac output 6.7  Thermodilution cardiac index 2.8  PVR 0.9 TERAN by     NYHA Functional Class:  2-3    Video-Visit Details    Type of service:  Video Visit    Video Start Time: 1545  Video End Time: 1558    An additional 20 minutes was spent today performing chart and history review, pre and post visit documentation, patient education, and care coordination.      Originating Location (pt. Location): Home    Distant Location (provider location):  On-site    Platform used for Video Visit: Kaitlyn Silva PA-C  [facility]  Pager [phone]

## 2024-07-17 ENCOUNTER — TELEPHONE (OUTPATIENT)
Dept: CARDIOLOGY | Facility: CLINIC | Age: 54
End: 2024-07-17
Payer: COMMERCIAL

## 2024-07-17 NOTE — TELEPHONE ENCOUNTER
Verified all orders are in and faxed liver ultrasound to [facility] @ [phone] .  Yoly Villegas RN on 7/17/2024 at 11:23 AM    Sent patient citizenmadet message advising her I faxed US order. Yoly Villegas RN on 7/17/2024 at 11:24 AM

## 2024-07-17 NOTE — TELEPHONE ENCOUNTER
----- Message from Manuela DOMINGUEZ sent at 7/16/2024  3:59 PM CDT -----  Regarding: sched  Recommendations:   Liver ultrasound locally- I did not fax this order    Follow-up:   Schedule in January for echocardiogram, walk test and then Right heart catheterization with Dr. Paniagua. Will need bridging (send to coag clinic- shaista thinks she needed something specific for bridging?)    LMK If you have questions    Thanks

## 2024-07-29 ENCOUNTER — TELEPHONE (OUTPATIENT)
Dept: CARDIOLOGY | Facility: CLINIC | Age: 54
End: 2024-07-29
Payer: COMMERCIAL

## 2024-07-29 NOTE — TELEPHONE ENCOUNTER
7/29/2024  @ 4:34 PM -  tadalafil 20 mg PAH (60/30) - renew - expires:  7/31/24    437 PM - Called pt - SHE stopped tadalafil 9/6/23 when she restarted Adempas.   UPDATED SNAPSHOT.       ----------------------------  Insurance: [employer] [facility] [employer] PREX / MEDIMPACT  BIN: [id number]  PCN: [id number]  RX GRP#: [id number]  ID#: [id number]         Janey HUTCHINSON CMA- Prior Auths  Cardiology/Pulmonary Hypertension

## 2024-07-29 NOTE — TELEPHONE ENCOUNTER
7/29/2024  @ 4:42 PM -  Adempas 2.5 mg - renewal - expires:  8/8/24 - Prior Authorization Not Required      PA Initiation  Medication: Adempas 2.5 mg - renewal - expires:  8/8/24   Insurance Company: [employer] - Phone [phone] Fax [phone]  Pharmacy Filling the Rx: [facility] - [place], [place] - [address]  Filling Pharmacy Phone:    Filling Pharmacy Fax:    Start Date: 7/29/2024  via [facility], key [id number], w/ [facility] dated : 8/4/23; Dx Code: I27.24 (CTEPH)   ----------------------------  Insurance: [employer] [facility] PREX /MEDIMPACT  BIN: [id number]  PCN: [id number]  RX GRP#: [id number]  ID#: [id number]         Janey HUTCHINSON CMA- Prior Auths  Cardiology/Pulmonary Hypertension

## 2024-07-30 ENCOUNTER — TELEPHONE (OUTPATIENT)
Dept: CARDIOLOGY | Facility: CLINIC | Age: 54
End: 2024-07-30
Payer: COMMERCIAL

## 2024-07-30 NOTE — TELEPHONE ENCOUNTER
Left Voicemail (1st Attempt) for the patient to call back and schedule the following:    Appointment type:  RHC  Provider: AMANDA  Return date: 01/05/25  Specialty phone number: [phone] OPT 1   Additional appointment(s) needed:echo, 6mwt   Additonal Notes: RHC NEEDS TO BE WITH DR. PATEL

## 2024-07-30 NOTE — TELEPHONE ENCOUNTER
[facility] Call Center    Phone Message    May a detailed message be left on voicemail: yes     Reason for Call: Other: PT returning call to Obdulia Balderrama to discuss the RHC plans.  Please reach out to Yuridia to chat about this further.      Action Taken: Message routed to:  [facility] cardio    Travel Screening: Not Applicable    Thank you!  Specialty Access Center       Date of Service:

## 2024-07-31 NOTE — TELEPHONE ENCOUNTER
7/31/2024 4:28PM Selena Gerard  Patient Contacted for the patient to call back and schedule the following:    Appointment type: [facility] w/ Dr. Paniagua  Provider: Dr. Paniagua  Return date: 1/16/2025  Specialty phone number: [phone] option 1  Additional appointment(s) needed: 6MWT and echo prior  Additonal Notes: 7/31 Spoke w/ pt. Let her know [facility] w/ Dr. Paniagua January schedule not available yet and may be available by the end of August. We will call back when we can schedule. Pt needs [place] w/ Dr. Paniagua w/ 6MWT and echo prior. BECCA   Selena Gerard 7/31/2024 4:28PM

## 2024-08-09 NOTE — TELEPHONE ENCOUNTER
"8/9/2024  @ 10:37 AM -  Adempas 2.5 mg - RESPONSE: \"Prior Authorization Not Required\"    - Called [employer] @ [phone] - to check for PA approval info on file:      Case ID#:  [id number],  Effective:   8/1/24 -   9/1/25.    Prior Authorization Approval  Medication: ADEMPAS 2.5 MG PO TABS  Authorization Effective Date: 8/1/2024  Authorization Expiration Date: 9/1/2025  Approved Dose/Quantity: 90/30  Reference #: Case #:  [id number]   Insurance Company: [employer] - Phone [phone] Fax [phone]  Expected CoPay: $    CoPay Card Available:      Financial Assistance Needed: *Undetermined as of the date of this note   Which Pharmacy is filling the prescription: [facility] - [place], [place] - [address]  Pharmacy Notified: y;  Patient Notified: n     Updated Snapshot, added to PA Calendar.  Janey HUTCHINSON CMA- Prior Auths  Cardiology/Pulmonary Hypertension     "

## 2024-08-13 ENCOUNTER — MYC MEDICAL ADVICE (OUTPATIENT)
Dept: CARDIOLOGY | Facility: CLINIC | Age: 54
End: 2024-08-13
Payer: COMMERCIAL

## 2024-08-14 NOTE — TELEPHONE ENCOUNTER
RE: Abd US  Received: Yesterday  Azucena Silva PA sent to Yoly Villegas RN  Let her know this just showed some evidence of fatty liver, no cirrhosis or anything that should need further evaluation for now.    Thanks.

## 2024-09-19 DIAGNOSIS — R06.02 SOB (SHORTNESS OF BREATH): ICD-10-CM

## 2024-09-19 DIAGNOSIS — E87.6 HYPOKALEMIA: ICD-10-CM

## 2024-09-19 DIAGNOSIS — R06.09 DYSPNEA ON EXERTION: ICD-10-CM

## 2024-09-20 RX ORDER — BUMETANIDE 1 MG/1
TABLET ORAL
Qty: 810 TABLET | Refills: 3 | Status: SHIPPED | OUTPATIENT
Start: 2024-09-20

## 2024-09-20 NOTE — TELEPHONE ENCOUNTER
bumetanide (BUMEX) 1 MG tablet (Discontinued)   300 tablet 11 1/19/2024 4/16/2024     Take 5 tablets (5 mg) by mouth every morning AND 4 tablets (4 mg) every evening. - Oral     Last Office Visit : 7-  Future Office visit:  none    Routing refill request to provider for review/approval because:  Drug not active on patient's medication list      Diuretics (Including Combos) Protocol [id number]/20/2024 12:48 PM   Protocol Details Has GFR on file in past 12 months and most recent value is normal     Labs completed on :   7-  CareEverywhere lab  eGFRcr(AS)  >=60 mL/min/1.73m2 55 Low

## 2024-09-20 NOTE — TELEPHONE ENCOUNTER
Medication Refill double check:    Last virtual visit was on 7/16/24 with Azucena Silva PA-C.    Follow up was recommended for Jan '25.    Any additional encounters with changes to requested med? no    Authorizing provider is: Dr. Arcelia Caceres was approved.     Additional orders/notes:       Yoly Villegas RN on 9/20/2024 at 1:33 PM

## 2024-09-20 NOTE — TELEPHONE ENCOUNTER
[facility] Call Center    Phone Message    May a detailed message be left on voicemail: yes     Reason for Call: Medication Refill Request    Has the patient contacted the pharmacy for the refill? Yes   Name of medication being requested:   bumetanide (BUMEX) 1 MG tablet   Provider who prescribed the medication: [facility]   Pharmacy:   [facility] - [address]  Date medication is needed: 9/20   The patient said she is going out of town and needs this today     Action Taken: Other: Cardiology    Travel Screening: Not Applicable    Thank you!  [facility]       Date of Service:

## 2024-10-01 DIAGNOSIS — I27.24 CTEPH (CHRONIC THROMBOEMBOLIC PULMONARY HYPERTENSION) (H): ICD-10-CM

## 2024-10-01 NOTE — TELEPHONE ENCOUNTER
Medication Refill double check:    Last virtual visit was on 7/16/24 with Azucena Silva PA-C.    Follow up was recommended for 6 months.    Any additional encounters with changes to requested med? no    Authorizing provider is: Dr. Suzanne Caceres was approved.     Additional orders/notes:       Yoly Villegas RN on 10/1/2024 at 12:05 PM

## 2024-10-16 DIAGNOSIS — I27.24 CTEPH (CHRONIC THROMBOEMBOLIC PULMONARY HYPERTENSION) (H): ICD-10-CM

## 2024-10-16 DIAGNOSIS — R06.09 DOE (DYSPNEA ON EXERTION): ICD-10-CM

## 2024-10-23 RX ORDER — SPIRONOLACTONE 25 MG/1
25 TABLET ORAL 2 TIMES DAILY
Qty: 180 TABLET | Refills: 3 | Status: SHIPPED | OUTPATIENT
Start: 2024-10-23

## 2024-10-23 NOTE — TELEPHONE ENCOUNTER
spironolactone (ALDACTONE) 25 MG tablet 180 tablet 3 10/27/2023     Last Office Visit: 7/16/24  Future Office visit:   NONE    Diuretics (Including Combos) Protocol Svqpog09/16/2024 12:02 PM   Protocol Details Has GFR on file in past 12 months and most recent value is normal    Medication indicated for associated diagnosis        Routing refill request to provider for review/approval because:  Diagnosis does not match medication indication, cannot fill per protocol.   ABNORMAL LAB    Belen Lopes RN  P Central Nursing/Red Flag Triage & Med Refill Team

## 2024-10-23 NOTE — TELEPHONE ENCOUNTER
Medication Refill double check:    Last virtual visit was on 7/16/24 with Azucena Silva PA-C.    Follow up was recommended for 6 months.    Any additional encounters with changes to requested med? no    Authorizing provider is: Dr. Arcelia Caceres was approved.     Additional orders/notes:       Yoly Villegas RN on 10/23/2024 at 2:29 PM

## 2024-11-26 ENCOUNTER — TELEPHONE (OUTPATIENT)
Dept: CARDIOLOGY | Facility: CLINIC | Age: 54
End: 2024-11-26
Payer: COMMERCIAL

## 2024-11-26 NOTE — TELEPHONE ENCOUNTER
Cath Lab Case Request/Order    Location: [address] [place] [address] Waiting Room    Procedure: Right Heart Cath (RHC)    Procedure Date: 1/3     Patient Arrival Time: 9 CSC labs, walk, PFT   1130 UU    Procedure Time: 5th case to follow    Ordering Provider: Azucena Silva    Performing Cardiologist: Dr. Arcelia Paniagua    Inpatient Bed Needed: No    Post-  Procedure GLORY appointment scheduled (1 - 2 weeks): N/A, RHC      Communicated Patient Instructions:     NPO, nothing to eat 8 hours and drink 2 hours before arrival time: No     , need to arrange a ride home - unable to drive post- procedure: N/A, RHC     Adult at home, need a responsible adult to stay with patient 24 hours post- procedure: N/A, RHC    Appointment was scheduled: [place]    Patient expressed understanding of above instructions and denied further questions at this time.    Patricia Miller

## 2025-01-03 ENCOUNTER — ANCILLARY PROCEDURE (OUTPATIENT)
Dept: CARDIOLOGY | Facility: CLINIC | Age: 55
End: 2025-01-03
Attending: PHYSICIAN ASSISTANT
Payer: COMMERCIAL

## 2025-01-03 ENCOUNTER — APPOINTMENT (OUTPATIENT)
Dept: MEDSURG UNIT | Facility: CLINIC | Age: 55
End: 2025-01-03
Attending: INTERNAL MEDICINE
Payer: COMMERCIAL

## 2025-01-03 ENCOUNTER — LAB (OUTPATIENT)
Dept: LAB | Facility: CLINIC | Age: 55
End: 2025-01-03
Attending: PHYSICIAN ASSISTANT
Payer: COMMERCIAL

## 2025-01-03 ENCOUNTER — HOSPITAL ENCOUNTER (OUTPATIENT)
Facility: CLINIC | Age: 55
Discharge: HOME | End: 2025-01-03
Attending: INTERNAL MEDICINE | Admitting: INTERNAL MEDICINE
Payer: COMMERCIAL

## 2025-01-03 VITALS
SYSTOLIC BLOOD PRESSURE: 114 MMHG | WEIGHT: 287.9 LBS | HEART RATE: 83 BPM | RESPIRATION RATE: 16 BRPM | OXYGEN SATURATION: 97 % | TEMPERATURE: 97.7 F | BODY MASS INDEX: 46.47 KG/M2 | DIASTOLIC BLOOD PRESSURE: 73 MMHG

## 2025-01-03 DIAGNOSIS — I27.20 PULMONARY HYPERTENSION (H): ICD-10-CM

## 2025-01-03 DIAGNOSIS — R06.02 SOB (SHORTNESS OF BREATH): ICD-10-CM

## 2025-01-03 DIAGNOSIS — E87.6 HYPOKALEMIA: Primary | ICD-10-CM

## 2025-01-03 LAB
ALBUMIN SERPL BCG-MCNC: 4 G/DL (ref 3.5–5.2)
ALP SERPL-CCNC: 148 U/L (ref 40–150)
ALT SERPL W P-5'-P-CCNC: 20 U/L (ref 0–50)
ANION GAP SERPL CALCULATED.3IONS-SCNC: 11 MMOL/L (ref 7–15)
AST SERPL W P-5'-P-CCNC: 27 U/L (ref 0–45)
BILIRUB SERPL-MCNC: 0.6 MG/DL
BUN SERPL-MCNC: 24.7 MG/DL (ref 6–20)
CALCIUM SERPL-MCNC: 9.4 MG/DL (ref 8.8–10.4)
CHLORIDE SERPL-SCNC: 107 MMOL/L (ref 98–107)
CREAT SERPL-MCNC: 1.17 MG/DL (ref 0.51–0.95)
EGFRCR SERPLBLD CKD-EPI 2021: 56 ML/MIN/1.73M2
GLUCOSE SERPL-MCNC: 102 MG/DL (ref 70–99)
HCG SERPL QL: NEGATIVE
HCO3 SERPL-SCNC: 24 MMOL/L (ref 22–29)
HOLD SPECIMEN: NORMAL
INR PPP: 1.49 (ref 0.85–1.15)
LVEF ECHO: NORMAL
NT-PROBNP SERPL-MCNC: 321 PG/ML (ref 0–900)
POTASSIUM SERPL-SCNC: 3.9 MMOL/L (ref 3.4–5.3)
PROT SERPL-MCNC: 7.7 G/DL (ref 6.4–8.3)
SODIUM SERPL-SCNC: 142 MMOL/L (ref 135–145)

## 2025-01-03 PROCEDURE — C1751 CATH, INF, PER/CENT/MIDLINE: HCPCS | Performed by: INTERNAL MEDICINE

## 2025-01-03 PROCEDURE — 999N000132 HC STATISTIC PP CARE STAGE 1

## 2025-01-03 PROCEDURE — 999N000142 HC STATISTIC PROCEDURE PREP ONLY

## 2025-01-03 PROCEDURE — C1894 INTRO/SHEATH, NON-LASER: HCPCS | Performed by: INTERNAL MEDICINE

## 2025-01-03 PROCEDURE — 93451 RIGHT HEART CATH: CPT | Mod: 26 | Performed by: INTERNAL MEDICINE

## 2025-01-03 PROCEDURE — 80053 COMPREHEN METABOLIC PANEL: CPT | Performed by: PATHOLOGY

## 2025-01-03 PROCEDURE — 83880 ASSAY OF NATRIURETIC PEPTIDE: CPT | Performed by: PATHOLOGY

## 2025-01-03 PROCEDURE — 36415 COLL VENOUS BLD VENIPUNCTURE: CPT | Performed by: PATHOLOGY

## 2025-01-03 PROCEDURE — 250N000009 HC RX 250: Performed by: PHYSICIAN ASSISTANT

## 2025-01-03 PROCEDURE — 85610 PROTHROMBIN TIME: CPT | Performed by: PATHOLOGY

## 2025-01-03 PROCEDURE — 93451 RIGHT HEART CATH: CPT | Performed by: INTERNAL MEDICINE

## 2025-01-03 PROCEDURE — 84703 CHORIONIC GONADOTROPIN ASSAY: CPT | Performed by: PATHOLOGY

## 2025-01-03 PROCEDURE — 93306 TTE W/DOPPLER COMPLETE: CPT | Performed by: INTERNAL MEDICINE

## 2025-01-03 PROCEDURE — 272N000001 HC OR GENERAL SUPPLY STERILE: Performed by: INTERNAL MEDICINE

## 2025-01-03 PROCEDURE — 250N000009 HC RX 250: Performed by: INTERNAL MEDICINE

## 2025-01-03 RX ORDER — LIDOCAINE 40 MG/G
CREAM TOPICAL
Status: COMPLETED | OUTPATIENT
Start: 2025-01-03 | End: 2025-01-03

## 2025-01-03 RX ADMIN — LIDOCAINE: 40 CREAM TOPICAL at 12:08

## 2025-01-03 ASSESSMENT — ACTIVITIES OF DAILY LIVING (ADL)
ADLS_ACUITY_SCORE: 48

## 2025-01-03 NOTE — PRE-PROCEDURE
Consenting/Education for Cardiology Procedure: Right heart catheterization     Patient understands we would like to perform the listed procedure(s) due to pulmonary hypertension.    The patient understands the following:     The procedure was described to the patient in detail.    No sedation is planned for this procedure. Patient understands risks and complications of the procedure which include but are not limited to bruising/swelling around the incision site, infection, bleeding, allergic reaction to local anesthetic, air embolism, arterial puncture, stroke, heart attack, need for emergency heart surgery, death.       Patient verbalized understanding of risks and benefits and has elected to proceed with the procedure or procedures listed above.    Clinically Significant Risk Factors Present on Admission               # Drug Induced Coagulation Defect: home medication list includes an anticoagulant medication                    Nephrology: Stage 3a (GFR 45-59)  Pulmonary Heart Disease (Pulmonary hypertension or Cor pulmonale): Pulmonary Hypertension, unspecified  Chronic thromboembolic pulmonary hypertension      Kaley Rosales PA-C  Cardiology

## 2025-01-03 NOTE — PROGRESS NOTES
Pt arrived to Unit 2a for RHC procedure in CCL. AFVSS. Denies pain. Pt ready for consent. Pt stable.

## 2025-01-03 NOTE — DISCHARGE INSTRUCTIONS
[facility]                        Interventional Cardiology  Discharge Instructions   Post Right Heart Cath and/or Heart Biopsy      AFTER YOU GO HOME:  DO drink plenty of fluids  DO resume your regular diet and medications unless otherwise instructed by your Primary Physician  Do Not scrub the procedure site vigorously  No lotion or powder to the puncture site for 3 days    CALL YOUR PRIMARY PHYSICIAN IF: You may resume all normal activity.  Monitor neck site for bleeding, swelling, or voice changes. If you notice bleeding or swelling immediately apply pressure to the site and call number below to speak with Cardiology Fellow.  If you experience any changes in your breathing you should call your doctor immediately or come to the closest Emergency Department.  Do not drive yourself.    ADDITIONAL INSTRUCTIONS: Medications: You are to resume all home medications including anticoagulation therapy unless otherwise advised by your primary cardiologist or nurse coordinator.    Follow Up: Per your primary cardiology team    If you have any questions or concerns regarding your procedure site please call [phone] at anytime and ask for Cardiology Fellow on call.  They are available 24 hours a day.  You may also contact the Cardiology Clinic after hours number at [phone].                                                       Telephone Numbers [phone] Monday-Friday 8:00 am to 4:30 pm    [phone] After 4:30 pm Monday-Friday, Weekends & Holidays  Ask for Interventional Cardiologist on call. Someone is on call 24 hours/day   [facility] toll free number [phone] Monday-Friday 8:00 am to 4:30 pm   [facility] Emergency Dept [phone]

## 2025-01-03 NOTE — PRE-PROCEDURE
GENERAL PRE-PROCEDURE:   Procedure:  Right heart catheterization  Date/Time:  1/3/2025 12:30 PM    Verbal consent obtained?: Yes    Written consent obtained?: Yes    Risks and benefits: Risks, benefits and alternatives were discussed    Consent given by:  Patient  Patient states understanding of procedure being performed: Yes    Patient's understanding of procedure matches consent: Yes    Procedure consent matches procedure scheduled: Yes    Expected level of sedation:  Moderate  Appropriately NPO:  Yes  ASA Class:  3  Mallampati  :  Grade 2- soft palate, base of uvula, tonsillar pillars, and portion of posterior pharyngeal wall visible  Heart:  Normal heart sounds and rate  History & Physical reviewed:  History and physical reviewed and no updates needed  Statement of review:  I have reviewed the lab findings, diagnostic data, medications, and the plan for sedation

## 2025-01-03 NOTE — PROGRESS NOTES
Service Date: January 3, 2025     Ochoa Sheth MD   [facility] [address] [address]      Mario Gerber MD    [facility]      Clint Olsen MD   [facility]   [address] [place] [address]  [id number]       RE: Yuridia Barksdale   MRN: [id number]   : 1970      Dear Meryl Rondon and Zabrina:      We had the pleasure of seeing Ms. Yuridia Barksdale for followup in our Pulmonary Hypertension Clinic at the [facility].  As you know, she is a very pleasant 54 year old female with obesity, DAVIS and chronic thromboembolic pulmonary hypertension who underwent pulmonary thromboendarterectomy at [facility] in 2020.  She had type 3 disease. Her Adempas was then stopped. Her repeat V/Q and RHC showed mild residual CTEPH and she underwent pulmonary balloon angioplasty of the right A8 branch and right A3 branch in January and 2021. Her repeat TTE showed mild RV dilation and dysfunction, and she was started on tadalafil. She also takes torsemide 80 mg daily.  She returns today for follow-up. During her last visit in 2023, she was switched from tadalafil to riociguat for persistent CTEPH.     Overall, she states that she has been doing reasonably well in the last 6 months. She has not noticed a significant change.  Exercises on a stationary bike every day for 30 minutes.  She is not needing supplemental oxygen during daytime at rest or with exertion.  She is also not needing supplemental oxygen at nighttime.  She has not had any fluid retention problems lower extremity swelling, abdominal distention or weight gain.  She has been stable on the lower dose of Bumex 4 mg twice daily.  She denies having any exertional chest pain or chest pressure.  Occasional lightheadedness but no exertional presyncope or syncope.  No recent hospitalization or ER visit.  Characterized  as functional class II.  She is able to do most of her activities without any limitation.    Her anticoagulation is closely monitored by the [facility] using chromogenic factor instead of INR.  Her chromogenic factor goal is 20-30.    PAST MEDICAL HISTORY:   1.  Systemic lupus erythematosus.   2.  Lupus antiphospholipid antibody syndrome.   3.  History of deep venous thrombosis.   4.  Chronic thromboembolic pulmonary hypertension.   5.  Obesity.   6.  Obstructive sleep apnea, REM-related, severe.      MEDICATIONS:   Her current medication includes Adempas 2.5 mg 3 times a day, warfarin 5 mg with goal chromogenic factor Xa of 20-40, Bumex 4 mg twice daily, spironolactone 25 mg twice daily, Plaquenil 200 mg twice daily, Abilify 5 mg daily, Lipitor 10 mg daily, Klonopin 1 mg as needed, and Arixtra as needed for bridging.    REVIEW OF SYSTEMS:  A detailed 10-point review of systems was obtained as described in the History of Present Illness.  All other systems are reviewed and are negative.      PHYSICAL EXAMINATION:   /73   Pulse 83   Temp 97.7  F (36.5  C) (Oral)   Resp 16   Wt 130.6 kg (287 lb 14.4 oz)   LMP 04/04/2012   SpO2 97%   BMI 46.47 kg/m    She was awake, alert, oriented x3.  She was comfortable.  She was in no apparent distress.  Normocephalic atraumatic.   Her neck exam revealed no jugular venous distention.  Carotids are 2+ bilaterally.  She had no pallor sounds, cyanosis or jaundice.  Cardiac auscultation revealed normal S1 and normal S2 with no murmur rub or gallop.  Auscultation of her lungs revealed equal air entry on both sides with no added sounds.  Her abdomen was soft with no was no tenderness, or no guarding.  She had no focal neurological deficit.  Her extremities reveal no edema.    Recent Results (from the past week)   6 minute walk test    Collection Time: 01/03/25 12:00 AM   Result Value Ref Range    6 min walk (FT) 1,500 1,109 ft    6 Min Walk (M) 457 338 m   Extra Blue Top  Tube (LAB USE ONLY)    Collection Time: 01/03/25  9:07 AM   Result Value Ref Range    Hold Specimen JIC    Extra Purple Top EDTA (LAB USE ONLY)    Collection Time: 01/03/25  9:07 AM   Result Value Ref Range    Hold Specimen JIC    Extra Green Top Tube (LAB USE ONLY)    Collection Time: 01/03/25  9:07 AM   Result Value Ref Range    Hold Specimen JIC    Extra SST Tube (LAB USE ONLY)    Collection Time: 01/03/25  9:07 AM   Result Value Ref Range    Hold Specimen JIC    Extra Red Top Tube (LAB USE ONLY)    Collection Time: 01/03/25  9:07 AM   Result Value Ref Range    Hold Specimen JIC    INR    Collection Time: 01/03/25  9:07 AM   Result Value Ref Range    INR 1.49 (H) 0.85 - 1.15   hCG Qualitative Pregnancy    Collection Time: 01/03/25  9:07 AM   Result Value Ref Range    hCG Serum Qualitative Negative Negative   N terminal pro BNP outpatient    Collection Time: 01/03/25  9:07 AM   Result Value Ref Range    N Terminal Pro BNP Outpatient 321 0 - 900 pg/mL   Comprehensive metabolic panel    Collection Time: 01/03/25  9:07 AM   Result Value Ref Range    Sodium 142 135 - 145 mmol/L    Potassium 3.9 3.4 - 5.3 mmol/L    Carbon Dioxide (CO2) 24 22 - 29 mmol/L    Anion Gap 11 7 - 15 mmol/L    Urea Nitrogen 24.7 (H) 6.0 - 20.0 mg/dL    Creatinine 1.17 (H) 0.51 - 0.95 mg/dL    GFR Estimate 56 (L) >60 mL/min/1.73m2    Calcium 9.4 8.8 - 10.4 mg/dL    Chloride 107 98 - 107 mmol/L    Glucose 102 (H) 70 - 99 mg/dL    Alkaline Phosphatase 148 40 - 150 U/L    AST 27 0 - 45 U/L    ALT 20 0 - 50 U/L    Protein Total 7.7 6.4 - 8.3 g/dL    Albumin 4.0 3.5 - 5.2 g/dL    Bilirubin Total 0.6 <=1.2 mg/dL   Extra Urine (LAB USE ONLY)    Collection Time: 01/03/25  9:11 AM   Result Value Ref Range    Hold Specimen JIC    Echocardiogram Complete    Collection Time: 01/03/25  9:58 AM   Result Value Ref Range    LVEF  55-60%    General PFT Lab (Please always keep checked)    Collection Time: 01/03/25 10:06 AM   Result Value Ref Range    FIO2-Pre  21.00 %        Echocardiogram (01/2025)  Global and regional left ventricular function is normal with an EF of 55-60%.  Mild right ventricular dilation is present. Global right ventricular function is mildly reduced.  RVSP 30 mm Hg  IVC diameter <2.1 cm collapsing >50% with sniff suggests a normal RA pressure of 3 mmHg.  No pericardial effusion is present.  This study was compared with the study from 1/2024: No significant changes noted.    RHC (01/2025)  RA 1  RV 42/3  PA 42/15 (26)  Wedge 4  PA sat 70.2%  Terry cardiac output 10.1, Terry cardiac index 4.3  Thermodilution cardiac output 8.2 Thermodilution cardiac index 3.5  PVR 2.7 TERAN by TD    6MWT (01/2025)  She walked 430 meters, which is very stable. Her lowest oxygen saturation on room air was 91%. This is table to slightly better when compared to last year.     ASSESSMENT AND PLAN:      Ms. Yuridia Barksdale is a 54 year old female with chronic thromboembolic pulmonary hypertension due to hypercoagulable state from her antiphospholipid antibody syndrome.  She underwent pulmonary thromboendarterectomy at [facility] on July 4, 2020.  She has residual inoperable chronic thromboembolic pulmonary hypertension for which she has undergone 2 sessions of pulmonary balloon angioplasty in the in the right A8 and A3 segments in January and March 2021.  He is currently on riociguat 2.5 mg 3 times a day.    I am delighted to say that she is responding beautifully to riociguat.  Her PVR has completely normalized.  Her mean PA pressure also improved significantly.  Her cardiac output is back in the normal range.  She has normal biventricular filling pressures in the setting of high normal cardiac output.  Her echocardiogram shows only mild right ventricular dilatation and dysfunction.  6-minute walk distance is stable.   Lowest oxygen saturation with maximal exertion was 91%.  Her NT proBNP is within normal limits.    I have recommended her to continue riociguat 2.5 mg  3 times a day, Bumex 4 mg twice daily, spironolactone 25 mg twice daily and warfarin.  She will restart her Arixtra today.  She will continue her warfarin until her chromogenic factor is 20-40.  She will continue to use supplemental oxygen as needed.  She is on CPAP therapy at nighttime.    I recommend her to return to see us in 6 months with labs and walk test.  She will call us in the interim if there are any further worsening symptoms.  It was a pleasure meeting Ms. Shi people in our pulmonary hypertension clinic at [facility].    Total time today was 45 minutes reviewing notes, imaging, labs, patient visit, orders and documentation       Sincerely,     Arcelia Paniagua MD   Center for Pulmonary Hypertension  Heart Failure, Transplant, and Mechanical Circulatory Support Cardiology   Cardiovascular Division  [facility]   [phone]

## 2025-01-03 NOTE — PROGRESS NOTES
Patient tolerated recovery stage well. VSS, right neck site clean/dry/intact, no hematoma, and denies pain. Patient tolerated PO food and fluids. Teaching was done and discharge instructions were given. Patient discharged from the hospital ambulatory to home.

## 2025-01-29 ENCOUNTER — TELEPHONE (OUTPATIENT)
Dept: CARDIOLOGY | Facility: CLINIC | Age: 55
End: 2025-01-29
Payer: COMMERCIAL

## 2025-02-06 NOTE — LETTER
2020      RE: Yuridia Barksdale  [address] [address]       Service Date: 2020      Clint Olsen MD   [facility]   [address] [place] [address]      RE: Yuridia Barksdale   MRN: [id number]   : 1970      Dear Dr. lOsen:      We had the pleasure of seeing Yuridia Barksdale in our Pulmonary Hypertension Clinic at the [facility].  Ms. Yuridia Barksdale is a very pleasant 49-year-old female with past medical history significant for lupus, antiphospholipid antibody syndrome, DVT, obesity, obstructive sleep apnea and chronic thromboembolic hypertension, who is currently on riociguat therapy.  She was referred to us for further evaluation and management of her CTEPH.     We saw her the end of 2019.  We recommended her to have a repeat right heart catheterization and pulmonary angiogram here to determine her candidacy for pulmonary balloon angioplasty in our program.  She is completed this testing and she returns today for follow-up.    Her right heart catheterization showed an RA pressure of 8 mmHg, RV of 100/60 mmHg, PA of 100/27 with a mean of 51 mmHg, pulmonary capillary wedge pressure of 14 mmHg, PA saturation of 66%, thermodilution cardiac output of 10.1 L/min with an index of 4.34 L/min/m , estimated Terry cardiac output of 5.2 L/min with an index of 2.23 L/min/m .  Her PVR was 7.15 Wood units based on her estimated Terry and 4 Wood units based on her thermodilution cardiac output.      She had selective pulmonary angiogram which revealed proximal stenosis of the A2, A1 and A9 segments were noted on the right side.  Mid segment of the A 8 was noted in the left side all these lesions are amenable to BPA.    In the interim, she has been doing about the same.  She is functional class II.  She has no exertional presyncope or syncope.  No worsening lower extremity swelling.  She has been complaining of left-sided chest pain on and  "off with exertion.  No PND or orthopnea.  No bleeding complications.  She is compliant with her RIOCIGUAT and anticoagulation therapy.     PAST MEDICAL HISTORY:   1.  Systemic lupus erythematosus.   2.  Deep venous thrombosis.   3.  DAVIS with obstructive sleep apnea.   4.  Obesity.   5.  Chronic thromboembolic pulmonary hypertension.         MEDICATIONS:    Current Outpatient Medications   Medication Sig     ARIPiprazole (ABILIFY) 5 MG tablet Take 5 mg by mouth     atorvastatin (LIPITOR) 10 MG tablet Take 10 mg by mouth     clonazePAM (KLONOPIN) 1 MG tablet Take 1 mg by mouth     digoxin (LANOXIN) 125 MCG tablet TK 1 T PO QD     furosemide (LASIX) 20 MG tablet Take 40 mg by mouth 2 times daily      hydroxychloroquine (PLAQUENIL) 200 MG tablet Take 200 mg by mouth 2 times daily      losartan-hydrochlorothiazide (HYZAAR) 100-12.5 MG tablet Take 1 tablet by mouth     melatonin 3 MG tablet      riociguat (ADEMPAS) 2.5 MG tablet      saccharomyces boulardii (FLORASTOR) 250 MG capsule      sertraline (ZOLOFT) 100 MG tablet Take 200 mg by mouth     traZODone (DESYREL) 100 MG tablet Take 100 mg by mouth     Warfarin Sodium (COUMADIN PO) Take  by mouth.     Misc Natural Products (TART CHERRY ADVANCED PO) Take 1 Dose by mouth daily States liquid form but not regularly.     No current facility-administered medications for this visit.      REVIEW OF SYSTEMS:  A detailed 10-point review of systems was obtained as described in the History of Present Illness.  All other systems reviewed and are negative.       PHYSICAL EXAMINATION:      She was comfortable.  She was in no apparent distress.  /75 (BP Location: Right arm, Patient Position: Chair, Cuff Size: Adult Large)   Pulse 70   Ht 1.676 m (5' 6\")   Wt 130.4 kg (287 lb 8 oz)   SpO2 95%   BMI 46.40 kg/m   . She had no pallor, cyanosis or jaundice.  Her neck exam revealed no jugular venous distention.  Her carotids were 1+ bilaterally.  Cardiac auscultation revealed " normal S1, S2 with no murmur, rub or gallop.  Auscultation of the lungs revealed equal air entry on both sides with no added sounds.  Her abdomen was soft with normal bowel sounds, no tenderness, no rigidity, no guarding.  She had no focal neurological deficit.  Her extremities showed no edema.      CBC RESULTS:   Recent Labs   Lab Test 01/20/20  1118   WBC 6.5   RBC 4.21   HGB 12.9   HCT 39.6   MCV 94   MCH 30.6   MCHC 32.6   RDW 13.0        Recent Labs   Lab Test 01/20/20  1118 12/02/19  1045    139   POTASSIUM 3.9 4.5   CHLORIDE 110* 108   CO2 25 26   ANIONGAP 5 4   GLC 95 90   BUN 31* 11   CR 1.02 0.96   KATARINA 9.1 9.0     Liver Function Studies -   Recent Labs   Lab Test 01/20/20  1118   PROTTOTAL 7.6   ALBUMIN 3.3*   BILITOTAL 0.4   ALKPHOS 106   AST 18   ALT 24     No results found for: NTBNPI  Lab Results   Component Value Date    NTBNP 485 (H) 01/20/2020        ASSESSMENT AND PLAN:      Ms. Barksdale was scheduled to go to the [facility], for possible pulmonary balloon angioplasty versus pulmonary thromboendarterectomy.  Unfortunately, her insurance has denied her coverage for [facility].  Thus, she was referred to our program for further evaluation and treatment.      We reviewed her case in our multidisciplinary CTEPH meeting.  She has proximal lesions are on her A1 A2 and a 9 segments on the right side and a 8 segment on the left side which are amenable for pulmonary balloon angioplasty.  However she would be considered a high risk candidate for our program given her high PA pressure.  The risk of reperfusion injury and pulmonary hemorrhage directly correlates with the severity of the PA pressure.  Our program is in its early stages.  We have been doing this only for the last 6 months.  Thus given her complexity and high risk nature she would be better off getting this done in a more expedient center which would be [facility]  [place].  I have sent a referral to [facility] for reconsideration.  Hopefully her insurance will approve.    In the interim, I have recommended her to continue RIOCIGUAT therapy and Coumadin.  She is being monitored by chromogenic factor with a goal of 20-30.    She does complain of this atypical chest pain which could be related to her pulmonary hypertension.  However given her lupus, to be sure, I have recommended her to have a nuclear stress test to make sure that we rule out coronary disease.  She will have this scheduled in the next couple of days.    It was a pleasure meeting Ms. Shi people in our pulmonary hypertension program at [facility].  We thank you for involving us in her care.  Please do not hesitate to call us in the interim of any further questions.         Sincerely,   Arcelia Paniagua MD   Center for Pulmonary Hypertension  Heart Failure, Transplant, and Mechanical Circulatory Support Cardiology   Cardiovascular Division  [facility]   [phone]               D: 2019   T: 2019   MT: al      Name:     JEREMY BOATENG   MRN:      [id number]        Account:      [id number]   :      1970           Service Date: 2019      Document: [id number]    Dr. Conte Skim    Jacque Dawson MD     no

## 2025-03-10 ENCOUNTER — TELEPHONE (OUTPATIENT)
Dept: CARDIOLOGY | Facility: CLINIC | Age: 55
End: 2025-03-10
Payer: COMMERCIAL

## 2025-03-10 NOTE — TELEPHONE ENCOUNTER
Patient Contacted for the patient to call back and schedule the following:    Appointment type: RTNP   Provider: NIURKA   Return date: 07/17/25  Specialty phone number: [phone] OPT 1   Additional appointment(s) needed:6MWT   LABS   Additonal Notes: N/A

## 2025-06-01 ENCOUNTER — HEALTH MAINTENANCE LETTER (OUTPATIENT)
Age: 55
End: 2025-06-01

## 2025-06-19 ENCOUNTER — TELEPHONE (OUTPATIENT)
Dept: CARDIOLOGY | Facility: CLINIC | Age: 55
End: 2025-06-19
Payer: COMMERCIAL

## 2025-06-19 NOTE — TELEPHONE ENCOUNTER
Left Voicemail (1st Attempt) and Sent Mychart (1st Attempt) for the patient to call back and schedule the following:    Appointment type: Return Pulm Hypertension  Provider: Azucena Silva  Return date: Next Available  Specialty phone number: [phone] Opt 1  Additional appointment(s) needed: 6MWT, Labs  Additonal Notes: Rescheduling 7/17 Appt

## 2025-07-24 ENCOUNTER — OFFICE VISIT (OUTPATIENT)
Dept: CARDIOLOGY | Facility: CLINIC | Age: 55
End: 2025-07-24
Attending: PHYSICIAN ASSISTANT
Payer: COMMERCIAL

## 2025-07-24 ENCOUNTER — LAB (OUTPATIENT)
Dept: LAB | Facility: CLINIC | Age: 55
End: 2025-07-24
Payer: COMMERCIAL

## 2025-07-24 VITALS
DIASTOLIC BLOOD PRESSURE: 84 MMHG | WEIGHT: 293 LBS | SYSTOLIC BLOOD PRESSURE: 140 MMHG | HEART RATE: 72 BPM | OXYGEN SATURATION: 97 % | BODY MASS INDEX: 49.21 KG/M2

## 2025-07-24 DIAGNOSIS — R06.02 SOB (SHORTNESS OF BREATH): ICD-10-CM

## 2025-07-24 DIAGNOSIS — R06.09 DYSPNEA ON EXERTION: ICD-10-CM

## 2025-07-24 DIAGNOSIS — I27.20 PULMONARY HYPERTENSION (H): ICD-10-CM

## 2025-07-24 DIAGNOSIS — E87.6 HYPOKALEMIA: ICD-10-CM

## 2025-07-24 DIAGNOSIS — I27.24 CTEPH (CHRONIC THROMBOEMBOLIC PULMONARY HYPERTENSION) (H): ICD-10-CM

## 2025-07-24 PROBLEM — I82.4Z2 DEEP VEIN THROMBOSIS (DVT) OF DISTAL VEIN OF LEFT LOWER EXTREMITY (H): Status: ACTIVE | Noted: 2022-12-09

## 2025-07-24 PROBLEM — F32.9 MDD (MAJOR DEPRESSIVE DISORDER): Status: ACTIVE | Noted: 2018-10-08

## 2025-07-24 PROBLEM — Z86.711 HX OF PULMONARY EMBOLUS: Status: ACTIVE | Noted: 2022-12-09

## 2025-07-24 PROBLEM — M32.9 LUPUS (SYSTEMIC LUPUS ERYTHEMATOSUS) (H): Status: ACTIVE | Noted: 2025-07-24

## 2025-07-24 PROBLEM — D75.829 HEPARIN INDUCED THROMBOCYTOPENIA (HIT): Status: ACTIVE | Noted: 2022-12-08

## 2025-07-24 PROBLEM — F41.1 GAD (GENERALIZED ANXIETY DISORDER): Status: ACTIVE | Noted: 2022-12-09

## 2025-07-24 PROBLEM — H53.2 DIPLOPIA: Status: ACTIVE | Noted: 2017-07-17

## 2025-07-24 PROBLEM — R20.2 PARESTHESIA: Status: ACTIVE | Noted: 2017-07-17

## 2025-07-24 PROBLEM — E66.9 OBESITY: Status: ACTIVE | Noted: 2025-07-24

## 2025-07-24 LAB
6 MIN WALK (FT): 1512 FT
6 MIN WALK (M): 461 M
ALBUMIN SERPL BCG-MCNC: 4.1 G/DL (ref 3.5–5.2)
ALP SERPL-CCNC: 156 U/L (ref 40–150)
ALT SERPL W P-5'-P-CCNC: 18 U/L (ref 0–50)
ANION GAP SERPL CALCULATED.3IONS-SCNC: 15 MMOL/L (ref 7–15)
AST SERPL W P-5'-P-CCNC: 24 U/L (ref 0–45)
BILIRUB SERPL-MCNC: 0.5 MG/DL
BUN SERPL-MCNC: 28.9 MG/DL (ref 6–20)
CALCIUM SERPL-MCNC: 9.8 MG/DL (ref 8.8–10.4)
CHLORIDE SERPL-SCNC: 101 MMOL/L (ref 98–107)
CREAT SERPL-MCNC: 1.38 MG/DL (ref 0.51–0.95)
EGFRCR SERPLBLD CKD-EPI 2021: 46 ML/MIN/1.73M2
ERYTHROCYTE [DISTWIDTH] IN BLOOD BY AUTOMATED COUNT: 12.7 % (ref 10–15)
FIO2-PRE: 21 %
GLUCOSE SERPL-MCNC: 106 MG/DL (ref 70–99)
HCO3 SERPL-SCNC: 25 MMOL/L (ref 22–29)
HCT VFR BLD AUTO: 44.6 % (ref 35–47)
HGB BLD-MCNC: 14.4 G/DL (ref 11.7–15.7)
MCH RBC QN AUTO: 29.1 PG (ref 26.5–33)
MCHC RBC AUTO-ENTMCNC: 32.3 G/DL (ref 31.5–36.5)
MCV RBC AUTO: 90 FL (ref 78–100)
NT-PROBNP SERPL-MCNC: 136 PG/ML (ref 0–192)
PLATELET # BLD AUTO: 273 10E3/UL (ref 150–450)
POTASSIUM SERPL-SCNC: 4.2 MMOL/L (ref 3.4–5.3)
PROT SERPL-MCNC: 8.2 G/DL (ref 6.4–8.3)
RBC # BLD AUTO: 4.95 10E6/UL (ref 3.8–5.2)
SODIUM SERPL-SCNC: 141 MMOL/L (ref 135–145)
WBC # BLD AUTO: 6.9 10E3/UL (ref 4–11)

## 2025-07-24 PROCEDURE — 99213 OFFICE O/P EST LOW 20 MIN: CPT | Performed by: PHYSICIAN ASSISTANT

## 2025-07-24 RX ORDER — BUMETANIDE 1 MG/1
3 TABLET ORAL
COMMUNITY
Start: 2025-07-24

## 2025-07-24 ASSESSMENT — PAIN SCALES - GENERAL: PAINLEVEL_OUTOF10: NO PAIN (0)

## 2025-07-24 NOTE — PROGRESS NOTES
Date of Visit:  07/24/25      [facility] Pulmonary Hypertension Clinic      Primary PH cardiologist:  Dr. Paniagua         HPI:  Ms. Barksdale is a very pleasant 54 year old female with a PMhx including SLE with lupus antiphospholipid antibody syndrome and hx DVT on anticoagulation, and DAVIS on CPAP. She also has chronic thromboembolic pulmonary arterial hypertension. She was initially placed on riociguat, and then referred to [facility] for surgical evaluation. Ultimately, she underwent pulmonary thromboendarterectomy at [facility] in July 2020. She has type IV disease and her PTE was somewhat difficult to the distal nature of her disease. Her post operative course was uncomplicated, and postoperative hemodynamics showed improvement along with improved perfusion of the right lower lobe and left upper lobe via VQ scan, and her riociguat and digoxin were discontinued at that time. Post surgically she had struggled with swelling and weight gain requiring escalation of diuretics. She then underwent BPA in January and March of 2021 of right A8 branch and right A3 branch. Ultimately, due to some ongoing mild RV dysfunction, she was started back on monotherapy with tadalafil.      Unfortunately, due to a lapse in insurance we did not see her for several months. However, she returned in Aug of 2023 year to re-establish with us and get repeat hemodynamic testing. At that time, she was walking outside daily and feeling well, not requiring her oxygen, confirmed via repeat 6MWT. Echo showed stable mild RV dilation and RV dysfunction. RHC showed stable mildly elevated PA pressure and PVR with preserved cardiac output. Based on this, it was recommended that her tadalafil be changed back to riociguat for further titration. At our most recent virtual visit in Nov 2023, she was on Adempas 2.5mg TID and weight sounded to be stable, I then referred her back for repeat hemodynamic testing. This was done  in Jan 2024 and showed significant improvement with normalization of her PVR and cardiac output. Echo showed mild RV dilation and dysfunction. At that time, no changes were made. I saw her back most recently in April at which time she was doing well and I made no changes.     She returned most recently in Jan of this year with hemodynamic testing to see Dr. Paniagua. She sounded to be doing well at that time and was exercising daily on a stationary bike. Testing at that time showed normalization of her PVR and significant improvement in her mPAP with normal cardiac output. Echo showed only mild RV dilation and dysfunction. No changes were made at that time and she was advised to return in 6 months with a repeat walk test.     Today, we are meeting back in clinic to follow up. She tells me that overall she is doing about the same. She denies any worsening in her breathing since she was here last. No new CP or palpitations. She feels like her LE edema is under very good control recently; she now uses the sequential squeezing devices at home for her legs which has been helpful. She will on occasion feel LH but no presyncope/syncope.     She had a 6MWT prior to our visit which I reviewed. She ambulated 1512 ft and did briefly desat to 89% but did not require supplemental oxygen.      Labs performed prior to our visit today were reviewed as below.          CURRENT PULMONARY HYPERTENSION REGIMEN:     PAH Rx: Adempas 2.5mg TID  (Changed from tadalafil due to residual RV dysfunction)     Diuretics: Bumex 4mg BID, spironolactone 25mg BID     Oxygen: None      Anticoagulation: warfarin (follows chrom factor goal 20-30)--needs to be bridged with Arixtra given her heparin allergy if she needs procedures.  Indication: DVT/antiphospholipid     Immunosuppression: Yes   Rheum: Dr. Graf        Assessment/Plan:     1. Chronic thromboembolic pulmonary hypertension.              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 She was initially placed on Adempas and is s/p pulmonary thromboendarterectomy July 2020 at [facility]; Adempas was discontinued post surgery. For residual disease, she then had BPA in January and March of 2021 here at [facility], of the right A8 branch and right A3 branch. Due to residual RV dysfunction she is now back on Adempas. Via repeat hemodynamic testing in January, her PVR has normalized and and mPAP has improved. Cardiac output is preserved. Clinically, she is doing well.               --Today on exam she appears euvolemic on Bumex 4mg BID, and labs suggest she is perhaps slightly dry. With sequential squeezing devices for her legs, her edema is under good control currently. NT-proBNP is normal. Today, I will reduce her bumex slightly to 3mg BID and asked her to call if she notes any increased swelling or dyspnea.               --Via 6MWT she continues to have borderline hypoxemia, though does not have oxygen at home. Continue to monitor.               --She is compliant with her CPAP for known DAVIS.               --Continue Coumadin for anticoagulation, in the setting of CTEPH and antiphospholipid antibody syndrome. She follows locally with a chromogenic factor goal of 20-30. She needs to be bridged with Arixtra given her heparin allergy if she needs procedures.                 Follow up plan: Return to see me virtually in 4 months, with local labs prior.   We are happy to see the patient back sooner with any new concerns.       Orders this Visit:  Orders Placed This Encounter   Procedures    Follow-Up with Cardiology GLORY     No orders of the defined types were placed in this encounter.    There are no discontinued medications.      Review of Systems:  POS ROS ARE BOLDED, all other negative.    Cardiovascular: Chest pain, palpitations, orthopnea, LE edema  Resp: Dyspnea on exertion, cough, known chronic lung disease  Hematologic/lymphatic: Current systemic  anticoagulation, hx of blood clots, new bleeding concerns.  Neurological: Dizziness, syncope/presyncope     Physical Exam:  Vitals: BP (!) 140/84 (BP Location: Left arm, Patient Position: Chair, Cuff Size: Adult Large)   Pulse 72   Wt (!) 138.3 kg (304 lb 14.4 oz)   LMP 04/04/2012   SpO2 97%   BMI 49.21 kg/m     Wt Readings from Last 4 Encounters:   07/24/25 (!) 138.3 kg (304 lb 14.4 oz)   01/03/25 130.6 kg (287 lb 14.4 oz)   07/16/24 136.9 kg (301 lb 11.2 oz)   04/16/24 139 kg (306 lb 8 oz)       GEN:  In general, this is an overweight female in no acute distress on RA.  Patient ambulatory, unaccompanied.   NECK: Supple, No JVD with patient upright.  C/V:  Regular rate and rhythm, no murmur, rub or gallop. No S3 or RV heave.   RESP: Respirations are unlabored. No use of accessory muscles. Clear to auscultation bilaterally without wheezing, rales, or rhonchi.  EXTREM: Trace bilateral LE edema.   NEURO: Alert and oriented, cooperative. Gait not assessed.      Recent Lab Results:    LIVER ENZYME RESULTS:  Lab Results   Component Value Date    AST 24 07/24/2025    AST 19 05/03/2021    ALT 18 07/24/2025    ALT 23 05/03/2021       CBC RESULTS:  Lab Results   Component Value Date    WBC 6.9 07/24/2025    WBC 4.8 05/03/2021    RBC 4.95 07/24/2025    RBC 4.46 05/03/2021    HGB 14.4 07/24/2025    HGB 13.5 05/03/2021    HCT 44.6 07/24/2025    HCT 40.8 05/03/2021    MCV 90 07/24/2025    MCV 92 05/03/2021    MCH 29.1 07/24/2025    MCH 30.3 05/03/2021    MCHC 32.3 07/24/2025    MCHC 33.1 05/03/2021    RDW 12.7 07/24/2025    RDW 12.6 05/03/2021     07/24/2025     05/03/2021       BMP RESULTS:  Lab Results   Component Value Date     07/24/2025     05/03/2021    POTASSIUM 4.2 07/24/2025    POTASSIUM 3.1 (L) 08/02/2022    POTASSIUM 3.5 05/03/2021    CHLORIDE 101 07/24/2025    CHLORIDE 108 (H) 07/12/2024    CHLORIDE 106 05/03/2021    CO2 25 07/24/2025    CO2 23 08/02/2022    CO2 28 05/03/2021     ANIONGAP 15 07/24/2025    ANIONGAP 4 08/02/2022    ANIONGAP 8 05/03/2021     (H) 07/24/2025     (H) 08/02/2022    GLC 96 05/03/2021    BUN 28.9 (H) 07/24/2025    BUN 26 08/02/2022    BUN 23 05/03/2021    CR 1.38 (H) 07/24/2025    CR 1.14 (H) 05/03/2021    GFRESTIMATED 46 (L) 07/24/2025    GFRESTIMATED 57 (L) 05/03/2021    GFRESTBLACK 66 05/03/2021    KATARINA 9.8 07/24/2025    KATARINA 9.0 05/03/2021        Recent Labs   Lab Test 07/24/25  1129 01/03/25  0907 04/16/24  1220 10/03/23  1517 08/04/23  0921 05/03/21  1153 03/12/21  0434   NTBNPI  --   --   --   --  824  --  473*   NTBNP 136 321 485   < >  --    < >  --     < > = values in this interval not displayed.         Most recent pertinent testing:    Echocardiogram (01/2025)  Global and regional left ventricular function is normal with an EF of 55-60%.  Mild right ventricular dilation is present. Global right ventricular function is mildly reduced.  RVSP 30 mm Hg  IVC diameter <2.1 cm collapsing >50% with sniff suggests a normal RA pressure of 3 mmHg.  No pericardial effusion is present.  This study was compared with the study from 1/2024: No significant changes noted.     RHC (01/2025)  RA 1  RV 42/3  PA 42/15 (26)  Wedge 4  PA sat 70.2%  Terry cardiac output 10.1, Terry cardiac index 4.3  Thermodilution cardiac output 8.2 Thermodilution cardiac index 3.5  PVR 2.7 TERAN by TD      NYHA Functional Class:  2    The longitudinal plan of care for the diagnosis(es)/condition(s) as documented were addressed during this visit. Due to the added complexity in care, I will continue to support Yuridia in the subsequent management and with ongoing continuity of care.    A total of 30 minutes was spent today performing chart and history review, gathering HPI, physical exam, patient education, pre and post visit documentation, and care coordination.    Azucena Silva PA-C  [facility] Cardiology~Pulmonary Hypertension Clinic

## 2025-07-24 NOTE — NURSING NOTE
Chief Complaint   Patient presents with    Follow Up     Return Pulmonary Hypertension- 6 month PH follow up with labs & 6mwt prior     Vitals were taken and medications reconciled.    BOB Cramer  12:36 PM

## 2025-07-24 NOTE — PROGRESS NOTES
Yuridia Barksdale comes into clinic today at the request of Dr. Paniagua Ordering Provider for 6 minute walk      Jeffy Patterson, RRT

## 2025-07-24 NOTE — PATIENT INSTRUCTIONS
You were seen today in the Pulmonary Hypertension Clinic at the [facility].     Cardiology Provider you saw during your visit:    CEDRICK Zamarripa    Medication Changes:  REDUCE your bumex to 3mg twice daily (please watch for any worsening swelling and call us with concerns)    Results:    Latest Reference Range & Units 07/24/25 11:29   Sodium 135 - 145 mmol/L 141   Potassium 3.4 - 5.3 mmol/L 4.2   Chloride 98 - 107 mmol/L 101   Carbon Dioxide (CO2) 22 - 29 mmol/L 25   Urea Nitrogen 6.0 - 20.0 mg/dL 28.9 (H)   Creatinine 0.51 - 0.95 mg/dL 1.38 (H)   GFR Estimate >60 mL/min/1.73m2 46 (L)   Calcium 8.8 - 10.4 mg/dL 9.8   Anion Gap 7 - 15 mmol/L 15   Albumin 3.5 - 5.2 g/dL 4.1   Protein Total 6.4 - 8.3 g/dL 8.2   Alkaline Phosphatase 40 - 150 U/L 156 (H)   ALT 0 - 50 U/L 18   AST 0 - 45 U/L 24   Bilirubin Total <=1.2 mg/dL 0.5   Glucose 70 - 99 mg/dL 106 (H)   N-Terminal Pro Bnp 0 - 192 pg/mL 136   WBC 4.0 - 11.0 10e3/uL 6.9   Hemoglobin 11.7 - 15.7 g/dL 14.4   Hematocrit 35.0 - 47.0 % 44.6   Platelet Count 150 - 450 10e3/uL 273   RBC Count 3.80 - 5.20 10e6/uL 4.95   MCV 78 - 100 fL 90   MCH 26.5 - 33.0 pg 29.1   MCHC 31.5 - 36.5 g/dL 32.3   RDW 10.0 - 15.0 % 12.7   (H): Data is abnormally high  (L): Data is abnormally low    Recommendations:   Please start checking your blood pressure at home a few times per week and send us a MyChart in a few weeks with an update to see if we need to make any changes.     Follow-up:   Return in 4 months to see Azucena ren with local labs prior.       Please call us immediately if you have any syncope (fainting or passing out), chest pain, edema (swelling or weight gain), or decline in your functional status (general decline in how you are feeling).    If you have emergent concerns after hours or on the weekend, please call our on-call Cardiologist at [phone], option [phone]. For emergencies call [phone].     Thank you for allowing us to be a part of your care here at the  [facility] [place]    If you have questions or concerns please contact us at:    Yoly Villegas, RN (P: [phone])    Nurse Coordinator       Pulmonary Hypertension     [facility] [place]         OZZY Brown   (Prior Auths & Pt Assistance)   ()  Clinic   Clinic   Pulmonary Hypertension   Pulmonary Hypertension  [facility] [place]  (P)[phone]    (P) [phone]  (F) [phone]

## 2025-07-24 NOTE — LETTER
7/24/2025      RE: Yuridia Barksdale  [id number] [address] [address]       Dear Colleague,    Thank you for the opportunity to participate in the care of your patient, Yuridia Barksdale, at the [facility] [place] at [facility]. Please see a copy of my visit note below.        Date of Visit:  07/24/25      [facility] Pulmonary Hypertension Clinic      Primary PH cardiologist:  Dr. Paniagua         HPI:  Ms. Barksdale is a very pleasant 54 year old female with a PMhx including SLE with lupus antiphospholipid antibody syndrome and hx DVT on anticoagulation, and DAVIS on CPAP. She also has chronic thromboembolic pulmonary arterial hypertension. She was initially placed on riociguat, and then referred to [facility] for surgical evaluation. Ultimately, she underwent pulmonary thromboendarterectomy at [facility] in July 2020. She has type IV disease and her PTE was somewhat difficult to the distal nature of her disease. Her post operative course was uncomplicated, and postoperative hemodynamics showed improvement along with improved perfusion of the right lower lobe and left upper lobe via VQ scan, and her riociguat and digoxin were discontinued at that time. Post surgically she had struggled with swelling and weight gain requiring escalation of diuretics. She then underwent BPA in January and March of 2021 of right A8 branch and right A3 branch. Ultimately, due to some ongoing mild RV dysfunction, she was started back on monotherapy with tadalafil.      Unfortunately, due to a lapse in insurance we did not see her for several months. However, she returned in Aug of 2023 year to re-establish with us and get repeat hemodynamic testing. At that time, she was walking outside daily and feeling well, not requiring her oxygen, confirmed via repeat 6MWT. Echo showed stable mild RV dilation and RV dysfunction. RHC showed  stable mildly elevated PA pressure and PVR with preserved cardiac output. Based on this, it was recommended that her tadalafil be changed back to riociguat for further titration. At our most recent virtual visit in Nov 2023, she was on Adempas 2.5mg TID and weight sounded to be stable, I then referred her back for repeat hemodynamic testing. This was done in Jan 2024 and showed significant improvement with normalization of her PVR and cardiac output. Echo showed mild RV dilation and dysfunction. At that time, no changes were made. I saw her back most recently in April at which time she was doing well and I made no changes.     She returned most recently in Jan of this year with hemodynamic testing to see Dr. Paniagua. She sounded to be doing well at that time and was exercising daily on a stationary bike. Testing at that time showed normalization of her PVR and significant improvement in her mPAP with normal cardiac output. Echo showed only mild RV dilation and dysfunction. No changes were made at that time and she was advised to return in 6 months with a repeat walk test.     Today, we are meeting back in clinic to follow up. She tells me that overall she is doing about the same. She denies any worsening in her breathing since she was here last. No new CP or palpitations. She feels like her LE edema is under very good control recently; she now uses the sequential squeezing devices at home for her legs which has been helpful. She will on occasion feel LH but no presyncope/syncope.     She had a 6MWT prior to our visit which I reviewed. She ambulated 1512 ft and did briefly desat to 89% but did not require supplemental oxygen.      Labs performed prior to our visit today were reviewed as below.          CURRENT PULMONARY HYPERTENSION REGIMEN:     PAH Rx: Adempas 2.5mg TID  (Changed from tadalafil due to residual RV dysfunction)     Diuretics: Bumex 4mg BID, spironolactone 25mg BID     Oxygen: None       Anticoagulation: warfarin (follows chrom factor goal 20-30)--needs to be bridged with Arixtra given her heparin allergy if she needs procedures.  Indication: DVT/antiphospholipid     Immunosuppression: Yes   Rheum: Dr. Graf        Assessment/Plan:     1. Chronic thromboembolic pulmonary hypertension.              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 She was initially placed on Adempas and is s/p pulmonary thromboendarterectomy July 2020 at [facility] [place]; Adempas was discontinued post surgery. For residual disease, she then had BPA in January and March of 2021 here at [facility], of the right A8 branch and right A3 branch. Due to residual RV dysfunction she is now back on Adempas. Via repeat hemodynamic testing in January, her PVR has normalized and and mPAP has improved. Cardiac output is preserved. Clinically, she is doing well.               --Today on exam she appears euvolemic on Bumex 4mg BID, and labs suggest she is perhaps slightly dry. With sequential squeezing devices for her legs, her edema is under good control currently. NT-proBNP is normal. Today, I will reduce her bumex slightly to 3mg BID and asked her to call if she notes any increased swelling or dyspnea.               --Via 6MWT she continues to have borderline hypoxemia, though does not have oxygen at home. Continue to monitor.               --She is compliant with her CPAP for known DAVIS.               --Continue Coumadin for anticoagulation, in the setting of CTEPH and antiphospholipid antibody syndrome. She follows locally with a chromogenic factor goal of 20-30. She needs to be bridged with Arixtra given her heparin allergy if she needs procedures.                 Follow up plan: Return to see me virtually in 4 months, with local labs prior.   We are happy to see the patient back sooner with any new concerns.       Orders this Visit:  Orders Placed This Encounter   Procedures      Follow-Up with Cardiology GLORY     No orders of the defined types were placed in this encounter.    There are no discontinued medications.      Review of Systems:  POS ROS ARE BOLDED, all other negative.    Cardiovascular: Chest pain, palpitations, orthopnea, LE edema  Resp: Dyspnea on exertion, cough, known chronic lung disease  Hematologic/lymphatic: Current systemic anticoagulation, hx of blood clots, new bleeding concerns.  Neurological: Dizziness, syncope/presyncope     Physical Exam:  Vitals: BP (!) 140/84 (BP Location: Left arm, Patient Position: Chair, Cuff Size: Adult Large)   Pulse 72   Wt (!) 138.3 kg (304 lb 14.4 oz)   LMP 04/04/2012   SpO2 97%   BMI 49.21 kg/m     Wt Readings from Last 4 Encounters:   07/24/25 (!) 138.3 kg (304 lb 14.4 oz)   01/03/25 130.6 kg (287 lb 14.4 oz)   07/16/24 136.9 kg (301 lb 11.2 oz)   04/16/24 139 kg (306 lb 8 oz)       GEN:  In general, this is an overweight female in no acute distress on RA.  Patient ambulatory, unaccompanied.   NECK: Supple, No JVD with patient upright.  C/V:  Regular rate and rhythm, no murmur, rub or gallop. No S3 or RV heave.   RESP: Respirations are unlabored. No use of accessory muscles. Clear to auscultation bilaterally without wheezing, rales, or rhonchi.  EXTREM: Trace bilateral LE edema.   NEURO: Alert and oriented, cooperative. Gait not assessed.      Recent Lab Results:    LIVER ENZYME RESULTS:  Lab Results   Component Value Date    AST 24 07/24/2025    AST 19 05/03/2021    ALT 18 07/24/2025    ALT 23 05/03/2021       CBC RESULTS:  Lab Results   Component Value Date    WBC 6.9 07/24/2025    WBC 4.8 05/03/2021    RBC 4.95 07/24/2025    RBC 4.46 05/03/2021    HGB 14.4 07/24/2025    HGB 13.5 05/03/2021    HCT 44.6 07/24/2025    HCT 40.8 05/03/2021    MCV 90 07/24/2025    MCV 92 05/03/2021    MCH 29.1 07/24/2025    MCH 30.3 05/03/2021    MCHC 32.3 07/24/2025    MCHC 33.1 05/03/2021    RDW 12.7 07/24/2025    RDW 12.6 05/03/2021      07/24/2025     05/03/2021       BMP RESULTS:  Lab Results   Component Value Date     07/24/2025     05/03/2021    POTASSIUM 4.2 07/24/2025    POTASSIUM 3.1 (L) 08/02/2022    POTASSIUM 3.5 05/03/2021    CHLORIDE 101 07/24/2025    CHLORIDE 108 (H) 07/12/2024    CHLORIDE 106 05/03/2021    CO2 25 07/24/2025    CO2 23 08/02/2022    CO2 28 05/03/2021    ANIONGAP 15 07/24/2025    ANIONGAP 4 08/02/2022    ANIONGAP 8 05/03/2021     (H) 07/24/2025     (H) 08/02/2022    GLC 96 05/03/2021    BUN 28.9 (H) 07/24/2025    BUN 26 08/02/2022    BUN 23 05/03/2021    CR 1.38 (H) 07/24/2025    CR 1.14 (H) 05/03/2021    GFRESTIMATED 46 (L) 07/24/2025    GFRESTIMATED 57 (L) 05/03/2021    GFRESTBLACK 66 05/03/2021    KATARINA 9.8 07/24/2025    KATARINA 9.0 05/03/2021        Recent Labs   Lab Test 07/24/25  1129 01/03/25  0907 04/16/24  1220 10/03/23  1517 08/04/23  0921 05/03/21  1153 03/12/21  0434   NTBNPI  --   --   --   --  824  --  473*   NTBNP 136 321 485   < >  --    < >  --     < > = values in this interval not displayed.         Most recent pertinent testing:    Echocardiogram (01/2025)  Global and regional left ventricular function is normal with an EF of 55-60%.  Mild right ventricular dilation is present. Global right ventricular function is mildly reduced.  RVSP 30 mm Hg  IVC diameter <2.1 cm collapsing >50% with sniff suggests a normal RA pressure of 3 mmHg.  No pericardial effusion is present.  This study was compared with the study from 1/2024: No significant changes noted.     RHC (01/2025)  RA 1  RV 42/3  PA 42/15 (26)  Wedge 4  PA sat 70.2%  Terry cardiac output 10.1, Terry cardiac index 4.3  Thermodilution cardiac output 8.2 Thermodilution cardiac index 3.5  PVR 2.7 TERAN by TD      NYHA Functional Class:  2    The longitudinal plan of care for the diagnosis(es)/condition(s) as documented were addressed during this visit. Due to the added complexity in care, I will continue to support Yuridia in the  subsequent management and with ongoing continuity of care.    A total of 30 minutes was spent today performing chart and history review, gathering HPI, physical exam, patient education, pre and post visit documentation, and care coordination.    Azucena Silva PA-C  [facility] Cardiology~Pulmonary Hypertension Clinic          Please do not hesitate to contact me if you have any questions/concerns.     Sincerely,     CEDRICK Zamarripa

## 2025-08-13 ENCOUNTER — TELEPHONE (OUTPATIENT)
Dept: CARDIOLOGY | Facility: CLINIC | Age: 55
End: 2025-08-13
Payer: COMMERCIAL

## (undated) DEVICE — CATH BALLOON EMERGE 3.0X15MM H7493918915300

## (undated) DEVICE — INTRO SHEATH 7FRX10CM PINNACLE RSS702

## (undated) DEVICE — CATH ANGIO SUPERTORQUE PLUS JR4 6FRX100CM 533621

## (undated) DEVICE — MANIFOLD KIT ANGIO AUTOMATED 014613

## (undated) DEVICE — Device

## (undated) DEVICE — CATH GUIDING 100CM 6FR .070IN VSTBR

## (undated) DEVICE — PACK HEART RIGHT CUSTOM SAN32RHF18

## (undated) DEVICE — PACK HEART LEFT CUSTOM

## (undated) DEVICE — UMMC CONVENIENCE KIT FORMALLY H9656021017160 NEW# 602101716

## (undated) DEVICE — KIT ACCESSORY INTRO INFLATION SYS 20/30 PRIORITY 1000186-115

## (undated) DEVICE — GUIDEWIRE VASC 0.014INX180CM RUNTHROUGH 25-1011

## (undated) DEVICE — TUBING PRESSURE 30"

## (undated) DEVICE — CATH GUIDING BLUE YELLOW PTFE JL4 6FRX100CM 67000400

## (undated) DEVICE — KIT HAND CONTROL ACIST 014644 AR-P54

## (undated) DEVICE — INTRO SHEATH MICRO PLATINUM TIP 4FRX40CM 7274

## (undated) DEVICE — CATH GUIDING BLUE YELLOW PTFE JR4 6FRX100CM 67008200

## (undated) DEVICE — VALVE HEMOSTASIS .096" COPILOT MECH 1003331

## (undated) DEVICE — WIRE GUIDE 0.025"X150CM EMERALD J TIP 502524

## (undated) DEVICE — INTRO SHEATH 4FRX10CM PINNACLE RSS402

## (undated) DEVICE — CATH BALLOON EMERGE 2.5X12MM H7493918912250

## (undated) DEVICE — KIT MICROINTRODUCER VASCULAR  4FRX21GAX4CM

## (undated) DEVICE — WIRE GUIDE 0.035"X145CM AMPLATZ XSTIFF J THSCF-35-145-3-AES

## (undated) DEVICE — CATH ANGIO INFINITI PIGTAIL 145 6 SH 6FRX110CM  534-652S

## (undated) DEVICE — WIRE GUIDE 0.035"X150CM EMERALD J TIP 502521

## (undated) DEVICE — CATH FINECROSS MG 1.8-2.6FR X 130CM

## (undated) DEVICE — CLOSURE DEVICE 6FR VASC PROGLIDE MEDICATED SUTURE 12673-03

## (undated) DEVICE — KIT RIGHT HEART CATH 60130719

## (undated) DEVICE — 0.035IN X 260CM, EMERALD DIAGNOSTIC GUIDEWIRE, FIXED-CORE PTFE COATED, STANDARD, 3MM EXCHANGE J-TIP (EA/1)

## (undated) DEVICE — CATH BALLOON EMERGE 2.0X12MM H7493918912200

## (undated) DEVICE — WIRE GUIDE AMPLATZ SUPER STIFF 0.035"X260CM J-TIP

## (undated) DEVICE — GUIDEWIRE TERUMO .035X180 ANG GR3508

## (undated) DEVICE — 8 FR X .11 IN X 77 CM, MULTIPURP BRAIDED GUIDING SHEATH

## (undated) DEVICE — INTRO SHEATH 8FRX10CM PINNACLE RSS802

## (undated) DEVICE — INTRO SHEATH 7FRX25CM PINNACLE RSS706

## (undated) DEVICE — CATH DIAGNOSTIC RADIAL 5FR TIG 4.0

## (undated) DEVICE — CATH BALLOON EMERGE 2.5X20MM H7493918920250

## (undated) DEVICE — CATH GUIDING JR5 6FR .070IN 100CM

## (undated) DEVICE — CATH GUIDING IKARI 6FR IL3.5 LEFT HEARTRAIL 40-6370

## (undated) DEVICE — KIT VENOUS FLUSH 60210177

## (undated) DEVICE — CATH ANGIO INFINITI 3DRC 4FRX100CM 538476

## (undated) DEVICE — CATH GUIDING BLUE YELLOW PTFE AL1 6FRX100CM 67003600

## (undated) DEVICE — CATH GUIDING BLUE YELLOW PTFE 3DRC 6FRX100CM 67013000

## (undated) DEVICE — INTRO SHEATH 6FRX10CM PINNACLE RSS602

## (undated) RX ORDER — POTASSIUM CHLORIDE 750 MG/1
TABLET, EXTENDED RELEASE ORAL
Status: DISPENSED
Start: 2020-12-15

## (undated) RX ORDER — SODIUM CHLORIDE 9 MG/ML
INJECTION, SOLUTION INTRAVENOUS
Status: DISPENSED
Start: 2019-12-02

## (undated) RX ORDER — LIDOCAINE 40 MG/G
CREAM TOPICAL
Status: DISPENSED
Start: 2024-01-17

## (undated) RX ORDER — SODIUM CHLORIDE 9 MG/ML
INJECTION, SOLUTION INTRAVENOUS
Status: DISPENSED
Start: 2021-01-18

## (undated) RX ORDER — LIDOCAINE 40 MG/G
CREAM TOPICAL
Status: DISPENSED
Start: 2020-11-11

## (undated) RX ORDER — LIDOCAINE 40 MG/G
CREAM TOPICAL
Status: DISPENSED
Start: 2020-12-15

## (undated) RX ORDER — LIDOCAINE 40 MG/G
CREAM TOPICAL
Status: DISPENSED
Start: 2025-01-03

## (undated) RX ORDER — REGADENOSON 0.08 MG/ML
INJECTION, SOLUTION INTRAVENOUS
Status: DISPENSED
Start: 2020-01-24

## (undated) RX ORDER — LIDOCAINE 40 MG/G
CREAM TOPICAL
Status: DISPENSED
Start: 2023-08-04